# Patient Record
Sex: MALE | Race: WHITE | Employment: FULL TIME | URBAN - METROPOLITAN AREA
[De-identification: names, ages, dates, MRNs, and addresses within clinical notes are randomized per-mention and may not be internally consistent; named-entity substitution may affect disease eponyms.]

---

## 2017-01-04 ENCOUNTER — TELEPHONE (OUTPATIENT)
Dept: FAMILY MEDICINE CLINIC | Age: 57
End: 2017-01-04

## 2017-01-04 DIAGNOSIS — M17.11 PRIMARY OSTEOARTHRITIS OF RIGHT KNEE: ICD-10-CM

## 2017-01-04 RX ORDER — HYDROCODONE BITARTRATE AND ACETAMINOPHEN 5; 325 MG/1; MG/1
1 TABLET ORAL 2 TIMES DAILY PRN
Qty: 60 TABLET | Refills: 0 | Status: SHIPPED | OUTPATIENT
Start: 2017-01-04 | End: 2017-02-08 | Stop reason: SDUPTHER

## 2017-02-07 RX ORDER — ISOSORBIDE MONONITRATE 60 MG/1
TABLET, EXTENDED RELEASE ORAL
Qty: 30 TABLET | Refills: 1 | Status: SHIPPED | OUTPATIENT
Start: 2017-02-07 | End: 2017-03-05 | Stop reason: SDUPTHER

## 2017-02-08 ENCOUNTER — OFFICE VISIT (OUTPATIENT)
Dept: FAMILY MEDICINE CLINIC | Age: 57
End: 2017-02-08

## 2017-02-08 VITALS
DIASTOLIC BLOOD PRESSURE: 72 MMHG | HEART RATE: 70 BPM | SYSTOLIC BLOOD PRESSURE: 110 MMHG | WEIGHT: 153 LBS | HEIGHT: 66 IN | TEMPERATURE: 99.4 F | BODY MASS INDEX: 24.59 KG/M2 | RESPIRATION RATE: 12 BRPM

## 2017-02-08 DIAGNOSIS — I25.10 CAD S/P PERCUTANEOUS CORONARY ANGIOPLASTY: ICD-10-CM

## 2017-02-08 DIAGNOSIS — Z98.61 CAD S/P PERCUTANEOUS CORONARY ANGIOPLASTY: ICD-10-CM

## 2017-02-08 DIAGNOSIS — I10 BENIGN ESSENTIAL HTN: Primary | ICD-10-CM

## 2017-02-08 DIAGNOSIS — M17.11 PRIMARY OSTEOARTHRITIS OF RIGHT KNEE: ICD-10-CM

## 2017-02-08 PROCEDURE — 99213 OFFICE O/P EST LOW 20 MIN: CPT | Performed by: FAMILY MEDICINE

## 2017-02-08 RX ORDER — MELOXICAM 15 MG/1
15 TABLET ORAL DAILY
Qty: 30 TABLET | Refills: 3 | Status: SHIPPED | OUTPATIENT
Start: 2017-02-08 | End: 2017-07-18

## 2017-02-08 RX ORDER — HYDROCODONE BITARTRATE AND ACETAMINOPHEN 5; 325 MG/1; MG/1
1 TABLET ORAL 2 TIMES DAILY PRN
Qty: 60 TABLET | Refills: 0 | Status: SHIPPED | OUTPATIENT
Start: 2017-02-08 | End: 2017-03-08 | Stop reason: SDUPTHER

## 2017-02-19 ASSESSMENT — ENCOUNTER SYMPTOMS
SHORTNESS OF BREATH: 0
BLOOD IN STOOL: 0
ABDOMINAL PAIN: 0

## 2017-03-06 DIAGNOSIS — Z87.2: ICD-10-CM

## 2017-03-06 RX ORDER — METRONIDAZOLE 7.5 MG/G
GEL TOPICAL
Qty: 45 G | Refills: 2 | Status: SHIPPED | OUTPATIENT
Start: 2017-03-06 | End: 2018-05-02 | Stop reason: ALTCHOICE

## 2017-03-06 RX ORDER — ISOSORBIDE MONONITRATE 60 MG/1
60 TABLET, EXTENDED RELEASE ORAL DAILY
Qty: 30 TABLET | Refills: 2 | Status: SHIPPED | OUTPATIENT
Start: 2017-03-06 | End: 2017-06-05 | Stop reason: SDUPTHER

## 2017-03-08 DIAGNOSIS — M17.11 PRIMARY OSTEOARTHRITIS OF RIGHT KNEE: ICD-10-CM

## 2017-03-08 RX ORDER — HYDROCODONE BITARTRATE AND ACETAMINOPHEN 5; 325 MG/1; MG/1
1 TABLET ORAL 2 TIMES DAILY PRN
Qty: 60 TABLET | Refills: 0 | Status: SHIPPED | OUTPATIENT
Start: 2017-03-08 | End: 2017-04-07 | Stop reason: SDUPTHER

## 2017-03-14 ENCOUNTER — OFFICE VISIT (OUTPATIENT)
Dept: CARDIOLOGY | Age: 57
End: 2017-03-14

## 2017-03-14 VITALS
HEART RATE: 64 BPM | HEIGHT: 67 IN | WEIGHT: 152.8 LBS | DIASTOLIC BLOOD PRESSURE: 80 MMHG | BODY MASS INDEX: 23.98 KG/M2 | SYSTOLIC BLOOD PRESSURE: 120 MMHG

## 2017-03-14 DIAGNOSIS — I10 ESSENTIAL HYPERTENSION: Primary | ICD-10-CM

## 2017-03-14 DIAGNOSIS — Z72.0 TOBACCO ABUSE: Chronic | ICD-10-CM

## 2017-03-14 DIAGNOSIS — I25.10 CORONARY ARTERY DISEASE INVOLVING NATIVE CORONARY ARTERY OF NATIVE HEART WITHOUT ANGINA PECTORIS: Chronic | ICD-10-CM

## 2017-03-14 DIAGNOSIS — E78.5 DYSLIPIDEMIA: ICD-10-CM

## 2017-03-14 PROCEDURE — 93000 ELECTROCARDIOGRAM COMPLETE: CPT | Performed by: INTERNAL MEDICINE

## 2017-03-14 PROCEDURE — 99213 OFFICE O/P EST LOW 20 MIN: CPT | Performed by: INTERNAL MEDICINE

## 2017-03-31 RX ORDER — RAMIPRIL 10 MG/1
CAPSULE ORAL
Qty: 30 CAPSULE | Refills: 5 | Status: SHIPPED | OUTPATIENT
Start: 2017-03-31 | End: 2017-10-12 | Stop reason: SDUPTHER

## 2017-04-07 DIAGNOSIS — F41.0 ANXIETY ATTACK: ICD-10-CM

## 2017-04-07 DIAGNOSIS — M17.11 PRIMARY OSTEOARTHRITIS OF RIGHT KNEE: ICD-10-CM

## 2017-04-07 RX ORDER — ALPRAZOLAM 0.5 MG/1
TABLET ORAL
Qty: 30 TABLET | Refills: 1 | Status: SHIPPED | OUTPATIENT
Start: 2017-04-07 | End: 2017-10-01 | Stop reason: SDUPTHER

## 2017-04-07 RX ORDER — HYDROCODONE BITARTRATE AND ACETAMINOPHEN 5; 325 MG/1; MG/1
1 TABLET ORAL 2 TIMES DAILY PRN
Qty: 60 TABLET | Refills: 0 | Status: SHIPPED | OUTPATIENT
Start: 2017-04-07 | End: 2017-05-07 | Stop reason: SDUPTHER

## 2017-04-26 RX ORDER — PRAVASTATIN SODIUM 40 MG
40 TABLET ORAL NIGHTLY
Qty: 90 TABLET | Refills: 3 | Status: SHIPPED | OUTPATIENT
Start: 2017-04-26 | End: 2018-05-24 | Stop reason: SDUPTHER

## 2017-05-07 DIAGNOSIS — M17.11 PRIMARY OSTEOARTHRITIS OF RIGHT KNEE: ICD-10-CM

## 2017-05-07 RX ORDER — HYDROCODONE BITARTRATE AND ACETAMINOPHEN 5; 325 MG/1; MG/1
1 TABLET ORAL 2 TIMES DAILY PRN
Qty: 60 TABLET | Refills: 0 | Status: SHIPPED | OUTPATIENT
Start: 2017-05-07 | End: 2017-06-07 | Stop reason: SDUPTHER

## 2017-06-06 RX ORDER — ISOSORBIDE MONONITRATE 60 MG/1
60 TABLET, EXTENDED RELEASE ORAL DAILY
Qty: 30 TABLET | Refills: 5 | Status: SHIPPED | OUTPATIENT
Start: 2017-06-06 | End: 2018-01-11 | Stop reason: SDUPTHER

## 2017-06-07 DIAGNOSIS — M17.11 PRIMARY OSTEOARTHRITIS OF RIGHT KNEE: ICD-10-CM

## 2017-06-08 RX ORDER — HYDROCODONE BITARTRATE AND ACETAMINOPHEN 5; 325 MG/1; MG/1
1 TABLET ORAL 2 TIMES DAILY PRN
Qty: 60 TABLET | Refills: 0 | Status: SHIPPED | OUTPATIENT
Start: 2017-06-08 | End: 2017-07-18 | Stop reason: SDUPTHER

## 2017-07-18 ENCOUNTER — OFFICE VISIT (OUTPATIENT)
Dept: FAMILY MEDICINE CLINIC | Age: 57
End: 2017-07-18

## 2017-07-18 VITALS
HEART RATE: 64 BPM | TEMPERATURE: 99 F | RESPIRATION RATE: 16 BRPM | WEIGHT: 145 LBS | SYSTOLIC BLOOD PRESSURE: 130 MMHG | BODY MASS INDEX: 22.76 KG/M2 | DIASTOLIC BLOOD PRESSURE: 76 MMHG | HEIGHT: 67 IN

## 2017-07-18 DIAGNOSIS — F41.0 ANXIETY ATTACK: ICD-10-CM

## 2017-07-18 DIAGNOSIS — F43.21 ADJUSTMENT REACTION WITH BRIEF DEPRESSIVE REACTION: Primary | ICD-10-CM

## 2017-07-18 DIAGNOSIS — M17.11 PRIMARY OSTEOARTHRITIS OF RIGHT KNEE: ICD-10-CM

## 2017-07-18 PROCEDURE — 99213 OFFICE O/P EST LOW 20 MIN: CPT | Performed by: FAMILY MEDICINE

## 2017-07-18 PROCEDURE — G0444 DEPRESSION SCREEN ANNUAL: HCPCS | Performed by: FAMILY MEDICINE

## 2017-07-18 RX ORDER — FLUOXETINE 20 MG/1
20 TABLET, FILM COATED ORAL DAILY
Qty: 30 TABLET | Refills: 3 | Status: SHIPPED | OUTPATIENT
Start: 2017-07-18 | End: 2017-11-16 | Stop reason: SDUPTHER

## 2017-07-18 RX ORDER — HYDROCODONE BITARTRATE AND ACETAMINOPHEN 5; 325 MG/1; MG/1
1 TABLET ORAL 2 TIMES DAILY PRN
Qty: 60 TABLET | Refills: 0 | Status: SHIPPED | OUTPATIENT
Start: 2017-07-18 | End: 2017-08-17 | Stop reason: SDUPTHER

## 2017-07-18 ASSESSMENT — PATIENT HEALTH QUESTIONNAIRE - PHQ9
SUM OF ALL RESPONSES TO PHQ9 QUESTIONS 1 & 2: 5
4. FEELING TIRED OR HAVING LITTLE ENERGY: 3
1. LITTLE INTEREST OR PLEASURE IN DOING THINGS: 3
5. POOR APPETITE OR OVEREATING: 3
10. IF YOU CHECKED OFF ANY PROBLEMS, HOW DIFFICULT HAVE THESE PROBLEMS MADE IT FOR YOU TO DO YOUR WORK, TAKE CARE OF THINGS AT HOME, OR GET ALONG WITH OTHER PEOPLE: 1
7. TROUBLE CONCENTRATING ON THINGS, SUCH AS READING THE NEWSPAPER OR WATCHING TELEVISION: 0
SUM OF ALL RESPONSES TO PHQ QUESTIONS 1-9: 17
3. TROUBLE FALLING OR STAYING ASLEEP: 0
6. FEELING BAD ABOUT YOURSELF - OR THAT YOU ARE A FAILURE OR HAVE LET YOURSELF OR YOUR FAMILY DOWN: 3
2. FEELING DOWN, DEPRESSED OR HOPELESS: 2
9. THOUGHTS THAT YOU WOULD BE BETTER OFF DEAD, OR OF HURTING YOURSELF: 1
8. MOVING OR SPEAKING SO SLOWLY THAT OTHER PEOPLE COULD HAVE NOTICED. OR THE OPPOSITE, BEING SO FIGETY OR RESTLESS THAT YOU HAVE BEEN MOVING AROUND A LOT MORE THAN USUAL: 2

## 2017-07-18 ASSESSMENT — ENCOUNTER SYMPTOMS
BLOOD IN STOOL: 0
ABDOMINAL PAIN: 0
SHORTNESS OF BREATH: 0
CHEST TIGHTNESS: 0
NAUSEA: 0

## 2017-08-17 DIAGNOSIS — M17.11 PRIMARY OSTEOARTHRITIS OF RIGHT KNEE: ICD-10-CM

## 2017-08-17 RX ORDER — HYDROCODONE BITARTRATE AND ACETAMINOPHEN 5; 325 MG/1; MG/1
1 TABLET ORAL 2 TIMES DAILY PRN
Qty: 60 TABLET | Refills: 0 | Status: SHIPPED | OUTPATIENT
Start: 2017-08-17 | End: 2017-09-14 | Stop reason: SDUPTHER

## 2017-08-30 ENCOUNTER — OFFICE VISIT (OUTPATIENT)
Dept: FAMILY MEDICINE CLINIC | Age: 57
End: 2017-08-30

## 2017-08-30 VITALS
RESPIRATION RATE: 14 BRPM | HEIGHT: 67 IN | WEIGHT: 144 LBS | DIASTOLIC BLOOD PRESSURE: 78 MMHG | BODY MASS INDEX: 22.6 KG/M2 | SYSTOLIC BLOOD PRESSURE: 126 MMHG | HEART RATE: 59 BPM | TEMPERATURE: 98.7 F

## 2017-08-30 DIAGNOSIS — E78.5 DYSLIPIDEMIA: ICD-10-CM

## 2017-08-30 DIAGNOSIS — I10 ESSENTIAL HYPERTENSION: ICD-10-CM

## 2017-08-30 DIAGNOSIS — F32.4 MAJOR DEPRESSIVE DISORDER WITH SINGLE EPISODE, IN PARTIAL REMISSION (HCC): Primary | ICD-10-CM

## 2017-08-30 PROCEDURE — 99212 OFFICE O/P EST SF 10 MIN: CPT | Performed by: FAMILY MEDICINE

## 2017-08-31 ASSESSMENT — ENCOUNTER SYMPTOMS
ABDOMINAL PAIN: 0
BLOOD IN STOOL: 0
COUGH: 0

## 2017-09-14 DIAGNOSIS — M17.11 PRIMARY OSTEOARTHRITIS OF RIGHT KNEE: ICD-10-CM

## 2017-09-14 RX ORDER — HYDROCODONE BITARTRATE AND ACETAMINOPHEN 5; 325 MG/1; MG/1
1 TABLET ORAL 2 TIMES DAILY PRN
Qty: 60 TABLET | Refills: 0 | Status: SHIPPED | OUTPATIENT
Start: 2017-09-14 | End: 2017-10-12 | Stop reason: SDUPTHER

## 2017-10-01 DIAGNOSIS — F41.0 ANXIETY ATTACK: ICD-10-CM

## 2017-10-01 RX ORDER — ALPRAZOLAM 0.5 MG/1
TABLET ORAL
Qty: 30 TABLET | Refills: 0 | Status: SHIPPED | OUTPATIENT
Start: 2017-10-01 | End: 2017-11-29 | Stop reason: SDUPTHER

## 2017-10-12 DIAGNOSIS — M17.11 PRIMARY OSTEOARTHRITIS OF RIGHT KNEE: ICD-10-CM

## 2017-10-12 DIAGNOSIS — I10 ESSENTIAL HYPERTENSION: Primary | ICD-10-CM

## 2017-10-12 RX ORDER — RAMIPRIL 10 MG/1
CAPSULE ORAL
Qty: 30 CAPSULE | Refills: 5 | Status: SHIPPED | OUTPATIENT
Start: 2017-10-12 | End: 2018-04-25 | Stop reason: SDUPTHER

## 2017-10-12 RX ORDER — HYDROCODONE BITARTRATE AND ACETAMINOPHEN 5; 325 MG/1; MG/1
1 TABLET ORAL 2 TIMES DAILY PRN
Qty: 60 TABLET | Refills: 0 | Status: SHIPPED | OUTPATIENT
Start: 2017-10-12 | End: 2017-11-08 | Stop reason: SDUPTHER

## 2017-10-12 NOTE — TELEPHONE ENCOUNTER
Call, as rxs called    ; Controlled Substances Monitoring:     Attestation: The Prescription Monitoring Report for this patient was reviewed today. Ok Gale DO)  Documentation: Possible medication side effects, risk of tolerance and/or dependence, and alternative treatments discussed., No signs of potential drug abuse or diversion identified. Ok Gale DO)  Chronic Pain: Treatment objectives documented - patient is progressing appropriately. , Dose reduction has been attempted.  Ok Gale DO)

## 2017-10-12 NOTE — TELEPHONE ENCOUNTER
From: Yoni Ledesma  Sent: 10/12/2017 9:24 AM EDT  Subject: Medication Renewal Request    Yoni Ledesma would like a refill of the following medications:  ramipril (ALTACE) 10 MG capsule Nima Howardine, DO]  HYDROcodone-acetaminophen (Lillian Anthony) 5-325 MG per tablet Nima Boone, DO]    Preferred pharmacy: [address] [place] [address] Darrian [phone] - F [phone]    Comment:

## 2017-11-08 DIAGNOSIS — M17.11 PRIMARY OSTEOARTHRITIS OF RIGHT KNEE: ICD-10-CM

## 2017-11-08 RX ORDER — HYDROCODONE BITARTRATE AND ACETAMINOPHEN 5; 325 MG/1; MG/1
1 TABLET ORAL 2 TIMES DAILY PRN
Qty: 60 TABLET | Refills: 0 | Status: SHIPPED | OUTPATIENT
Start: 2017-11-08 | End: 2017-12-06 | Stop reason: SDUPTHER

## 2017-11-08 NOTE — TELEPHONE ENCOUNTER
Pt requesting refill please approve or deny.    Last seen 8/30/2017  Last filled 10/12/2017    Future Appointments  Date Time Provider Ginna Barnett   11/29/2017 9:30 AM DO KIKO Hernandez [facility] PCP [facility] EMERGENCY [facility] CENTER AT [place]

## 2017-11-08 NOTE — TELEPHONE ENCOUNTER
From: Julien Cabrales  Sent: 11/8/2017 8:22 AM EST  Subject: Medication Renewal Request    Julien Cabrales would like a refill of the following medications:  HYDROcodone-acetaminophen ([facility]) 5-325 MG per tablet Mallory Gao, DO]    Preferred pharmacy: [address] [place] [address] Darrian [phone] - F [phone]    Comment:  could I get either more or a higher dose they don't seem to work as well as they used to

## 2017-11-08 NOTE — TELEPHONE ENCOUNTER
Call-as rx called    ; Controlled Substances Monitoring:     Attestation: The Prescription Monitoring Report for this patient was reviewed today. Mallory Gao DO)  Documentation: Possible medication side effects, risk of tolerance and/or dependence, and alternative treatments discussed., No signs of potential drug abuse or diversion identified., Existing medication contract. Mallory Gao DO)  Chronic Pain: Treatment objectives documented - patient is progressing appropriately. , Functional status reviewed - continues with improved or maintaining ADL's., Reviewed the patient's functional status and documentation, including the 4A's of chronic pain treatment., Dose reduction has been attempted.  Mallory Gao DO)

## 2017-11-16 DIAGNOSIS — F43.21 ADJUSTMENT REACTION WITH BRIEF DEPRESSIVE REACTION: ICD-10-CM

## 2017-11-16 RX ORDER — FLUOXETINE 20 MG/1
20 TABLET, FILM COATED ORAL DAILY
Qty: 30 TABLET | Refills: 3 | Status: SHIPPED | OUTPATIENT
Start: 2017-11-16 | End: 2018-03-23 | Stop reason: SDUPTHER

## 2017-11-29 ENCOUNTER — OFFICE VISIT (OUTPATIENT)
Dept: FAMILY MEDICINE CLINIC | Age: 57
End: 2017-11-29

## 2017-11-29 VITALS
RESPIRATION RATE: 16 BRPM | TEMPERATURE: 97.9 F | DIASTOLIC BLOOD PRESSURE: 84 MMHG | BODY MASS INDEX: 23.39 KG/M2 | HEIGHT: 67 IN | WEIGHT: 149 LBS | SYSTOLIC BLOOD PRESSURE: 138 MMHG | HEART RATE: 61 BPM

## 2017-11-29 DIAGNOSIS — Z23 NEED FOR INFLUENZA VACCINATION: ICD-10-CM

## 2017-11-29 DIAGNOSIS — M17.11 LOCALIZED OSTEOARTHRITIS OF RIGHT KNEE: Primary | ICD-10-CM

## 2017-11-29 DIAGNOSIS — E78.5 DYSLIPIDEMIA: ICD-10-CM

## 2017-11-29 DIAGNOSIS — I10 ESSENTIAL HYPERTENSION: ICD-10-CM

## 2017-11-29 DIAGNOSIS — F41.0 ANXIETY ATTACK: ICD-10-CM

## 2017-11-29 PROCEDURE — G8420 CALC BMI NORM PARAMETERS: HCPCS | Performed by: FAMILY MEDICINE

## 2017-11-29 PROCEDURE — G8598 ASA/ANTIPLAT THER USED: HCPCS | Performed by: FAMILY MEDICINE

## 2017-11-29 PROCEDURE — G8484 FLU IMMUNIZE NO ADMIN: HCPCS | Performed by: FAMILY MEDICINE

## 2017-11-29 PROCEDURE — 99213 OFFICE O/P EST LOW 20 MIN: CPT | Performed by: FAMILY MEDICINE

## 2017-11-29 PROCEDURE — G8427 DOCREV CUR MEDS BY ELIG CLIN: HCPCS | Performed by: FAMILY MEDICINE

## 2017-11-29 PROCEDURE — 90688 IIV4 VACCINE SPLT 0.5 ML IM: CPT | Performed by: FAMILY MEDICINE

## 2017-11-29 PROCEDURE — 4004F PT TOBACCO SCREEN RCVD TLK: CPT | Performed by: FAMILY MEDICINE

## 2017-11-29 PROCEDURE — 90471 IMMUNIZATION ADMIN: CPT | Performed by: FAMILY MEDICINE

## 2017-11-29 PROCEDURE — 3017F COLORECTAL CA SCREEN DOC REV: CPT | Performed by: FAMILY MEDICINE

## 2017-11-29 RX ORDER — ALPRAZOLAM 0.5 MG/1
TABLET ORAL
Qty: 30 TABLET | Refills: 0 | Status: SHIPPED | OUTPATIENT
Start: 2017-11-29 | End: 2018-01-24 | Stop reason: SDUPTHER

## 2017-11-29 NOTE — PROGRESS NOTES
Subjective  Jair Rosa, 65 y.o. male presents today with:  Chief Complaint   Patient presents with    Depression     3 mnth f/u     Hypertension    Knee Pain       HPI  Patient presents today for a three month follow up for depression, hypertension, and coronary artery disease. NO exertional gallagher/palp/cp--on cardiac rxs/better diet  Still daily medial r-knee pain and occ swelling/pm[on norco, as benefit>>risk in this cad-pt]  Rev/rxs; No abuse nor side effects on meds   Mood/attacks much improved /prozac qam  Vaccine Information Sheet, \"Influenza - Inactivated\" OR \"Live - Intranasal\"  given to Jair Rosa. Patient responses:    Have you ever had a reaction to a flu vaccine? No  Are you able to eat eggs without adverse effects? Yes  Do you have any current illness? No  Have you ever had Guillian [place] Syndrome? No    Flu vaccine given per order. Please see immunization tab. Review of Systems   Constitutional: Negative for fatigue and fever. HENT: Positive for congestion. Eyes: Negative for visual disturbance. Respiratory: Negative for cough and shortness of breath. Cardiovascular: Negative for chest pain, palpitations and leg swelling. Gastrointestinal: Negative for abdominal pain, blood in stool and nausea. Genitourinary: Positive for frequency. Negative for decreased urine volume, difficulty urinating and dysuria. Musculoskeletal: Positive for arthralgias, gait problem (from r-knee pain) and myalgias. Neurological: Negative for weakness, light-headedness and headaches. Psychiatric/Behavioral: Positive for dysphoric mood (more stable/rx).        No Known Allergies    Past Medical History:   Diagnosis Date    Anxiety attack     Asthma     CAD (coronary artery disease)     Depression     Hypertension      Past Surgical History:   Procedure Laterality Date    CARDIAC CATHETERIZATION      TEMPOROMANDIBULAR JOINT SURGERY      Rt knee     Social History     Social History present. Cardiovascular: Normal rate, regular rhythm, S1 normal and S2 normal.   No extrasystoles are present. Exam reveals no S3.    Murmur heard. Systolic murmur is present with a grade of 1/6   Pulmonary/Chest: Effort normal and breath sounds normal.   Musculoskeletal: He exhibits no edema. Right knee: He exhibits decreased range of motion (nl hip exam) and bony tenderness. He exhibits no swelling, no effusion, no erythema, normal patellar mobility and normal meniscus. Tenderness found. Medial joint line (1/4 tx) tenderness noted. Skin: He is not diaphoretic. Psychiatric: Mood, memory and affect normal.   ;;Normal sensory/vascular foot exam and no lesions bilaterally   Assessment & Plan   1. Localized osteoarthritis of right knee  Comprehensive Metabolic Panel   2. Anxiety attack,depression,stable  ALPRAZolam (XANAX) 0.5 MG tablet   3. Need for influenza vaccination  INFLUENZA, QUADV, 3 YRS AND OLDER, IM, MDV, 0.5ML (Ellan Crumb)   4. Dyslipidemia  Lipid Panel    Comprehensive Metabolic Panel   5. Essential hypertension  Comprehensive Metabolic Panel   ; The patient is asked to make an attempt to improve diet and exercise patterns to aid in medical management of this problem. ;See above orders, as use and side effects reviewed ;Continue same meds, as common side effects and use reviewed-call if ineffective or problems   Call if worse  ; If worsening chest pain/weakness/ or SOB, get to ER; call 911 if severe or rapidly worsening symptoms. [ Jose Guadalupe Benitez soon/refill. ..--no s/e];xanax PRN only. ..   Orders Placed This Encounter   Procedures    INFLUENZA, QUADV, 3 YRS AND OLDER, IM, MDV, 0.5ML (FLUZONE QUADV)    Lipid Panel     Standing Status:   Future     Standing Expiration Date:   11/29/2018     Order Specific Question:   Is Patient Fasting?/# of Hours     Answer:   10 HOURS    Comprehensive Metabolic Panel     Standing Status:   Future     Standing Expiration Date:   11/29/2018     Orders Placed This

## 2017-12-06 DIAGNOSIS — M17.11 PRIMARY OSTEOARTHRITIS OF RIGHT KNEE: ICD-10-CM

## 2017-12-06 RX ORDER — HYDROCODONE BITARTRATE AND ACETAMINOPHEN 5; 325 MG/1; MG/1
1 TABLET ORAL 2 TIMES DAILY PRN
Qty: 60 TABLET | Refills: 0 | Status: SHIPPED | OUTPATIENT
Start: 2017-12-06 | End: 2018-01-04 | Stop reason: SDUPTHER

## 2017-12-06 ASSESSMENT — ENCOUNTER SYMPTOMS
BLOOD IN STOOL: 0
SHORTNESS OF BREATH: 0
NAUSEA: 0
ABDOMINAL PAIN: 0
COUGH: 0

## 2017-12-06 NOTE — TELEPHONE ENCOUNTER
Call, as rx called    ; Controlled Substances Monitoring:     Attestation: The Prescription Monitoring Report for this patient was reviewed today. Neil Machuca DO)  Documentation: Possible medication side effects, risk of tolerance and/or dependence, and alternative treatments discussed., No signs of potential drug abuse or diversion identified., Existing medication contract. Neil Machuca DO)  Chronic Pain: Treatment objectives documented - patient is progressing appropriately. , Functional status reviewed - continues with improved or maintaining ADL's., Reviewed the patient's functional status and documentation, including the 4A's of chronic pain treatment., Dose reduction has been attempted.  Neil Machuca, )

## 2017-12-06 NOTE — TELEPHONE ENCOUNTER
From: Josiah Herrera  Sent: 12/6/2017 9:32 AM EST  Subject: Medication Renewal Request    Erynfallon Javier would like a refill of the following medications:  HYDROcodone-acetaminophen ([facility] [facility]) 5-325 MG per tablet Demetri Ramon, DO]    Preferred pharmacy: [facility] Marlo Saleh [phone] - F [phone]    Comment:

## 2017-12-07 NOTE — PROGRESS NOTES
Subjective:      Patient ID: Lesvia Molina is a 65 y.o. male.     HPI    Review of Systems    Objective:   Physical Exam    Assessment:            Plan:

## 2018-01-04 DIAGNOSIS — M17.11 PRIMARY OSTEOARTHRITIS OF RIGHT KNEE: ICD-10-CM

## 2018-01-04 RX ORDER — HYDROCODONE BITARTRATE AND ACETAMINOPHEN 5; 325 MG/1; MG/1
1 TABLET ORAL 2 TIMES DAILY PRN
Qty: 60 TABLET | Refills: 0 | Status: SHIPPED | OUTPATIENT
Start: 2018-01-04 | End: 2018-01-31 | Stop reason: SDUPTHER

## 2018-01-04 NOTE — TELEPHONE ENCOUNTER
Pt requesting refill please approve or deny.    Last seen 11/29/2017  Last filled 12/06/2017    Future Appointments  Date Time Provider Ginna Barnett   1/31/2018 9:30 AM DO KIKO Block [facility] PCP [facility] AT [place]

## 2018-01-08 ENCOUNTER — TELEPHONE (OUTPATIENT)
Dept: FAMILY MEDICINE CLINIC | Age: 58
End: 2018-01-08

## 2018-01-08 NOTE — TELEPHONE ENCOUNTER
Received a PA for Roosevelt from [address] [place] [address]. Completed PA form and put on doctor's for signature, once signed needs faxed back to insurance. Will receive response by fax in 1-5 business days.

## 2018-01-12 RX ORDER — ISOSORBIDE MONONITRATE 60 MG/1
60 TABLET, EXTENDED RELEASE ORAL DAILY
Qty: 30 TABLET | Refills: 0 | Status: SHIPPED | OUTPATIENT
Start: 2018-01-12 | End: 2018-02-13 | Stop reason: SDUPTHER

## 2018-01-23 DIAGNOSIS — M17.11 LOCALIZED OSTEOARTHRITIS OF RIGHT KNEE: ICD-10-CM

## 2018-01-23 DIAGNOSIS — I10 ESSENTIAL HYPERTENSION: ICD-10-CM

## 2018-01-23 DIAGNOSIS — E78.5 DYSLIPIDEMIA: ICD-10-CM

## 2018-01-23 LAB
ALBUMIN SERPL-MCNC: 4.6 G/DL (ref 3.9–4.9)
ALP BLD-CCNC: 73 U/L (ref 35–104)
ALT SERPL-CCNC: 22 U/L (ref 0–41)
ANION GAP SERPL CALCULATED.3IONS-SCNC: 14 MEQ/L (ref 7–13)
AST SERPL-CCNC: 21 U/L (ref 0–40)
BILIRUB SERPL-MCNC: 0.5 MG/DL (ref 0–1.2)
BUN BLDV-MCNC: 7 MG/DL (ref 6–20)
CALCIUM SERPL-MCNC: 9.5 MG/DL (ref 8.6–10.2)
CHLORIDE BLD-SCNC: 101 MEQ/L (ref 98–107)
CHOLESTEROL, TOTAL: 174 MG/DL (ref 0–199)
CO2: 25 MEQ/L (ref 22–29)
CREAT SERPL-MCNC: 0.52 MG/DL (ref 0.7–1.2)
GFR AFRICAN AMERICAN: >60
GFR NON-AFRICAN AMERICAN: >60
GLOBULIN: 2.2 G/DL (ref 2.3–3.5)
GLUCOSE BLD-MCNC: 81 MG/DL (ref 74–109)
HDLC SERPL-MCNC: 67 MG/DL (ref 40–59)
LDL CHOLESTEROL CALCULATED: 86 MG/DL (ref 0–129)
POTASSIUM SERPL-SCNC: 5.2 MEQ/L (ref 3.5–5.1)
SODIUM BLD-SCNC: 140 MEQ/L (ref 132–144)
TOTAL PROTEIN: 6.8 G/DL (ref 6.4–8.1)
TRIGL SERPL-MCNC: 104 MG/DL (ref 0–200)

## 2018-01-24 DIAGNOSIS — F41.0 ANXIETY ATTACK: ICD-10-CM

## 2018-01-24 RX ORDER — ALPRAZOLAM 0.5 MG/1
TABLET ORAL
Qty: 30 TABLET | Refills: 1 | Status: SHIPPED | OUTPATIENT
Start: 2018-01-24 | End: 2018-04-27 | Stop reason: SDUPTHER

## 2018-01-24 NOTE — TELEPHONE ENCOUNTER
From: Wilfrido Mac  Sent: 1/24/2018 9:56 AM EST  Subject: Medication Renewal Request    Wilfrido Mac would like a refill of the following medications:  ALPRAZolam Valri Turner) 0.5 MG tablet JACKIE Chakraborty    Preferred pharmacy: [address] [place] [address] Way, [address] [place] [phone] - F [phone]    Comment:

## 2018-01-31 ENCOUNTER — OFFICE VISIT (OUTPATIENT)
Dept: FAMILY MEDICINE CLINIC | Age: 58
End: 2018-01-31
Payer: COMMERCIAL

## 2018-01-31 VITALS
HEART RATE: 60 BPM | RESPIRATION RATE: 14 BRPM | HEIGHT: 67 IN | SYSTOLIC BLOOD PRESSURE: 120 MMHG | DIASTOLIC BLOOD PRESSURE: 74 MMHG | TEMPERATURE: 98.4 F | WEIGHT: 148 LBS | BODY MASS INDEX: 23.23 KG/M2

## 2018-01-31 DIAGNOSIS — F32.0 MILD SINGLE CURRENT EPISODE OF MAJOR DEPRESSIVE DISORDER (HCC): Primary | ICD-10-CM

## 2018-01-31 DIAGNOSIS — M17.11 PRIMARY OSTEOARTHRITIS OF RIGHT KNEE: ICD-10-CM

## 2018-01-31 DIAGNOSIS — E78.5 DYSLIPIDEMIA: ICD-10-CM

## 2018-01-31 PROCEDURE — 3017F COLORECTAL CA SCREEN DOC REV: CPT | Performed by: FAMILY MEDICINE

## 2018-01-31 PROCEDURE — 4004F PT TOBACCO SCREEN RCVD TLK: CPT | Performed by: FAMILY MEDICINE

## 2018-01-31 PROCEDURE — G8427 DOCREV CUR MEDS BY ELIG CLIN: HCPCS | Performed by: FAMILY MEDICINE

## 2018-01-31 PROCEDURE — G8598 ASA/ANTIPLAT THER USED: HCPCS | Performed by: FAMILY MEDICINE

## 2018-01-31 PROCEDURE — 99213 OFFICE O/P EST LOW 20 MIN: CPT | Performed by: FAMILY MEDICINE

## 2018-01-31 PROCEDURE — G8420 CALC BMI NORM PARAMETERS: HCPCS | Performed by: FAMILY MEDICINE

## 2018-01-31 PROCEDURE — G8484 FLU IMMUNIZE NO ADMIN: HCPCS | Performed by: FAMILY MEDICINE

## 2018-01-31 RX ORDER — HYDROCODONE BITARTRATE AND ACETAMINOPHEN 5; 325 MG/1; MG/1
1 TABLET ORAL 2 TIMES DAILY PRN
Qty: 60 TABLET | Refills: 0 | Status: SHIPPED | OUTPATIENT
Start: 2018-01-31 | End: 2018-03-02 | Stop reason: SDUPTHER

## 2018-01-31 NOTE — PROGRESS NOTES
Subjective  Ramila See, 65 y.o. male presents today with:  Chief Complaint   Patient presents with    Depression     9 wk f/u     Knee Pain    Results       HPI  Patient presents today for a nine week follow up for depression, knee pain, and lab results. Still r-medial knee pain  ; No cardio-pulmonary probs;compliant with diet/rxs;no new gi-gu complaints rev/rxs; No abuse nor side effects on meds benefti norco>>risk for r- knee  NO cp/palp/gallagher  Mood ok/rxs  Review of Systems   Constitutional: Negative for fatigue and fever. Eyes: Negative for visual disturbance. Respiratory: Negative for choking. Cardiovascular: Negative for chest pain, palpitations and leg swelling. Gastrointestinal: Negative for abdominal distention and blood in stool. Genitourinary: Negative for dysuria and frequency. Musculoskeletal: Positive for arthralgias and myalgias. Neurological: Negative for headaches. Psychiatric/Behavioral: Positive for sleep disturbance. Negative for suicidal ideas. The patient is nervous/anxious.         No Known Allergies    Past Medical History:   Diagnosis Date    Anxiety attack     Asthma     CAD (coronary artery disease)     Depression     Hypertension      Past Surgical History:   Procedure Laterality Date    CARDIAC CATHETERIZATION      TEMPOROMANDIBULAR JOINT SURGERY      Rt knee     Social History     Social History    Marital status:      Spouse name: N/A    Number of children: N/A    Years of education: N/A     Occupational History    EMPLOYED      [employer]     Social History Main Topics    Smoking status: Current Every Day Smoker     Packs/day: 1.00     Types: Cigarettes    Smokeless tobacco: Never Used    Alcohol use Yes      Comment: OCCASONIALLY    Drug use: Unknown    Sexual activity: Not on file     Other Topics Concern    Not on file     Social History Narrative    No narrative on file     Family History   Problem Relation Age of Onset    Heart Disease Mother 58          Current Outpatient Prescriptions on File Prior to Visit   Medication Sig Dispense Refill    ALPRAZolam (XANAX) 0.5 MG tablet Take 1 qpm if needed for attacks/anxiety. 30 tablet 1    isosorbide mononitrate (IMDUR) 60 MG extended release tablet Take 1 tablet by mouth daily PLEASE SCHEDULE FOLLOW UP FOR REFILLS 30 tablet 0    metoprolol tartrate (LOPRESSOR) 25 MG tablet TAKE ONE TABLET BY MOUTH TWICE DAILY 60 tablet 5    FLUoxetine (PROZAC) 20 MG tablet Take 1 tablet by mouth daily 30 tablet 3    ramipril (ALTACE) 10 MG capsule take 1 capsule by mouth once daily 30 capsule 5    pravastatin (PRAVACHOL) 40 MG tablet Take 1 tablet by mouth nightly 90 tablet 3    metroNIDAZOLE (METROGEL) 0.75 % gel Apply topically 2 times daily. 45 g 2    NITROSTAT 0.4 MG SL tablet       albuterol (PROAIR HFA) 108 (90 BASE) MCG/ACT inhaler Inhale 2 puffs into the lungs every 4 hours as needed for Wheezing 1 Inhaler 3    aspirin 81 MG tablet Take 81 mg by mouth daily. No current facility-administered medications on file prior to visit. Objective    Vitals:    01/31/18 0929   BP: 120/74   Pulse: 60   Resp: 14   Temp: 98.4 °F (36.9 °C)   TempSrc: Temporal   Weight: 148 lb (67.1 kg)   Height: 5' 7\" (1.702 m)     Physical Exam   Constitutional: He is oriented to person, place, and time and well-developed, well-nourished, and in no distress. No distress. Eyes: No scleral icterus. Neck: Normal range of motion. Neck supple. Carotid bruit is not present. No neck rigidity. No thyroid mass and no thyromegaly present. Cardiovascular: Normal rate, regular rhythm, S1 normal, S2 normal and normal heart sounds. No extrasystoles are present. No murmur heard. Pulses:       Dorsalis pedis pulses are 2+ on the right side, and 2+ on the left side. Posterior tibial pulses are 2+ on the right side, and 2+ on the left side.    Pulmonary/Chest: Effort normal and breath sounds normal. mg by mouth daily.  [DISCONTINUED] HYDROcodone-acetaminophen (NORCO) 5-325 MG per tablet Take 1 tablet by mouth 2 times daily as needed for Pain for up to 30 days. 60 tablet 0     No facility-administered encounter medications on file as of 1/31/2018.       ;If worsening chest pain/weakness/ or SOB, get to ER; call 911 if severe or rapidly worsening symptoms. No orders of the defined types were placed in this encounter. ;Controlled Substances Monitoring:   ;Controlled Substances Monitoring:     Attestation: The Prescription Monitoring Report for this patient was reviewed today. Slick Patrick DO)  Documentation: Possible medication side effects, risk of tolerance and/or dependence, and alternative treatments discussed., No signs of potential drug abuse or diversion identified. Slick Patrick DO)  Chronic Pain: Treatment objectives documented - patient is progressing appropriately. , Functional status reviewed - continues with improved or maintaining ADL's., Reviewed the patient's functional status and documentation, including the 4A's of chronic pain treatment., Dose reduction has been attempted. Slick Patrick DO)  Medication Contracts: Medication contract signed today., Existing medication contract. Slick Patrick DO)    Attestation: The Prescription Monitoring Report for this patient was reviewed today. Slick Patrick DO)  Documentation: Possible medication side effects, risk of tolerance and/or dependence, and alternative treatments discussed., No signs of potential drug abuse or diversion identified. Slick Patrick DO)  Chronic Pain: Treatment objectives documented - patient is progressing appropriately. , Functional status reviewed - continues with improved or maintaining ADL's., Reviewed the patient's functional status and documentation, including the 4A's of chronic pain treatment., Dose reduction has been attempted.  Slick Patrick DO)  Medication Contracts: Medication contract signed today., Existing medication contract. Eduardo Casanova DO)   Orders Placed This Encounter   Medications    HYDROcodone-acetaminophen (NORCO) 5-325 MG per tablet     Sig: Take 1 tablet by mouth 2 times daily as needed for Pain for up to 30 days. Earliest Fill Date: 1/31/18     Dispense:  60 tablet     Refill:  0     Oarrs done     Medications Discontinued During This Encounter   Medication Reason    HYDROcodone-acetaminophen (NORCO) 5-325 MG per tablet Reorder     Return in about 3 months (around 4/30/2018). Call or return to clinic prn if these symptoms worsen or fail to improve as anticipated.       Eduardo Casanova DO

## 2018-01-31 NOTE — PROGRESS NOTES
Subjective:      Patient ID: Bertha Younger is a 65 y.o. male.     HPI    Review of Systems    Objective:   Physical Exam    Assessment:          Plan:

## 2018-01-31 NOTE — LETTER
· I will protect my prescriptions and medications. I understand that lost or misplaced prescriptions will not be replaced. · I will keep medications only for my own use and will not share them with others. I will keep all medications away from children. · I agree to participate in any medical, psychological or psychiatric assessments recommended by my provider. · I will actively participate in any program designed to improve function, including social, physical, psychological and daily or work activities. 2. I will not use illegal or street drugs or another person's prescription. If I have an addiction problem with drugs or alcohol and my provider asks me to enter a program to address this issue, I agree to follow through. Such programs may include:  · 12-Step program and securing a sponsor  · Individual counseling   · Inpatient or outpatient treatment  · Other:_____________________________________________________________________________________________________________________________________________    If in treatment, I will request that a copy of the programs initial evaluation and treatment recommendations be sent to this provider and will not expect refills until that is received. I will also request written monthly updates be sent to this provider to verify my continuing treatment. 3. I will consent to drug screening upon my providers request to assure I am only taking the prescribed drugs, described in this MEDICATION AGREEMENT. I understand that a drug screen is a laboratory test in which a sample of my urine, blood or saliva is checked to see what drugs I have been taking. 4. I agree that I will treat the providers and staff at this office with respect at all times. I will keep all of my scheduled appointments, but if I need to cancel my appointment, I will do so a minimum of 24 hours before it is scheduled.       5. I understand that this provider may stop prescribing the medications listed if:  · I do not show any improvement in pain, or my activity has not improved. · I develop rapid tolerance or loss of improvement, as described in my treatment plan. · I develop significant side effects from the medication. · My behavior is inconsistent with the responsibilities outlined above, which may also result in my being prevented from receiving further care from this office. · Other:____________________________________________________________________    AGREEMENT:    I have read the above and have had all of my questions answered. For chronic disease management, I know that my symptoms can be managed with many types of treatments. A chronic medication trial may be part of my treatment, but I must be an active participant in my care. Medication therapy is only one part of my symptom management plan. In some cases, there may be limited scientific evidence to support the chronic use of certain medications to improve symptoms and daily function. Furthermore, in certain circumstances, there may be scientific information that suggests that use of chronic controlled substances may actually worsen my symptoms and increase my risk of unintentional death directly related to this medication therapy. I know that if my provider feels my risk from controlled medications is greater than my benefit, I will have my controlled substance medication(s) compassionately lowered or removed altogether. I agree to a controlled substance medication trial.      I further agree to allow this office to contact family or friends if there are concerns about my safety and use of the controlled medications. I have agreed to use the following medications above as instructed by my physician and as stated in this [place] [address].      Patient Signature:  ______________________  Date:1/31/2018 or _____________    Provider Signature:______________________  Date:1/31/2018 or _____________

## 2018-02-01 ASSESSMENT — ENCOUNTER SYMPTOMS
ABDOMINAL DISTENTION: 0
BLOOD IN STOOL: 0
CHOKING: 0

## 2018-02-02 ENCOUNTER — TELEPHONE (OUTPATIENT)
Dept: FAMILY MEDICINE CLINIC | Age: 58
End: 2018-02-02

## 2018-02-13 NOTE — TELEPHONE ENCOUNTER
From: Kaz Gloria  Sent: 2/13/2018 7:44 AM EST  Subject: Medication Renewal Request    Kaz Gloria would like a refill of the following medications:  isosorbide mononitrate (IMDUR) 60 MG extended release tablet Katelyn Sheffield DOTameka    Preferred pharmacy: [address] [place] [address], [address] [address] [place] [phone] - F [phone]    Comment:

## 2018-02-14 RX ORDER — ISOSORBIDE MONONITRATE 60 MG/1
60 TABLET, EXTENDED RELEASE ORAL DAILY
Qty: 30 TABLET | Refills: 0 | Status: SHIPPED | OUTPATIENT
Start: 2018-02-14 | End: 2018-03-16 | Stop reason: SDUPTHER

## 2018-03-02 DIAGNOSIS — M17.11 PRIMARY OSTEOARTHRITIS OF RIGHT KNEE: ICD-10-CM

## 2018-03-02 RX ORDER — HYDROCODONE BITARTRATE AND ACETAMINOPHEN 5; 325 MG/1; MG/1
1 TABLET ORAL 2 TIMES DAILY PRN
Qty: 60 TABLET | Refills: 0 | Status: SHIPPED | OUTPATIENT
Start: 2018-03-02 | End: 2018-04-03 | Stop reason: SDUPTHER

## 2018-03-02 NOTE — TELEPHONE ENCOUNTER
Patient REQUESTING REFILL. ORDER PENDED.  THANK YOU.    LOV-1/31/2018    Future Appointments  Date Time Provider Ginna Barnett   5/2/2018 8:30 AM General [facility], THE [facility] AT [place]

## 2018-03-02 NOTE — TELEPHONE ENCOUNTER
From: Dinesh Martinez  Sent: 3/2/2018 9:46 AM EST  Subject: Medication Renewal Request    Dinesh Martinez would like a refill of the following medications:     HYDROcodone-acetaminophen (Radhadorina Solis) 5-325 MG per tablet JACKIE Guerra    Preferred pharmacy: [facility] Marlo Saleh [phone] - F [phone]    Comment:

## 2018-03-02 NOTE — TELEPHONE ENCOUNTER
Call, as rx called    ; Controlled Substances Monitoring: The Prescription Monitoring Report for this patient was reviewed today. Duglas Michel DO)    Possible medication side effects, risk of tolerance/dependence & alternative treatments discussed., No signs of potential drug abuse or diversion identified. Duglas Michel DO)         Treatment objectives documented - patient is progressing appropriately. , Functional status reviewed - continues with improved or maintaining ADL's., Dose reduction has been attempted., Reviewed the patient's functional status and documentation. Duglas Michel DO)    Existing medication contract.  Duglas Michel DO)

## 2018-03-22 RX ORDER — ISOSORBIDE MONONITRATE 60 MG/1
60 TABLET, EXTENDED RELEASE ORAL DAILY
Qty: 30 TABLET | Refills: 0 | Status: SHIPPED | OUTPATIENT
Start: 2018-03-22 | End: 2018-05-08

## 2018-03-23 DIAGNOSIS — F43.21 ADJUSTMENT REACTION WITH BRIEF DEPRESSIVE REACTION: ICD-10-CM

## 2018-03-23 RX ORDER — FLUOXETINE HYDROCHLORIDE 20 MG/1
CAPSULE ORAL
Qty: 30 CAPSULE | Refills: 3 | Status: SHIPPED | OUTPATIENT
Start: 2018-03-23 | End: 2018-08-02 | Stop reason: SDUPTHER

## 2018-04-03 DIAGNOSIS — M17.11 PRIMARY OSTEOARTHRITIS OF RIGHT KNEE: ICD-10-CM

## 2018-04-03 RX ORDER — HYDROCODONE BITARTRATE AND ACETAMINOPHEN 5; 325 MG/1; MG/1
1 TABLET ORAL 2 TIMES DAILY PRN
Qty: 60 TABLET | Refills: 0 | Status: SHIPPED | OUTPATIENT
Start: 2018-04-03 | End: 2018-05-02 | Stop reason: SDUPTHER

## 2018-04-25 DIAGNOSIS — I10 ESSENTIAL HYPERTENSION: ICD-10-CM

## 2018-04-25 RX ORDER — RAMIPRIL 10 MG/1
CAPSULE ORAL
Qty: 30 CAPSULE | Refills: 5 | Status: SHIPPED | OUTPATIENT
Start: 2018-04-25 | End: 2018-11-09 | Stop reason: SDUPTHER

## 2018-04-27 DIAGNOSIS — F41.0 ANXIETY ATTACK: ICD-10-CM

## 2018-04-27 RX ORDER — ALPRAZOLAM 0.5 MG/1
TABLET ORAL
Qty: 30 TABLET | Refills: 1 | Status: SHIPPED | OUTPATIENT
Start: 2018-04-27 | End: 2018-08-01 | Stop reason: SDUPTHER

## 2018-05-02 ENCOUNTER — OFFICE VISIT (OUTPATIENT)
Dept: FAMILY MEDICINE CLINIC | Age: 58
End: 2018-05-02
Payer: COMMERCIAL

## 2018-05-02 VITALS
BODY MASS INDEX: 23.07 KG/M2 | HEIGHT: 67 IN | HEART RATE: 61 BPM | RESPIRATION RATE: 14 BRPM | DIASTOLIC BLOOD PRESSURE: 88 MMHG | WEIGHT: 147 LBS | SYSTOLIC BLOOD PRESSURE: 124 MMHG | TEMPERATURE: 98.2 F

## 2018-05-02 DIAGNOSIS — J45.20 MILD INTERMITTENT ASTHMA WITHOUT COMPLICATION: ICD-10-CM

## 2018-05-02 DIAGNOSIS — I10 ESSENTIAL HYPERTENSION: ICD-10-CM

## 2018-05-02 DIAGNOSIS — E78.5 DYSLIPIDEMIA: ICD-10-CM

## 2018-05-02 DIAGNOSIS — M17.11 PRIMARY OSTEOARTHRITIS OF RIGHT KNEE: ICD-10-CM

## 2018-05-02 DIAGNOSIS — I25.10 CORONARY ARTERY DISEASE INVOLVING NATIVE CORONARY ARTERY OF NATIVE HEART WITHOUT ANGINA PECTORIS: Primary | Chronic | ICD-10-CM

## 2018-05-02 PROCEDURE — 4004F PT TOBACCO SCREEN RCVD TLK: CPT | Performed by: FAMILY MEDICINE

## 2018-05-02 PROCEDURE — 99213 OFFICE O/P EST LOW 20 MIN: CPT | Performed by: FAMILY MEDICINE

## 2018-05-02 PROCEDURE — 3017F COLORECTAL CA SCREEN DOC REV: CPT | Performed by: FAMILY MEDICINE

## 2018-05-02 PROCEDURE — G8420 CALC BMI NORM PARAMETERS: HCPCS | Performed by: FAMILY MEDICINE

## 2018-05-02 PROCEDURE — G8427 DOCREV CUR MEDS BY ELIG CLIN: HCPCS | Performed by: FAMILY MEDICINE

## 2018-05-02 PROCEDURE — G8598 ASA/ANTIPLAT THER USED: HCPCS | Performed by: FAMILY MEDICINE

## 2018-05-02 RX ORDER — ALBUTEROL SULFATE 90 UG/1
2 AEROSOL, METERED RESPIRATORY (INHALATION) EVERY 4 HOURS PRN
Qty: 1 INHALER | Refills: 3 | Status: SHIPPED | OUTPATIENT
Start: 2018-05-02

## 2018-05-02 RX ORDER — HYDROCODONE BITARTRATE AND ACETAMINOPHEN 5; 325 MG/1; MG/1
1 TABLET ORAL 2 TIMES DAILY PRN
Qty: 60 TABLET | Refills: 0 | Status: SHIPPED | OUTPATIENT
Start: 2018-05-02 | End: 2018-05-31 | Stop reason: SDUPTHER

## 2018-05-08 ENCOUNTER — OFFICE VISIT (OUTPATIENT)
Dept: CARDIOLOGY CLINIC | Age: 58
End: 2018-05-08
Payer: COMMERCIAL

## 2018-05-08 VITALS
HEIGHT: 67 IN | DIASTOLIC BLOOD PRESSURE: 80 MMHG | HEART RATE: 57 BPM | WEIGHT: 149.8 LBS | BODY MASS INDEX: 23.51 KG/M2 | SYSTOLIC BLOOD PRESSURE: 130 MMHG

## 2018-05-08 DIAGNOSIS — Z72.0 TOBACCO ABUSE: Chronic | ICD-10-CM

## 2018-05-08 DIAGNOSIS — E78.5 DYSLIPIDEMIA: ICD-10-CM

## 2018-05-08 DIAGNOSIS — I25.10 CORONARY ARTERY DISEASE INVOLVING NATIVE CORONARY ARTERY OF NATIVE HEART WITHOUT ANGINA PECTORIS: Chronic | ICD-10-CM

## 2018-05-08 DIAGNOSIS — I10 ESSENTIAL HYPERTENSION: Primary | ICD-10-CM

## 2018-05-08 DIAGNOSIS — R07.2 PRECORDIAL PAIN: Chronic | ICD-10-CM

## 2018-05-08 DIAGNOSIS — Z82.49 FAMILY HISTORY OF PREMATURE CAD: Chronic | ICD-10-CM

## 2018-05-08 PROCEDURE — G8427 DOCREV CUR MEDS BY ELIG CLIN: HCPCS | Performed by: INTERNAL MEDICINE

## 2018-05-08 PROCEDURE — G8598 ASA/ANTIPLAT THER USED: HCPCS | Performed by: INTERNAL MEDICINE

## 2018-05-08 PROCEDURE — 3017F COLORECTAL CA SCREEN DOC REV: CPT | Performed by: INTERNAL MEDICINE

## 2018-05-08 PROCEDURE — 99214 OFFICE O/P EST MOD 30 MIN: CPT | Performed by: INTERNAL MEDICINE

## 2018-05-08 PROCEDURE — G8420 CALC BMI NORM PARAMETERS: HCPCS | Performed by: INTERNAL MEDICINE

## 2018-05-08 PROCEDURE — 4004F PT TOBACCO SCREEN RCVD TLK: CPT | Performed by: INTERNAL MEDICINE

## 2018-05-08 PROCEDURE — 93000 ELECTROCARDIOGRAM COMPLETE: CPT | Performed by: INTERNAL MEDICINE

## 2018-05-09 ASSESSMENT — ENCOUNTER SYMPTOMS
SHORTNESS OF BREATH: 0
CHEST TIGHTNESS: 0
NAUSEA: 0
ABDOMINAL PAIN: 0
BLOOD IN STOOL: 0
DIARRHEA: 0
COUGH: 0

## 2018-05-25 RX ORDER — PRAVASTATIN SODIUM 40 MG
40 TABLET ORAL NIGHTLY
Qty: 90 TABLET | Refills: 3 | Status: SHIPPED | OUTPATIENT
Start: 2018-05-25 | End: 2019-06-19 | Stop reason: SDUPTHER

## 2018-05-31 DIAGNOSIS — M17.11 PRIMARY OSTEOARTHRITIS OF RIGHT KNEE: ICD-10-CM

## 2018-05-31 RX ORDER — HYDROCODONE BITARTRATE AND ACETAMINOPHEN 5; 325 MG/1; MG/1
1 TABLET ORAL 2 TIMES DAILY PRN
Qty: 60 TABLET | Refills: 0 | Status: SHIPPED | OUTPATIENT
Start: 2018-05-31 | End: 2018-06-29 | Stop reason: SDUPTHER

## 2018-06-29 DIAGNOSIS — M17.11 PRIMARY OSTEOARTHRITIS OF RIGHT KNEE: ICD-10-CM

## 2018-06-29 RX ORDER — HYDROCODONE BITARTRATE AND ACETAMINOPHEN 5; 325 MG/1; MG/1
1 TABLET ORAL 2 TIMES DAILY PRN
Qty: 60 TABLET | Refills: 0 | Status: SHIPPED | OUTPATIENT
Start: 2018-06-29 | End: 2018-07-30 | Stop reason: SDUPTHER

## 2018-06-29 NOTE — TELEPHONE ENCOUNTER
From: Jamari Todd  Sent: 6/28/2018 8:41 PM EDT  Subject: Medication Renewal Request    Jamari Todd would like a refill of the following medications:     HYDROcodone-acetaminophen (Lana [place]) 5-325 MG per tablet Dilshad Stewart, DO]    Preferred pharmacy: [facility] Marlo Saleh [phone] - F [phone]    Comment:

## 2018-07-10 DIAGNOSIS — I10 ESSENTIAL HYPERTENSION: Primary | ICD-10-CM

## 2018-08-01 ENCOUNTER — PATIENT MESSAGE (OUTPATIENT)
Dept: FAMILY MEDICINE CLINIC | Age: 58
End: 2018-08-01

## 2018-08-01 DIAGNOSIS — F41.0 ANXIETY ATTACK: ICD-10-CM

## 2018-08-01 RX ORDER — ALPRAZOLAM 0.5 MG/1
TABLET ORAL
Qty: 30 TABLET | Refills: 1 | Status: SHIPPED | OUTPATIENT
Start: 2018-08-01 | End: 2018-10-22 | Stop reason: SDUPTHER

## 2018-08-01 NOTE — TELEPHONE ENCOUNTER
PATIENT REQUESTING A REFILL. PATIENT LAST SEEN 5/2/18  LAST REFILL 4/27/18  PLEASE APPROVE OR DENY.        Future Appointments  Date Time Provider Ginna Barnett   9/5/2018 9:30 AM Linda Mac [facility], THE [facility]   11/13/2018 9:00 AM Carter Vega [facility]

## 2018-08-01 NOTE — TELEPHONE ENCOUNTER
From: Jorje Conn  To: Paulina Ascencio DO  Sent: 8/1/2018 9:37 AM EDT  Subject: Prescription Question    I need my prescription for xanax refilled it's not showing up on my chart

## 2018-08-02 DIAGNOSIS — F43.21 ADJUSTMENT REACTION WITH BRIEF DEPRESSIVE REACTION: ICD-10-CM

## 2018-08-02 RX ORDER — FLUOXETINE HYDROCHLORIDE 20 MG/1
CAPSULE ORAL
Qty: 30 CAPSULE | Refills: 3 | Status: SHIPPED | OUTPATIENT
Start: 2018-08-02 | End: 2018-12-09 | Stop reason: SDUPTHER

## 2018-08-02 NOTE — TELEPHONE ENCOUNTER
From: Rosie Winchester  Sent: 8/2/2018 6:27 AM EDT  Subject: Medication Renewal Request    Rosie Winchester would like a refill of the following medications:     FLUoxetine (PROZAC) 20 MG capsule JACKIE Trujillo    Preferred pharmacy: [address], [address] [address] [place] [phone] - F [phone]    Comment:

## 2018-08-28 DIAGNOSIS — M17.11 PRIMARY OSTEOARTHRITIS OF RIGHT KNEE: ICD-10-CM

## 2018-08-28 RX ORDER — HYDROCODONE BITARTRATE AND ACETAMINOPHEN 5; 325 MG/1; MG/1
1 TABLET ORAL 2 TIMES DAILY PRN
Qty: 60 TABLET | Refills: 0 | Status: SHIPPED | OUTPATIENT
Start: 2018-08-28 | End: 2018-09-27 | Stop reason: SDUPTHER

## 2018-08-28 NOTE — TELEPHONE ENCOUNTER
Call, as rx called    ; Controlled Substances Monitoring:     RX Monitoring 8/28/2018   Attestation The Prescription Monitoring Report for this patient was reviewed today. Documentation Possible medication side effects, risk of tolerance/dependence & alternative treatments discussed. ;Obtaining appropriate analgesic effect of treatment. ;No signs of potential drug abuse or diversion identified. Chronic Pain Treatment objectives documented - patient is progressing appropriately. ;Functional status reviewed - continues with improved or maintaining ADL's.;Reviewed the patient's functional status and documentation. ;Dose reduction has been attempted. Medication Contracts Existing medication contract.

## 2018-08-28 NOTE — TELEPHONE ENCOUNTER
From: Sylvester Thomas  Sent: 8/28/2018 6:26 AM EDT  Subject: Medication Renewal Request    Sylvester Thomas would like a refill of the following medications:     HYDROcodone-acetaminophen ([facility] [facility]) 5-325 MG per tablet Laura Ybarra DO]    Preferred pharmacy: [facility] Marlo Saleh [phone] - F [phone]    Comment:

## 2018-09-05 ENCOUNTER — OFFICE VISIT (OUTPATIENT)
Dept: FAMILY MEDICINE CLINIC | Age: 58
End: 2018-09-05
Payer: COMMERCIAL

## 2018-09-05 VITALS
TEMPERATURE: 97.8 F | HEART RATE: 56 BPM | HEIGHT: 67 IN | DIASTOLIC BLOOD PRESSURE: 82 MMHG | WEIGHT: 146 LBS | RESPIRATION RATE: 14 BRPM | SYSTOLIC BLOOD PRESSURE: 130 MMHG | BODY MASS INDEX: 22.91 KG/M2

## 2018-09-05 DIAGNOSIS — M17.11 PRIMARY OSTEOARTHRITIS OF RIGHT KNEE: ICD-10-CM

## 2018-09-05 DIAGNOSIS — E78.5 DYSLIPIDEMIA: ICD-10-CM

## 2018-09-05 DIAGNOSIS — I25.10 CORONARY ARTERY DISEASE INVOLVING NATIVE CORONARY ARTERY OF NATIVE HEART WITHOUT ANGINA PECTORIS: Chronic | ICD-10-CM

## 2018-09-05 DIAGNOSIS — F41.0 ANXIETY ATTACK: ICD-10-CM

## 2018-09-05 DIAGNOSIS — I10 ESSENTIAL HYPERTENSION: Primary | ICD-10-CM

## 2018-09-05 DIAGNOSIS — Z23 NEED FOR PROPHYLACTIC VACCINATION AND INOCULATION AGAINST VARICELLA: ICD-10-CM

## 2018-09-05 PROCEDURE — 3017F COLORECTAL CA SCREEN DOC REV: CPT | Performed by: FAMILY MEDICINE

## 2018-09-05 PROCEDURE — 4004F PT TOBACCO SCREEN RCVD TLK: CPT | Performed by: FAMILY MEDICINE

## 2018-09-05 PROCEDURE — G8420 CALC BMI NORM PARAMETERS: HCPCS | Performed by: FAMILY MEDICINE

## 2018-09-05 PROCEDURE — G8427 DOCREV CUR MEDS BY ELIG CLIN: HCPCS | Performed by: FAMILY MEDICINE

## 2018-09-05 PROCEDURE — G8598 ASA/ANTIPLAT THER USED: HCPCS | Performed by: FAMILY MEDICINE

## 2018-09-05 PROCEDURE — 99213 OFFICE O/P EST LOW 20 MIN: CPT | Performed by: FAMILY MEDICINE

## 2018-09-05 RX ORDER — NAPROXEN 500 MG/1
TABLET ORAL
Qty: 30 TABLET | Refills: 3 | Status: ON HOLD | OUTPATIENT
Start: 2018-09-05 | End: 2021-12-24 | Stop reason: HOSPADM

## 2018-09-05 NOTE — PROGRESS NOTES
DAILY 30 capsule 3    metoprolol tartrate (LOPRESSOR) 25 MG tablet Take 1 tablet by mouth 2 times daily 60 tablet 6    pravastatin (PRAVACHOL) 40 MG tablet Take 1 tablet by mouth nightly 90 tablet 3    albuterol sulfate HFA (PROAIR HFA) 108 (90 Base) MCG/ACT inhaler Inhale 2 puffs into the lungs every 4 hours as needed for Wheezing 1 Inhaler 3    ramipril (ALTACE) 10 MG capsule take 1 capsule by mouth once daily 30 capsule 5    NITROSTAT 0.4 MG SL tablet       aspirin 81 MG tablet Take 81 mg by mouth daily. No facility-administered encounter medications on file as of 9/5/2018. F/u cardio. .  Orders Placed This Encounter   Procedures    LDL Cholesterol, Direct     Standing Status:   Future     Standing Expiration Date:   9/5/2019    Comprehensive Metabolic Panel     Standing Status:   Future     Standing Expiration Date:   9/5/2019     Orders Placed This Encounter   Medications    zoster recombinant adjuvanted vaccine (SHINGRIX) 50 MCG SUSR injection     Sig: Inject 0.5 mLs into the muscle once for 1 dose 50 MCG IM then repeat 2-6 months. Dispense:  1 each     Refill:  1    naproxen (EC NAPROSYN) 500 MG EC tablet     Sig: Take 1 tab at lunch if needed for knee     Dispense:  30 tablet     Refill:  3     There are no discontinued medications. Return in about 3 months (around 12/5/2018) for lipid. Call or return to clinic prn if these symptoms worsen or fail to improve as anticipated.       Alethea Barrera, DO

## 2018-09-12 ASSESSMENT — ENCOUNTER SYMPTOMS
SHORTNESS OF BREATH: 0
ABDOMINAL PAIN: 0
BLOOD IN STOOL: 0

## 2018-09-27 DIAGNOSIS — M17.11 PRIMARY OSTEOARTHRITIS OF RIGHT KNEE: ICD-10-CM

## 2018-09-27 NOTE — TELEPHONE ENCOUNTER
Pt requests refill on medication.  Please approve or deny this request.    LOV 9/5/2018    Last refill 8/29/18    Future Appointments  Date Time Provider Ginna Barnett   11/13/2018 9:00 AM [id number]   12/5/2018 9:30 AM Derrick Fonseca DO [address]

## 2018-09-28 RX ORDER — HYDROCODONE BITARTRATE AND ACETAMINOPHEN 5; 325 MG/1; MG/1
1 TABLET ORAL 2 TIMES DAILY PRN
Qty: 60 TABLET | Refills: 0 | Status: SHIPPED | OUTPATIENT
Start: 2018-09-28 | End: 2018-10-26 | Stop reason: SDUPTHER

## 2018-10-22 ENCOUNTER — TELEPHONE (OUTPATIENT)
Dept: FAMILY MEDICINE CLINIC | Age: 58
End: 2018-10-22

## 2018-10-22 DIAGNOSIS — F41.0 ANXIETY ATTACK: ICD-10-CM

## 2018-10-22 RX ORDER — ALPRAZOLAM 0.5 MG/1
TABLET ORAL
Qty: 30 TABLET | Refills: 1 | Status: SHIPPED | OUTPATIENT
Start: 2018-10-22 | End: 2019-01-24 | Stop reason: SDUPTHER

## 2018-10-22 NOTE — TELEPHONE ENCOUNTER
Call, as rx called    ; Controlled Substances Monitoring:     RX Monitoring 10/22/2018   Attestation The Prescription Monitoring Report for this patient was reviewed today. Documentation Possible medication side effects, risk of tolerance/dependence & alternative treatments discussed. ;No signs of potential drug abuse or diversion identified. Acute Pain Prescriptions -   Chronic Pain Dose reduction has been attempted. Medication Contracts Existing medication contract.

## 2018-10-26 DIAGNOSIS — M17.11 PRIMARY OSTEOARTHRITIS OF RIGHT KNEE: ICD-10-CM

## 2018-10-26 RX ORDER — HYDROCODONE BITARTRATE AND ACETAMINOPHEN 5; 325 MG/1; MG/1
1 TABLET ORAL 2 TIMES DAILY PRN
Qty: 60 TABLET | Refills: 0 | Status: SHIPPED | OUTPATIENT
Start: 2018-10-26 | End: 2018-11-23 | Stop reason: SDUPTHER

## 2018-10-26 NOTE — TELEPHONE ENCOUNTER
Patient (lily) requesting refill. Please approve or deny refill request. Medication pended. Thank you.     Last OV: 9/5/2018  Last RX: 09/28/18    Next Visit Date:  Future Appointments  Date Time Provider Ginna Barnett   11/13/2018 9:00 AM Carter Chaudhary DO [facility]   12/5/2018 9:30 AM Neo Owens DO [facility], THE [facility] AT [place]

## 2018-11-13 ENCOUNTER — OFFICE VISIT (OUTPATIENT)
Dept: CARDIOLOGY CLINIC | Age: 58
End: 2018-11-13
Payer: COMMERCIAL

## 2018-11-13 VITALS
BODY MASS INDEX: 23.7 KG/M2 | WEIGHT: 151 LBS | SYSTOLIC BLOOD PRESSURE: 120 MMHG | HEART RATE: 62 BPM | DIASTOLIC BLOOD PRESSURE: 70 MMHG | HEIGHT: 67 IN | OXYGEN SATURATION: 97 %

## 2018-11-13 DIAGNOSIS — I25.10 CORONARY ARTERY DISEASE INVOLVING NATIVE CORONARY ARTERY OF NATIVE HEART WITHOUT ANGINA PECTORIS: Chronic | ICD-10-CM

## 2018-11-13 DIAGNOSIS — I10 ESSENTIAL HYPERTENSION: ICD-10-CM

## 2018-11-13 DIAGNOSIS — R07.2 PRECORDIAL PAIN: Chronic | ICD-10-CM

## 2018-11-13 DIAGNOSIS — M17.11 PRIMARY OSTEOARTHRITIS OF RIGHT KNEE: ICD-10-CM

## 2018-11-13 DIAGNOSIS — E78.5 DYSLIPIDEMIA: ICD-10-CM

## 2018-11-13 DIAGNOSIS — Z72.0 TOBACCO ABUSE: Primary | Chronic | ICD-10-CM

## 2018-11-13 LAB
ALBUMIN SERPL-MCNC: 4.8 G/DL (ref 3.9–4.9)
ALP BLD-CCNC: 76 U/L (ref 35–104)
ALT SERPL-CCNC: 23 U/L (ref 0–41)
ANION GAP SERPL CALCULATED.3IONS-SCNC: 17 MEQ/L (ref 7–13)
AST SERPL-CCNC: 26 U/L (ref 0–40)
BILIRUB SERPL-MCNC: 0.4 MG/DL (ref 0–1.2)
BUN BLDV-MCNC: 8 MG/DL (ref 6–20)
CALCIUM SERPL-MCNC: 10.1 MG/DL (ref 8.6–10.2)
CHLORIDE BLD-SCNC: 97 MEQ/L (ref 98–107)
CO2: 24 MEQ/L (ref 22–29)
CREAT SERPL-MCNC: 0.54 MG/DL (ref 0.7–1.2)
GFR AFRICAN AMERICAN: >60
GFR NON-AFRICAN AMERICAN: >60
GLOBULIN: 2.6 G/DL (ref 2.3–3.5)
GLUCOSE BLD-MCNC: 87 MG/DL (ref 74–109)
LDL CHOLESTEROL DIRECT: 86 MG/DL (ref 0–129)
POTASSIUM SERPL-SCNC: 5.6 MEQ/L (ref 3.5–5.1)
SODIUM BLD-SCNC: 138 MEQ/L (ref 132–144)
TOTAL PROTEIN: 7.4 G/DL (ref 6.4–8.1)

## 2018-11-13 PROCEDURE — 4004F PT TOBACCO SCREEN RCVD TLK: CPT | Performed by: INTERNAL MEDICINE

## 2018-11-13 PROCEDURE — 99214 OFFICE O/P EST MOD 30 MIN: CPT | Performed by: INTERNAL MEDICINE

## 2018-11-13 PROCEDURE — G8420 CALC BMI NORM PARAMETERS: HCPCS | Performed by: INTERNAL MEDICINE

## 2018-11-13 PROCEDURE — 3017F COLORECTAL CA SCREEN DOC REV: CPT | Performed by: INTERNAL MEDICINE

## 2018-11-13 PROCEDURE — G8484 FLU IMMUNIZE NO ADMIN: HCPCS | Performed by: INTERNAL MEDICINE

## 2018-11-13 PROCEDURE — G8427 DOCREV CUR MEDS BY ELIG CLIN: HCPCS | Performed by: INTERNAL MEDICINE

## 2018-11-13 PROCEDURE — G8598 ASA/ANTIPLAT THER USED: HCPCS | Performed by: INTERNAL MEDICINE

## 2018-11-14 DIAGNOSIS — I10 ESSENTIAL HYPERTENSION: Primary | ICD-10-CM

## 2018-11-14 RX ORDER — AMLODIPINE BESYLATE 5 MG/1
TABLET ORAL
Qty: 30 TABLET | Refills: 3 | Status: SHIPPED | OUTPATIENT
Start: 2018-11-14 | End: 2019-03-17 | Stop reason: SDUPTHER

## 2018-11-23 DIAGNOSIS — M17.11 PRIMARY OSTEOARTHRITIS OF RIGHT KNEE: ICD-10-CM

## 2018-11-23 RX ORDER — HYDROCODONE BITARTRATE AND ACETAMINOPHEN 5; 325 MG/1; MG/1
1 TABLET ORAL 2 TIMES DAILY PRN
Qty: 60 TABLET | Refills: 0 | Status: SHIPPED | OUTPATIENT
Start: 2018-11-23 | End: 2018-12-20 | Stop reason: SDUPTHER

## 2018-11-23 NOTE — TELEPHONE ENCOUNTER
From: Avtar Clifford  Sent: 11/23/2018 10:21 AM EST  Subject: Medication Renewal Request    Avtar Clifford would like a refill of the following medications:     HYDROcodone-acetaminophen (Burnett Gustavo) 5-325 MG per tablet Ariane Gould DO]    Preferred pharmacy: [facility] Marlo Saleh [phone] - F [phone]    Comment:

## 2018-12-05 ENCOUNTER — OFFICE VISIT (OUTPATIENT)
Dept: FAMILY MEDICINE CLINIC | Age: 58
End: 2018-12-05
Payer: COMMERCIAL

## 2018-12-05 VITALS
TEMPERATURE: 97.8 F | SYSTOLIC BLOOD PRESSURE: 132 MMHG | RESPIRATION RATE: 16 BRPM | DIASTOLIC BLOOD PRESSURE: 82 MMHG | HEART RATE: 67 BPM | BODY MASS INDEX: 23.86 KG/M2 | HEIGHT: 67 IN | WEIGHT: 152 LBS

## 2018-12-05 DIAGNOSIS — Z23 NEED FOR INFLUENZA VACCINATION: ICD-10-CM

## 2018-12-05 DIAGNOSIS — E78.5 DYSLIPIDEMIA: ICD-10-CM

## 2018-12-05 DIAGNOSIS — I10 ESSENTIAL HYPERTENSION: Primary | ICD-10-CM

## 2018-12-05 DIAGNOSIS — M17.11 PRIMARY OSTEOARTHRITIS OF RIGHT KNEE: ICD-10-CM

## 2018-12-05 DIAGNOSIS — E87.5 HYPERKALEMIA: ICD-10-CM

## 2018-12-05 LAB — POTASSIUM SERPL-SCNC: 4.8 MEQ/L (ref 3.5–5.1)

## 2018-12-05 PROCEDURE — 99213 OFFICE O/P EST LOW 20 MIN: CPT | Performed by: FAMILY MEDICINE

## 2018-12-05 PROCEDURE — G8598 ASA/ANTIPLAT THER USED: HCPCS | Performed by: FAMILY MEDICINE

## 2018-12-05 PROCEDURE — 90688 IIV4 VACCINE SPLT 0.5 ML IM: CPT | Performed by: FAMILY MEDICINE

## 2018-12-05 PROCEDURE — G8427 DOCREV CUR MEDS BY ELIG CLIN: HCPCS | Performed by: FAMILY MEDICINE

## 2018-12-05 PROCEDURE — 90471 IMMUNIZATION ADMIN: CPT | Performed by: FAMILY MEDICINE

## 2018-12-05 PROCEDURE — G8420 CALC BMI NORM PARAMETERS: HCPCS | Performed by: FAMILY MEDICINE

## 2018-12-05 PROCEDURE — 4004F PT TOBACCO SCREEN RCVD TLK: CPT | Performed by: FAMILY MEDICINE

## 2018-12-05 PROCEDURE — G8482 FLU IMMUNIZE ORDER/ADMIN: HCPCS | Performed by: FAMILY MEDICINE

## 2018-12-05 PROCEDURE — 3017F COLORECTAL CA SCREEN DOC REV: CPT | Performed by: FAMILY MEDICINE

## 2018-12-09 DIAGNOSIS — F43.21 ADJUSTMENT REACTION WITH BRIEF DEPRESSIVE REACTION: ICD-10-CM

## 2018-12-09 RX ORDER — FLUOXETINE HYDROCHLORIDE 20 MG/1
CAPSULE ORAL
Qty: 30 CAPSULE | Refills: 3 | Status: SHIPPED | OUTPATIENT
Start: 2018-12-09

## 2018-12-12 ASSESSMENT — ENCOUNTER SYMPTOMS
SHORTNESS OF BREATH: 0
ABDOMINAL PAIN: 0
BLOOD IN STOOL: 0

## 2018-12-20 DIAGNOSIS — M17.11 PRIMARY OSTEOARTHRITIS OF RIGHT KNEE: ICD-10-CM

## 2018-12-20 RX ORDER — HYDROCODONE BITARTRATE AND ACETAMINOPHEN 5; 325 MG/1; MG/1
1 TABLET ORAL 2 TIMES DAILY PRN
Qty: 60 TABLET | Refills: 0 | Status: SHIPPED | OUTPATIENT
Start: 2018-12-20 | End: 2019-01-21 | Stop reason: SDUPTHER

## 2018-12-20 NOTE — TELEPHONE ENCOUNTER
Call, as rx called for tomorrow  ; Controlled Substances Monitoring:     RX Monitoring 12/20/2018   Attestation The Prescription Monitoring Report for this patient was reviewed today. Documentation Possible medication side effects, risk of tolerance/dependence & alternative treatments discussed. ;Obtaining appropriate analgesic effect of treatment. ;No signs of potential drug abuse or diversion identified. Acute Pain Prescriptions -   Chronic Pain Treatment objectives documented - patient is progressing appropriately. ;Functional status reviewed - continues with improved or maintaining ADL's.;Reviewed the patient's functional status and documentation. ;Dose reduction has been attempted. Medication Contracts Existing medication contract.

## 2018-12-20 NOTE — TELEPHONE ENCOUNTER
Pt would like to  on 12/22/18      Pt requests refill on medication.  Please approve or deny this request.    LOV 12/5/2018    Last refill 11/24/18    Future Appointments  Date Time Provider Ginna Barnett   3/12/2019 9:00 AM Rena Grijalva DO [address]   5/14/2019 9:00 AM DO Mehnaz Javed [id number]

## 2019-01-21 ENCOUNTER — PATIENT MESSAGE (OUTPATIENT)
Dept: FAMILY MEDICINE CLINIC | Age: 59
End: 2019-01-21

## 2019-01-21 DIAGNOSIS — M17.11 PRIMARY OSTEOARTHRITIS OF RIGHT KNEE: ICD-10-CM

## 2019-01-21 RX ORDER — HYDROCODONE BITARTRATE AND ACETAMINOPHEN 5; 325 MG/1; MG/1
1 TABLET ORAL 2 TIMES DAILY PRN
Qty: 60 TABLET | Refills: 0 | Status: SHIPPED | OUTPATIENT
Start: 2019-01-21 | End: 2019-02-19 | Stop reason: SDUPTHER

## 2019-02-19 DIAGNOSIS — M17.11 PRIMARY OSTEOARTHRITIS OF RIGHT KNEE: ICD-10-CM

## 2019-02-19 RX ORDER — HYDROCODONE BITARTRATE AND ACETAMINOPHEN 5; 325 MG/1; MG/1
1 TABLET ORAL 2 TIMES DAILY PRN
Qty: 60 TABLET | Refills: 0 | Status: SHIPPED | OUTPATIENT
Start: 2019-02-19 | End: 2019-03-19 | Stop reason: SDUPTHER

## 2019-03-04 DIAGNOSIS — I10 ESSENTIAL HYPERTENSION: ICD-10-CM

## 2019-03-12 ENCOUNTER — OFFICE VISIT (OUTPATIENT)
Dept: FAMILY MEDICINE CLINIC | Age: 59
End: 2019-03-12
Payer: COMMERCIAL

## 2019-03-12 VITALS
HEIGHT: 67 IN | WEIGHT: 154 LBS | SYSTOLIC BLOOD PRESSURE: 124 MMHG | TEMPERATURE: 98.7 F | HEART RATE: 64 BPM | BODY MASS INDEX: 24.17 KG/M2 | RESPIRATION RATE: 16 BRPM | DIASTOLIC BLOOD PRESSURE: 82 MMHG

## 2019-03-12 DIAGNOSIS — F41.0 ANXIETY ATTACK: ICD-10-CM

## 2019-03-12 DIAGNOSIS — Z12.5 PROSTATE CANCER SCREENING: ICD-10-CM

## 2019-03-12 DIAGNOSIS — M17.11 PRIMARY OSTEOARTHRITIS OF RIGHT KNEE: ICD-10-CM

## 2019-03-12 DIAGNOSIS — I10 ESSENTIAL HYPERTENSION: Primary | ICD-10-CM

## 2019-03-12 DIAGNOSIS — E78.5 DYSLIPIDEMIA: ICD-10-CM

## 2019-03-12 DIAGNOSIS — Z79.899 ENCOUNTER FOR LONG-TERM (CURRENT) DRUG USE: ICD-10-CM

## 2019-03-12 PROCEDURE — 99213 OFFICE O/P EST LOW 20 MIN: CPT | Performed by: FAMILY MEDICINE

## 2019-03-12 PROCEDURE — G8427 DOCREV CUR MEDS BY ELIG CLIN: HCPCS | Performed by: FAMILY MEDICINE

## 2019-03-12 PROCEDURE — G8420 CALC BMI NORM PARAMETERS: HCPCS | Performed by: FAMILY MEDICINE

## 2019-03-12 PROCEDURE — 4004F PT TOBACCO SCREEN RCVD TLK: CPT | Performed by: FAMILY MEDICINE

## 2019-03-12 PROCEDURE — G8598 ASA/ANTIPLAT THER USED: HCPCS | Performed by: FAMILY MEDICINE

## 2019-03-12 PROCEDURE — G8482 FLU IMMUNIZE ORDER/ADMIN: HCPCS | Performed by: FAMILY MEDICINE

## 2019-03-12 PROCEDURE — 3017F COLORECTAL CA SCREEN DOC REV: CPT | Performed by: FAMILY MEDICINE

## 2019-03-12 RX ORDER — ALPRAZOLAM 0.5 MG/1
0.5 TABLET ORAL 3 TIMES DAILY PRN
COMMUNITY
Start: 2019-03-09

## 2019-03-14 LAB
ALCOHOL URINE: NEGATIVE MG/DL
AMPHETAMINE SCREEN, URINE: NEGATIVE NG/ML
BARBITURATE SCREEN URINE: NEGATIVE NG/ML
BENZODIAZEPINE SCREEN, URINE: POSITIVE NG/ML
CANNABINOID SCREEN URINE: NEGATIVE NG/ML
COCAINE METABOLITE SCREEN URINE: NEGATIVE NG/ML
CREATININE URINE: 48 MG/DL (ref 20–400)
Lab: NORMAL
MDMA URINE: NEGATIVE NG/ML
METHADONE SCREEN, URINE: NEGATIVE NG/ML
OPIATE SCREEN URINE: POSITIVE NG/ML
OXYCODONE SCREEN URINE: NEGATIVE NG/ML
PHENCYCLIDINE SCREEN URINE: NEGATIVE NG/ML
PROPOXYPHENE SCREEN: NEGATIVE NG/ML

## 2019-03-19 DIAGNOSIS — M17.11 PRIMARY OSTEOARTHRITIS OF RIGHT KNEE: ICD-10-CM

## 2019-03-19 RX ORDER — HYDROCODONE BITARTRATE AND ACETAMINOPHEN 5; 325 MG/1; MG/1
1 TABLET ORAL 2 TIMES DAILY PRN
Qty: 60 TABLET | Refills: 0 | Status: SHIPPED | OUTPATIENT
Start: 2019-03-19 | End: 2019-04-18

## 2019-03-19 ASSESSMENT — ENCOUNTER SYMPTOMS
ABDOMINAL PAIN: 0
CHEST TIGHTNESS: 0
SHORTNESS OF BREATH: 0
BLOOD IN STOOL: 0

## 2019-03-28 ENCOUNTER — TELEPHONE (OUTPATIENT)
Dept: FAMILY MEDICINE CLINIC | Age: 59
End: 2019-03-28

## 2019-05-14 ENCOUNTER — OFFICE VISIT (OUTPATIENT)
Dept: CARDIOLOGY CLINIC | Age: 59
End: 2019-05-14
Payer: COMMERCIAL

## 2019-05-14 VITALS
HEART RATE: 65 BPM | BODY MASS INDEX: 24.33 KG/M2 | HEIGHT: 67 IN | DIASTOLIC BLOOD PRESSURE: 80 MMHG | OXYGEN SATURATION: 98 % | RESPIRATION RATE: 20 BRPM | WEIGHT: 155 LBS | SYSTOLIC BLOOD PRESSURE: 122 MMHG

## 2019-05-14 DIAGNOSIS — I10 ESSENTIAL HYPERTENSION: ICD-10-CM

## 2019-05-14 DIAGNOSIS — Z72.0 TOBACCO ABUSE: Chronic | ICD-10-CM

## 2019-05-14 DIAGNOSIS — E78.5 DYSLIPIDEMIA: ICD-10-CM

## 2019-05-14 DIAGNOSIS — R07.2 PRECORDIAL PAIN: Chronic | ICD-10-CM

## 2019-05-14 DIAGNOSIS — I25.10 CORONARY ARTERY DISEASE INVOLVING NATIVE CORONARY ARTERY OF NATIVE HEART WITHOUT ANGINA PECTORIS: Primary | Chronic | ICD-10-CM

## 2019-05-14 PROCEDURE — G8427 DOCREV CUR MEDS BY ELIG CLIN: HCPCS | Performed by: INTERNAL MEDICINE

## 2019-05-14 PROCEDURE — G8598 ASA/ANTIPLAT THER USED: HCPCS | Performed by: INTERNAL MEDICINE

## 2019-05-14 PROCEDURE — G8420 CALC BMI NORM PARAMETERS: HCPCS | Performed by: INTERNAL MEDICINE

## 2019-05-14 PROCEDURE — 4004F PT TOBACCO SCREEN RCVD TLK: CPT | Performed by: INTERNAL MEDICINE

## 2019-05-14 PROCEDURE — 3017F COLORECTAL CA SCREEN DOC REV: CPT | Performed by: INTERNAL MEDICINE

## 2019-05-14 PROCEDURE — 93000 ELECTROCARDIOGRAM COMPLETE: CPT | Performed by: INTERNAL MEDICINE

## 2019-05-14 PROCEDURE — 99213 OFFICE O/P EST LOW 20 MIN: CPT | Performed by: INTERNAL MEDICINE

## 2019-05-14 RX ORDER — HYDROCODONE BITARTRATE AND ACETAMINOPHEN 5; 325 MG/1; MG/1
1 TABLET ORAL 2 TIMES DAILY
COMMUNITY
Start: 2019-04-18

## 2019-05-14 NOTE — PROGRESS NOTES
Chief Complaint   Patient presents with    6 Month Follow-Up    Coronary Artery Disease    Hypertension       Patient presents for follow up medical evaluation. Patient is followed on a regular basis by Dr. Renato Berg DO. S/p hospitalization for CP/SOB. S/p cardiac cath with abnormal FFR of the LAD s/p LAURENCE to mid LAD 2.75x12 Xience. Echo with normal LVF. Experienced occasional mild chest discomfort a few weeks ago, no SL nitro use. Pt denies dyspnea, dyspnea on exertion, change in exercise capacity, fatigue, nausea, vomiting, diarrhea, constipation, motor weakness, insomnia, weight loss, syncope, dizziness, lightheadedness, palpitations, PND, orthopnea, or claudication. 7-18-13: as above, doing ok overall, used SL nitro once in last six months. Pt denies chest pain, dyspnea, dyspnea on exertion, change in exercise capacity, fatigue, nausea, vomiting, diarrhea, constipation, motor weakness, insomnia, weight loss, syncope, dizziness, lightheadedness, palpitations, PND, orthopnea, or claudication. Having right knee pain, may need to have replacement in future. Compliant with meds, no bleeding issues. Álvarez complain of foot coldness at times b/l right >left. Continues to smoke but cutting back.    8-8-13: as above, s/p FUNMI/PVR. FUNMI were above one b/l. There was abnormal segmental pressure in right high thigh, abnormal TFI in tibioperoneal vessels, suggestive of mild disease. No significant claudication type symptoms. On chantix, trying to quit.    2-13-14: As above, doing well overall. No recent hospitalizations, SL nitro use, or bleeding issues. No change in meds. Recently hospitalized for PNA. Pt denies chest pain, dyspnea, dyspnea on exertion, change in exercise capacity, fatigue,  nausea, vomiting, diarrhea, constipation, motor weakness, insomnia, weight loss, syncope, dizziness, lightheadedness, palpitations, PND, orthopnea, or claudication. States right leg gets colder at times. Cutting back on smoking. 5-6-14: as above, s/p hospitalization for NSTEMI. Patient had severe CP, ischemic EKG changes, rushe to cath and underwent LAURENCE to outlfow of MID LAD instent restenosis. Doing better overall. No SL nitro use since discharge. + anxiety, increased BP at times. Pt denies chest pain, dyspnea, dyspnea on exertion, change in exercise capacity, fatigue,  nausea, vomiting, diarrhea, constipation, motor weakness, insomnia, weight loss, syncope, dizziness, lightheadedness, palpitations, PND, orthopnea, or claudication. Cut back on smoking. 11-6-14: as above, doing well overall. No bleeding issues. Does have some foot swelling. Has used nitro twice over the past 6 months, he had forgotten to take IMDUR. Dewane Newness Pt denies dyspnea, dyspnea on exertion, change in exercise capacity, fatigue,  nausea, vomiting, diarrhea, constipation, motor weakness, insomnia, weight loss, syncope, dizziness, lightheadedness, palpitations, PND, orthopnea, or claudication. States he's somewhat active. Continues to smoke. S/p hospitalization in 10/2014, s/p normal 2D echo. No further episodes, was felt to be due to vertigo. 12-23-14: as above, s/p hospitalization for unstable angina type symptoms, s/p C with patent mid LAD stents with mild proximal instent restenosis, somewhat hazy but no significant stenosis, meds were titrated. Developed right radial site hematoma. Compliant with meds. No melena or hematuria. No SL nitro use. Continues to smoke. imdur was increased to 60mg daily. 4-2-15: as above, continues to have occasional midsternal chest pain at times relieved with nitro. Has taken 3-4 tablets since last visit, which relieves his symptoms quickly. Longest episode lasts 4-5 min. Continues to smoke, cut way back. Started on Xanax by PCP. Does have some mild SOB with exertion. Having a lot of anxiety/stress issues financially, filled for bankruptcy.  Pt denies dyspnea,change in exercise capacity, fatigue,  nausea, vomiting, diarrhea, constipation, motor weakness, insomnia, weight loss, syncope, dizziness, lightheadedness, palpitations, PND, orthopnea, or claudication. 10-6-15: as above, states he had a CP episode last night, needed to take one nitro, otherwise been feeling ok. Pt denies chest pain, dyspnea, dyspnea on exertion, change in exercise capacity, fatigue,  nausea, vomiting, diarrhea, constipation, motor weakness, insomnia, weight loss, syncope, dizziness, lightheadedness, palpitations, PND, orthopnea, or claudication. Compliant with meds. No bleeding issues. BP is high today. Has a pinched nerve in his neck. 12-3-15: as above, s/p hospitalization for CP and NSTEMI. S/p LHC with patent mid LAD stents, mid CX with 10-30%, EF of 60%. No SL nitro use. Pt denies chest pain, dyspnea, dyspnea on exertion, change in exercise capacity, fatigue,  nausea, vomiting, diarrhea, constipation, motor weakness, insomnia, weight loss, syncope, dizziness, lightheadedness, palpitations, PND, orthopnea. BP is normal today. No LE edema. No on regular exercise program.     6-23-16: as above, Pt denies chest pain, dyspnea, dyspnea on exertion, change in exercise capacity, fatigue,  nausea, vomiting, diarrhea, constipation, motor weakness, insomnia, weight loss, syncope, dizziness, lightheadedness, palpitations, PND, orthopnea, or claudication. No nitro use. No bleeding issues. CAD is stable, BP is good. No recent lipid profile. No LE edema. Continues to smoke. No muscle aches. Diet is good but exercise could be better. 3-14-17: states his BP has been fluctuation recently. Gets LH/DIzz, SOB and CP at times. Has needed to take some nitros from time to time. Pt denies   nausea, vomiting, diarrhea, constipation, motor weakness, insomnia, weight loss, syncope, dizziness,  PND, orthopnea. BP and hr are good in office today. No LE discoloration or ulcers. No LE edema. No CHF type symptoms. Lipid profile is normal.  Continues to smoke.    Under a lot of 98 %.  Body mass index is 24.28 kg/m². Physical Examination:  General appearance - alert, well appearing, and in no distress  Mental status - alert, oriented to person, place, and time  Neck - Neck is supple, no JVD or carotid bruits. No thyromegaly or adenopathy. Chest - clear to auscultation, no wheezes, rales or rhonchi, symmetric air entry  Heart - normal rate, regular rhythm, normal S1, S2, no murmurs, rubs, clicks or gallops  Abdomen - soft, nontender, nondistended, no masses or organomegaly  Neurological - alert, oriented, normal speech, no focal findings or movement disorder noted  Extremities - peripheral pulses normal, no pedal edema, no clubbing or cyanosis  Skin - normal coloration and turgor, no rashes, no suspicious skin lesions noted    Right hand: right forearm echymosis, hematoma, +2 radial and ulnar pulses, hand is warm, no neurological deficits. LABS:    Chemistry        Component Value Date/Time     11/13/2018 1036    K 4.8 12/05/2018 1045    CL 97 (L) 11/13/2018 1036    CO2 24 11/13/2018 1036    BUN 8 11/13/2018 1036    CREATININE 0.54 (L) 11/13/2018 1036        Component Value Date/Time    CALCIUM 10.1 11/13/2018 1036    ALKPHOS 76 11/13/2018 1036    AST 26 11/13/2018 1036    ALT 23 11/13/2018 1036    BILITOT 0.4 11/13/2018 1036            Lab Results   Component Value Date    WBC 11.2 (H) 06/23/2016    HGB 16.4 06/23/2016    HCT 48.4 06/23/2016    .1 (H) 06/23/2016     06/23/2016       No components found for: CHLPL  Lab Results   Component Value Date    TRIG 104 01/23/2018    TRIG 101 06/23/2016    TRIG 90 04/15/2015     Lab Results   Component Value Date    HDL 67 (H) 01/23/2018    HDL 60 (H) 06/23/2016    HDL 56 04/15/2015     Lab Results   Component Value Date    LDLCALC 86 01/23/2018    LDLCALC 110 06/23/2016    [address] 104 04/15/2015     No results found for: LABVLDL    EKG: NSR, old AS wall infarct. ASSESSMENT:     Diagnosis Orders   1.  Coronary artery emergency room immediately if symptoms get worse or do not improve. Thank you for allowing me to participate in the care of your patient, please don't hesitate to contact me if you have any further questions.

## 2019-06-19 RX ORDER — PRAVASTATIN SODIUM 40 MG
40 TABLET ORAL NIGHTLY
Qty: 90 TABLET | Refills: 3 | Status: SHIPPED | OUTPATIENT
Start: 2019-06-19 | End: 2020-07-14

## 2019-10-30 DIAGNOSIS — I10 ESSENTIAL HYPERTENSION: ICD-10-CM

## 2019-11-03 DIAGNOSIS — I10 ESSENTIAL HYPERTENSION: ICD-10-CM

## 2019-11-12 ENCOUNTER — OFFICE VISIT (OUTPATIENT)
Dept: CARDIOLOGY CLINIC | Age: 59
End: 2019-11-12
Payer: COMMERCIAL

## 2019-11-12 VITALS
DIASTOLIC BLOOD PRESSURE: 90 MMHG | HEART RATE: 69 BPM | RESPIRATION RATE: 16 BRPM | SYSTOLIC BLOOD PRESSURE: 146 MMHG | OXYGEN SATURATION: 98 % | WEIGHT: 153.6 LBS | BODY MASS INDEX: 24.06 KG/M2

## 2019-11-12 DIAGNOSIS — I25.10 CORONARY ARTERY DISEASE INVOLVING NATIVE CORONARY ARTERY OF NATIVE HEART WITHOUT ANGINA PECTORIS: ICD-10-CM

## 2019-11-12 DIAGNOSIS — R07.2 PRECORDIAL PAIN: ICD-10-CM

## 2019-11-12 DIAGNOSIS — E78.5 DYSLIPIDEMIA: ICD-10-CM

## 2019-11-12 DIAGNOSIS — Z72.0 TOBACCO ABUSE: Chronic | ICD-10-CM

## 2019-11-12 DIAGNOSIS — I10 ESSENTIAL HYPERTENSION: Primary | ICD-10-CM

## 2019-11-12 PROCEDURE — G8427 DOCREV CUR MEDS BY ELIG CLIN: HCPCS | Performed by: INTERNAL MEDICINE

## 2019-11-12 PROCEDURE — G8598 ASA/ANTIPLAT THER USED: HCPCS | Performed by: INTERNAL MEDICINE

## 2019-11-12 PROCEDURE — G8484 FLU IMMUNIZE NO ADMIN: HCPCS | Performed by: INTERNAL MEDICINE

## 2019-11-12 PROCEDURE — 93000 ELECTROCARDIOGRAM COMPLETE: CPT | Performed by: INTERNAL MEDICINE

## 2019-11-12 PROCEDURE — 4004F PT TOBACCO SCREEN RCVD TLK: CPT | Performed by: INTERNAL MEDICINE

## 2019-11-12 PROCEDURE — 99214 OFFICE O/P EST MOD 30 MIN: CPT | Performed by: INTERNAL MEDICINE

## 2019-11-12 PROCEDURE — G8420 CALC BMI NORM PARAMETERS: HCPCS | Performed by: INTERNAL MEDICINE

## 2019-11-12 PROCEDURE — 3017F COLORECTAL CA SCREEN DOC REV: CPT | Performed by: INTERNAL MEDICINE

## 2020-01-06 NOTE — TELEPHONE ENCOUNTER
LOV 11/12/19  Requested Prescriptions     Pending Prescriptions Disp Refills    metoprolol tartrate (LOPRESSOR) 25 MG tablet 60 tablet 5     Sig: Take 1 tablet by mouth 2 times daily

## 2020-01-07 ENCOUNTER — OFFICE VISIT (OUTPATIENT)
Dept: CARDIOLOGY CLINIC | Age: 60
End: 2020-01-07
Payer: COMMERCIAL

## 2020-01-07 VITALS
BODY MASS INDEX: 23.65 KG/M2 | SYSTOLIC BLOOD PRESSURE: 110 MMHG | HEART RATE: 73 BPM | WEIGHT: 151 LBS | DIASTOLIC BLOOD PRESSURE: 80 MMHG

## 2020-01-07 PROBLEM — R06.02 SHORTNESS OF BREATH: Status: ACTIVE | Noted: 2020-01-07

## 2020-01-07 PROCEDURE — 99214 OFFICE O/P EST MOD 30 MIN: CPT | Performed by: INTERNAL MEDICINE

## 2020-01-07 PROCEDURE — 3017F COLORECTAL CA SCREEN DOC REV: CPT | Performed by: INTERNAL MEDICINE

## 2020-01-07 PROCEDURE — G8484 FLU IMMUNIZE NO ADMIN: HCPCS | Performed by: INTERNAL MEDICINE

## 2020-01-07 PROCEDURE — 93000 ELECTROCARDIOGRAM COMPLETE: CPT | Performed by: INTERNAL MEDICINE

## 2020-01-07 PROCEDURE — G8427 DOCREV CUR MEDS BY ELIG CLIN: HCPCS | Performed by: INTERNAL MEDICINE

## 2020-01-07 PROCEDURE — G8420 CALC BMI NORM PARAMETERS: HCPCS | Performed by: INTERNAL MEDICINE

## 2020-01-07 PROCEDURE — 4004F PT TOBACCO SCREEN RCVD TLK: CPT | Performed by: INTERNAL MEDICINE

## 2020-01-07 NOTE — PROGRESS NOTES
motor weakness, insomnia, weight loss, syncope, dizziness, lightheadedness, palpitations, PND, orthopnea, or claudication. 10-6-15: as above, states he had a CP episode last night, needed to take one nitro, otherwise been feeling ok. Pt denies chest pain, dyspnea, dyspnea on exertion, change in exercise capacity, fatigue,  nausea, vomiting, diarrhea, constipation, motor weakness, insomnia, weight loss, syncope, dizziness, lightheadedness, palpitations, PND, orthopnea, or claudication. Compliant with meds. No bleeding issues. BP is high today. Has a pinched nerve in his neck. 12-3-15: as above, s/p hospitalization for CP and NSTEMI. S/p LHC with patent mid LAD stents, mid CX with 10-30%, EF of 60%. No SL nitro use. Pt denies chest pain, dyspnea, dyspnea on exertion, change in exercise capacity, fatigue,  nausea, vomiting, diarrhea, constipation, motor weakness, insomnia, weight loss, syncope, dizziness, lightheadedness, palpitations, PND, orthopnea. BP is normal today. No LE edema. No on regular exercise program.     6-23-16: as above, Pt denies chest pain, dyspnea, dyspnea on exertion, change in exercise capacity, fatigue,  nausea, vomiting, diarrhea, constipation, motor weakness, insomnia, weight loss, syncope, dizziness, lightheadedness, palpitations, PND, orthopnea, or claudication. No nitro use. No bleeding issues. CAD is stable, BP is good. No recent lipid profile. No LE edema. Continues to smoke. No muscle aches. Diet is good but exercise could be better. 3-14-17: states his BP has been fluctuation recently. Gets LH/DIzz, SOB and CP at times. Has needed to take some nitros from time to time. Pt denies   nausea, vomiting, diarrhea, constipation, motor weakness, insomnia, weight loss, syncope, dizziness,  PND, orthopnea. BP and hr are good in office today. No LE discoloration or ulcers. No LE edema. No CHF type symptoms. Lipid profile is normal.  Continues to smoke.    Under a lot of stress and anxiety. Drinks a lot of ETOH, drinks 3-4 and more a day. Working at this time. His father committed suicide, his dog , his sister in law  from an MI in his home. EKG with NSR, no acute ischemia.       5-8-10: used nitro once back in  for CP while chopping wood. Doing well overall. Pt denies chest pain, dyspnea, dyspnea on exertion, change in exercise capacity, fatigue,  nausea, vomiting, diarrhea, constipation, motor weakness, insomnia, weight loss, syncope, palpitations, PND, orthopnea, or claudication. No nitro use. BP and hr are good. CAD is stable. No LE discoloration or ulcers. No LE edema. No CHF type symptoms. Lipid profile is normal. No recent hospitalization. No change in meds. He was LH/DIzz and quit Imdur and doing better now. Continues to smoke. EKG with NSR, old AS wall infarct. 18: HAS not needed to use nitro. Doing well overall. No complaints. Pt denies chest pain, dyspnea, dyspnea on exertion, change in exercise capacity, fatigue,  nausea, vomiting, diarrhea, constipation, motor weakness, insomnia, weight loss, syncope, dizziness, lightheadedness, palpitations, PND, orthopnea, or claudication. No nitro use. BP and hr are good. CAD is stable. No LE discoloration or ulcers. No LE edema. No CHF type symptoms. Lipid profile is normal. No recent hospitalization. No change in meds. Continues to smoke. 19: Pt denies chest pain, dyspnea, dyspnea on exertion, change in exercise capacity, fatigue,  nausea, vomiting, diarrhea, constipation, motor weakness, insomnia, weight loss, syncope, dizziness, lightheadedness, palpitations, PND, orthopnea, or claudication. No nitro use. BP and hr are good. CAD is stable. No LE discoloration or ulcers. No LE edema. No CHF type symptoms. Lipid profile is normal. No recent hospitalization. No change in meds. LDL is 86. EKG with NSR. continues to smoke but not as much. 19: he is smoking again.  Pt denies chest pain, dyspnea, dyspnea on exertion, change in exercise capacity, fatigue,  nausea, vomiting, diarrhea, constipation, motor weakness, insomnia, weight loss, syncope, dizziness, lightheadedness, palpitations, PND, orthopnea, or claudication. No nitro use. BP and hr are good. CAD is stable. No LE discoloration or ulcers. No LE edema. No CHF type symptoms. Lipid profile is abnormal. No recent hospitalization. No change in meds. EKG with NSR, no ischemia. 1-7-19: c/o SOB for the past 2 weeks. States its worsened with mild activity. Also c/o midsternal chest pressure, like someone sitting on his chest, occurs at rest and with activity. Took a nitro 3 days ago which relieved his symptoms. + diaphoresis. Denies any LE edema or fluid build up, no orthopnea. Pt denies  nausea, vomiting, diarrhea, constipation, motor weakness, insomnia, weight loss, syncope, dizziness, lightheadedness, palpitations, PND,  or claudication. BP and hr are good. No LE discoloration or ulcers. No LE edema. Lipid profile is normal. No recent hospitalization. No change in meds. Continues to smoke. Denies any wheezing. No URI type symptoms. EKG with NSR, non specific changes.          Patient Active Problem List   Diagnosis    Depression    Coronary artery disease involving native coronary artery of native heart without angina pectoris    Tobacco abuse    Family history of premature CAD    Dyslipidemia    Asthma    Anxiety attack    Traumatic amputation of finger    Closed fracture of distal phalanx of digit of hand    Essential hypertension    Primary osteoarthritis of right knee    Shortness of breath       Current Outpatient Medications   Medication Sig Dispense Refill    metoprolol tartrate (LOPRESSOR) 25 MG tablet Take 1 tablet by mouth 2 times daily 60 tablet 5    metoprolol tartrate (LOPRESSOR) 25 MG tablet Take 1 tablet by mouth 2 times daily 60 tablet 6    pravastatin (PRAVACHOL) 40 MG tablet Take 1 tablet by mouth nightly 90 tablet 3    HYDROcodone-acetaminophen (NORCO) 5-325 MG per tablet       amLODIPine (NORVASC) 5 MG tablet Take 1 qhs 30 tablet 5    ALPRAZolam (XANAX) 0.5 MG tablet       FLUoxetine (PROZAC) 20 MG capsule TAKE ONE CAPSULE BY MOUTH DAILY 30 capsule 3    naproxen (EC NAPROSYN) 500 MG EC tablet Take 1 tab at lunch if needed for knee 30 tablet 3    albuterol sulfate HFA (PROAIR HFA) 108 (90 Base) MCG/ACT inhaler Inhale 2 puffs into the lungs every 4 hours as needed for Wheezing 1 Inhaler 3    NITROSTAT 0.4 MG SL tablet       aspirin 81 MG tablet Take 81 mg by mouth daily. No current facility-administered medications for this visit. ALLERGIES: Altace [ramipril]    Review of Systems:  General ROS: negative  Psychological ROS: negative  Hematological and Lymphatic ROS: No history of blood clots or bleeding disorder. Respiratory ROS: no cough, shortness of breath, or wheezing  Cardiovascular ROS: no chest pain or dyspnea on exertion  Gastrointestinal ROS: no abdominal pain, change in bowel habits, or black or bloody stools  Genito-Urinary ROS: no dysuria, trouble voiding, or hematuria  Musculoskeletal ROS: negative  Neurological ROS: no TIA or stroke symptoms  Dermatological ROS: negative    VITALS:  Blood pressure 110/80, pulse 73, weight 151 lb (68.5 kg). Body mass index is 23.65 kg/m². Physical Examination:  General appearance - alert, well appearing, and in no distress  Mental status - alert, oriented to person, place, and time  Neck - Neck is supple, no JVD or carotid bruits. No thyromegaly or adenopathy.    Chest - clear to auscultation, no wheezes, rales or rhonchi, symmetric air entry  Heart - normal rate, regular rhythm, normal S1, S2, no murmurs, rubs, clicks or gallops  Abdomen - soft, nontender, nondistended, no masses or organomegaly  Neurological - alert, oriented, normal speech, no focal findings or movement disorder noted  Extremities - peripheral pulses normal, no pedal edema, no clubbing or

## 2020-01-09 ENCOUNTER — HOSPITAL ENCOUNTER (OUTPATIENT)
Dept: CARDIAC CATH/INVASIVE PROCEDURES | Age: 60
Discharge: HOME | End: 2020-01-09
Attending: INTERNAL MEDICINE | Admitting: INTERNAL MEDICINE
Payer: COMMERCIAL

## 2020-01-09 VITALS
HEART RATE: 67 BPM | DIASTOLIC BLOOD PRESSURE: 87 MMHG | SYSTOLIC BLOOD PRESSURE: 130 MMHG | RESPIRATION RATE: 15 BRPM | OXYGEN SATURATION: 96 %

## 2020-01-09 LAB
ANION GAP SERPL CALCULATED.3IONS-SCNC: 16 MEQ/L (ref 9–15)
BUN BLDV-MCNC: 8 MG/DL (ref 6–20)
CALCIUM SERPL-MCNC: 9.3 MG/DL (ref 8.5–9.9)
CHLORIDE BLD-SCNC: 100 MEQ/L (ref 95–107)
CO2: 21 MEQ/L (ref 20–31)
CREAT SERPL-MCNC: 0.53 MG/DL (ref 0.7–1.2)
EKG ATRIAL RATE: 64 BPM
EKG P AXIS: 72 DEGREES
EKG P-R INTERVAL: 156 MS
EKG Q-T INTERVAL: 440 MS
EKG QRS DURATION: 82 MS
EKG QTC CALCULATION (BAZETT): 453 MS
EKG R AXIS: 33 DEGREES
EKG T AXIS: 56 DEGREES
EKG VENTRICULAR RATE: 64 BPM
GFR AFRICAN AMERICAN: >60
GFR NON-AFRICAN AMERICAN: >60
GLUCOSE BLD-MCNC: 101 MG/DL (ref 70–99)
HCT VFR BLD CALC: 48.1 % (ref 42–52)
HEMOGLOBIN: 16.4 G/DL (ref 14–18)
LV EF: 65 %
LVEF MODALITY: NORMAL
MCH RBC QN AUTO: 35 PG (ref 27–31.3)
MCHC RBC AUTO-ENTMCNC: 34 % (ref 33–37)
MCV RBC AUTO: 102.9 FL (ref 80–100)
PDW BLD-RTO: 12.7 % (ref 11.5–14.5)
PLATELET # BLD: 293 K/UL (ref 130–400)
POTASSIUM SERPL-SCNC: 4.5 MEQ/L (ref 3.4–4.9)
RBC # BLD: 4.67 M/UL (ref 4.7–6.1)
SODIUM BLD-SCNC: 137 MEQ/L (ref 135–144)
WBC # BLD: 12.2 K/UL (ref 4.8–10.8)

## 2020-01-09 PROCEDURE — C1894 INTRO/SHEATH, NON-LASER: HCPCS

## 2020-01-09 PROCEDURE — 85027 COMPLETE CBC AUTOMATED: CPT

## 2020-01-09 PROCEDURE — C1725 CATH, TRANSLUMIN NON-LASER: HCPCS

## 2020-01-09 PROCEDURE — 93005 ELECTROCARDIOGRAM TRACING: CPT | Performed by: INTERNAL MEDICINE

## 2020-01-09 PROCEDURE — C1769 GUIDE WIRE: HCPCS

## 2020-01-09 PROCEDURE — 2500000003 HC RX 250 WO HCPCS

## 2020-01-09 PROCEDURE — 93306 TTE W/DOPPLER COMPLETE: CPT

## 2020-01-09 PROCEDURE — 2580000003 HC RX 258

## 2020-01-09 PROCEDURE — 6360000002 HC RX W HCPCS

## 2020-01-09 PROCEDURE — 6360000004 HC RX CONTRAST MEDICATION: Performed by: INTERNAL MEDICINE

## 2020-01-09 PROCEDURE — 93458 L HRT ARTERY/VENTRICLE ANGIO: CPT | Performed by: INTERNAL MEDICINE

## 2020-01-09 PROCEDURE — 2709999900 HC NON-CHARGEABLE SUPPLY

## 2020-01-09 PROCEDURE — 80048 BASIC METABOLIC PNL TOTAL CA: CPT

## 2020-01-09 RX ORDER — ACETAMINOPHEN 325 MG/1
650 TABLET ORAL EVERY 4 HOURS PRN
Status: DISCONTINUED | OUTPATIENT
Start: 2020-01-09 | End: 2020-01-09 | Stop reason: HOSPADM

## 2020-01-09 RX ORDER — ONDANSETRON 2 MG/ML
4 INJECTION INTRAMUSCULAR; INTRAVENOUS EVERY 6 HOURS PRN
Status: DISCONTINUED | OUTPATIENT
Start: 2020-01-09 | End: 2020-01-09 | Stop reason: HOSPADM

## 2020-01-09 RX ORDER — HYDRALAZINE HYDROCHLORIDE 20 MG/ML
10 INJECTION INTRAMUSCULAR; INTRAVENOUS EVERY 10 MIN PRN
Status: DISCONTINUED | OUTPATIENT
Start: 2020-01-09 | End: 2020-01-09 | Stop reason: HOSPADM

## 2020-01-09 RX ORDER — PANTOPRAZOLE SODIUM 20 MG/1
20 TABLET, DELAYED RELEASE ORAL
Qty: 30 TABLET | Refills: 5 | Status: SHIPPED | OUTPATIENT
Start: 2020-01-09

## 2020-01-09 RX ORDER — LABETALOL 20 MG/4 ML (5 MG/ML) INTRAVENOUS SYRINGE
10 EVERY 30 MIN PRN
Status: DISCONTINUED | OUTPATIENT
Start: 2020-01-09 | End: 2020-01-09 | Stop reason: HOSPADM

## 2020-01-09 RX ORDER — SODIUM CHLORIDE 9 MG/ML
INJECTION, SOLUTION INTRAVENOUS CONTINUOUS
Status: DISCONTINUED | OUTPATIENT
Start: 2020-01-09 | End: 2020-01-09 | Stop reason: HOSPADM

## 2020-01-09 RX ORDER — SODIUM CHLORIDE 0.9 % (FLUSH) 0.9 %
10 SYRINGE (ML) INJECTION PRN
Status: DISCONTINUED | OUTPATIENT
Start: 2020-01-09 | End: 2020-01-09 | Stop reason: HOSPADM

## 2020-01-09 RX ORDER — SODIUM CHLORIDE 0.9 % (FLUSH) 0.9 %
10 SYRINGE (ML) INJECTION EVERY 12 HOURS SCHEDULED
Status: DISCONTINUED | OUTPATIENT
Start: 2020-01-09 | End: 2020-01-09 | Stop reason: HOSPADM

## 2020-01-09 RX ADMIN — IOPAMIDOL 70 ML: 612 INJECTION, SOLUTION INTRAVENOUS at 16:40

## 2020-01-09 NOTE — BRIEF OP NOTE
Section of Cardiology  Adult Brief Cardiac Cath Procedure Note        Procedure(s):  LHC, b/l coronary angio    Pre-operative Diagnosis:  Angina class IV    H&P Status: Completed and reviewed.      Post-operative Diagnosis:    Patent lad stents with 40% ISR  Distal LAD 40%   Normal LVF    Findings:  See full report    Complications:  none    Primary Proceduralist:   Dr.Wes Rhodes DO    Max Med rx  RFM        Full procedure note to follow

## 2020-01-09 NOTE — PROGRESS NOTES
2cc of air removed from Vasc Band. No bleeding noted to right radial site. Wife at bedside upset and raising voice to pt.

## 2020-01-09 NOTE — PROGRESS NOTES
Arrived to pre/post from home. Name and date of birth verified along with consents for procedure and allergies.   Oriented to surroundings and rights and responsibility handout given to patient

## 2020-01-09 NOTE — PROGRESS NOTES
Wife at bedside. Taking liquids well and tolerating. Refusing food at this time . No bleeding or hematoma noted to right radial site.

## 2020-01-10 PROCEDURE — 93010 ELECTROCARDIOGRAM REPORT: CPT | Performed by: INTERNAL MEDICINE

## 2020-07-14 RX ORDER — PRAVASTATIN SODIUM 40 MG
40 TABLET ORAL NIGHTLY
Qty: 90 TABLET | Refills: 3 | Status: SHIPPED | OUTPATIENT
Start: 2020-07-14 | End: 2021-07-29

## 2020-07-16 ENCOUNTER — OFFICE VISIT (OUTPATIENT)
Dept: CARDIOLOGY CLINIC | Age: 60
End: 2020-07-16
Payer: COMMERCIAL

## 2020-07-16 VITALS
WEIGHT: 146.4 LBS | SYSTOLIC BLOOD PRESSURE: 138 MMHG | OXYGEN SATURATION: 99 % | TEMPERATURE: 91.9 F | BODY MASS INDEX: 22.93 KG/M2 | DIASTOLIC BLOOD PRESSURE: 76 MMHG | HEART RATE: 64 BPM | RESPIRATION RATE: 14 BRPM

## 2020-07-16 PROCEDURE — 99214 OFFICE O/P EST MOD 30 MIN: CPT | Performed by: INTERNAL MEDICINE

## 2020-07-16 PROCEDURE — G8427 DOCREV CUR MEDS BY ELIG CLIN: HCPCS | Performed by: INTERNAL MEDICINE

## 2020-07-16 PROCEDURE — 3017F COLORECTAL CA SCREEN DOC REV: CPT | Performed by: INTERNAL MEDICINE

## 2020-07-16 PROCEDURE — G8420 CALC BMI NORM PARAMETERS: HCPCS | Performed by: INTERNAL MEDICINE

## 2020-07-16 PROCEDURE — 93000 ELECTROCARDIOGRAM COMPLETE: CPT | Performed by: INTERNAL MEDICINE

## 2020-07-16 PROCEDURE — 4004F PT TOBACCO SCREEN RCVD TLK: CPT | Performed by: INTERNAL MEDICINE

## 2020-07-16 RX ORDER — NALOXONE HYDROCHLORIDE 4 MG/.1ML
SPRAY NASAL
COMMUNITY
Start: 2020-03-03

## 2020-07-16 NOTE — PATIENT INSTRUCTIONS
Patient Education        Stopping Smoking: Care Instructions  Your Care Instructions     Cigarette smokers crave the nicotine in cigarettes. Giving it up is much harder than simply changing a habit. Your body has to stop craving the nicotine. It is hard to quit, but you can do it. There are many tools that people use to quit smoking. You may find that combining tools works best for you. There are several steps to quitting. First you get ready to quit. Then you get support to help you. After that, you learn new skills and behaviors to become a nonsmoker. For many people, a necessary step is getting and using medicine. Your doctor will help you set up the plan that best meets your needs. You may want to attend a smoking cessation program to help you quit smoking. When you choose a program, look for one that has proven success. Ask your doctor for ideas. You will greatly increase your chances of success if you take medicine as well as get counseling or join a cessation program.  Some of the changes you feel when you first quit tobacco are uncomfortable. Your body will miss the nicotine at first, and you may feel short-tempered and grumpy. You may have trouble sleeping or concentrating. Medicine can help you deal with these symptoms. You may struggle with changing your smoking habits and rituals. The last step is the tricky one: Be prepared for the smoking urge to continue for a time. This is a lot to deal with, but keep at it. You will feel better. Follow-up care is a key part of your treatment and safety. Be sure to make and go to all appointments, and call your doctor if you are having problems. It's also a good idea to know your test results and keep a list of the medicines you take. How can you care for yourself at home? · Ask your family, friends, and coworkers for support. You have a better chance of quitting if you have help and support.   · Join a support group, such as Nicotine Anonymous, for people who are trying to quit smoking. · Consider signing up for a smoking cessation program, such as the American Lung Association's Freedom from Smoking program.  · Get text messaging support. Go to the website at www.smokefree. gov to sign up for the [facility] program.  · Set a quit date. Pick your date carefully so that it is not right in the middle of a big deadline or stressful time. Once you quit, do not even take a puff. Get rid of all ashtrays and lighters after your last cigarette. Clean your house and your clothes so that they do not smell of smoke. · Learn how to be a nonsmoker. Think about ways you can avoid those things that make you reach for a cigarette. ? Avoid situations that put you at greatest risk for smoking. For some people, it is hard to have a drink with friends without smoking. For others, they might skip a coffee break with coworkers who smoke. ? Change your daily routine. Take a different route to work or eat a meal in a different place. · Cut down on stress. Calm yourself or release tension by doing an activity you enjoy, such as reading a book, taking a hot bath, or gardening. · Talk to your doctor or pharmacist about nicotine replacement therapy, which replaces the nicotine in your body. You still get nicotine but you do not use tobacco. Nicotine replacement products help you slowly reduce the amount of nicotine you need. These products come in several forms, many of them available over-the-counter:  ? Nicotine patches  ? Nicotine gum and lozenges  ? Nicotine inhaler  · Ask your doctor about bupropion (Wellbutrin) or varenicline (Chantix), which are prescription medicines. They do not contain nicotine. They help you by reducing withdrawal symptoms, such as stress and anxiety. · Some people find hypnosis, acupuncture, and massage helpful for ending the smoking habit. · Eat a healthy diet and get regular exercise.  Having healthy habits will help your body move past its craving for nicotine. · Be prepared to keep trying. Most people are not successful the first few times they try to quit. Do not get mad at yourself if you smoke again. Make a list of things you learned and think about when you want to try again, such as next week, next month, or next year. Where can you learn more? Go to https://Next Safetypenicoleewradha.[employer]. org and sign in to your [employer] account. Enter D088 in the [employer] box to learn more about \"Stopping Smoking: Care Instructions. \"     If you do not have an account, please click on the \"Sign Up Now\" link. Current as of: March 12, 2020               Content Version: 12.5  © 2006-2020 [employer]wise, Incorporated. Care instructions adapted under license by [facility] ([facility]). If you have questions about a medical condition or this instruction, always ask your healthcare professional. [address] any warranty or liability for your use of this information.

## 2020-07-16 NOTE — PROGRESS NOTES
Chief Complaint   Patient presents with    Coronary Artery Disease    Hypertension       Patient presents for follow up medical evaluation. Patient is followed on a regular basis by Dr. Marge Pelletier, DO. S/p hospitalization for CP/SOB. S/p cardiac cath with abnormal FFR of the LAD s/p LAURENCE to mid LAD 2.75x12 Xience. Echo with normal LVF. Experienced occasional mild chest discomfort a few weeks ago, no SL nitro use. Pt denies dyspnea, dyspnea on exertion, change in exercise capacity, fatigue, nausea, vomiting, diarrhea, constipation, motor weakness, insomnia, weight loss, syncope, dizziness, lightheadedness, palpitations, PND, orthopnea, or claudication. 7-18-13: as above, doing ok overall, used SL nitro once in last six months. Pt denies chest pain, dyspnea, dyspnea on exertion, change in exercise capacity, fatigue, nausea, vomiting, diarrhea, constipation, motor weakness, insomnia, weight loss, syncope, dizziness, lightheadedness, palpitations, PND, orthopnea, or claudication. Having right knee pain, may need to have replacement in future. Compliant with meds, no bleeding issues. Álvarez complain of foot coldness at times b/l right >left. Continues to smoke but cutting back.    8-8-13: as above, s/p FUNMI/PVR. FUNMI were above one b/l. There was abnormal segmental pressure in right high thigh, abnormal TFI in tibioperoneal vessels, suggestive of mild disease. No significant claudication type symptoms. On chantix, trying to quit.    2-13-14: As above, doing well overall. No recent hospitalizations, SL nitro use, or bleeding issues. No change in meds. Recently hospitalized for PNA. Pt denies chest pain, dyspnea, dyspnea on exertion, change in exercise capacity, fatigue,  nausea, vomiting, diarrhea, constipation, motor weakness, insomnia, weight loss, syncope, dizziness, lightheadedness, palpitations, PND, orthopnea, or claudication. States right leg gets colder at times.  Cutting back on smoking.     5-6-14: as above, s/p hospitalization for NSTEMI. Patient had severe CP, ischemic EKG changes, rushe to cath and underwent LAURENCE to outlfow of MID LAD instent restenosis. Doing better overall. No SL nitro use since discharge. + anxiety, increased BP at times. Pt denies chest pain, dyspnea, dyspnea on exertion, change in exercise capacity, fatigue,  nausea, vomiting, diarrhea, constipation, motor weakness, insomnia, weight loss, syncope, dizziness, lightheadedness, palpitations, PND, orthopnea, or claudication. Cut back on smoking. 11-6-14: as above, doing well overall. No bleeding issues. Does have some foot swelling. Has used nitro twice over the past 6 months, he had forgotten to take IMDUR. [place] Pt denies dyspnea, dyspnea on exertion, change in exercise capacity, fatigue,  nausea, vomiting, diarrhea, constipation, motor weakness, insomnia, weight loss, syncope, dizziness, lightheadedness, palpitations, PND, orthopnea, or claudication. States he's somewhat active. Continues to smoke. S/p hospitalization in 10/2014, s/p normal 2D echo. No further episodes, was felt to be due to vertigo. 12-23-14: as above, s/p hospitalization for unstable angina type symptoms, s/p LHC with patent mid LAD stents with mild proximal instent restenosis, somewhat hazy but no significant stenosis, meds were titrated. Developed right radial site hematoma. Compliant with meds. No melena or hematuria. No SL nitro use. Continues to smoke. imdur was increased to 60mg daily. 4-2-15: as above, continues to have occasional midsternal chest pain at times relieved with nitro. Has taken 3-4 tablets since last visit, which relieves his symptoms quickly. Longest episode lasts 4-5 min. Continues to smoke, cut way back. Started on Xanax by PCP. Does have some mild SOB with exertion. Having a lot of anxiety/stress issues financially, filled for bankruptcy.  Pt denies dyspnea,change in exercise capacity, fatigue,  nausea, vomiting, diarrhea, constipation, motor Drinks a lot of ETOH, drinks 3-4 and more a day. Working at this time. His father committed suicide, his dog , his sister in law  from an MI in his home. EKG with NSR, no acute ischemia.       5-8-10: used nitro once back in  for CP while chopping wood. Doing well overall. Pt denies chest pain, dyspnea, dyspnea on exertion, change in exercise capacity, fatigue,  nausea, vomiting, diarrhea, constipation, motor weakness, insomnia, weight loss, syncope, palpitations, PND, orthopnea, or claudication. No nitro use. BP and hr are good. CAD is stable. No LE discoloration or ulcers. No LE edema. No CHF type symptoms. Lipid profile is normal. No recent hospitalization. No change in meds. He was LH/DIzz and quit Imdur and doing better now. Continues to smoke. EKG with NSR, old AS wall infarct. 18: HAS not needed to use nitro. Doing well overall. No complaints. Pt denies chest pain, dyspnea, dyspnea on exertion, change in exercise capacity, fatigue,  nausea, vomiting, diarrhea, constipation, motor weakness, insomnia, weight loss, syncope, dizziness, lightheadedness, palpitations, PND, orthopnea, or claudication. No nitro use. BP and hr are good. CAD is stable. No LE discoloration or ulcers. No LE edema. No CHF type symptoms. Lipid profile is normal. No recent hospitalization. No change in meds. Continues to smoke. 19: Pt denies chest pain, dyspnea, dyspnea on exertion, change in exercise capacity, fatigue,  nausea, vomiting, diarrhea, constipation, motor weakness, insomnia, weight loss, syncope, dizziness, lightheadedness, palpitations, PND, orthopnea, or claudication. No nitro use. BP and hr are good. CAD is stable. No LE discoloration or ulcers. No LE edema. No CHF type symptoms. Lipid profile is normal. No recent hospitalization. No change in meds. LDL is 86. EKG with NSR. continues to smoke but not as much. 19: he is smoking again.  Pt denies chest pain, dyspnea, dyspnea on exertion, change in exercise capacity, fatigue,  nausea, vomiting, diarrhea, constipation, motor weakness, insomnia, weight loss, syncope, dizziness, lightheadedness, palpitations, PND, orthopnea, or claudication. No nitro use. BP and hr are good. CAD is stable. No LE discoloration or ulcers. No LE edema. No CHF type symptoms. Lipid profile is abnormal. No recent hospitalization. No change in meds. EKG with NSR, no ischemia. 1-7-19: c/o SOB for the past 2 weeks. States its worsened with mild activity. Also c/o midsternal chest pressure, like someone sitting on his chest, occurs at rest and with activity. Took a nitro 3 days ago which relieved his symptoms. + diaphoresis. Denies any LE edema or fluid build up, no orthopnea. Pt denies  nausea, vomiting, diarrhea, constipation, motor weakness, insomnia, weight loss, syncope, dizziness, lightheadedness, palpitations, PND,  or claudication. BP and hr are good. No LE discoloration or ulcers. No LE edema. Lipid profile is normal. No recent hospitalization. No change in meds. Continues to smoke. Denies any wheezing. No URI type symptoms. EKG with NSR, non specific changes. 7-16-20: Pt denies chest pain, dyspnea, dyspnea on exertion, change in exercise capacity, fatigue,  nausea, vomiting, diarrhea, constipation, motor weakness, insomnia, weight loss, syncope, dizziness, lightheadedness, palpitations, PND, orthopnea, or claudication. No nitro use. BP and hr are good. CAD is stable. No LE discoloration or ulcers. No LE edema. No CHF type symptoms. Lipid profile is normal. No recent hospitalization. No change in meds. Continues to smoke. EKG with NSR, old AS infarct. S/p ECHO in 1/2020 with EF of 65%, grade I DD, trace MR.      S/p C on 1/-9-20: Post-operative Diagnosis:    Patent lad stents with 40% ISR  Distal LAD 40%   Normal LVF       Patient Active Problem List   Diagnosis    Depression    Coronary artery disease involving native coronary artery of native heart without angina pectoris    Tobacco abuse    Family history of premature CAD    Dyslipidemia    Asthma    Anxiety attack    Traumatic amputation of finger    Closed fracture of distal phalanx of digit of hand    Essential hypertension    Primary osteoarthritis of right knee    SOB (shortness of breath)       Current Outpatient Medications   Medication Sig Dispense Refill    naloxone 4 MG/0.1ML LIQD nasal spray by Nasal route      metoprolol tartrate (LOPRESSOR) 25 MG tablet Take 1 tablet by mouth 2 times daily 180 tablet 3    pravastatin (PRAVACHOL) 40 MG tablet TAKE 1 TABLET BY MOUTH NIGHTLY 90 tablet 3    pantoprazole (PROTONIX) 20 MG tablet Take 1 tablet by mouth every morning (before breakfast) 30 tablet 5    HYDROcodone-acetaminophen (NORCO) 5-325 MG per tablet Take 1 tablet by mouth 2 times daily.  amLODIPine (NORVASC) 5 MG tablet Take 1 qhs (Patient taking differently: Take 5 mg by mouth daily Take 1 qhs) 30 tablet 5    ALPRAZolam (XANAX) 0.5 MG tablet Take 0.5 mg by mouth 3 times daily as needed.  FLUoxetine (PROZAC) 20 MG capsule TAKE ONE CAPSULE BY MOUTH DAILY (Patient taking differently: Take 20 mg by mouth daily TAKE ONE CAPSULE BY MOUTH DAILY) 30 capsule 3    naproxen (EC NAPROSYN) 500 MG EC tablet Take 1 tab at lunch if needed for knee (Patient taking differently: Take 500 mg by mouth Take 1 tab at lunch if needed for knee) 30 tablet 3    albuterol sulfate HFA (PROAIR HFA) 108 (90 Base) MCG/ACT inhaler Inhale 2 puffs into the lungs every 4 hours as needed for Wheezing 1 Inhaler 3    NITROSTAT 0.4 MG SL tablet Place 0.4 mg under the tongue every 5 minutes as needed       aspirin 81 MG tablet Take 81 mg by mouth daily. No current facility-administered medications for this visit.         ALLERGIES: Altace [ramipril]    Review of Systems:  General ROS: negative  Psychological ROS: negative  Hematological and Lymphatic ROS: No history of blood clots or bleeding disorder. Respiratory ROS: no cough, shortness of breath, or wheezing  Cardiovascular ROS: no chest pain or dyspnea on exertion  Gastrointestinal ROS: no abdominal pain, change in bowel habits, or black or bloody stools  Genito-Urinary ROS: no dysuria, trouble voiding, or hematuria  Musculoskeletal ROS: negative  Neurological ROS: no TIA or stroke symptoms  Dermatological ROS: negative    VITALS:  Blood pressure 138/76, pulse 64, temperature (!) 91.9 °F (33.3 °C), temperature source Temporal, resp. rate 14, weight 146 lb 6.4 oz (66.4 kg), SpO2 99 %. Body mass index is 22.93 kg/m². Physical Examination:  General appearance - alert, well appearing, and in no distress  Mental status - alert, oriented to person, place, and time  Neck - Neck is supple, no JVD or carotid bruits. No thyromegaly or adenopathy. Chest - clear to auscultation, no wheezes, rales or rhonchi, symmetric air entry  Heart - normal rate, regular rhythm, normal S1, S2, no murmurs, rubs, clicks or gallops  Abdomen - soft, nontender, nondistended, no masses or organomegaly  Neurological - alert, oriented, normal speech, no focal findings or movement disorder noted  Extremities - peripheral pulses normal, no pedal edema, no clubbing or cyanosis  Skin - normal coloration and turgor, no rashes, no suspicious skin lesions noted    Right hand: right forearm echymosis, hematoma, +2 radial and ulnar pulses, hand is warm, no neurological deficits. Pulses:   Carotid pulses are 3+ on the right side, and 3+ on the left side. Radial pulses are 3+ on the right side, and 3+ on the left side. Femoral pulses are 3+ on the right side, and 3+ on the left side. Popliteal pulses are 2+ on the right side, and 2+ on the left side.        LABS:    Chemistry        Component Value Date/Time     01/09/2020 1318    K 4.5 01/09/2020 1318     01/09/2020 1318    CO2 21 01/09/2020 1318    BUN 8 01/09/2020 1318    CREATININE 0.53 (L) 01/09/2020 1318        Component Value Date/Time    CALCIUM 9.3 01/09/2020 1318    ALKPHOS 76 11/13/2018 1036    AST 26 11/13/2018 1036    ALT 23 11/13/2018 1036    BILITOT 0.4 11/13/2018 1036            Lab Results   Component Value Date    WBC 12.2 (H) 01/09/2020    HGB 16.4 01/09/2020    HCT 48.1 01/09/2020    .9 (H) 01/09/2020     01/09/2020       No components found for: CHLPL  Lab Results   Component Value Date    TRIG 104 01/23/2018    TRIG 101 06/23/2016    TRIG 90 04/15/2015     Lab Results   Component Value Date    HDL 67 (H) 01/23/2018    HDL 60 (H) 06/23/2016    HDL 56 04/15/2015     Lab Results   Component Value Date    LDLCALC 86 01/23/2018    LDLCALC 110 06/23/2016    [address] 104 04/15/2015     No results found for: LABVLDL    EKG: NSR, old AS wall infarct. ASSESSMENT:     Diagnosis Orders   1. SOB (shortness of breath)  EKG 12 Lead   2. Precordial pain  EKG 12 Lead   3. Coronary artery disease involving native coronary artery of native heart without angina pectoris     4. Tobacco abuse     5. Dyslipidemia     6. Essential hypertension     7. Shortness of breath - rule out ischemia, doubt ADHF. PLAN:     Patient will need to continue to follow up with you for their general medical care and return to see me in the office in 3 months. As always, aggressive risk factor modification is strongly recommended. We should adhere to the [address] VII guidelines for HTN management and the NCEP ATP III guidelines for LDL-C management. The nature of cardiac risk has been fully discussed with this patient. I have made him aware of his LDL target goal given his cardiovascular risk analysis. I have discussed the appropriate diet. The need for lifelong compliance in order to reduce risk is stressed. A regular exercise program is recommended to help achieve and maintain normal body weight, fitness and improve lipid balance. Continue medications at current doses.      Monitor BP, will not change meds at this time due to pain. Check EKG    PAD workup in future. Patient was advised and encouraged to check blood pressure at home or at a pharmacy, maintain a logbook, and also call us back if blood pressure are above the target ranges or if it is low. Patient clearly understands and agrees to the instructions. We will need to continue to monitor muscle and liver enzymes, BUN, CR, and electrolytes. Smoking cessation was strongly recommended    Details of medical condition explained and patient was warned about adverse consequences of uncontrolled medical conditions and possible side effects of prescribed medications. Patient was advised to go to the ER if he starts experiencing adverse effects of the medications. patient was instructed to call us back or go to nearby emergency room immediately if symptoms get worse or do not improve. Thank you for allowing me to participate in the care of your patient, please don't hesitate to contact me if you have any further questions.

## 2020-12-29 NOTE — TELEPHONE ENCOUNTER
Call- done    ; Controlled Substances Monitoring:     Attestation: The Prescription Monitoring Report for this patient was reviewed today. Khadijah Lindo DO)  Documentation: Possible medication side effects, risk of tolerance and/or dependence, and alternative treatments discussed., No signs of potential drug abuse or diversion identified. Khadijah Lindo DO)  Medication Contracts: Existing medication contract.  Khadijah Lindo DO) · BP elevated at time of admission   · Home regimen Norvasc 10 mg daily and Lopressor 25 mg BID   · Given 1 dose of IV Lasix in ED   · Give nightly dose of Lopressor now   · Monitor BP with PRN labetolol for SBP >200

## 2021-07-28 DIAGNOSIS — E78.5 DYSLIPIDEMIA: ICD-10-CM

## 2021-07-29 RX ORDER — PRAVASTATIN SODIUM 40 MG
TABLET ORAL
Qty: 90 TABLET | Refills: 3 | Status: SHIPPED | OUTPATIENT
Start: 2021-07-29

## 2021-07-29 NOTE — TELEPHONE ENCOUNTER
requesting medication refill. Please approve or deny this request.    Rx requested:  Requested Prescriptions     Pending Prescriptions Disp Refills    pravastatin (PRAVACHOL) 40 MG tablet [Pharmacy Med Name: PRAVASTATIN SODIUM 40MG TABLET] 90 tablet 3     Sig: TAKE ONE TABLET BY MOUTH NIGHTLY         Last Office Visit:   7/16/2020      Next Visit Date:  No future appointments.

## 2021-08-06 DIAGNOSIS — I10 ESSENTIAL HYPERTENSION: ICD-10-CM

## 2021-08-06 NOTE — TELEPHONE ENCOUNTER
requesting medication refill. Please approve or deny this request.    Rx requested:  Requested Prescriptions     Pending Prescriptions Disp Refills    metoprolol tartrate (LOPRESSOR) 25 MG tablet [Pharmacy Med Name: METOPROLOL TARTRATE 25MG TABLET] 180 tablet 3     Sig: TAKE ONE TABLET BY MOUTH TWO TIMES DAILY         Last Office Visit:   7/16/2020      Next Visit Date:  No future appointments.

## 2021-12-20 ENCOUNTER — HOSPITAL ENCOUNTER (INPATIENT)
Age: 61
LOS: 2 days | Discharge: HOME | DRG: 093 | End: 2021-12-24
Attending: EMERGENCY MEDICINE | Admitting: INTERNAL MEDICINE
Payer: COMMERCIAL

## 2021-12-20 ENCOUNTER — APPOINTMENT (OUTPATIENT)
Dept: CT IMAGING | Age: 61
DRG: 093 | End: 2021-12-20
Payer: COMMERCIAL

## 2021-12-20 ENCOUNTER — APPOINTMENT (OUTPATIENT)
Dept: ULTRASOUND IMAGING | Age: 61
DRG: 093 | End: 2021-12-20
Payer: COMMERCIAL

## 2021-12-20 DIAGNOSIS — I10 ESSENTIAL HYPERTENSION: ICD-10-CM

## 2021-12-20 DIAGNOSIS — R25.1 TREMOR: ICD-10-CM

## 2021-12-20 DIAGNOSIS — R26.81 GAIT INSTABILITY: Primary | ICD-10-CM

## 2021-12-20 DIAGNOSIS — I63.9 CEREBROVASCULAR ACCIDENT (CVA), UNSPECIFIED MECHANISM (HCC): ICD-10-CM

## 2021-12-20 PROBLEM — R27.0 ATAXIA: Status: ACTIVE | Noted: 2021-12-20

## 2021-12-20 LAB
ALBUMIN SERPL-MCNC: 4.4 G/DL (ref 3.5–4.6)
ALP BLD-CCNC: 101 U/L (ref 35–104)
ALT SERPL-CCNC: 20 U/L (ref 0–41)
ANION GAP SERPL CALCULATED.3IONS-SCNC: 19 MEQ/L (ref 9–15)
AST SERPL-CCNC: 17 U/L (ref 0–40)
BASOPHILS ABSOLUTE: 0.1 K/UL (ref 0–0.1)
BASOPHILS RELATIVE PERCENT: 1 % (ref 0.2–1.2)
BILIRUB SERPL-MCNC: 0.6 MG/DL (ref 0.2–0.7)
BUN BLDV-MCNC: 11 MG/DL (ref 8–23)
CALCIUM SERPL-MCNC: 9.5 MG/DL (ref 8.5–9.9)
CHLORIDE BLD-SCNC: 99 MEQ/L (ref 95–107)
CO2: 18 MEQ/L (ref 20–31)
CREAT SERPL-MCNC: 0.69 MG/DL (ref 0.7–1.2)
EKG ATRIAL RATE: 74 BPM
EKG P AXIS: 69 DEGREES
EKG P-R INTERVAL: 158 MS
EKG Q-T INTERVAL: 398 MS
EKG QRS DURATION: 70 MS
EKG QTC CALCULATION (BAZETT): 441 MS
EKG R AXIS: 47 DEGREES
EKG T AXIS: 61 DEGREES
EKG VENTRICULAR RATE: 74 BPM
EOSINOPHILS ABSOLUTE: 0.1 K/UL (ref 0–0.5)
EOSINOPHILS RELATIVE PERCENT: 1 % (ref 0.8–7)
GFR AFRICAN AMERICAN: >60
GFR NON-AFRICAN AMERICAN: >60
GLOBULIN: 2.8 G/DL (ref 2.3–3.5)
GLUCOSE BLD-MCNC: 99 MG/DL (ref 70–99)
HCT VFR BLD CALC: 49.3 % (ref 42–52)
HEMOGLOBIN: 17.1 G/DL (ref 13.7–17.5)
IMMATURE GRANULOCYTES #: 0 K/UL
IMMATURE GRANULOCYTES %: 0.4 %
LV EF: 60 %
LVEF MODALITY: NORMAL
LYMPHOCYTES ABSOLUTE: 2.3 K/UL (ref 1.3–3.6)
LYMPHOCYTES RELATIVE PERCENT: 23.1 %
MAGNESIUM: 2 MG/DL (ref 1.7–2.4)
MCH RBC QN AUTO: 34.3 PG (ref 25.7–32.2)
MCHC RBC AUTO-ENTMCNC: 34.7 % (ref 32.3–36.5)
MCV RBC AUTO: 98.8 FL (ref 79–92.2)
MONOCYTES ABSOLUTE: 1.1 K/UL (ref 0.3–0.8)
MONOCYTES RELATIVE PERCENT: 10.4 % (ref 5.3–12.2)
NEUTROPHILS ABSOLUTE: 6.5 K/UL (ref 1.8–5.4)
NEUTROPHILS RELATIVE PERCENT: 64.1 % (ref 34–67.9)
PDW BLD-RTO: 12 % (ref 11.6–14.4)
PLATELET # BLD: 264 K/UL (ref 163–337)
POTASSIUM SERPL-SCNC: 4.1 MEQ/L (ref 3.4–4.9)
RBC # BLD: 4.99 M/UL (ref 4.63–6.08)
SARS-COV-2, NAAT: NOT DETECTED
SODIUM BLD-SCNC: 136 MEQ/L (ref 135–144)
TOTAL PROTEIN: 7.2 G/DL (ref 6.3–8)
TROPONIN: <0.01 NG/ML (ref 0–0.01)
WBC # BLD: 10.1 K/UL (ref 4.2–9)

## 2021-12-20 PROCEDURE — 93306 TTE W/DOPPLER COMPLETE: CPT

## 2021-12-20 PROCEDURE — 96374 THER/PROPH/DIAG INJ IV PUSH: CPT

## 2021-12-20 PROCEDURE — 6370000000 HC RX 637 (ALT 250 FOR IP): Performed by: INTERNAL MEDICINE

## 2021-12-20 PROCEDURE — 83735 ASSAY OF MAGNESIUM: CPT

## 2021-12-20 PROCEDURE — 96372 THER/PROPH/DIAG INJ SC/IM: CPT

## 2021-12-20 PROCEDURE — 99285 EMERGENCY DEPT VISIT HI MDM: CPT

## 2021-12-20 PROCEDURE — 70450 CT HEAD/BRAIN W/O DYE: CPT

## 2021-12-20 PROCEDURE — 85025 COMPLETE CBC W/AUTO DIFF WBC: CPT

## 2021-12-20 PROCEDURE — 93005 ELECTROCARDIOGRAM TRACING: CPT

## 2021-12-20 PROCEDURE — 6360000002 HC RX W HCPCS: Performed by: INTERNAL MEDICINE

## 2021-12-20 PROCEDURE — 84484 ASSAY OF TROPONIN QUANT: CPT

## 2021-12-20 PROCEDURE — 6360000002 HC RX W HCPCS: Performed by: EMERGENCY MEDICINE

## 2021-12-20 PROCEDURE — 36415 COLL VENOUS BLD VENIPUNCTURE: CPT

## 2021-12-20 PROCEDURE — 2580000003 HC RX 258: Performed by: INTERNAL MEDICINE

## 2021-12-20 PROCEDURE — G0378 HOSPITAL OBSERVATION PER HR: HCPCS

## 2021-12-20 PROCEDURE — 87635 SARS-COV-2 COVID-19 AMP PRB: CPT

## 2021-12-20 PROCEDURE — 80053 COMPREHEN METABOLIC PANEL: CPT

## 2021-12-20 PROCEDURE — 93880 EXTRACRANIAL BILAT STUDY: CPT

## 2021-12-20 RX ORDER — FLUOXETINE HYDROCHLORIDE 20 MG/1
20 CAPSULE ORAL DAILY
Status: DISCONTINUED | OUTPATIENT
Start: 2021-12-20 | End: 2021-12-22

## 2021-12-20 RX ORDER — PANTOPRAZOLE SODIUM 20 MG/1
20 TABLET, DELAYED RELEASE ORAL
Status: DISCONTINUED | OUTPATIENT
Start: 2021-12-21 | End: 2021-12-24 | Stop reason: HOSPADM

## 2021-12-20 RX ORDER — LORAZEPAM 2 MG/ML
1 INJECTION INTRAMUSCULAR ONCE
Status: COMPLETED | OUTPATIENT
Start: 2021-12-20 | End: 2021-12-20

## 2021-12-20 RX ORDER — SODIUM CHLORIDE 9 MG/ML
25 INJECTION, SOLUTION INTRAVENOUS PRN
Status: DISCONTINUED | OUTPATIENT
Start: 2021-12-20 | End: 2021-12-24 | Stop reason: HOSPADM

## 2021-12-20 RX ORDER — ALPRAZOLAM 0.5 MG/1
0.5 TABLET ORAL 3 TIMES DAILY PRN
Status: DISCONTINUED | OUTPATIENT
Start: 2021-12-20 | End: 2021-12-22

## 2021-12-20 RX ORDER — ONDANSETRON 2 MG/ML
4 INJECTION INTRAMUSCULAR; INTRAVENOUS EVERY 6 HOURS PRN
Status: DISCONTINUED | OUTPATIENT
Start: 2021-12-20 | End: 2021-12-24 | Stop reason: HOSPADM

## 2021-12-20 RX ORDER — PROMETHAZINE HYDROCHLORIDE 12.5 MG/1
12.5 TABLET ORAL EVERY 6 HOURS PRN
Status: DISCONTINUED | OUTPATIENT
Start: 2021-12-20 | End: 2021-12-24 | Stop reason: HOSPADM

## 2021-12-20 RX ORDER — ASPIRIN 325 MG
162 TABLET ORAL DAILY
Status: DISCONTINUED | OUTPATIENT
Start: 2021-12-21 | End: 2021-12-20

## 2021-12-20 RX ORDER — ALBUTEROL SULFATE 90 UG/1
2 AEROSOL, METERED RESPIRATORY (INHALATION) EVERY 4 HOURS PRN
Status: DISCONTINUED | OUTPATIENT
Start: 2021-12-20 | End: 2021-12-24 | Stop reason: HOSPADM

## 2021-12-20 RX ORDER — SODIUM CHLORIDE 0.9 % (FLUSH) 0.9 %
10 SYRINGE (ML) INJECTION EVERY 12 HOURS SCHEDULED
Status: DISCONTINUED | OUTPATIENT
Start: 2021-12-20 | End: 2021-12-24 | Stop reason: HOSPADM

## 2021-12-20 RX ORDER — ACETAMINOPHEN 650 MG/1
650 SUPPOSITORY RECTAL EVERY 6 HOURS PRN
Status: DISCONTINUED | OUTPATIENT
Start: 2021-12-20 | End: 2021-12-24 | Stop reason: HOSPADM

## 2021-12-20 RX ORDER — POLYETHYLENE GLYCOL 3350 17 G/17G
17 POWDER, FOR SOLUTION ORAL DAILY PRN
Status: DISCONTINUED | OUTPATIENT
Start: 2021-12-20 | End: 2021-12-24 | Stop reason: HOSPADM

## 2021-12-20 RX ORDER — AMLODIPINE BESYLATE 5 MG/1
5 TABLET ORAL DAILY
Status: DISCONTINUED | OUTPATIENT
Start: 2021-12-20 | End: 2021-12-24 | Stop reason: HOSPADM

## 2021-12-20 RX ORDER — LORAZEPAM 2 MG/ML
2 INJECTION INTRAMUSCULAR ONCE
Status: COMPLETED | OUTPATIENT
Start: 2021-12-21 | End: 2021-12-21

## 2021-12-20 RX ORDER — ACETAMINOPHEN 325 MG/1
650 TABLET ORAL EVERY 6 HOURS PRN
Status: DISCONTINUED | OUTPATIENT
Start: 2021-12-20 | End: 2021-12-24 | Stop reason: HOSPADM

## 2021-12-20 RX ORDER — PRAVASTATIN SODIUM 40 MG
40 TABLET ORAL NIGHTLY
Status: DISCONTINUED | OUTPATIENT
Start: 2021-12-20 | End: 2021-12-24 | Stop reason: HOSPADM

## 2021-12-20 RX ORDER — SODIUM CHLORIDE 0.9 % (FLUSH) 0.9 %
10 SYRINGE (ML) INJECTION PRN
Status: DISCONTINUED | OUTPATIENT
Start: 2021-12-20 | End: 2021-12-24 | Stop reason: HOSPADM

## 2021-12-20 RX ADMIN — ALPRAZOLAM 0.5 MG: 0.5 TABLET ORAL at 18:13

## 2021-12-20 RX ADMIN — AMLODIPINE BESYLATE 5 MG: 5 TABLET ORAL at 15:37

## 2021-12-20 RX ADMIN — Medication 10 ML: at 19:55

## 2021-12-20 RX ADMIN — PRAVASTATIN SODIUM 40 MG: 40 TABLET ORAL at 19:55

## 2021-12-20 RX ADMIN — ACETAMINOPHEN 650 MG: 325 TABLET ORAL at 19:54

## 2021-12-20 RX ADMIN — LORAZEPAM 1 MG: 2 INJECTION INTRAMUSCULAR; INTRAVENOUS at 13:21

## 2021-12-20 RX ADMIN — ENOXAPARIN SODIUM 40 MG: 40 INJECTION SUBCUTANEOUS at 19:55

## 2021-12-20 RX ADMIN — ASPIRIN 325 MG: 325 TABLET, COATED ORAL at 15:02

## 2021-12-20 ASSESSMENT — PAIN SCALES - GENERAL: PAINLEVEL_OUTOF10: 3

## 2021-12-20 NOTE — ED PROVIDER NOTES
[facility] ED  EMERGENCY DEPARTMENT ENCOUNTER      Pt Name: Rima Velarde  MRN: [id number]  [place] 1960  Date of evaluation: 12/20/2021  Provider: Dread Flores MD    CHIEF COMPLAINT       Chief Complaint   Patient presents with    Extremity Weakness     weakness, shaking, confusion x1 week ago    Anxiety     history of anxiety did not take his medication today. HISTORY OF PRESENT ILLNESS   (Location/Symptom, Timing/Onset, Context/Setting, Quality, Duration, Modifying Factors, Severity)  Note limiting factors. 73-year-old male presenting with gait instability and tremors. Symptoms have been ongoing for a week. Prior to this patient was working and asymptomatic. He presents late because he thought symptoms would eventually go away. He does have a history of anxiety but did not take medication prior to arrival.  Does not walk with any assistance at baseline. No history of stroke in the past.          Nursing Notes were reviewed. REVIEW OF SYSTEMS    (2-9 systems for level 4, 10 or more for level 5)     Review of Systems   Neurological: Positive for dizziness and weakness. All other systems reviewed and are negative. Except as noted above the remainder of the review of systems was reviewed and negative. PAST MEDICAL HISTORY     Past Medical History:   Diagnosis Date    Anxiety attack     Asthma     CAD (coronary artery disease)     Depression     Hypertension     Primary osteoarthritis of right knee 9/5/2018         SURGICAL HISTORY       Past Surgical History:   Procedure Laterality Date    CARDIAC CATHETERIZATION      TEMPOROMANDIBULAR JOINT SURGERY      Rt knee         CURRENT MEDICATIONS       Previous Medications    ALBUTEROL SULFATE HFA (PROAIR HFA) 108 (90 BASE) MCG/ACT INHALER    Inhale 2 puffs into the lungs every 4 hours as needed for Wheezing    ALPRAZOLAM (XANAX) 0.5 MG TABLET    Take 0.5 mg by mouth 3 times daily as needed.      AMLODIPINE (NORVASC) 5 MG TABLET    Take 1 qhs    ASPIRIN 81 MG TABLET    Take 81 mg by mouth daily. FLUOXETINE (PROZAC) 20 MG CAPSULE    TAKE ONE CAPSULE BY MOUTH DAILY    HYDROCODONE-ACETAMINOPHEN (NORCO) 5-325 MG PER TABLET    Take 1 tablet by mouth 2 times daily. METOPROLOL TARTRATE (LOPRESSOR) 25 MG TABLET    Take 1 tablet by mouth 2 times daily    NALOXONE 4 MG/0.1ML LIQD NASAL SPRAY    by Nasal route    NAPROXEN (EC NAPROSYN) 500 MG EC TABLET    Take 1 tab at lunch if needed for knee    NITROSTAT 0.4 MG SL TABLET    Place 0.4 mg under the tongue every 5 minutes as needed     PANTOPRAZOLE (PROTONIX) 20 MG TABLET    Take 1 tablet by mouth every morning (before breakfast)    PRAVASTATIN (PRAVACHOL) 40 MG TABLET    TAKE ONE TABLET BY MOUTH NIGHTLY        ALLERGIES     Altace [ramipril]    FAMILY HISTORY       Family History   Problem Relation Age of Onset    Heart Disease Mother 58          SOCIAL HISTORY       Social History     Socioeconomic History    Marital status:      Spouse name: None    Number of children: None    Years of education: None    Highest education level: None   Occupational History    Occupation: EMPLOYED     Comment: [employer]   Tobacco Use    Smoking status: Current Every Day Smoker     Packs/day: 1.00     Years: 48.00     Pack years: 48.00     Types: Cigarettes    Smokeless tobacco: Never Used   Vaping Use    Vaping Use: Former    Substances: Unknown   Substance and Sexual Activity    Alcohol use: Yes     Comment: OCCASONIALLY    Drug use: Never    Sexual activity: Yes     Partners: Female   Other Topics Concern    None   Social History Narrative    None     Social Determinants of Health     Financial Resource Strain:     Difficulty of Paying Living Expenses: Not on file   Food Insecurity:     Worried About Running Out of Food in the Last Year: Not on file    Romain of Food in the Last Year: Not on file   Transportation Needs:     Lack of Transportation (Medical):  Not on file  Lack of Transportation (Non-Medical): Not on file   Physical Activity:     Days of Exercise per Week: Not on file    Minutes of Exercise per Session: Not on file   Stress:     Feeling of Stress : Not on file   Social Connections:     Frequency of Communication with Friends and Family: Not on file    Frequency of Social Gatherings with Friends and Family: Not on file    Attends Hoahaoism Services: Not on file    Active Member of [address] [place] [address] or Organizations: Not on file    Attends Club or Organization Meetings: Not on file    Marital Status: Not on file   Intimate Partner Violence:     Fear of Current or Ex-Partner: Not on file    Emotionally Abused: Not on file    Physically Abused: Not on file    Sexually Abused: Not on file   Housing Stability:     Unable to Pay for Housing in the Last Year: Not on file    Number of [place] in the Last Year: Not on file    Unstable Housing in the Last Year: Not on file       SCREENINGS   NIH Stroke Scale  Interval: Baseline  Level of Consciousness (1a. ): Alert  LOC Questions (1b. ):  Answers both correctly  LOC Commands (1c. ): Performs both tasks correctly  Best Gaze (2. ): Normal  Visual (3. ): No visual loss  Facial Palsy (4. ): Normal symmetrical movement  Motor Arm, Left (5a. ): No drift  Motor Arm, Right (5b. ): No drift  Motor Leg, Left (6a. ): No drift  Motor Leg, Right (6b. ): No drift  Limb Ataxia (7. ): Absent  Sensory (8. ): Normal  Best Language (9. ): No aphasia  Dysarthria (10. ): Normal  Extinction and Inattention (11): No abnormality  Total: 0Glasgow Coma Scale  Eye Opening: Spontaneous  Best Verbal Response: Oriented  Best Motor Response: Obeys commands  Baldemar Coma Scale Score: 15          PHYSICAL EXAM    (up to 7 for level 4, 8 or more for level 5)     ED Triage Vitals [12/20/21 1303]   BP Temp Temp Source Pulse Resp SpO2 Height Weight   (!) 196/124 97.9 °F (36.6 °C) Temporal 98 16 98 % 5' 7\" (1.702 m) 146 lb (66.2 kg)       Physical Exam  Vitals and nursing note reviewed. Constitutional:       General: He is not in acute distress. Appearance: Normal appearance. He is well-developed. He is not ill-appearing. HENT:      Head: Normocephalic and atraumatic. Mouth/Throat:      Mouth: Mucous membranes are moist.      Pharynx: Oropharynx is clear. Eyes:      Extraocular Movements: Extraocular movements intact. Conjunctiva/sclera: Conjunctivae normal.      Comments: +nystagmus   Cardiovascular:      Rate and Rhythm: Normal rate and regular rhythm. Pulmonary:      Effort: Pulmonary effort is normal.      Breath sounds: Normal breath sounds. Abdominal:      General: Bowel sounds are normal.      Palpations: Abdomen is soft. Tenderness: There is no abdominal tenderness. Musculoskeletal:         General: No deformity. Normal range of motion. Cervical back: Normal range of motion and neck supple. Skin:     General: Skin is warm and dry. Capillary Refill: Capillary refill takes less than 2 seconds. Neurological:      General: No focal deficit present. Mental Status: He is alert and oriented to person, place, and time. Cranial Nerves: No cranial nerve deficit. Comments: Gait instability   Psychiatric:         Thought Content: Thought content normal.         DIAGNOSTIC RESULTS     EKG: All EKG's are interpreted by the Emergency Department Physician who either signs or Co-signs this chart in the absence of a cardiologist.    NSR w PVC, rate 74, normal intervals, no ST elevation/ depression    RADIOLOGY:   Non-plain film images such as CT, Ultrasound and MRI are read by the radiologist. Plain radiographic images are visualized and preliminarily interpreted by the emergency physician with the below findings:    Interpretation per the Radiologist below, if available at the time of this note:    CT Head WO Contrast   Final Result      No acute intracranial process.          All CT scans at this facility use dose modulation, iterative reconstruction, and/or weight based dosing when appropriate to reduce radiation dose to as low as reasonably achievable. LABS:  Labs Reviewed   COMPREHENSIVE METABOLIC PANEL - Abnormal; Notable for the following components:       Result Value    CO2 18 (*)     Anion Gap 19 (*)     CREATININE 0.69 (*)     All other components within normal limits   CBC WITH AUTO DIFFERENTIAL - Abnormal; Notable for the following components:    WBC 10.1 (*)     MCV 98.8 (*)     MCH 34.3 (*)     Neutrophils Absolute 6.5 (*)     Monocytes Absolute 1.1 (*)     All other components within normal limits   COVID-19, RAPID   COVID-19, RAPID   TROPONIN   MAGNESIUM       All other labs were within normal range or not returned as of this dictation. EMERGENCY DEPARTMENT COURSE and DIFFERENTIAL DIAGNOSIS/MDM:   Vitals:    Vitals:    12/20/21 1303 12/20/21 1318 12/20/21 1407   BP: (!) 196/124 (!) 191/104 (!) 147/110   Pulse: 98  63   Resp: 16  20   Temp: 97.9 °F (36.6 °C)     TempSrc: Temporal     SpO2: 98%  96%   Weight: 146 lb (66.2 kg)     Height: 5' 7\" (1.702 m)         MDM  Number of Diagnoses or Management Options  Gait instability  Tremor  Diagnosis management comments: Work-up is negative but patient is unsteady walking around ER. Will admit for MRI, accepted by Dr. Jackie Omalley. Stable throughout ED course. Procedures    CRITICAL CARE TIME   Total Critical Care time was 0 minutes, excluding separately reportable procedures. There was a high probability of clinically significant/life threatening deterioration in the patient's condition which required my urgent intervention. FINAL IMPRESSION      1. Gait instability    2.  Tremor          DISPOSITION/PLAN   DISPOSITION Admitted 12/20/2021 02:21:00 PM      (Please note that portions of this note were completed with a voice recognition program.  Efforts were made to edit the dictations but occasionally words are mis-transcribed.)    Shira Keenan, MD (electronically signed)  Attending Emergency Physician        Declan Mendez MD  12/20/21 8311

## 2021-12-20 NOTE — ED NOTES
Supervisor called back, assigned bed 224, I told her patient will be ready for floor in approximately 10 minutes.   Luis Barnes [facility]  12/20/21 0401

## 2021-12-20 NOTE — ED TRIAGE NOTES
Patient presents to ED with c/o shakiness and anxiety for the past week. He does report history of anxiety and takes xanax a few times a wk. Denies any new stressors.  His wife reports mild confusion at times but upon arrival patient A & O xs 3

## 2021-12-20 NOTE — ED NOTES
Dr. Lali nevarez for Dr. Clarence Mcwilliams.   Mina Blanton [facility]  12/20/21 3352

## 2021-12-21 ENCOUNTER — HOSPITAL ENCOUNTER (OUTPATIENT)
Dept: MRI IMAGING | Age: 61
Discharge: HOME | End: 2021-12-23
Payer: COMMERCIAL

## 2021-12-21 LAB
ALBUMIN SERPL-MCNC: 3.9 G/DL (ref 3.5–4.6)
ALP BLD-CCNC: 88 U/L (ref 35–104)
ALT SERPL-CCNC: 14 U/L (ref 0–41)
ANION GAP SERPL CALCULATED.3IONS-SCNC: 13 MEQ/L (ref 9–15)
AST SERPL-CCNC: 13 U/L (ref 0–40)
BILIRUB SERPL-MCNC: 0.4 MG/DL (ref 0.2–0.7)
BUN BLDV-MCNC: 14 MG/DL (ref 8–23)
CALCIUM SERPL-MCNC: 9.3 MG/DL (ref 8.5–9.9)
CHLORIDE BLD-SCNC: 104 MEQ/L (ref 95–107)
CHOLESTEROL, TOTAL: 157 MG/DL (ref 0–199)
CO2: 21 MEQ/L (ref 20–31)
CREAT SERPL-MCNC: 0.62 MG/DL (ref 0.7–1.2)
GFR AFRICAN AMERICAN: >60
GFR NON-AFRICAN AMERICAN: >60
GLOBULIN: 2.5 G/DL (ref 2.3–3.5)
GLUCOSE BLD-MCNC: 98 MG/DL (ref 70–99)
HCT VFR BLD CALC: 46.2 % (ref 42–52)
HDLC SERPL-MCNC: 59 MG/DL (ref 40–59)
HEMOGLOBIN: 15.7 G/DL (ref 13.7–17.5)
LDL CHOLESTEROL CALCULATED: 80 MG/DL (ref 0–129)
MCH RBC QN AUTO: 33.7 PG (ref 25.7–32.2)
MCHC RBC AUTO-ENTMCNC: 34 % (ref 32.3–36.5)
MCV RBC AUTO: 99.1 FL (ref 79–92.2)
PDW BLD-RTO: 12 % (ref 11.6–14.4)
PLATELET # BLD: 251 K/UL (ref 163–337)
POTASSIUM REFLEX MAGNESIUM: 3.9 MEQ/L (ref 3.4–4.9)
RBC # BLD: 4.66 M/UL (ref 4.63–6.08)
SODIUM BLD-SCNC: 138 MEQ/L (ref 135–144)
TOTAL PROTEIN: 6.4 G/DL (ref 6.3–8)
TRIGL SERPL-MCNC: 91 MG/DL (ref 0–150)
TSH SERPL DL<=0.05 MIU/L-ACNC: 2.3 UIU/ML (ref 0.44–3.86)
WBC # BLD: 7.3 K/UL (ref 4.2–9)

## 2021-12-21 PROCEDURE — 96372 THER/PROPH/DIAG INJ SC/IM: CPT

## 2021-12-21 PROCEDURE — 84443 ASSAY THYROID STIM HORMONE: CPT

## 2021-12-21 PROCEDURE — 97166 OT EVAL MOD COMPLEX 45 MIN: CPT

## 2021-12-21 PROCEDURE — 85027 COMPLETE CBC AUTOMATED: CPT

## 2021-12-21 PROCEDURE — 96376 TX/PRO/DX INJ SAME DRUG ADON: CPT

## 2021-12-21 PROCEDURE — 2580000003 HC RX 258: Performed by: INTERNAL MEDICINE

## 2021-12-21 PROCEDURE — G0378 HOSPITAL OBSERVATION PER HR: HCPCS

## 2021-12-21 PROCEDURE — 70546 MR ANGIOGRAPH HEAD W/O&W/DYE: CPT

## 2021-12-21 PROCEDURE — 6360000002 HC RX W HCPCS: Performed by: PSYCHIATRY & NEUROLOGY

## 2021-12-21 PROCEDURE — 99204 OFFICE O/P NEW MOD 45 MIN: CPT | Performed by: INTERNAL MEDICINE

## 2021-12-21 PROCEDURE — A9579 GAD-BASE MR CONTRAST NOS,1ML: HCPCS | Performed by: INTERNAL MEDICINE

## 2021-12-21 PROCEDURE — 6370000000 HC RX 637 (ALT 250 FOR IP): Performed by: INTERNAL MEDICINE

## 2021-12-21 PROCEDURE — 36415 COLL VENOUS BLD VENIPUNCTURE: CPT

## 2021-12-21 PROCEDURE — 97162 PT EVAL MOD COMPLEX 30 MIN: CPT

## 2021-12-21 PROCEDURE — 6360000004 HC RX CONTRAST MEDICATION: Performed by: INTERNAL MEDICINE

## 2021-12-21 PROCEDURE — 6360000002 HC RX W HCPCS: Performed by: INTERNAL MEDICINE

## 2021-12-21 PROCEDURE — 70544 MR ANGIOGRAPHY HEAD W/O DYE: CPT

## 2021-12-21 PROCEDURE — 70553 MRI BRAIN STEM W/O & W/DYE: CPT

## 2021-12-21 PROCEDURE — 80061 LIPID PANEL: CPT

## 2021-12-21 PROCEDURE — 80053 COMPREHEN METABOLIC PANEL: CPT

## 2021-12-21 RX ORDER — SODIUM CHLORIDE 0.9 % (FLUSH) 0.9 %
10 SYRINGE (ML) INJECTION PRN
Status: DISCONTINUED | OUTPATIENT
Start: 2021-12-21 | End: 2021-12-24 | Stop reason: HOSPADM

## 2021-12-21 RX ADMIN — PRAVASTATIN SODIUM 40 MG: 40 TABLET ORAL at 21:03

## 2021-12-21 RX ADMIN — FLUOXETINE 20 MG: 20 CAPSULE ORAL at 09:44

## 2021-12-21 RX ADMIN — Medication 10 ML: at 11:29

## 2021-12-21 RX ADMIN — ENOXAPARIN SODIUM 40 MG: 40 INJECTION SUBCUTANEOUS at 09:44

## 2021-12-21 RX ADMIN — GADOTERIDOL 20 ML: 279.3 INJECTION, SOLUTION INTRAVENOUS at 12:50

## 2021-12-21 RX ADMIN — Medication 10 ML: at 10:17

## 2021-12-21 RX ADMIN — Medication 10 ML: at 21:03

## 2021-12-21 RX ADMIN — LORAZEPAM 2 MG: 2 INJECTION INTRAMUSCULAR; INTRAVENOUS at 11:32

## 2021-12-21 RX ADMIN — AMLODIPINE BESYLATE 5 MG: 5 TABLET ORAL at 09:44

## 2021-12-21 RX ADMIN — ASPIRIN 325 MG: 325 TABLET, COATED ORAL at 09:44

## 2021-12-21 RX ADMIN — PANTOPRAZOLE SODIUM 20 MG: 20 TABLET, DELAYED RELEASE ORAL at 05:35

## 2021-12-21 ASSESSMENT — ENCOUNTER SYMPTOMS
FACIAL SWELLING: 0
VOMITING: 0
BLOOD IN STOOL: 0
CHEST TIGHTNESS: 0
TROUBLE SWALLOWING: 0
SHORTNESS OF BREATH: 0
NAUSEA: 0
ANAL BLEEDING: 0
COLOR CHANGE: 0
ABDOMINAL DISTENTION: 0
APNEA: 0
WHEEZING: 0
VOICE CHANGE: 0

## 2021-12-21 ASSESSMENT — PAIN SCALES - GENERAL
PAINLEVEL_OUTOF10: 0

## 2021-12-21 NOTE — CONSULTS
ID - The patient is a 62y year old, right-handed male. Consultation requested by Dr. Sarah Turner. The history was obtained from  the patient and available records. Progress notes, x-rays, lab results, and other inpatient notes were reviewed. CC -- Ataxia -- HPI -- Pt presented to ER today with extremity weakness, gait instability, tremors, and confusion x 1 week. Initial sx were 11 d ago, headache, visual sx, nausea, dizziness. This persisted for several days, with pt continuing to  go to work. These sx have subsided but have not gone entirely away. Usual HA frequency is 2/mo, though usually mild; most  include some nausea. Then 7 d ago developed dystaxia R hand. Handwriting was messier than usual. This sx has not abated. Also had wordfinding  difficulty. Denies weakness of RUE, denies weakness or ataxia of RLE, denies facial droop. PMH -- hypertension, coronary artery disease, cardiac stent, asthma, osteoarthritis R knee, TMJ syndrome, depression, anxiety  PSH -- TMJ surg, cardiac dath  Aller -- ramipril  Meds -- see reconciliation sheet. alprazolam 0.5  FHx -- heart ds  SHx -- No tob No EtOH  ROS -- Negatives: malaise rash headache diplopia nausea ataxia angina palpitations cough vomiting incontinence dysuria  arthralgias weight gain anorexia alopecia nystagmus drowsiness tremor memory difficulty Positives: dizziness . Also see HPI for elements of this section, particularly PMH, allergies, FH, ROS, documented therein. Physical Examination --  The patient is well groomed, well developed, and in no acute distress. Vital signs: reviewed as per the graphic sheet. Skin: examination demonstrated no evident lesions. HEENT: bruits absent (carotid and supraclavicular). Tongue large, ridged. Heart: cardiac rhythm regular, with no murmur. Lungs: breath sounds normal throughout. .  Abdomen: grossly normal.  Extremities: no clubbing, cyanosis, or edema. Neurological Examination --  Mental status: The patient is alert. Orientation is to person, place, and time. Thought content and form is normal.  Comprehension is normal. Phonation, articulation, resonance, and prosody are normal. Flat affect. CNN: Pupils are equal, 4 mm OU, round, and normally reactive to light and accommodation, both directly and consensually. Visual fields are full to confrontation. Ptosis is absent. Extra-ocular movements are full. Nystagmus is not observed. Masseters are of normal strength. Facial movement is normal. Hearing is grossly normal. There is no dysarthria. The  gag reflex is strong bilaterally. Sternocleidomastoids and trapezii are of normal strength. The tongue in the midline. Motor: Muscle testing including , finger abductors, biceps, triceps, deltoid, toe flexors and extensors, tibialis anterior,  triceps surae, quadriceps femoris, biceps femoris, and iliopsoases was normal. Tone is normal. Muscle bulk is normal.  Fasciculations are not seen. Pronator drift was not evident. Sensory: Sensation to to touch, temperature, and vibration was normal in the arms. Decr sensation feet. Romberg is  mildly positive. Reflexes: biceps triceps brachioradialis patellar Achilles plantar  R 2+ 2+ 2+ 2+ 2+ flexor  L 2+ 2+ 2+ 2+ 2+ flexor  Coordination: Dysmetria and dysdiadochokinesia are absent. Tremor is absent; dystonia is absent; chorea is absent. Gait: Upon standing, pt is quite unsteady, shaking. Gait very wide, atactic. Pulse reg irreg upon standing; later noted very  frequent PVCs, including runs of bigeminy, on telemetry. Musculoskeletal: Scoliosis and lordosis are not evident. Impression  Hemidystaxia RUE, aetiology unclear. Stroke is a possibility. Migraine without aura. Many of the above sx could be explained by this diagnosis, but it is not at all clear that this versus another dx is causative. Bigeminy. Recommendations and Patient Instructions --  1. Agree with MR+MRA.  Given pt's claustrophobia, should try for 1.2 T open scanner at [facility]. Premedicate with lorazepam..  2. Add Mg 250. .  3. Cardiology consultation. Orthostatic BP/P.. 4. I will see pt after MR completed.

## 2021-12-21 NOTE — PROGRESS NOTES
Occupational Therapy   Occupational Therapy Initial Assessment- Obs  Date: 2021   Patient Name: Anju Ralph  MRN: [id number]     : 1960    Date of Service: 2021    Discharge Recommendations:  Continue to assess pending progress,Subacute/Skilled Nursing Facility (Acute rehab vs [facility] pending insurance)       Assessment   Performance deficits / Impairments: Decreased functional mobility ; Decreased ADL status; Decreased strength;Decreased safe awareness;Decreased cognition;Decreased endurance;Decreased balance;Decreased high-level IADLs;Decreased coordination  Assessment: Pt is a 56y/o male PLOF lives with wife, was I with ADL and IADL, whom expressed 1 week history of dizziness and feeling unsteady on his legs when he walks. Pt currently at [facility]* Ataxia, tremors, getting MRI to assess for possible stroke with the above resulting perf def/impair and would benefit from OT skilled services to maximize strength/end and safety/I with ADL for safe d/c transition. Treatment Diagnosis: Ataxia, tremors, getting MRI to assess for possible stroke  Prognosis: Good  History: multi comorb  Exam: 9 perf def/impair  OT Education: OT Role;Plan of Care;Transfer Training  REQUIRES OT FOLLOW UP: Yes  Safety Devices  Safety Devices in place: Yes  Type of devices: All fall risk precautions in place           Patient Diagnosis(es): The primary encounter diagnosis was Gait instability. A diagnosis of Tremor was also pertinent to this visit. has a past medical history of Anxiety attack, Asthma, CAD (coronary artery disease), Depression, Hypertension, and Primary osteoarthritis of right knee. has a past surgical history that includes Temporomandibular joint surgery and Cardiac catheterization.     Treatment Diagnosis: Ataxia, tremors, getting MRI to assess for possible stroke      Restrictions  Restrictions/Precautions: Fall risk    Subjective   General  Chart Reviewed: Yes  Patient assessed for rehabilitation services?: Yes  Additional Pertinent Hx: 1 week history of dizziness and feeling unsteady on his legs when he walks. Patient also reported having slowing of his thought process and expressions with inc'd time to process information. Family / Caregiver Present: No  Referring Practitioner: Dr. Lali Ornelas  Diagnosis: Ataxia, tremors, getting MRI to assess for possible stroke  Subjective  Subjective: Pt agreeable to OT eval/tx  Patient Currently in Pain: No  Pain Assessment  Pain Assessment: 0-10  Pain Level: 0  Vital Signs  Level of Consciousness: Alert (0)  Patient Currently in Pain: No     Social/Functional History  Social/Functional History  Lives With: Spouse  Type of Home: House  Home Layout: One level  Home Access: Stairs to enter with rails  Entrance Stairs - Number of Steps: 2  Entrance Stairs - Rails: Left (L side ascending)  Bathroom Shower/Tub: Tub/Shower unit  Bathroom Toilet: Standard  Bathroom Equipment: Hand-held shower  Bathroom Accessibility: Walker accessible  Home Equipment: Scarlet Bouquet Help From: Family  ADL Assistance: Independent  Homemaking Responsibilities: Yes  Ambulation Assistance: Independent (Mostly w/o AD, occ used SPC \"when R knee gets bad\")  Transfer Assistance: Independent  Active : Yes  Mode of Transportation: Car  Occupation: Full time employment  Type of occupation: Supervisor- [employer]  Leisure & Hobbies: TV, reading  IADL Comments: 3 dogs- 2 large 1 medium       Objective    Observations:Telemetry monitor, IV LUE, ataxic mvmts all UEs- mostly with gait. Eyes have twitching mvmts randomly even during still sitting and talking.  Inc'd processing time with slower mvmts noted as well  Vision: Impaired  Vision Exceptions: Wears glasses for reading  Hearing: Within functional limits    Orientation  Overall Orientation Status: Within Functional Limits (Pt A&O but reports inc'd time for processing/to respond than his norm.)     Functional Mobility  Functional - Mobility Device: No device (trialed w/o AD (PLOF) and w/RW)  Activity: To/from bathroom; Other (level tile surface in hallway)  Assist Level:  (CGA/Hector w/o AD, CGA with RW with inc'd steadiness)  Functional Mobility Comments: Trialed fxl mob w/o AD- pt needed CGA/Hector, furniture creaped, unsteady. Therefore, provided RW and recommend RW use at this time as pt was more steady and stated he felt more comfortable with it.   ADL  Feeding: Modified independent   Grooming: Modified independent   UE Bathing: Setup;Supervision  LE Bathing: Contact guard assistance;Stand by assistance  UE Dressing: Modified independent   LE Dressing: Stand by assistance  Toileting: Contact guard assistance;Stand by assistance  Tone RUE  RUE Tone: Normotonic  Tone LUE  LUE Tone: Normotonic  Coordination  Movements Are Fluid And Coordinated: No  Coordination and Movement description: Decreased speed;Right UE;Left UE  Quality of Movement Other  Comment: ataxic mvmt's at times, slower to move and inc'd weakness BUE     Bed mobility  Rolling to Left: Modified independent  Rolling to Right: Modified independent  Supine to Sit: Supervision  Sit to Supine: Supervision  Transfers  Sit to stand: Contact guard assistance  Stand to sit: Contact guard assistance  Transfer Comments: cues for safety with hand placement                       LUE AROM (degrees)  LUE AROM : WFL  Left Hand AROM (degrees)  Left Hand AROM: WFL  RUE AROM (degrees)  RUE AROM : WFL  Right Hand AROM (degrees)  Right Hand AROM: WFL  LUE Strength  Gross LUE Strength: Exceptions to [facility] [place]  L Shoulder Flex: 4-/5  RUE Strength  Gross RUE Strength: Exceptions to [facility] [place]  R Shoulder Flex: 4-/5     Hand Dominance  Hand Dominance: Right             Plan   Plan  Times per week: 4-7x/wk  Times per day: Daily  Plan weeks: <1- Obs pt  Current Treatment Recommendations: Strengthening,ROM,Balance Training,Functional Mobility Training,Endurance Training,Stair training,Neuromuscular Re-education,Safety Education & Training,Patient/Caregiver Education & Training,Self-Care / ADL,Home Management Training       Goals  Short term goals  Time Frame for Short term goals: 1-3 days- Obs pt  Short term goal 1: Tolerate 25-30min BUE ther ex/act to inc strength/end for ADL  Short term goal 2: SBA/Spv toileting  Short term goal 3: SBA/Spv LB bathing and dressing  Short term goal 4: Inc to 8-10min stand lexx/end, with CGA or less for steadiness, to inc safety and I with ADL  Long term goals  Time Frame for Long term goals : Same as STGs  Long term goal 1: Same as STGs  Long term goal 2: Same as STGs  Patient Goals   Patient goals :  To get better       Therapy Time   Individual Concurrent Group Co-treatment   Time In  9:55         Time Out  10:35         Minutes  [address] [place]

## 2021-12-21 NOTE — PROGRESS NOTES
Physical Therapy    Medical Initial Assessment    NAME: Wong Begum  : 1960  MRN: [id number]    Date of Service: 2021    Patient Diagnosis(es): The primary encounter diagnosis was Gait instability. A diagnosis of Tremor was also pertinent to this visit. has a past medical history of Anxiety attack, Asthma, CAD (coronary artery disease), Depression, Hypertension, and Primary osteoarthritis of right knee. has a past surgical history that includes Temporomandibular joint surgery and Cardiac catheterization. Restrictions  Restrictions/Precautions  Restrictions/Precautions: Fall Risk  Vision/Hearing  Vision: Impaired  Vision Exceptions: Wears glasses for reading  Hearing: Within functional limits     Subjective  General  Chart Reviewed: Yes  Patient assessed for rehabilitation services?: Yes  Family / Caregiver Present: No  Referring Practitioner: Dr Annemarie White  Referral Date : 21  Diagnosis: Ataxia, gait instability, tremors  Follows Commands: Within Functional Limits (mild dec processing per pt report)  Subjective  Subjective: Pt reports one week hisotry of difficulty wlaking and some confusion, doesn't feel that symptoms have gotten any better from yesterday. Pt to go for MRI today.   Pain Screening  Patient Currently in Pain: No  Pain Assessment  Pain Assessment: 0-10  Pain Level: 0  Vital Signs  Patient Currently in Pain: No  Patient Observation  Observations: RLE held in inc ext rot in stand and with gait- \"forgets behind\"   Pre Treatment Pain Screening  Pain at present: 0  Scale Used: Numeric Score    Orientation  Orientation  Overall Orientation Status: Within Functional Limits (accurate- slow response per pt report )    Social/Functional History  Social/Functional History  Lives With: Spouse  Type of Home: House  Home Layout: One level  Home Access: Stairs to enter with rails  Entrance Stairs - Number of Steps: 2  Entrance Stairs - Rails: Left  Bathroom Shower/Tub: Tub/Shower unit  Bathroom Toilet: Standard  Bathroom Equipment: Hand-held shower  Bathroom Accessibility: Walker accessible  Home Equipment: Cane  Receives Help From: Family  ADL Assistance: Independent  Homemaking Responsibilities: Yes  Ambulation Assistance: Independent (SPC PRN with bad knee R , uses cane in LUE)  Transfer Assistance: Independent  Active : Yes  Mode of Transportation: Car  Occupation: Full time employment  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, reading  IADL Comments: 3 dogs- 2 large 1 medium  Objective          AROM RLE (degrees)  RLE AROM: WFL  AROM LLE (degrees)  LLE AROM : WFL  Strength RLE  Comment: sitting hip, knee and ankle grossly 4 to 4+/5  Strength LLE  Comment: sitting hip, knee and ankle  grossly 4 to 4+/5  Coordination  Movements Are Fluid And Coordinated: Yes (ADAMA ankles and heel to shin)  Sensation  Overall Sensation Status:  (intact lt touch LE)  Bed mobility  Scooting: Modified independent  Transfers  Sit to Stand: Contact guard assistance;Stand by assistance  Stand to sit: Contact guard assistance;Stand by assistance  Ambulation  Ambulation?: Yes  More Ambulation?: Yes  Ambulation 1  Surface: level tile  Device: Rolling Walker  Assistance: Contact guard assistance;Stand by assistance  Quality of Gait: RLE in inc ext rotation \"forgets oputside of FWW\", wide ROZINA, no scissoring- concentrating on streight patetern with mild veering to left. Using FWW approx 25% UE  Distance: 50 ft  Comments: pt does not have a FWW at home. Ambulation 2  Surface - 2: level tile  Device 2:  (handheld left )  Assistance 2: Contact guard assistance  Quality of Gait 2: hesitancy in gait , mild tremors felt in UE.  More direct path with arm in arm than with FWW  Distance: 50ft  Stairs/Curb  Stairs?:  (NT pt had to go to MRI)     Balance  Sitting - Static: Good  Sitting - Dynamic: Good  Standing - Static: Good;-  Standing - Dynamic: Fair;+  Comments: wide ROZINA, hesitant,         Assessment   Body structures, Functions, Activity limitations: Decreased functional mobility ; Decreased strength;Decreased endurance;Decreased balance  Assessment: 61yom with one weekhisotry of weakness and difficutly walking . Pt used cane PRN for knee pain, pt works 50 hours a week. Pt noted to have unsteadiness and leaving RLEext rotated and out to side, no LOB , but slow and purposeful with movement- appears unsure of self. If pt needds FWW will need or veda or self purchase/script from MD. AMANDA. Pt would benefit from OP PT at discharge to regain gait and balcne to PLOF.  Pt currently approriate for medical PT intervention along with medical testing  while in acute hospital.    Treatment Diagnosis: ataxic gait, gait instability x 1 week  Prognosis: Good  REQUIRES PT FOLLOW UP: Yes  Activity Tolerance  Activity Tolerance: Patient limited by fatigue  Activity Tolerance: limited by balance, unsteadiness  PT Equipment Recommendations  Other: may need FWW     Discharge Recommendations:  Continue to assess pending progress,Outpatient PT      Plan   Plan  Times per week: 5-7x week  Times per day:  (1-2x per day)  Plan weeks: <1 medical pt  Current Treatment Recommendations: Strengthening,Balance Training,Functional Mobility Training,Gait Training,Stair training,Neuromuscular Re-education,Endurance Training,Home Exercise Program,Safety Education & Training,Patient/Caregiver Education & Training,Equipment Evaluation, Education, & procurement      Goals  Short term goals  Time Frame for Short term goals: 3-5 days medical pt  Short term goal 1: Transfers modified independent and safe with least AD  Short term goal 2: amb >= 150ft least AD improved gait and steadiness, no LOB   Short term goal 3: 2 stairs one rail with <= supervision   Short term goal 4: Pt able to do x10 LE standing ALBA for balance and strenght to asssist imroved functional gait   Short term goal 5: HEP and discharge plan in place  Long term goals  Time Frame for Long term goals : same as [facility] medical pt  Patient Goals   Patient goals : \"I want to feel better about walking and not be so tired.  \"       Therapy Time   Individual Concurrent Group Co-treatment   Time In           Time Out           Minutes                   [id number]

## 2021-12-21 NOTE — H&P
Hospital Medicine  History and Physical    Patient:  Xiang Medrano  MRN: [id number]    CHIEF COMPLAINT:    Chief Complaint   Patient presents with    Extremity Weakness     weakness, shaking, confusion x1 week ago    Anxiety     history of anxiety did not take his medication today. History Obtained From:  Patient, EMR  Primary Care Physician: Bishop Byrd DO    HISTORY OF PRESENT ILLNESS:   The patient is a 62 y.o. male with PMH of hypertension and heart disease presents with 1 week history of dizziness and feeling that he is unsteady on his legs when he walks. Patient also reported having slowing of his thought process and expressions. It takes him a while to process information. No slurred speech. No chest pain or palpitation. No fever or chills. No headaches, loss of consciousness or seizure. Past Medical History:      Diagnosis Date    Anxiety attack     Asthma     CAD (coronary artery disease)     Depression     Hypertension     Primary osteoarthritis of right knee 9/5/2018       Past Surgical History:      Procedure Laterality Date    CARDIAC CATHETERIZATION      TEMPOROMANDIBULAR JOINT SURGERY      Rt knee       Medications Prior to Admission:    Prior to Admission medications    Medication Sig Start Date End Date Taking? Authorizing Provider   pravastatin (PRAVACHOL) 40 MG tablet TAKE ONE TABLET BY MOUTH NIGHTLY  7/29/21  Yes Carter Rhodes, DO   naloxone 4 MG/0.1ML LIQD nasal spray by Nasal route 3/3/20  Yes Historical Provider, MD   pantoprazole (PROTONIX) 20 MG tablet Take 1 tablet by mouth every morning (before breakfast) 1/9/20  Yes Carter Rhodes, DO   HYDROcodone-acetaminophen (NORCO) 5-325 MG per tablet Take 1 tablet by mouth 2 times daily.   4/18/19  Yes Historical Provider, MD   amLODIPine (NORVASC) 5 MG tablet Take 1 qhs  Patient taking differently: Take 5 mg by mouth daily Take 1 qhs 3/18/19  Yes Bishop Byrd DO   ALPRAZolam Duchesnejean [place]) 0.5 MG tablet Take 0.5 mg by mouth 3 times daily as needed. 3/9/19  Yes Historical Provider, MD   FLUoxetine (PROZAC) 20 MG capsule TAKE ONE CAPSULE BY MOUTH DAILY  Patient taking differently: Take 20 mg by mouth daily TAKE ONE CAPSULE BY MOUTH DAILY 12/9/18  Yes Sagrario Brannon DO   naproxen (EC NAPROSYN) 500 MG EC tablet Take 1 tab at lunch if needed for knee  Patient taking differently: Take 500 mg by mouth Take 1 tab at lunch if needed for knee 9/5/18  Yes Sagrario Brannon DO   albuterol sulfate HFA (PROAIR HFA) 108 (90 Base) MCG/ACT inhaler Inhale 2 puffs into the lungs every 4 hours as needed for Wheezing 5/2/18  Yes Sagrario Brannon DO   NITROSTAT 0.4 MG SL tablet Place 0.4 mg under the tongue every 5 minutes as needed  11/17/15  Yes Historical Provider, MD   aspirin 81 MG tablet Take 81 mg by mouth daily. Yes Historical Provider, MD   metoprolol tartrate (LOPRESSOR) 25 MG tablet Take 1 tablet by mouth 2 times daily 7/16/20   Carter NAVAS Holiday, DO       Allergies:  Altace [ramipril]    Social History:   TOBACCO:   reports that he has been smoking cigarettes. He has a 48.00 pack-year smoking history. He has never used smokeless tobacco.  ETOH:   reports current alcohol use. Family History:       Problem Relation Age of Onset    Heart Disease Mother 58       REVIEW OF SYSTEMS:  Ten systems reviewed and negative except for stated in HPI    Physical Exam:    Vitals: BP (!) 154/87   Pulse 60   Temp 98.3 °F (36.8 °C) (Oral)   Resp 18   Ht 5' 7\" (1.702 m)   Wt 133 lb (60.3 kg)   SpO2 97%   BMI 20.83 kg/m²   General appearance: alert, appears stated age and cooperative, no apparent distress. Skin: Skin color, texture, turgor normal. No rashes or lesions  HEENT: Head: Normocephalic, no lesions, without obvious abnormality.   Neck: no adenopathy, no carotid bruit, no JVD, supple, symmetrical, trachea midline and thyroid not enlarged, symmetric, no tenderness/mass/nodules  Lungs: clear to auscultation bilaterally  Heart: regular rate and rhythm, S1, S2 normal, no murmur, click, rub or gallop  Abdomen: soft, non-tender; bowel sounds normal; no masses,  no organomegaly  Extremities: extremities normal, atraumatic, no cyanosis or edema  Neurologic: Mental status: Alert, oriented, thought content appropriate normal speech. Normal mentation however it takes patient is secondary to to process the question and provide an answer. Symmetrical motor and tone. No discoordination. He does have nystagmus, horizontal.    Recent Labs     12/20/21  1320 12/21/21  0606   WBC 10.1* 7.3   HGB 17.1 15.7    251     Recent Labs     12/20/21  1320 12/21/21  0607    138   K 4.1 3.9   CL 99 104   CO2 18* 21   BUN 11 14   CREATININE 0.69* 0.62*   GLUCOSE 99 98   AST 17 13   ALT 20 14   BILITOT 0.6 0.4   ALKPHOS 101 88     Troponin T:   Recent Labs     12/20/21  1320   TROPONINI <0.010       ABGs: No results found for: PHART, PO2ART, XLQ5WDP  INR: No results for input(s): INR in the last 72 hours. URINALYSIS:No results for input(s): NITRITE, COLORU, PHUR, LABCAST, WBCUA, RBCUA, MUCUS, TRICHOMONAS, YEAST, BACTERIA, CLARITYU, SPECGRAV, LEUKOCYTESUR, UROBILINOGEN, BILIRUBINUR, BLOODU, GLUCOSEU, AMORPHOUS in the last 72 hours. Invalid input(s): Raman Harrington  -----------------------------------------------------------------   CT Head WO Contrast    Result Date: 12/20/2021  EXAMINATION: CT of the brain without contrast HISTORY: Dizziness. Altered mental status. COMPARISON: None available TECHNIQUE: Multiple contiguous axial images were obtained of the brain from the skull base through the vertex. Multiplanar reformats were obtained. FINDINGS: Prominence of the sulci and ventricles compatible with mild generalized parenchymal volume loss. Gray-white matter differentiation is preserved. No acute hemorrhage or abnormal extra-axial fluid collection. No mass effect or midline shift. The visualized paranasal sinuses and mastoid air cells are clear. Calvarium is intact.      No acute cm/s Prox ICA    50 cm/s Mid ICA     77 cm/s Dist ICA    0 cm/s Prox ECA    195 cm/s Prox VERT   55 cm/s ICA/CCA     0.7      <50  % STENOSIS IN THE RIGHT ICA  <50 % STENOSIS IN THE LEFT ICA ANTEGRADE FLOW WITHIN BOTH VERTEBRAL ARTERIES. USING MODIFIED CRITERIA FOR CAROTID ARTERY STENOSIS BY THE CAROTID INTER[employer]I[facility] ACCREDITATION COMMISSION. Assessment and Plan     *Dizziness and ataxia, broad differential diagnosis. This started a week ago. NIH is 0. *Hypertension. *Coronary artery disease, previous stenting. No chest pain or angina    *Hyperlipidemia. *Tobacco addiction. *Few other medical conditions not listed above. Plan:  I had accepted to observe the patient for 24 to 48 hours. Start patient on aspirin. Requested echocardiogram, MRI of the brain, carotid ultrasound, lipid profile. Telemetry monitoring to rule out any cardiac dysrhythmia. PT OT eval and treatment. Counseling about tobacco addiction. Defer further needed diagnostic, investigative and therapeutic work-up relative to his neurological symptoms to neurology team given his expertise. I will follow his recommendations. Patient Active Problem List   Diagnosis Code    Depression F32. 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 High Risk Readmission Screening Tool Score Noted.      Emergency Contact:

## 2021-12-21 NOTE — CONSULTS
Consult Note  Patient: Flower Loss  Unit/Bed: 0277/8031-50  YOB: 1960  MRN: [id number]  Acct: [de-identified]   Admitting Diagnosis: Ataxia [R27.0]  Tremor [R25.1]  Gait instability [R26.81]  Date:  12/20/2021  Hospital Day: 0      Chief Complaint:  Unstable gait. History of cardiac stents    History of Present Illness:  52-year-old gentleman who is a patient of Dr. Sara Sales and Dr. Crescencio Rizvi. Has last catheterization 2020 which showed patent LAD stent. ISR was negative LV ejection fraction was normal.  He is a smoker. Smokes 1 pack/day. 3 to 4 days prior to admission he was getting dizzy confused balance issues. He came to the ER was admitted. We were consulted because of history of cardiac disease. He denies any chest pain.   He is still confused    Allergies   Allergen Reactions    Altace [Ramipril]      hyperkalemia       Current Facility-Administered Medications   Medication Dose Route Frequency Provider Last Rate Last Admin    aspirin EC tablet 325 mg  325 mg Oral Daily Aurora Munroe MD   325 mg at 12/21/21 0944    pantoprazole (PROTONIX) tablet 20 mg  20 mg Oral QAM AC Aurora Munroe MD   20 mg at 12/21/21 0535    pravastatin (PRAVACHOL) tablet 40 mg  40 mg Oral Nightly Aurora Munroe MD   40 mg at 12/20/21 1955    FLUoxetine (PROZAC) capsule 20 mg  20 mg Oral Daily Aurora Munroe MD   20 mg at 12/21/21 0944    amLODIPine (NORVASC) tablet 5 mg  5 mg Oral Daily Aurora Munroe MD   5 mg at 12/21/21 0944    ALPRAZolam (XANAX) tablet 0.5 mg  0.5 mg Oral TID PRN Anabella Peters MD   0.5 mg at 12/20/21 1813    albuterol sulfate  (90 Base) MCG/ACT inhaler 2 puff  2 puff Inhalation Q4H PRN Aurora Munroe MD        sodium chloride flush 0.9 % injection 10 mL  10 mL IntraVENous 2 times per day Anabella Peters MD   10 mL at 12/21/21 1017    sodium chloride flush 0.9 % injection 10 mL  10 mL IntraVENous PRN Anabella Peters MD   10 mL at 12/21/21 1126    0.9 % sodium chloride infusion  25 Financial Resource Strain:     Difficulty of Paying Living Expenses: Not on file   Food Insecurity:     Worried About Running Out of Food in the Last Year: Not on file    Romain of Food in the Last Year: Not on file   Transportation Needs:     Lack of Transportation (Medical): Not on file    Lack of Transportation (Non-Medical): Not on file   Physical Activity:     Days of Exercise per Week: Not on file    Minutes of Exercise per Session: Not on file   Stress:     Feeling of Stress : Not on file   Social Connections:     Frequency of Communication with Friends and Family: Not on file    Frequency of Social Gatherings with Friends and Family: Not on file    Attends Taoism Services: Not on file    Active Member of [address] [place] [address] or Organizations: Not on file    Attends Club or Organization Meetings: Not on file    Marital Status: Not on file   Intimate Partner Violence:     Fear of Current or Ex-Partner: Not on file    Emotionally Abused: Not on file    Physically Abused: Not on file    Sexually Abused: Not on file   Housing Stability:     Unable to Pay for Housing in the Last Year: Not on file    Number of [place] in the Last Year: Not on file    Unstable Housing in the Last Year: Not on file       Family Hx:  Family History   Problem Relation Age of Onset    Heart Disease Mother 58       Review of Systems:   Review of Systems   Constitutional: Negative for activity change, appetite change, diaphoresis, fatigue and unexpected weight change. HENT: Negative for facial swelling, nosebleeds, trouble swallowing and voice change. Respiratory: Negative for apnea, chest tightness, shortness of breath and wheezing. Cardiovascular: Negative for chest pain, palpitations and leg swelling. Gastrointestinal: Negative for abdominal distention, anal bleeding, blood in stool, nausea and vomiting. Genitourinary: Negative for decreased urine volume and dysuria.    Musculoskeletal: Negative for gait problem and myalgias. Skin: Negative for color change, pallor, rash and wound. Neurological: Negative for dizziness, syncope, facial asymmetry, weakness, light-headedness, numbness and headaches. Hematological: Does not bruise/bleed easily. Psychiatric/Behavioral: Negative for agitation, behavioral problems, confusion, hallucinations and suicidal ideas. The patient is not nervous/anxious. All other systems reviewed and are negative. Physical Examination:    BP (!) 154/87   Pulse 60   Temp 98.3 °F (36.8 °C) (Oral)   Resp 18   Ht 5' 7\" (1.702 m)   Wt 133 lb (60.3 kg)   SpO2 97%   BMI 20.83 kg/m²    Physical Exam  Constitutional:       Appearance: He is well-developed. HENT:      Head: Normocephalic and atraumatic. Right Ear: External ear normal.      Left Ear: External ear normal.   Eyes:      Conjunctiva/sclera: Conjunctivae normal.      Pupils: Pupils are equal, round, and reactive to light. Cardiovascular:      Rate and Rhythm: Normal rate and regular rhythm. Heart sounds: Normal heart sounds. Pulmonary:      Effort: Pulmonary effort is normal.      Breath sounds: Normal breath sounds. Abdominal:      General: Bowel sounds are normal.      Palpations: Abdomen is soft. Musculoskeletal:         General: Normal range of motion. Cervical back: Normal range of motion and neck supple. Skin:     General: Skin is warm and dry. Neurological:      Mental Status: He is alert and oriented to person, place, and time. Deep Tendon Reflexes: Reflexes are normal and symmetric. Psychiatric:         Behavior: Behavior normal.         Thought Content:  Thought content normal.         Judgment: Judgment normal.         LABS:  CBC:  Lab Results   Component Value Date    WBC 7.3 12/21/2021    RBC 4.66 12/21/2021    HGB 15.7 12/21/2021    HCT 46.2 12/21/2021    MCV 99.1 12/21/2021    MCH 33.7 12/21/2021    MCHC 34.0 12/21/2021    RDW 12.0 12/21/2021     12/21/2021    MPV 7.1 07/18/2013     CBC with Differential:   Lab Results   Component Value Date    WBC 7.3 12/21/2021    RBC 4.66 12/21/2021    HGB 15.7 12/21/2021    HCT 46.2 12/21/2021     12/21/2021    MCV 99.1 12/21/2021    MCH 33.7 12/21/2021    MCHC 34.0 12/21/2021    RDW 12.0 12/21/2021    LYMPHOPCT 23.1 12/20/2021    MONOPCT 10.4 12/20/2021    BASOPCT 1.0 12/20/2021    MONOSABS 1.1 12/20/2021    LYMPHSABS 2.3 12/20/2021    EOSABS 0.1 12/20/2021    BASOSABS 0.1 12/20/2021     CMP:    Lab Results   Component Value Date     12/21/2021    K 3.9 12/21/2021     12/21/2021    CO2 21 12/21/2021    BUN 14 12/21/2021    CREATININE 0.62 12/21/2021    GFRAA >60.0 12/21/2021    LABGLOM >60.0 12/21/2021    GLUCOSE 98 12/21/2021    PROT 6.4 12/21/2021    LABALBU 3.9 12/21/2021    CALCIUM 9.3 12/21/2021    BILITOT 0.4 12/21/2021    ALKPHOS 88 12/21/2021    AST 13 12/21/2021    ALT 14 12/21/2021     BMP:    Lab Results   Component Value Date     12/21/2021    K 3.9 12/21/2021     12/21/2021    CO2 21 12/21/2021    BUN 14 12/21/2021    LABALBU 3.9 12/21/2021    CREATININE 0.62 12/21/2021    CALCIUM 9.3 12/21/2021    GFRAA >60.0 12/21/2021    LABGLOM >60.0 12/21/2021    GLUCOSE 98 12/21/2021     Magnesium:    Lab Results   Component Value Date    MG 2.0 12/20/2021     Troponin:    Lab Results   Component Value Date    TROPONINI <0.010 12/20/2021       Radiology:  CT Head WO Contrast    Result Date: 12/20/2021  EXAMINATION: CT of the brain without contrast HISTORY: Dizziness. Altered mental status. COMPARISON: None available TECHNIQUE: Multiple contiguous axial images were obtained of the brain from the skull base through the vertex. Multiplanar reformats were obtained. FINDINGS: Prominence of the sulci and ventricles compatible with mild generalized parenchymal volume loss. Gray-white matter differentiation is preserved. No acute hemorrhage or abnormal extra-axial fluid collection.  No mass effect or midline shift. The visualized paranasal sinuses and mastoid air cells are clear. Calvarium is intact. No acute intracranial process. All CT scans at this facility use dose modulation, iterative reconstruction, and/or weight based dosing when appropriate to reduce radiation dose to as low as reasonably achievable. MRA HEAD WO CONTRAST    Result Date: 12/21/2021  EXAMINATION:  MRA HEAD WO CONTRAST, MRI BRAIN W WO CONTRAST HISTORY:  Altered state of awareness. TECHNIQUE: Routine contrast-enhanced MRI protocol including diffusion and gradient echo images. 20 mL IV MultiHance was administered. Intracranial 3D time-of-flight MRA with 2D multiplanar and 3D maximum intensity projections calculated on the imaging workstation under physician supervision. COMPARISON:  CT brain 12/20/2021 RESULT: BRAIN: Acute Change: There is no evidence of restricted diffusion to suggest an acute infarct. Hemorrhage:   No evidence of prior parenchymal hemorrhage on the gradient echo images. .  Mass Effect / Mass Lesion:   No evidence of an intracranial mass or extra-axial fluid collection. 3 mm right anterior temporal lobe focus of enhancement (series 2 image 24, series 1201, image 133) and similar focus of enhancement in the left anterior temporal lobe (series 2 image 25, series 1201 image 138), secondary to pulsation artifact from the cavernous ICA. Chronic Change:  Scattered patchy areas of increased T2 and FLAIR signal  are present in the supratentorial white matter which is a nonspecific finding but likely represents mild chronic microvascular ischemia. Parenchyma:   No significant volume loss for age. The brain parenchyma is otherwise within normal limits of signal intensity and morphology. Ventricles:     Normal caliber and morphology. Skull Base:    Hypothalamic and pituitary region are grossly normal.   Craniocervical junction is normal. No significant marrow replacement process.  Vasculature:    Major intracranial arterial structures, and dural venous sinuses show typical flow void, suggesting patency by spin echo criteria. Other:  The visualized paranasal sinuses and mastoid air cells are clear. The orbits and extracranial soft tissues are unremarkable. INTRACRANIAL MRA:    The visualized distal vertebral and basilar arteries are widely patent. The distal ICAs are patent and within normal limits of caliber. The proximal ACAs, MCAs and PCAs are patent and within normal limits of caliber and configuration. There is no evidence of focal,  significant stenosis or aneurysm in the visualized vessels. MRI brain: No acute intracranial abnormality; no acute infarct, intracranial mass or suspicious enhancement. Sequela of mild chronic microvascular ischemia. MRA BRAIN: Patent anterior and posterior intracranial circulation. MRI BRAIN W WO CONTRAST    Result Date: 12/21/2021  EXAMINATION:  MRA HEAD WO CONTRAST, MRI BRAIN W WO CONTRAST HISTORY:  Altered state of awareness. TECHNIQUE: Routine contrast-enhanced MRI protocol including diffusion and gradient echo images. 20 mL IV MultiHance was administered. Intracranial 3D time-of-flight MRA with 2D multiplanar and 3D maximum intensity projections calculated on the imaging workstation under physician supervision. COMPARISON:  CT brain 12/20/2021 RESULT: BRAIN: Acute Change: There is no evidence of restricted diffusion to suggest an acute infarct. Hemorrhage:   No evidence of prior parenchymal hemorrhage on the gradient echo images. .  Mass Effect / Mass Lesion:   No evidence of an intracranial mass or extra-axial fluid collection. 3 mm right anterior temporal lobe focus of enhancement (series 2 image 24, series 1201, image 133) and similar focus of enhancement in the left anterior temporal lobe (series 2 image 25, series 1201 image 138), secondary to pulsation artifact from the cavernous ICA.  Chronic Change:  Scattered patchy areas of increased T2 and FLAIR signal  are present in the supratentorial white matter which is a nonspecific finding but likely represents mild chronic microvascular ischemia. Parenchyma:   No significant volume loss for age. The brain parenchyma is otherwise within normal limits of signal intensity and morphology. Ventricles:     Normal caliber and morphology. Skull Base:    Hypothalamic and pituitary region are grossly normal.   Craniocervical junction is normal. No significant marrow replacement process. Vasculature:    Major intracranial arterial structures, and dural venous sinuses show typical flow void, suggesting patency by spin echo criteria. Other:  The visualized paranasal sinuses and mastoid air cells are clear. The orbits and extracranial soft tissues are unremarkable. INTRACRANIAL MRA:    The visualized distal vertebral and basilar arteries are widely patent. The distal ICAs are patent and within normal limits of caliber. The proximal ACAs, MCAs and PCAs are patent and within normal limits of caliber and configuration. There is no evidence of focal,  significant stenosis or aneurysm in the visualized vessels. MRI brain: No acute intracranial abnormality; no acute infarct, intracranial mass or suspicious enhancement. Sequela of mild chronic microvascular ischemia. MRA BRAIN: Patent anterior and posterior intracranial circulation. US CAROTID ARTERY BILATERAL    Result Date: 12/20/2021  EXAMINATION: US CAROTID ARTERY BILATERAL HISTORY:   TIA. Smoking history. COMPARISON:None TECHNIQUE: Carotid duplex sonograms which include gray scale and color flow evaluation are complimented with spectral waveform analysis. Please refer to chart below for specific carotid velocity measurements. The degree of stenosis recorded on this exam uses the same method of stratification used in the NASCET trials.  This complies with ACR practice guidelines and the Society of radiology in ultrasound consensus statement and provides adequate information for clinical decision making. Society of Radiologists in Ultrasound (SRU) consensus statement was used to estimate internal carotid artery stenosis. RESULT: There is complex plaque formation at the right carotid bifurcation with plaque extending into the ICA and soft plaque into the ECA. There is complex plaque formation at the left carotid bifurcation and origin of ICA and soft plaque formation at origin of the left KAY. There is increased velocity measurement within the proximal left ECA with a maximum velocity of 195 cm/s. There is antegrade flow identified in both vertebral arteries. A cardiac arrhythmia is noted. ARTERIAL BLOOD FLOW VELOCITY RIGHT PEAK SYSTOLIC VELOCITIES (PSV)                                               Prox CCA    109 cm/s             Mid CCA     80 cm/s              Dist CCA    66 cm/s              Prox ICA    52 cm/s               Mid ICA     53 cm/s            Dist ICA    56 cm/s             Prox ECA    122 cm/s             Prox VERT   42 cm/s              ICA/CCA     0.7                        LEFT PEAK SYSTOLIC VELOCITIES (PSV)  Prox CCA    134 cm/s Mid CCA     112 cm/s Dist CCA    106 cm/s Prox ICA    50 cm/s Mid ICA     77 cm/s Dist ICA    0 cm/s Prox ECA    195 cm/s Prox VERT   55 cm/s ICA/CCA     0.7      <50  % STENOSIS IN THE RIGHT ICA  <50 % STENOSIS IN THE LEFT ICA ANTEGRADE FLOW WITHIN BOTH VERTEBRAL ARTERIES. USING MODIFIED CRITERIA FOR CAROTID ARTERY STENOSIS BY THE CAROTID INTERSOCIETAL ACCREDITATION COMMISSION. EKG:Assessment:    Active Hospital Problems    Diagnosis Date Noted    Ataxia [R27.0] 12/20/2021         CAD        Patent LAD stent by cardiac cath in 2020        Mild nonobstructive carotid disease        Still mildly confused        Tobacco abuse       Plan:  1. Reviewed the echocardiogram  2. Neuro work-up in progress  3.  Evaluate for arrhythmias he is on telemetry            Electronically signed by Geralene Frankel, MD on 12/21/2021 at 3:27 PM

## 2021-12-21 NOTE — PLAN OF CARE
Problem: Falls - Risk of:  Goal: Will remain free from falls  Description: Will remain free from falls  Outcome: Ongoing  Goal: Absence of physical injury  Description: Absence of physical injury  Outcome: Ongoing     Problem: Skin Integrity:  Goal: Will show no infection signs and symptoms  Description: Will show no infection signs and symptoms  Outcome: Ongoing  Goal: Absence of new skin breakdown  Description: Absence of new skin breakdown  Outcome: Ongoing     Problem: Mental Status - Impaired:  Goal: Mental status will improve  Description: Mental status will improve  Outcome: Ongoing

## 2021-12-21 NOTE — PROGRESS NOTES
Pt p/u for transport to [facility] for mri, pt premedicated with ativan per order.

## 2021-12-21 NOTE — PROGRESS NOTES
Pt a and o x4 at this time. Pt pleasant. Able to take pills appropriately. Dr Odilia Mckeon in to see pt at this time. Inquiring about if pt can go to [facility] tomorrow for 1.2 scanner MRI for open MRI as pt is claustrophobic. Supervisor aware and will pass this on to see if possible for set up in AM. If not,  pt will need premedicated prior to MRI. Also when pt stood up for Dr. Odilia Mckeon pt threw multipile PVCs on tele. Per Dr. Odilia Mckeon this also may be contributing to his dizziness. Orthostatic bp's requested by Dr. Odilia Mckeon. This nurse did them and they were negative. Pt denies dizziness when in bed and only c/o left chronic knee pain. Denies chest pain and SOB. VSS. Call light in reach. Bed alarm on.

## 2021-12-21 NOTE — FLOWSHEET NOTE
[facility] called for report prior to scheduled MRI today. Spoke with pt's nurse Lindsey. Pt is A&Ox3 but can be forgetful at times, has complaints of dizziness and unsteadiness. Ok to come off tele. Pt to be pre-medicated with Ativan 2mg IV prior to arrival. [facility] transport to pick patient up at 1130 for 1230 scheduled time. Ratna Crocker called in MRI and updated.  Electronically signed by Juanita Granados RN on 12/21/2021 at 10:49 AM

## 2021-12-22 LAB — SEDIMENTATION RATE, ERYTHROCYTE: 2 MM (ref 0–20)

## 2021-12-22 PROCEDURE — 97110 THERAPEUTIC EXERCISES: CPT

## 2021-12-22 PROCEDURE — 95813 EEG EXTND MNTR 61-119 MIN: CPT

## 2021-12-22 PROCEDURE — 97530 THERAPEUTIC ACTIVITIES: CPT

## 2021-12-22 PROCEDURE — 84425 ASSAY OF VITAMIN B-1: CPT

## 2021-12-22 PROCEDURE — 85652 RBC SED RATE AUTOMATED: CPT

## 2021-12-22 PROCEDURE — G0378 HOSPITAL OBSERVATION PER HR: HCPCS

## 2021-12-22 PROCEDURE — 97112 NEUROMUSCULAR REEDUCATION: CPT

## 2021-12-22 PROCEDURE — 1210000000 HC MED SURG R&B

## 2021-12-22 PROCEDURE — 82607 VITAMIN B-12: CPT

## 2021-12-22 PROCEDURE — 96372 THER/PROPH/DIAG INJ SC/IM: CPT

## 2021-12-22 PROCEDURE — 6370000000 HC RX 637 (ALT 250 FOR IP): Performed by: PSYCHIATRY & NEUROLOGY

## 2021-12-22 PROCEDURE — 6360000002 HC RX W HCPCS: Performed by: INTERNAL MEDICINE

## 2021-12-22 PROCEDURE — 6370000000 HC RX 637 (ALT 250 FOR IP): Performed by: INTERNAL MEDICINE

## 2021-12-22 PROCEDURE — 36415 COLL VENOUS BLD VENIPUNCTURE: CPT

## 2021-12-22 PROCEDURE — 97116 GAIT TRAINING THERAPY: CPT

## 2021-12-22 PROCEDURE — 2580000003 HC RX 258: Performed by: INTERNAL MEDICINE

## 2021-12-22 PROCEDURE — 82746 ASSAY OF FOLIC ACID SERUM: CPT

## 2021-12-22 PROCEDURE — 97535 SELF CARE MNGMENT TRAINING: CPT

## 2021-12-22 RX ORDER — LORAZEPAM 2 MG/ML
1 INJECTION INTRAMUSCULAR ONCE
Status: COMPLETED | OUTPATIENT
Start: 2021-12-23 | End: 2021-12-23

## 2021-12-22 RX ORDER — DIAZEPAM 5 MG/1
5 TABLET ORAL ONCE
Status: DISCONTINUED | OUTPATIENT
Start: 2021-12-23 | End: 2021-12-22

## 2021-12-22 RX ORDER — ALPRAZOLAM 0.5 MG/1
0.5 TABLET ORAL 2 TIMES DAILY
Status: DISCONTINUED | OUTPATIENT
Start: 2021-12-22 | End: 2021-12-24 | Stop reason: HOSPADM

## 2021-12-22 RX ORDER — FLUOXETINE HYDROCHLORIDE 20 MG/1
40 CAPSULE ORAL DAILY
Status: DISCONTINUED | OUTPATIENT
Start: 2021-12-23 | End: 2021-12-24 | Stop reason: HOSPADM

## 2021-12-22 RX ORDER — ALPRAZOLAM 0.5 MG/1
0.5 TABLET ORAL 2 TIMES DAILY
Status: DISCONTINUED | OUTPATIENT
Start: 2021-12-22 | End: 2021-12-22

## 2021-12-22 RX ADMIN — AMLODIPINE BESYLATE 5 MG: 5 TABLET ORAL at 08:24

## 2021-12-22 RX ADMIN — ASPIRIN 325 MG: 325 TABLET, COATED ORAL at 08:24

## 2021-12-22 RX ADMIN — Medication 10 ML: at 08:26

## 2021-12-22 RX ADMIN — ENOXAPARIN SODIUM 40 MG: 40 INJECTION SUBCUTANEOUS at 08:24

## 2021-12-22 RX ADMIN — ALPRAZOLAM 0.5 MG: 0.5 TABLET ORAL at 17:41

## 2021-12-22 RX ADMIN — PRAVASTATIN SODIUM 40 MG: 40 TABLET ORAL at 20:42

## 2021-12-22 RX ADMIN — PANTOPRAZOLE SODIUM 20 MG: 20 TABLET, DELAYED RELEASE ORAL at 06:35

## 2021-12-22 RX ADMIN — Medication 10 ML: at 20:42

## 2021-12-22 RX ADMIN — FLUOXETINE 20 MG: 20 CAPSULE ORAL at 08:24

## 2021-12-22 ASSESSMENT — PAIN SCALES - GENERAL
PAINLEVEL_OUTOF10: 0

## 2021-12-22 NOTE — PROGRESS NOTES
to go home and follow up with out patient therapy vs. Inpatient rehab due to the holidays. Patient reports interest in going to [facility] out patient therapy however voices concern that insurance will not cover. This  contacted Sara in out patient therapy at [facility]. Sara reports plan to verify patient's insurance benefits and will notify this .   SS to continue to follow     Electronically signed by IZABELLA Blair on 12/22/2021 at 12:18 PM

## 2021-12-22 NOTE — PROGRESS NOTES
This  was informed by [facility] JESSICA therapy out patient therapy coordinator that patient's insurance benefits were checked. Sara reports that patient's insurance will provide patient with a combined total of 20 PT/OT visits with a $60.00 per visit co-pay. [facility] goes on to report that starting Jan. 2022 patient's co-pay will drop to $50 per out patient visit. This  met with patient to review above and provided [facility] out patient contact information. SS to continue to follow as needed while patient is at [facility].

## 2021-12-22 NOTE — PROGRESS NOTES
Physical Therapy  Facility/Department: [facility] MED SURG UNIT  Daily Treatment Note  NAME: Jill Gallego  : 1960  MRN: [id number]    Date of Service: 2021    Discharge Recommendations:  Continue to assess pending progress,Outpatient PT        Assessment   Body structures, Functions, Activity limitations: Decreased functional mobility ; Decreased strength;Decreased endurance;Decreased balance  Assessment: Pt ambulated in the hallway with fair/fair- dynamic balance with unsteady gait noted but pt able to self correct. Pt performed forward and retro gait in hallway with CGA and able to shift weight and direction with no LOB noted. Pt then performed balance activies holding onto railing (heel-toe walking) with reports of fatigue. Pt then performed steps with CGA and handrail reciprocally. Pt took seated rest break and then performed standing ther ex needing cues for form and rest due to fatigue. Pt then returned to room and was positioned for comfort. Pt reports that he had no c/o pain but reports of fatigue. Continue to progress  as able  with strength, endurance and balance activites. Treatment Diagnosis: ataxic gait, gait instability x 1 week  Prognosis: Good  REQUIRES PT FOLLOW UP: Yes     Patient Diagnosis(es): The primary encounter diagnosis was Gait instability. Diagnoses of Tremor and Cerebrovascular accident (CVA), unspecified mechanism ([address] were also pertinent to this visit. has a past medical history of Anxiety attack, Asthma, CAD (coronary artery disease), Depression, Hypertension, and Primary osteoarthritis of right knee. has a past surgical history that includes Temporomandibular joint surgery and Cardiac catheterization. Restrictions  Restrictions/Precautions  Restrictions/Precautions: Fall Risk  Subjective   General  Chart Reviewed: Yes  Family / Caregiver Present: No  Referring Practitioner: Dr Paco Morris: Pt in bed but agreeable to work with therapy.  No reports of pain. Pain Screening  Patient Currently in Pain: Denies  Vital Signs  Patient Currently in Pain: Denies  Pre Treatment Pain Screening  Pain at present: 0  Scale Used: Numeric Score    Orientation     Cognition      Objective      Transfers  Sit to Stand: Contact guard assistance;Stand by assistance  Stand to sit: Contact guard assistance;Stand by assistance  Ambulation  Ambulation?: Yes  More Ambulation?: Yes  Ambulation 1  Surface: level tile  Device: Rolling Walker  Assistance: Contact guard assistance;Stand by assistance  Quality of Gait: Pt ambulated with decreased step length B LE with rigth foot toeing out; Pt deomonstrated increased tremors at times. Distance: 75'x 2; 100'x 1  Ambulation 2  Surface - 2: level tile  Device 2: No device (Railing on the wall )  Assistance 2: Contact guard assistance  Quality of Gait 2: Pt ambulated with heel toe walking both forward and backward   Distance: 25'x2  Comments: Pt had reports of fatigue and not able to continue with retro gait         Other exercises  Other exercises 1: Heel raises x 15   Other exercises 2: Mini squats x 5 due to fatigue   Other exercises 3: Standing SLR x 10 BLE                         G-Code     OutComes Score                                                     AM-PAC Score             Goals  Short term goals  Time Frame for Short term goals: 3-5 days medical pt  Short term goal 1: Transfers modified independent and safe with least AD  Short term goal 2: amb >= 150ft least AD improved gait and steadiness, no LOB   Short term goal 3: 2 stairs one rail with <= supervision   Short term goal 4: Pt able to do x10 LE standing ALBA for balance and strenght to asssist imroved functional gait   Short term goal 5: HEP and discharge plan in place  Long term goals  Time Frame for Long term goals : same as [facility] medical pt  Patient Goals   Patient goals : \"I want to feel better about walking and not be so tired.  \"    Plan    Plan  Times per week: 5-7x week  Times per day: Daily  Plan weeks: <1 medical pt  Current Treatment Recommendations: Strengthening,Balance Training,Functional Mobility Training,Gait Training,Stair training,Neuromuscular Re-education,Endurance [employer] Exercise Program,Safety Education & Training,Patient/Caregiver Education & Training,Equipment Evaluation, Education, & procurement  Safety Devices  Type of devices:  All fall risk precautions in place,Bed alarm in place,Call light within reach     Therapy Time   Individual Concurrent Group Co-treatment   Time In  1:00pm         Time Out  2;00pm         Minutes  60 min                  Benny Villareal, PT  Therapy License Number: [id number]

## 2021-12-22 NOTE — PROGRESS NOTES
Physical Therapy  Facility/Department: [facility] MED SURG UNIT  Daily Treatment Note  NAME: Emilie Becerril  : 1960  MRN: [id number]    Date of Service: 2021    Discharge Recommendations:  Continue to assess pending progress,Outpatient PT   PT Equipment Recommendations  Other: (P) may need FWW    Assessment   Body structures, Functions, Activity limitations: Decreased functional mobility ; Decreased strength;Decreased endurance;Decreased balance  Assessment: (P) Pt. tolerated fair minus to therapy intervention demonstrating increaase shakinees and tremors with fatigue post stand activity and gait training. Pt. requires at least close stand by / contact guard assistance for safety with stand and gait activity to reduce risk of incident due to increase unsteadiness as stand time and activity increase. Pt. appears to demo decrease safety awreness with use of FWW, tends to step out of FWW with turns. Treatment Diagnosis: ataxic gait, gait instability x 1 week  Prognosis: Good  REQUIRES PT FOLLOW UP: Yes  Activity Tolerance  Activity Tolerance: (P) Patient limited by fatigue  Activity Tolerance: (P) limited by balance, unsteadiness     Patient Diagnosis(es): The primary encounter diagnosis was Gait instability. Diagnoses of Tremor and Cerebrovascular accident (CVA), unspecified mechanism ([address] were also pertinent to this visit. has a past medical history of Anxiety attack, Asthma, CAD (coronary artery disease), Depression, Hypertension, and Primary osteoarthritis of right knee. has a past surgical history that includes Temporomandibular joint surgery and Cardiac catheterization. Restrictions  Restrictions/Precautions  Restrictions/Precautions: Fall Risk  Subjective   General  Chart Reviewed: Yes  Family / Caregiver Present: No  Referring Practitioner: Dr Dolan Alpha: (P) Pt. reports I am alright. I have the shakes and unsteady when I walk. No complaints of pain.    Pain Screening  Patient Currently in Pain: Denies  Vital Signs  Patient Currently in Pain: Denies       Orientation     Cognition      Objective      Transfers  Sit to Stand: Contact guard assistance;Stand by assistance  Stand to sit: Contact guard assistance;Stand by assistance  Ambulation  Ambulation?: Yes  Ambulation 1  Surface: level tile  Device: Rolling Walker  Assistance: (P) Contact guard assistance;Stand by assistance  Quality of Gait: (P) Increase external LE rotation R > L with narrow step width. Mild UE and trunk tremor with gait pattern. Pt. leaves his walker with turning. Distance: (P) 50'x1, 100'x1  Comments: (P) Pt. demo's increase UE tremors as gait distance increases with reports of fatigue. Ambulation 2  Surface - 2: (P) level tile  Device 2: (P) No device  Assistance 2: (P) Contact guard assistance  Quality of Gait 2: (P) Hesitancy, Continues to demo mild tremors with UE. Distance: (P) 25'x1  Comments: (P) Pt. became unsteadiness before attempting to sit with decrease safe transition. Balance  Sitting - Static: (P) Good  Sitting - Dynamic: (P) Good  Standing - Static: (P) Good;-  Standing - Dynamic: (P) Fair;+  Comments: (P) Static/ Dynamic stand with retro and forward stepping, B stand pivot turning, Cervical rotation x 2. 0 LOB. Pt. reports mild increase in dizziness with turning of head. ZContact guard assistance for satfety due to unsteadiness and UE/ trunk tremors. Pt. demo's increase shakiness/tremors towards and of activity.                             G-Code     OutComes Score                                                     AM-PAC Score             Goals  Short term goals  Time Frame for Short term goals: 3-5 days medical pt  Short term goal 1: Transfers modified independent and safe with least AD  Short term goal 2: amb >= 150ft least AD improved gait and steadiness, no LOB   Short term goal 3: 2 stairs one rail with <= supervision   Short term goal 4: Pt able to do x10 LE standing ALBA for balance and strenght to asssist imroved functional gait   Short term goal 5: HEP and discharge plan in place  Long term goals  Time Frame for Long term goals : same as [facility] medical pt  Patient Goals   Patient goals : \"I want to feel better about walking and not be so tired.  \"    Plan    Plan  Times per week: 5-7x week  Times per day: Daily  Plan weeks: <1 medical pt  Current Treatment Recommendations: Strengthening,Balance Training,Functional Mobility Training,Gait Training,Stair training,Neuromuscular Re-education,Endurance Training,Home Exercise Program,Safety Education & Training,Patient/Caregiver Education & Training,Equipment Evaluation, Education, & procurement  Safety Devices  Type of devices: (P) All fall risk precautions in place,Call light within reach,Chair alarm in place,Left in chair     Therapy Time   Individual Concurrent Group Co-treatment   Time In  910         Time Out  [address] , [facility]  License and [address] Number: [phone]

## 2021-12-22 NOTE — PROGRESS NOTES
Occupational Therapy  Facility/Department: [facility] MED SURG UNIT  Daily Treatment Note  NAME: Estrellita Smith  : 1960  MRN: [id number]    Date of Service: 2021    Discharge Recommendations:  Continue to assess pending progress,Subacute/Skilled Nursing Facility (Acute rehab vs [facility] pending insurance)       Assessment   Performance deficits / Impairments: Decreased functional mobility ; Decreased ADL status; Decreased strength;Decreased safe awareness;Decreased cognition;Decreased endurance;Decreased balance;Decreased high-level IADLs;Decreased coordination  Assessment: pt is cooperatuve  with safety precautions. Tremors increase with stand time   REQUIRES OT FOLLOW UP: Yes  Safety Devices  Safety Devices in place: Yes  Type of devices: All fall risk precautions in place     tolerated tx well     Patient Diagnosis(es): The primary encounter diagnosis was Gait instability. Diagnoses of Tremor and Cerebrovascular accident (CVA), unspecified mechanism ([address] were also pertinent to this visit. has a past medical history of Anxiety attack, Asthma, CAD (coronary artery disease), Depression, Hypertension, and Primary osteoarthritis of right knee. has a past surgical history that includes Temporomandibular joint surgery and Cardiac catheterization. Restrictions  Restrictions/Precautions  Restrictions/Precautions: Fall Risk  Subjective   General  Chart Reviewed: Yes  Patient assessed for rehabilitation services?: Yes  Additional Pertinent Hx: 1 week history of dizziness and feeling unsteady on his legs when he walks. Patient also reported having slowing of his thought process and expressions with inc'd time to process information.   Family / Caregiver Present: No  Referring Practitioner: Dr. Marlene Luciano  Diagnosis: Ataxia, tremors, getting MRI to assess for possible stroke  Subjective  Subjective: Pt agreeable to OT eval/tx      Orientation     Objective    ADL  Grooming: Modified independent   LE Dressing: Stand by assistance (donns socks seated)  Toileting: Stand by assistance  Additional Comments: Pt agreeable to grooming at sink         Balance  Sitting Balance: Independent  Standing Balance: Contact guard assistance  Standing Balance  Time: 2, 4 min   Activity: grooming and standing balance exer  Comment: develops increasing legshakiness . resumed seated postur after 4 min  front of recliner  Functional Mobility  Functional - Mobility Device: Rolling Walker  Activity: To/from bathroom  Assist Level: Contact guard assistance  Functional Mobility Comments: FWW for safety unsteady  after ,2 min un  Bed mobility  Sit to Supine: Supervision  Transfers  Sit to stand: Contact guard assistance  Stand to sit: Contact guard assistance                                            Type of ROM/Therapeutic Exercise  Type of ROM/Therapeutic Exercise: AROM  Comment: satic and dynamic ballance pose with light finger tip touch for assesment abd stability R,L rotation  5 reps wo lob , but increasing leg tremor after 3-4 min dandy  Exercises  Shoulder Flexion: 5 reps                     Plan   Plan  Times per week: 4-7x/wk  Times per day: Daily  Plan weeks: <1- Obs pt  Current Treatment Recommendations: Strengthening,ROM,Balance Training,Functional Mobility Training,Endurance Training,Stair training,Neuromuscular Re-education,Safety Education & Training,Patient/Caregiver Education & Training,Self-Care / ADL,Home Management Training  G-Code     OutComes Score                                                  AM-PAC Score             Goals  Short term goals  Time Frame for Short term goals: 1-3 days- Obs pt  Short term goal 1: Tolerate 25-30min BUE ther ex/act to inc strength/end for ADL  Short term goal 2: SBA/Spv toileting  Short term goal 3: SBA/Spv LB bathing and dressing  Short term goal 4:  Inc to 8-10min stand lexx/end, with CGA or less for steadiness, to inc safety and I with ADL  Long term goals  Time Frame for Long term goals : Same as STGs  Long term goal 1: Same as STGs  Long term goal 2: Same as STGs  Patient Goals   Patient goals :  To get better       Therapy Time   Individual Concurrent Group Co-treatment   Time In  8:30         Time Out  9:20         Minutes  [facility], [address] [place] [address],[facility] Floor Number: [id number]

## 2021-12-22 NOTE — PROGRESS NOTES
Rev'd MR and MRA images. White matter disease, quite mild. Examined gait with Dr. Jory Campbell and PT. Gait without assistive devices remains wide. Rapid tremulous shaking while walking without any truncal ataxia. Turns in 3-4 steps. Denies these sx while sitting, only when standing. Denies any positional component. Asked by PT to turn head side to side, which he did. After 2nd head-turning to R, states that he feels somewhat worse. I placed my hands on his shoulders, at which time his tremor worsened and pt asked to sit back down in wheelchair. Spoke with PT as well, which confirmed that sx have been primarily tremor and not ataxia. Will order MR C-s out of an abundance of caution, but my level of suspicion is not high. Suggest rehab if PT agreeable.

## 2021-12-22 NOTE — PROGRESS NOTES
Patient continues to feel somewhat unsteady on his feet when he walks. No chest pain or palpitation. No abdominal pain, nausea or vomiting. ROS: 12 system review otherwise is negative for acute signs or symptoms over the last 24 hrs. Exam: General appearance: alert, appears stated age and cooperative, no apparent distress. Skin: Skin color, texture, turgor normal. No rashes or lesions  HEENT: Head: Normocephalic, no lesions, without obvious abnormality. Neck: no adenopathy, no carotid bruit, no JVD, supple, symmetrical, trachea midline and thyroid not enlarged, symmetric, no tenderness/mass/nodules  Lungs: clear to auscultation bilaterally  Heart: regular rate and rhythm, S1, S2 normal, no murmur, click, rub or gallop  Abdomen: soft, non-tender; bowel sounds normal; no masses,  no organomegaly  Extremities: extremities normal, atraumatic, no cyanosis or edema  Neurologic: Mental status: Alert, oriented, thought content appropriate normal speech. Normal mentation however it takes patient is secondary to to process the question and provide an answer. Symmetrical motor and tone. No discoordination. He does have nystagmus, horizontal.  I witnessed patient standing up and walking in the hallway with therapist.  Patient appears to have wide gait. Patient is able to maintain his posture without assist however he was walking very slowly to prevent fall. Patient was having even slower and unsteady gait when he was turning around. Assessment and plan:     *Ataxia. MRI of the brain is negative. Requested MRI of the cervical and lumbar spine to rule out spinal cord disease. I requested to check a B12 and sed rate. I discussed his case with social work and . Plan to see if his insurance company would approve him to be admitted to an inpatient rehab unit. Defer further needed diagnostic, investigative and therapeutic work-up relative to his ataxia to neurology team given his expertise.   I will follow his recommendation. *Coronary artery disease. No chest pain or angina. *Tobacco addiction, counseling.

## 2021-12-23 ENCOUNTER — HOSPITAL ENCOUNTER (OUTPATIENT)
Dept: MRI IMAGING | Age: 61
Discharge: HOME | End: 2021-12-25
Payer: COMMERCIAL

## 2021-12-23 LAB
FOLATE: 11.2 NG/ML
VITAMIN B-12: 617 PG/ML (ref 232–1245)

## 2021-12-23 PROCEDURE — 97535 SELF CARE MNGMENT TRAINING: CPT

## 2021-12-23 PROCEDURE — 72141 MRI NECK SPINE W/O DYE: CPT

## 2021-12-23 PROCEDURE — 6360000002 HC RX W HCPCS: Performed by: INTERNAL MEDICINE

## 2021-12-23 PROCEDURE — A9579 GAD-BASE MR CONTRAST NOS,1ML: HCPCS | Performed by: PSYCHIATRY & NEUROLOGY

## 2021-12-23 PROCEDURE — 2580000003 HC RX 258: Performed by: INTERNAL MEDICINE

## 2021-12-23 PROCEDURE — 6360000004 HC RX CONTRAST MEDICATION: Performed by: PSYCHIATRY & NEUROLOGY

## 2021-12-23 PROCEDURE — 6370000000 HC RX 637 (ALT 250 FOR IP): Performed by: INTERNAL MEDICINE

## 2021-12-23 PROCEDURE — 96376 TX/PRO/DX INJ SAME DRUG ADON: CPT

## 2021-12-23 PROCEDURE — 1210000000 HC MED SURG R&B

## 2021-12-23 PROCEDURE — 97530 THERAPEUTIC ACTIVITIES: CPT

## 2021-12-23 PROCEDURE — G0378 HOSPITAL OBSERVATION PER HR: HCPCS

## 2021-12-23 PROCEDURE — 6370000000 HC RX 637 (ALT 250 FOR IP): Performed by: PSYCHIATRY & NEUROLOGY

## 2021-12-23 PROCEDURE — 72158 MRI LUMBAR SPINE W/O & W/DYE: CPT

## 2021-12-23 RX ORDER — LORAZEPAM 2 MG/ML
1 INJECTION INTRAMUSCULAR ONCE
Status: COMPLETED | OUTPATIENT
Start: 2021-12-23 | End: 2021-12-23

## 2021-12-23 RX ORDER — 0.9 % SODIUM CHLORIDE 0.9 %
10 INTRAVENOUS SOLUTION INTRAVENOUS ONCE
Status: DISCONTINUED | OUTPATIENT
Start: 2021-12-23 | End: 2021-12-26 | Stop reason: HOSPADM

## 2021-12-23 RX ADMIN — LORAZEPAM 1 MG: 2 INJECTION INTRAMUSCULAR; INTRAVENOUS at 13:19

## 2021-12-23 RX ADMIN — ALPRAZOLAM 0.5 MG: 0.5 TABLET ORAL at 18:59

## 2021-12-23 RX ADMIN — ALPRAZOLAM 0.5 MG: 0.5 TABLET ORAL at 06:39

## 2021-12-23 RX ADMIN — PRAVASTATIN SODIUM 40 MG: 40 TABLET ORAL at 20:12

## 2021-12-23 RX ADMIN — GADOTERIDOL 15 ML: 279.3 INJECTION, SOLUTION INTRAVENOUS at 16:51

## 2021-12-23 RX ADMIN — FLUOXETINE 40 MG: 20 CAPSULE ORAL at 08:37

## 2021-12-23 RX ADMIN — ASPIRIN 325 MG: 325 TABLET, COATED ORAL at 08:37

## 2021-12-23 RX ADMIN — PANTOPRAZOLE SODIUM 20 MG: 20 TABLET, DELAYED RELEASE ORAL at 06:39

## 2021-12-23 RX ADMIN — ENOXAPARIN SODIUM 40 MG: 40 INJECTION SUBCUTANEOUS at 08:37

## 2021-12-23 RX ADMIN — AMLODIPINE BESYLATE 5 MG: 5 TABLET ORAL at 08:37

## 2021-12-23 RX ADMIN — LORAZEPAM 1 MG: 2 INJECTION INTRAMUSCULAR; INTRAVENOUS at 15:54

## 2021-12-23 ASSESSMENT — PAIN SCALES - GENERAL
PAINLEVEL_OUTOF10: 0
PAINLEVEL_OUTOF10: 0

## 2021-12-23 NOTE — PROGRESS NOTES
Patient reports that his difficulty walking has improved over the last for 8 hours. No headaches, no dizziness just unsteady gait. No focal weakness or numbness. No chest pain or palpitation. ROS: 12 system review otherwise is negative for acute signs or symptoms over the last 24 hrs.      Exam: General appearance: alert, appears stated age and cooperative, no apparent distress. Skin: Skin color, texture, turgor normal. No rashes or lesions  HEENT: Head: Normocephalic, no lesions, without obvious abnormality. Neck: no adenopathy, no carotid bruit, no JVD, supple, symmetrical, trachea midline and thyroid not enlarged, symmetric, no tenderness/mass/nodules  Lungs: clear to auscultation bilaterally  Heart: regular rate and rhythm, S1, S2 normal, no murmur, click, rub or gallop  Abdomen: soft, non-tender; bowel sounds normal; no masses,  no organomegaly  Extremities: extremities normal, atraumatic, no cyanosis or edema  Neurologic: Mental status: Alert, oriented, thought content appropriate normal speech.  Normal mentation however it takes patient is secondary to to process the question and provide an answer.  Symmetrical motor and tone.  No discoordination.  He does have nystagmus, horizontal.    On Wednesday, I witnessed patient standing up and walking in the hallway with therapist.  Patient appears to have wide gait. Patient is able to maintain his posture without assist however he was walking very slowly to prevent fall. Patient was having even slower and unsteady gait when he was turning around.        Assessment and plan:      *Ataxia. MRI of the brain is negative. Requested MRI of the cervical and lumbar spine to rule out spinal cord disease. I requested to check a B12 and sed rate. Patient does not want any inpatient rehabilitation.   He is interested in having outpatient rehabilitation and therapy.     Defer further needed diagnostic, investigative and therapeutic work-up relative to his ataxia to neurology team given his expertise. I will follow his recommendation.     *Coronary artery disease. No chest pain or angina.     *Tobacco addiction, counseling.

## 2021-12-23 NOTE — PROGRESS NOTES
Physical Therapy  Facility/Department: [facility] MED SURG UNIT  Daily Treatment Note  NAME: Pat Oconnor  : 1960  MRN: [id number]    Date of Service: 2021    Discharge Recommendations:  (P) Continue to assess pending progress,Outpatient PT        Assessment   Body structures, Functions, Activity limitations: (P) Decreased functional mobility ; Decreased strength;Decreased endurance;Decreased balance  Treatment Diagnosis: (P) ataxic gait, gait instability x 1 week  Prognosis: (P) Good  REQUIRES PT FOLLOW UP: (P) Yes  Activity Tolerance  Activity Tolerance: (P) Patient limited by fatigue  Activity Tolerance: (P) limited by balance, unsteadiness     Patient Diagnosis(es): The primary encounter diagnosis was Gait instability. Diagnoses of Tremor and Cerebrovascular accident (CVA), unspecified mechanism ([address] were also pertinent to this visit. has a past medical history of Anxiety attack, Asthma, CAD (coronary artery disease), Depression, Hypertension, and Primary osteoarthritis of right knee. has a past surgical history that includes Temporomandibular joint surgery and Cardiac catheterization. Restrictions  Restrictions/Precautions  Restrictions/Precautions: Fall Risk  Subjective   General  Chart Reviewed: (P) Yes  Family / Caregiver Present: (P) No  Referring Practitioner: (P) Dr Deja Hatfield  Comments: (P) Pt. out of facility for MRI per nursing.            Orientation     Cognition      Objective                                           G-Code     OutComes Score                                                     AM-PAC Score             Goals  Short term goals  Time Frame for Short term goals: (P) 3-5 days medical pt  Short term goal 1: (P) Transfers modified independent and safe with least AD  Short term goal 2: (P) amb >= 150ft least AD improved gait and steadiness, no LOB   Short term goal 3: (P) 2 stairs one rail with <= supervision   Short term goal 4: (P) Pt able to do x10 LE standing ALBA for balance and strenght to asssist improved functional gait   Short term goal 5: (P) HEP and discharge plan in place  Long term goals  Time Frame for Long term goals : (P) same as [facility] medical pt  Patient Goals   Patient goals : (P) \"I want to feel better about walking and not be so tired.  \"    Plan    Plan  Times per week: (P) 5-7x week  Times per day: (P) Daily  Plan weeks: (P) <1 medical pt  Current Treatment Recommendations: (P) Strengthening,Balance Training,Functional Mobility Training,Gait Training,Stair training,Neuromuscular Re-education,Endurance Training,Home Exercise Program,Safety Education & Training,Patient/Caregiver Education & Training,Equipment Evaluation, Education, & procurement  Safety Devices  Type of devices: (P) All fall risk precautions in place,Call light within reach,Left in chair  Restraints  Initially in place: (P) Yes     Therapy Time   Individual Concurrent Group Co-treatment   Time In  [facility] PTA  License and [address] Number: [phone]

## 2021-12-23 NOTE — PROGRESS NOTES
having slowing of his thought process and expressions with inc'd time to process information. Family / Caregiver Present: No  Referring Practitioner: Dr. Kelin Newman  Diagnosis: Ataxia, tremors, getting MRI to assess for possible stroke  Subjective  Subjective: \"I can take a shower. \"      Orientation     Objective    ADL  Feeding: Modified independent   Grooming: Supervision  UE Bathing: Supervision  LE Bathing: Supervision  UE Dressing: Modified independent   LE Dressing: Supervision;Stand by assistance  Toileting: Supervision  Additional Comments: Min cues for ww safety        Balance  Sitting Balance: Independent  Standing Balance: Contact guard assistance  Standing Balance  Time: 13 min  Activity: functional mobility and bathing  Comment: Min cues for posture and safefty w/ use of w/w  Functional Mobility  Functional - Mobility Device: Rolling Walker  Activity: To/From therapy gym  Assist Level: Stand by assistance  Functional Mobility Comments: Pt demonstrated imrpoved skill level   Tub Transfers  Tub - Transfer From: Walker  Tub - Transfer Type: To  Tub - Transfer To: Other  Tub - Technique: Ambulating  Tub Transfers Comments: Simulated tub transfer with use of grab bar   Shower Transfers  Shower - Transfer From: Taylor Hooverbernardino - Transfer Type: To and From  Shower - Transfer To: Shower seat with back  Shower - Technique: Ambulating  Shower Transfers: Contact Guard     Transfers  Stand Step Transfers: Stand by assistance  Sit to stand: Stand by assistance  Stand to sit: Stand by assistance  Transfer Comments: Min cues for technique                           Functional Activity Tolerance  Functional Activity Tolerance:  Tolerates < 10 Min exercise, no significant change in vital signs  Additional Comments: Pt tolerated standing up to 13 min                                       Plan   Plan  Times per week: 4-7x/wk  Times per day: Daily  Plan weeks: <1- Obs pt  Current Treatment Recommendations: Strengthening,ROM,Balance Training,Functional Mobility Training,Endurance Training,Stair training,Neuromuscular Re-education,Safety Education & Training,Patient/Caregiver Education & Training,Self-Care / ADL,Home Management Training  Plan Comment: Continue with OT               Goals  Short term goals  Time Frame for Short term goals: 1-3 days- Obs pt  Short term goal 1: Tolerate 25-30min BUE ther ex/act to inc strength/end for ADL  Short term goal 2: SBA/Spv toileting  Short term goal 3: SBA/Spv LB bathing and dressing  Short term goal 4: Inc to 8-10min stand lexx/end, with CGA or less for steadiness, to inc safety and I with ADL  Long term goals  Time Frame for Long term goals : Same as STGs  Long term goal 1: Same as STGs  Long term goal 2: Same as STGs  Patient Goals   Patient goals :  To get better       Therapy Time   Individual Concurrent Group Co-treatment   Time In  9:35am         Time Out  10:30am         Minutes  Jennifer Billy [address] and [address] Number: [phone]

## 2021-12-23 NOTE — PROGRESS NOTES
PRN ativan given to pt prior to transport. Pt left unit via cot with [facility]. 1725 - Pt returned to unit via cot with [facility]. Tele reapplied and pt eating dinner.

## 2021-12-23 NOTE — FLOWSHEET NOTE
Spoke with Grant Sparks RN regarding pt needing more sedation to complete MRI exam at [facility] [place]. waiting to hear back for orders. Pt is currently in MRI department on MRI table. [address] spoke with Dr Sarah Turner, order received for additional sedation. [id number] pt medicated with 1 mg IV ativan per order. Pt very pleasant. Waiting to finish MRI exam then will return to [facility] via ambulance transport.

## 2021-12-24 VITALS
DIASTOLIC BLOOD PRESSURE: 90 MMHG | HEIGHT: 67 IN | RESPIRATION RATE: 18 BRPM | OXYGEN SATURATION: 94 % | WEIGHT: 133 LBS | HEART RATE: 71 BPM | BODY MASS INDEX: 20.88 KG/M2 | TEMPERATURE: 97.7 F | SYSTOLIC BLOOD PRESSURE: 147 MMHG

## 2021-12-24 PROCEDURE — 6370000000 HC RX 637 (ALT 250 FOR IP): Performed by: PSYCHIATRY & NEUROLOGY

## 2021-12-24 PROCEDURE — 96375 TX/PRO/DX INJ NEW DRUG ADDON: CPT

## 2021-12-24 PROCEDURE — 97110 THERAPEUTIC EXERCISES: CPT

## 2021-12-24 PROCEDURE — 97116 GAIT TRAINING THERAPY: CPT

## 2021-12-24 PROCEDURE — 6370000000 HC RX 637 (ALT 250 FOR IP): Performed by: INTERNAL MEDICINE

## 2021-12-24 PROCEDURE — 6360000002 HC RX W HCPCS: Performed by: INTERNAL MEDICINE

## 2021-12-24 PROCEDURE — G0378 HOSPITAL OBSERVATION PER HR: HCPCS

## 2021-12-24 RX ORDER — DEXAMETHASONE SODIUM PHOSPHATE 4 MG/ML
8 INJECTION, SOLUTION INTRA-ARTICULAR; INTRALESIONAL; INTRAMUSCULAR; INTRAVENOUS; SOFT TISSUE ONCE
Status: COMPLETED | OUTPATIENT
Start: 2021-12-24 | End: 2021-12-24

## 2021-12-24 RX ORDER — DEXAMETHASONE 4 MG/1
4 TABLET ORAL SEE ADMIN INSTRUCTIONS
Qty: 20 TABLET | Refills: 0 | Status: SHIPPED | OUTPATIENT
Start: 2021-12-24 | End: 2021-12-31

## 2021-12-24 RX ADMIN — FLUOXETINE 40 MG: 20 CAPSULE ORAL at 08:28

## 2021-12-24 RX ADMIN — AMLODIPINE BESYLATE 5 MG: 5 TABLET ORAL at 08:28

## 2021-12-24 RX ADMIN — ASPIRIN 325 MG: 325 TABLET, COATED ORAL at 08:28

## 2021-12-24 RX ADMIN — PANTOPRAZOLE SODIUM 20 MG: 20 TABLET, DELAYED RELEASE ORAL at 06:33

## 2021-12-24 RX ADMIN — ALPRAZOLAM 0.5 MG: 0.5 TABLET ORAL at 06:33

## 2021-12-24 RX ADMIN — DEXAMETHASONE SODIUM PHOSPHATE 4 MG: 4 INJECTION, SOLUTION INTRAMUSCULAR; INTRAVENOUS at 12:22

## 2021-12-24 RX ADMIN — ENOXAPARIN SODIUM 40 MG: 40 INJECTION SUBCUTANEOUS at 08:28

## 2021-12-24 NOTE — DISCHARGE SUMMARY
Hospital Medicine Discharge Summary    Estrellita Smith  :  1960  MRN:  [id number]    Admit date:  2021  Discharge date:  2021    Admitting Physician:  Charl Merlin, MD  Primary Care Physician:  Mayco Mcbride DO      Discharge Diagnoses:      *Ataxia.  MRI of the brain is negative for anything acute. Positive for microvascular disease. I increase his aspirin up to 325 mg daily. Carotid ultrasound is negative for any significant stenosis. Echocardiogram is unremarkable. B12 is normal, sed rate is normal.  MRI of the cervical and lumbar spine showed multiple abnormalities, multi disc degeneration and herniation, spinal stenosis amongst multiple other issues. Please refer to MRI imaging and the report for details. I called and discussed this with Dr. Nishi Hughes. Dr. Nishi Hughes stated that there is no urgent indication for urgent surgical intervention as this seemed to be chronic and subacute rather than acute issue. Patient however would need to be seen by spine surgery within short period of time. I discussed this with the patient in the room and I also called his wife on the phone and talk to her about this. I offered both of them transferring him to [facility] to inpatient to be seen by spine surgery tomorrow or the day after tomorrow. The other option is to discharge home and follow-up in the outpatient setting. Patient and his wife stated that they are not planning or permitting for anything to be done until the holiday is over. We called Dr. Stu Polanco office to schedule an appointment for him within a week or 2. 's office is closed today. The plan as desired by patient is for him to go home and get an outpatient appointment post holiday. We will try to call Dr. Jonathan Aggarwal office on Monday and attempt to get him an appointment sooner than later. Patient and his wife are comfortable with the plan. Patient also would need to follow-up with Dr. Nishi Hughes and the PCP.   I instructed patient to return back to the emergency room with any worsening symptoms. He understood my recommendation very well.     *Coronary artery disease.  No chest pain or angina.     *Tobacco addiction, counseling. Hospital Course:   Miller Rhodes is a 62 y.o. male that was admitted and treated at [facility] for the aforementioned medical issues. Patient wants to go home and follow-up in the outpatient setting. Patient does not want any urgent surgical intervention until after the holiday. Dr. Mariaa El is comfortable not having urgent spine surgery evaluation and stating that his spinal issues are chronic/subacute. At this time I do not have any other option but to respect patient's wishes and release him home with a goal for him to see a spine surgery within a week or 2 after discharge. Exam: General appearance: alert, appears stated age and cooperative, no apparent distress. Skin: Skin color, texture, turgor normal. No rashes or lesions  HEENT: Head: Normocephalic, no lesions, without obvious abnormality. Neck: no adenopathy, no carotid bruit, no JVD, supple, symmetrical, trachea midline and thyroid not enlarged, symmetric, no tenderness/mass/nodules  Lungs: clear to auscultation bilaterally  Heart: regular rate and rhythm, S1, S2 normal, no murmur, click, rub or gallop  Abdomen: soft, non-tender; bowel sounds normal; no masses,  no organomegaly  Extremities: extremities normal, atraumatic, no cyanosis or edema  Neurologic: Mental status: Alert, oriented, thought content appropriate normal speech.  Normal mentation however it takes patient is secondary to to process the question and provide an answer.  Symmetrical motor and tone.  No discoordination.  He does have nystagmus, horizontal.      I witnessed him walking today. He appears to be definitely more steady on his feet than previously reported.   He is able to use the walker and walk the hallway without any assist.    Patient was seen by the following consultants while admitted to [facility]:   Consults:  [address] TO CARDIOLOGY    Significant Diagnostic Studies:    MRI CERVICAL SPINE WO CONTRAST    Result Date: 12/24/2021  EXAMINATION: MRI CERVICAL SPINE WO CONTRAST CLINICAL HISTORY:  Gait abnormality, eval for cervical stenosis COMPARISONS: NONE AVAILABLE TECHNIQUE: Multiplanar multisequence MRI images of the cervical spine were obtained without contrast. FINDINGS:  The spine is in anatomic alignment. There is no acute fracture. There is preservation of the vertebral body heights. There is intervertebral disc desiccation disc space narrowing at each level. The bone marrow signal is within normal limits. There is a congenitally narrow spinal canal due to short pedicles which is exacerbated by degenerative changes resulting in varying degrees of central canal narrowing. The cord is normal in course with mild increased signal at the level of C5-C6 which may be secondary to chronic compression myelitis. There is no prevertebral soft tissue swelling. Anterior soft tissues are unremarkable. The craniocervical junction is unremarkable. C2-3:    There is no disc herniation or central canal narrowing. There is mild bilateral facet and uncovertebral joint hypertrophy producing mild bilateral neural foraminal narrowing. C3-4:   There is a 2 mm central disc Lisfranc complex indenting the thecal sac and mildly abutting cord without narrowing of central canal. There is moderate right and mild left facet and uncovertebral joint hypertrophy producing moderate right and mild left neural foraminal narrowing. C4-5:   There is a 3 mm disc Lisfranc complex compressing thecal sac and flatten the cord with moderate to severe narrowing of the central canal. AP diameter of the thecal sac to 6 mm. There is moderate bilateral facet and uncovertebral joint hypertrophy  producing severe bilateral neural foraminal narrowing.  C5-6:   There is a 2 mm disc osteophyte complex as well as moderate bilateral facet and uncovertebral joint hypertrophy resulting in severe narrowing of central canal and flattening of the cord. AP diameter of the thecal sac is 5 mm. There is severe bilateral neural foraminal narrowing. C6-7:   There is a 3 mm disc Lisfranc complex compressing thecal sac and abutting the cord producing moderate central canal narrowing. AP diameter of the thecal sac is 7 mm. There is moderate bilateral facet and uncovertebral joint hypertrophy producing moderate bilateral neural foraminal narrowing. C7-T1:   There is a 2 mm symmetric disc bulge flattening the thecal sac but not abutting the cord without narrowing of central canal. There is mild bilateral facet and uncovertebral joint hypertrophy producing mild bilateral neural foraminal narrowing. There is no acute fracture or subluxation. There is no loss of vertebral body height. There is intervertebral disc desiccation and disc space narrowing at each level. There is moderate to severe narrowing of central canal and a multifactorial basis from C4 to C7 severely affecting C5-6. AP diameter of the thecal sac at C5-6 is 5 mm. There is increased cord signal at the level of C5-6 worrisome for chronic compression myelitis. There are varying degrees of facet arthrosis and uncovertebral joint hypertrophy with variable degrees of neural foraminal narrowing as described at each level above. MRI LUMBAR SPINE W WO CONTRAST    Result Date: 12/23/2021  Patient: Oleksandr Smith  Time Out: 21:04 Exam(s): MRI L SPINE W/WO Contrast  EXAM:   MR Lumbar Spine Without and With Intravenous Contrast  CLINICAL HISTORY:    Reason for exam: Ataxia r/o spinal disease. TECHNIQUE:   Magnetic resonance images of the lumbar spine without and with intravenous contrast in multiple planes. COMPARISON:   No relevant prior studies available.   FINDINGS:   Vertebrae:  Mild grade 1 anterolisthesis of L4 on L5 likely degenerative. Mild levoscoliosis of the mid lumbar spine centered at L3- 4. Multilevel anterior osteophytes predominantly L2-L5. Spinal cord terminates at L1. No acute fracture. Spinal cord:  Unremarkable. Normal signal.  No abnormal enhancement. Soft tissues:  Unremarkable. Kidneys and ureters:  8mm and a 6 mm right renal cysts. DISCS/SPINAL CANAL/NEURAL FORAMINA:   L1-L2:  L1-L2 no significant herniation or stenosis. L2-L3:  L2-3 mild left neural foraminal disc bulge with mild left neural foraminal narrowing. No significant central canal narrowing. Facet arthropathy and ligamentum flavum thickening. L3-L4:  L3-4 mild left neural foraminal disc protrusion with mild left neural foraminal narrowing. No significant central canal narrowing. Mild facet arthropathy. L4-L5:  L4-5 broad-based disc bulge and mild anterolisthesis. Facet arthropathy and ligamentum flavum thickening. Moderate central canal narrowing with central canal measuring 7 x 7.5 mm AP dimension. Moderate to severe bilateral neural foraminal narrowing worse on the right. L5-S1:  L5-S1 Left far lateral disc protrusion with effacement of the left far lateral recess and may be touching the exiting nerve root. Mild facet arthropathy. Mild left neural foraminal narrowing. Electronically signed by Wilda Toro M.D. on 12-23-21 at 2108    1. Mild grade 1 anterolisthesis of L4 on L5 likely degenerative. 2.  Mild levoscoliosis of the mid lumbar spine centered at L3-4. 3. L4-5 broad-based disc bulge and mild anterolisthesis. Facet arthropathy and ligamentum flavum thickening. Moderate central canal narrowing with central canal measuring 7 x 7.5 mm AP dimension. Moderate to severe bilateral neural foraminal narrowing worse on the right. 4. L5-S1 Left far lateral disc protrusion with effacement of the left far lateral recess and may be touching the exiting nerve root. Mild facet arthropathy.   Mild left neural foraminal narrowing.         Discharge Medications:         Medication List      START taking these medications    aspirin 325 MG EC tablet  Take 1 tablet by mouth daily  Start taking on: December 25, 2021  Replaces: aspirin 81 MG tablet     dexamethasone 4 MG tablet  Commonly known as: Decadron  Take 1 tablet by mouth See Admin Instructions for 7 days Take 1 tablet twice a day for 7 days then 1 tablet daily        CHANGE how you take these medications    amLODIPine 5 MG tablet  Commonly known as: NORVASC  Take 1 qhs  What changed:   · how much to take  · how to take this  · when to take this     FLUoxetine 20 MG capsule  Commonly known as: PROZAC  TAKE ONE CAPSULE BY MOUTH DAILY  What changed:   · how much to take  · how to take this  · when to take this     metoprolol tartrate 25 MG tablet  Commonly known as: LOPRESSOR  What changed: how much to take        CONTINUE taking these medications    albuterol sulfate  (90 Base) MCG/ACT inhaler  Commonly known as: ProAir HFA  Inhale 2 puffs into the lungs every 4 hours as needed for Wheezing     ALPRAZolam 0.5 MG tablet  Commonly known as: XANAX     HYDROcodone-acetaminophen 5-325 MG per tablet  Commonly known as: NORCO     naloxone 4 MG/0.1ML Liqd nasal spray     Nitrostat 0.4 MG SL tablet  Generic drug: nitroGLYCERIN     pantoprazole 20 MG tablet  Commonly known as: PROTONIX  Take 1 tablet by mouth every morning (before breakfast)     pravastatin 40 MG tablet  Commonly known as: PRAVACHOL  TAKE ONE TABLET BY MOUTH NIGHTLY        STOP taking these medications    aspirin 81 MG tablet  Replaced by: aspirin 325 MG EC tablet     naproxen 500 MG EC tablet  Commonly known as: EC NAPROSYN           Where to Get Your Medications      These medications were sent to [address] [place] [address] CHINTAN Andino [address] P [phone] - F [phone]  [address] [facility] [address]    Phone: [phone]   · aspirin 325 MG EC tablet  · dexamethasone 4 MG tablet         Disposition: Discharged to Home. Any [facility] needs that were indicated and/or required as been addressed and set up by Social Work. Condition at discharge: Pt was medically stable at the time of discharge. Significant improvement in clinical condition compared to initial condition at presentation to hospital    Activity: activity as tolerated, fall precautions. Total time taken for discharging this patient: 40 minutes. Greater than 70% of time was spent focused exclusively on this patient. Time was taken to review chart, discuss plans with consultants, reconciling medications, discussing plan answering questions with patient. Pao Rene MD  12/24/2021, 11:39 AM  ----------------------------------------------------------------------------------------------------------------------    Truman Rosado,     Please return to ER or call 911 if you develop any significant signs or symptoms. I may not have addressed all of your medical illnesses or the abnormal blood work or imaging therefore please ask your PCP Darryle Glad, DO and other out patient specialists and providers  to obtain [facility] record entirely to follow up on all of the abnormal labs, imaging and findings that I have and have not addressed during your hospitalization. Discharging you from the hospital does not mean that your medical care ends here and now. You may still need additional work up, investigation, monitoring, and treatment to be handled from this point on by outside providers including your PCP, Darryle Glad, DO , Specialists and other healthcare providers. Please review your list of discharge medications prior to resuming medications you might still have at home, as the medications you need to be taking, dosages or how often you must take them may have changed.  For medication questions, contact your retail pharmacy and your PCP, Darryle Glad, DO .     ** I STRONGLY RECOMMEND that you follow up with Darryle Glad, DO within 3 to 5 days for a post hospitalization evaluation. This specific office visit is covered by your insurance, and is not the same as your annual doctor visit/ check up. This office visit is important, as it may prevent need for repeat and/or future hospitalizations. **    Your medical team at [facility]) appreciates the opportunity to work with you to get well! Sincerely,  Anabella Peters MD Follow up with Dr. Irma Serrano, DO in the next 7 days or sooner if needed. Please return to ER or call 911 if you develop any significant signs or symptoms. I may or may not have addressed all of your symptoms, medical issues,  Illnesses, or all of the abnormal blood work or imaging therefore please ask your PCP and other specialists to obtain [facility] record entirely to follow up on all of your symptoms, illnesses, abnormal labs, imaging and findings that I have and have not addressed during your hospitalization. Discharging you from the hospital does not mean that your medical care ends here and now. You may still need to have additional work up, investigation, monitoring, surveillance, and treatment to be handled from this point on by in the out patient setting by  out patient providers including your PCP, Specialists and other healthcare providers. For medication questions, contact your retail pharmacy and your PCP. Your medical team at [facility] appreciates the opportunity to work with you to get well!     Anabella Peters MD

## 2021-12-24 NOTE — DISCHARGE INSTR - COC
Continuity of Care Form    Patient Name: Tushar Herrera   :  1960  MRN:  [id number]    Admit date:  2021  Discharge date:  ***    Code Status Order: Full Code   Advance Directives:      Admitting Physician:  Kevin Canada MD  PCP: Tony Rodriguez DO    Discharging Nurse: [facility] Unit/Room#: 0224/0224-01  Discharging Unit Phone Number: ***    Emergency Contact:   Extended Emergency Contact Information  Primary Emergency Contact: [facility]  Address: [address] of [address] [place] [address] Phone: [phone]  Mobile Phone: [phone]  Relation: Spouse    Past Surgical History:  Past Surgical History:   Procedure Laterality Date    CARDIAC CATHETERIZATION      TEMPOROMANDIBULAR JOINT SURGERY      Rt knee       Immunization History:   Immunization History   Administered Date(s) Administered    COVID-19, J&J, PF, 0.5 mL 2021    Influenza, Quadv, IM, (6 mo and older Fluzone, Flulaval, Fluarix and 3 yrs and older Afluria) 2016, 2017, 2018    Pneumococcal Polysaccharide ([id number]) 2014       Active Problems:  Patient Active Problem List   Diagnosis Code    Depression F32. 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    Closed fracture of distal phalanx of digit of hand S62.639A    Essential hypertension I10    Primary osteoarthritis of right knee M17.11    SOB (shortness of breath) R06.02    Ataxia R27.0       Isolation/Infection:   Isolation            No Isolation          Patient Infection Status       Infection Onset Added Last Indicated Last Indicated By Review Planned Expiration Resolved Resolved By    None active    Resolved    COVID-19 (Rule Out) 21 COVID-19, Rapid (Ordered)   12/20/21 Rule-Out Test Resulted            Nurse Assessment:  Last Vital Signs: BP (!) 147/90 Comment: nurse aware  Pulse 71   Temp 97.7 °F (36.5 °C)   Resp 18   Ht 5' 7\" (1.702 m)   Wt 133 lb (60.3 kg)   SpO2 94%   BMI 20.83 kg/m²     Last documented pain score (0-10 scale): Pain Level: 0  Last Weight:   Wt Readings from Last 1 Encounters:   12/20/21 133 lb (60.3 kg)     Mental Status:  {IP PT MENTAL STATUS:20030}    IV Access:  { RENA IV ACCESS:782936025}    Nursing Mobility/ADLs:  Walking   {CHP DME IWXH:019201583}  Transfer  {CHP DME OEUX:536273985}  Bathing  {CHP DME VUTN:153875338}  Dressing  {CHP DME PIJM:271450787}  Toileting  {CHP DME EAYM:567103513}  Feeding  {CHP DME NQHH:230012548}  Med Admin  {CHP DME PXEX:150651546}  Med Delivery   { RENA MED Delivery:476348559}    Wound Care Documentation and Therapy:        Elimination:  Continence: Bowel: {YES / JV:52085}  Bladder: {YES / QO:08051}  Urinary Catheter: {Urinary Catheter:476745859}   Colostomy/Ileostomy/Ileal Conduit: {YES / RADHA:80197}       Date of Last BM: ***    Intake/Output Summary (Last 24 hours) at 12/24/2021 1129  Last data filed at 12/23/2021 1730  Gross per 24 hour   Intake 648 ml   Output --   Net 648 ml     I/O last 3 completed shifts:   In: 5 [P.O.:888]  Out: -     Safety Concerns:     [address] Concerns:889950356}    Impairments/Disabilities:      [address] Impairments/Disabilities:403507845}    Nutrition Therapy:  Current Nutrition Therapy:   [address] [employer] Diet List:731150483}    Routes of Feeding: {CHP DME Other Feedings:314566216}  Liquids: {Slp liquid thickness:29388}  Daily Fluid Restriction: {CHP DME Yes amt example:218894327}  Last Modified Barium Swallow with Video (Video Swallowing Test): {Done Not Done IPXC:400241670}    Treatments at the Time of Hospital Discharge:   Respiratory Treatments: ***  Oxygen Therapy:  {Therapy; copd oxygen:57563}  Ventilator:    {MH CC Vent JRKR:439671286}    Rehab Therapies: {THERAPEUTIC INTERVENTION:0862062294}  Weight Bearing Status/Restrictions: {[facility] Weight Bearin}  Other Medical Equipment (for information only, NOT a DME order):  {EQUIPMENT:969005243}  Other Treatments: ***    Patient's personal belongings (please select all that are sent with patient):  {[facility] DME Belongings:383778405}    RN SIGNATURE:  {Esignature:997204445}    CASE MANAGEMENT/SOCIAL WORK SECTION    Inpatient Status Date: ***    Readmission Risk Assessment Score:  Readmission Risk              Risk of Unplanned Readmission:  8           Discharging to Facility/ Agency   Name:   Address:  Phone:  Fax:    Dialysis Facility (if applicable)   Name:  Address:  Dialysis Schedule:  Phone:  Fax:    / signature: {Esignature:052289469}    PHYSICIAN SECTION    Prognosis: {Prognosis:1570989513}    Condition at Discharge: [address] [place] [address] Patient Condition:347907380}    Rehab Potential (if transferring to Rehab): {Prognosis:6682119958}    Recommended Labs or Other Treatments After Discharge: ***    Physician Certification: I certify the above information and transfer of Jill Gallego  is necessary for the continuing treatment of the diagnosis listed and that he requires {Admit to Appropriate Level of Care:68219} for {GREATER/LESS:831540278} 30 days.      Update Admission H&P: {CHP DME Changes in PJDYI:278692707}    PHYSICIAN SIGNATURE:  {Esignature:087653576}

## 2021-12-24 NOTE — PROGRESS NOTES
Physical Therapy  Facility/Department: [facility] MED SURG UNIT  Daily Treatment Note  NAME: Padmini Francisco  : 1960  MRN: [id number]    Date of Service: 2021    Discharge Recommendations:  Continue to assess pending progress,Outpatient PT        Assessment   Body structures, Functions, Activity limitations: Decreased functional mobility ; Decreased strength;Decreased endurance;Decreased balance  Assessment: Patient able to walk with minimal unsteadiness noted, good change of idirection and no loss of balance. Paitient was slightly delayed with responses but this improved as session progressed. Issued and performed standing and seated exercises this visit. Treatment Diagnosis: ataxic gait, gait instability x 1 week  Prognosis: Good  REQUIRES PT FOLLOW UP: Yes  Activity Tolerance  Activity Tolerance: Patient Tolerated treatment well;Patient limited by cognitive status  Activity Tolerance: limited by balance, unsteadiness     Patient Diagnosis(es): The primary encounter diagnosis was Gait instability. Diagnoses of Tremor and Cerebrovascular accident (CVA), unspecified mechanism ([address] were also pertinent to this visit. has a past medical history of Anxiety attack, Asthma, CAD (coronary artery disease), Depression, Hypertension, and Primary osteoarthritis of right knee. has a past surgical history that includes Temporomandibular joint surgery and Cardiac catheterization. Restrictions  Restrictions/Precautions  Restrictions/Precautions: Fall Risk  Subjective   General  Chart Reviewed: Yes  Family / Caregiver Present: No  Referring Practitioner: Dr Joe Dos Santos  Comments: Patient reports that he is feeling OK and wants to get out of here.    Pain Screening  Patient Currently in Pain: Denies  Vital Signs  Patient Currently in Pain: Denies  Pre Treatment Pain Screening  Pain at present: 0  Scale Used: Numeric Score    Orientation     Cognition      Objective      Transfers  Sit to Stand: Supervision  Stand to sit: Modified independent  Ambulation  Ambulation?: Yes  More Ambulation?: Yes  Ambulation 1  Surface: level tile  Device: Rolling Walker  Assistance: Stand by assistance;Supervision  Quality of Gait: Slight uneven but steady, symmetrical steps  Gait Deviations: Slow Kathleen; Increased ROZINA  Distance: 75'x 1; 100'x 1  Ambulation 2  Surface - 2: level tile  Device 2: No device  Distance: 10     Balance  Posture: Good  Sitting - Static: Good  Sitting - Dynamic: Good  Standing - Static: Good;-  Standing - Dynamic: Fair;+  Exercises  Hip Extension/Leg Presses: x 10  Hip Abduction: x 10 (standing)  Knee Long Arc Quad: x 10  Knee Short Arc Quad: x 10  Ankle Pumps: x 10  Core Strengthening: x 10 (rocking)                        G-Code     OutComes Score                                                     AM-PAC Score             Goals  Short term goals  Time Frame for Short term goals: 3-5 days medical pt  Short term goal 1: Transfers modified independent and safe with least AD  Short term goal 2: amb >= 150ft least AD improved gait and steadiness, no LOB   Short term goal 3: 2 stairs one rail with <= supervision   Short term goal 4: Pt able to do x10 LE standing ALBA for balance and strenght to asssist improved functional gait   Short term goal 5: HEP and discharge plan in place  Long term goals  Time Frame for Long term goals : same as [facility] medical pt  Patient Goals   Patient goals : \"I want to feel better about walking and not be so tired. \"    Plan    Plan  Times per week: 5-7x week  Times per day: Daily  Plan weeks: <1 medical pt  Current Treatment Recommendations: Strengthening,Balance Training,Functional Mobility Training,Gait Training,Stair training,Neuromuscular Re-education,Endurance [employer] Exercise Program,Safety Education & Training,Patient/Caregiver Education & Training,Equipment Evaluation, Education, & procurement  Safety Devices  Type of devices:  All fall risk precautions in place,Call light within reach,Left in chair  Restraints  Initially in place: Yes     Therapy Time   Individual Concurrent Group Co-treatment   Time In  930         Time Out  1000         Minutes  [facility]  License and [address] Number: [id number]

## 2021-12-27 LAB — VITAMIN B1, PLASMA: 5 NMOL/L (ref 4–15)

## 2021-12-27 NOTE — PROGRESS NOTES
12/27/21 10am  Dr Lloyd Sandhoff, neurosurgeon, LM on new patient line, to contact us for patient information. Dr Prescilla Leyden office will be arranging a f/u. Dr Jermaine Steinberg office will be contacting pt for a f/u.    16:00pm LM again for appt.

## 2022-01-12 ENCOUNTER — HOSPITAL ENCOUNTER (OUTPATIENT)
Dept: GENERAL RADIOLOGY | Age: 62
Discharge: HOME | End: 2022-01-14
Payer: COMMERCIAL

## 2022-01-12 DIAGNOSIS — M54.16 LUMBAR RADICULOPATHY: ICD-10-CM

## 2022-01-12 DIAGNOSIS — M50.00 CERVICAL DISC DISEASE WITH MYELOPATHY: ICD-10-CM

## 2022-01-12 PROCEDURE — 72050 X-RAY EXAM NECK SPINE 4/5VWS: CPT

## 2022-01-12 PROCEDURE — 72110 X-RAY EXAM L-2 SPINE 4/>VWS: CPT

## 2022-08-19 DIAGNOSIS — E78.5 DYSLIPIDEMIA: ICD-10-CM

## 2022-08-19 RX ORDER — PRAVASTATIN SODIUM 40 MG
TABLET ORAL
Qty: 90 TABLET | Refills: 3 | OUTPATIENT
Start: 2022-08-19

## 2022-08-19 NOTE — TELEPHONE ENCOUNTER
Requesting medication refill. Please approve or deny this request.    Rx requested:  Requested Prescriptions     Pending Prescriptions Disp Refills    pravastatin (PRAVACHOL) 40 MG tablet [Pharmacy Med Name: PRAVASTATIN SODIUM 40MG TABLET] 90 tablet 3     Sig: TAKE ONE TABLET BY MOUTH NIGHTLY         Last Office Visit:   7/16/2020      Next Visit Date:  No future appointments. Please approve or deny.

## 2022-10-04 ENCOUNTER — INITIAL CONSULT (OUTPATIENT)
Dept: PAIN MANAGEMENT | Age: 62
End: 2022-10-04
Payer: COMMERCIAL

## 2022-10-04 VITALS
TEMPERATURE: 97.5 F | BODY MASS INDEX: 22.6 KG/M2 | DIASTOLIC BLOOD PRESSURE: 82 MMHG | WEIGHT: 144 LBS | SYSTOLIC BLOOD PRESSURE: 132 MMHG | HEIGHT: 67 IN

## 2022-10-04 DIAGNOSIS — M51.26 LUMBAR HERNIATED DISC: ICD-10-CM

## 2022-10-04 DIAGNOSIS — M51.36 DDD (DEGENERATIVE DISC DISEASE), LUMBAR: ICD-10-CM

## 2022-10-04 DIAGNOSIS — M54.16 LUMBAR RADICULOPATHY: Primary | ICD-10-CM

## 2022-10-04 DIAGNOSIS — M54.31 SCIATICA OF RIGHT SIDE: ICD-10-CM

## 2022-10-04 PROCEDURE — G8420 CALC BMI NORM PARAMETERS: HCPCS | Performed by: PAIN MEDICINE

## 2022-10-04 PROCEDURE — 3017F COLORECTAL CA SCREEN DOC REV: CPT | Performed by: PAIN MEDICINE

## 2022-10-04 PROCEDURE — 4004F PT TOBACCO SCREEN RCVD TLK: CPT | Performed by: PAIN MEDICINE

## 2022-10-04 PROCEDURE — G8427 DOCREV CUR MEDS BY ELIG CLIN: HCPCS | Performed by: PAIN MEDICINE

## 2022-10-04 PROCEDURE — 99204 OFFICE O/P NEW MOD 45 MIN: CPT | Performed by: PAIN MEDICINE

## 2022-10-04 PROCEDURE — G8484 FLU IMMUNIZE NO ADMIN: HCPCS | Performed by: PAIN MEDICINE

## 2022-10-04 ASSESSMENT — ENCOUNTER SYMPTOMS
CONSTIPATION: 0
SHORTNESS OF BREATH: 0
DIARRHEA: 0
BACK PAIN: 1
NAUSEA: 0

## 2022-10-04 NOTE — PROGRESS NOTES
[facility]) Physicians  Neurosurgery and Pain [facility]  [address], [address] [facility] , [address] [facility], [facility]: [phone]  F: [phone]        Emilee Smith  ([birth year])    10/4/2022    Subjective:     Emilee Smith is 65 y.o. male who complains today of:    Chief Complaint   Patient presents with    Back Pain     Right sided Sciatica       HPI    Patient complains of low back pain for years, but more recently in July he experienced some pain shooting down his right leg. (Of note, he has chronic right knee pain with meniscal damage and needs a right knee replacement). The patient denies a traumatic or inciting incident. Pain has been gradual and insidious in onset. Leg symptoms predominate. Pain is described as aching, sharp, shooting, and tingling. Pain level today is rated 5 on a 10 point scale. It is severe in nature. With pain medication (Tylenol, Advil - of note he is also on chronic Norco for right knee pain), pain level drops to a 5/10. Without pain medication, pain level can escalate upto a 7/10. Pain is affecting the patients ability to perform activities of daily living especially with regards to personal hygiene, household chores and self care. With pain medication, the patient is better able to perform ADLs. Pain in part is secondary to osteoarthritis of the spine and knee. Pain is aggravated by bending, lifting, twisting, walking, and standing  Pain is alleviated by rest and medication. Prior PT: none  Prior Injections: none  Prior medications: NSAIDs and analgesics  Prior spine surgeries:  none  Pain is not is associated with fevers/chills/night sweats. Bowel and bladder are working appropriately.         Allergies:  Altace [ramipril]    Past Medical History:   Diagnosis Date    Anxiety attack     Asthma     CAD (coronary artery disease)     Coronary artery disease     Depression     Hypertension     Primary osteoarthritis of right knee 09/05/2018     Past Surgical History:   Procedure Laterality Date    CARDIAC CATHETERIZATION      TEMPOROMANDIBULAR JOINT SURGERY      Rt knee     Family History   Problem Relation Age of Onset    Stroke Mother     Cancer Mother     Heart Disease Mother 58    High Blood Pressure Mother     High Blood Pressure Father     Cancer Father      Social History     Socioeconomic History    Marital status:      Spouse name: Not on file    Number of children: Not on file    Years of education: Not on file    Highest education level: Not on file   Occupational History    Occupation: EMPLOYED     Comment: [employer]   Tobacco Use    Smoking status: Every Day     Packs/day: 1.00     Years: 48.00     Pack years: 48.00     Types: Cigarettes    Smokeless tobacco: Never   Vaping Use    Vaping Use: Former    Substances: Unknown   Substance and Sexual Activity    Alcohol use: Yes     Comment: OCCASONIALLY    Drug use: Never    Sexual activity: Yes     Partners: Female   Other Topics Concern    Not on file   Social History Narrative    Not on file     Social Determinants of Health     Financial Resource Strain: Not on file   Food Insecurity: Not on file   Transportation Needs: Not on file   Physical Activity: Not on file   Stress: Not on file   Social Connections: Not on file   Intimate Partner Violence: Not on file   Housing Stability: Not on file       Current Outpatient Medications on File Prior to Visit   Medication Sig Dispense Refill    metoprolol tartrate (LOPRESSOR) 25 MG tablet Take 0.5 tablets by mouth 2 times daily  0    aspirin 325 MG EC tablet Take 1 tablet by mouth daily 30 tablet 3    pravastatin (PRAVACHOL) 40 MG tablet TAKE ONE TABLET BY MOUTH NIGHTLY  90 tablet 3    HYDROcodone-acetaminophen (NORCO) 5-325 MG per tablet Take 1 tablet by mouth 2 times daily.        amLODIPine (NORVASC) 5 MG tablet Take 1 qhs (Patient taking differently: Take 5 mg by mouth daily Take 1 qhs) 30 tablet 5    ALPRAZolam (XANAX) 0.5 MG tablet Take 0.5 mg by mouth 3 times daily as needed. FLUoxetine (PROZAC) 20 MG capsule TAKE ONE CAPSULE BY MOUTH DAILY (Patient taking differently: Take 20 mg by mouth daily TAKE ONE CAPSULE BY MOUTH DAILY) 30 capsule 3    albuterol sulfate HFA (PROAIR HFA) 108 (90 Base) MCG/ACT inhaler Inhale 2 puffs into the lungs every 4 hours as needed for Wheezing 1 Inhaler 3    NITROSTAT 0.4 MG SL tablet Place 0.4 mg under the tongue every 5 minutes as needed       naloxone 4 MG/0.1ML LIQD nasal spray by Nasal route (Patient not taking: Reported on 10/4/2022)      pantoprazole (PROTONIX) 20 MG tablet Take 1 tablet by mouth every morning (before breakfast) (Patient not taking: Reported on 10/4/2022) 30 tablet 5     No current facility-administered medications on file prior to visit. Review of Systems   Constitutional:  Negative for fever. HENT:  Negative for hearing loss. Respiratory:  Negative for shortness of breath. Gastrointestinal:  Negative for constipation, diarrhea and nausea. Genitourinary:  Negative for difficulty urinating. Musculoskeletal:  Positive for back pain. Skin:  Negative for rash. Neurological:  Negative for headaches. Hematological:  Does not bruise/bleed easily. Psychiatric/Behavioral:  Negative for sleep disturbance. Objective:     Vitals:  /82 (Site: Left Upper Arm, Position: Sitting)   Temp 97.5 °F (36.4 °C)   Ht 5' 7\" (1.702 m)   Wt 144 lb (65.3 kg)   BMI 22.55 kg/m² Pain Score:   5      Physical Exam    General Appearance: NAD and Conversant. Eyes: EOM intact. HENT: Atraumatic. Neck: Neck is supple and Trachea midline. Lymph: No supraclavicular lymphadenopathy. Lungs: Normal respiratory effort and No significant respiratory distress. Cardiovascular: No lower extremity ulcerations or cyanosis and Capillary refill is less than 2 seconds. Abdomen: Soft and Non tender to palpation.   Extremeties: No significant lower exremity edema.  Skin: Visualized skin is intact and he has normal skin turgor. Psych: Mood and affect within normal limits, Insight and judgement within normal limits, and Alert and oriented. Inspection of the spine reveals no gross abnormalities in terms of curvature. ROM of the spine is abnormal.  There is pain inhibition with end ranges. Palpation: he hasTTP over the lumbar paraspinal musculature. Muscle Tone is within normal limits in all four extremities. Strength: 5 in right upper extremity extremity. 5 in left upper extremity extremity. 5 in right lower extremity extremity. 5 in left lower extremity extremity. Neurologic:  Coordination is within normal limits. DTR's are abnormal and very brisk. Sensation is abnormal with altered sensation in the lateral foot. Gait is not within normal limits. Difficulty with heel walking, toe walking and tandem walking. Long Tract Signs: SLR: Positive with reproduction of symptoms into the right lower extremity. Spurling's Maneuver: Negative. Plantar Response: Downgoing bilaterally. Law sign is negative bilaterally    DATA REVIEW:  XRAY LUMBAR SPINE 1/12/2022:   FINDINGS: Six views of the lumbar spine including flexion and extension views. Are submitted. There are 5 lumbar type vertebrae. There is a mild rotatory levoscoliosis. No acute fractures. Disk spaces are intact   There is degenerative changes posterior facets of lower lumbar spine. There is some anterior spondylolysis. The SI joints are unremarkable. In flexion there is a minimal grade 1 anterolisthesis of L4 on L5 which reduces on extension. IMPRESSION       NO ACUTE FRACTURE. MINIMAL GRADE 1 ANTEROLISTHESIS OF L4 AND L5 DURING FLEXION. MRI LUMBAR SPINE 12/22/2021:   FINDINGS:     Vertebrae:  Mild grade 1 anterolisthesis of L4 on L5 likely    degenerative. Mild levoscoliosis of the mid lumbar spine centered at L3-   4.   Multilevel anterior osteophytes predominantly L2-L5. Spinal cord    terminates at L1. No acute fracture. Spinal cord:  Unremarkable. Normal signal.  No abnormal enhancement. Soft tissues:  Unremarkable. Kidneys and ureters:  8mm and a 6 mm right renal cysts. DISCS/SPINAL CANAL/NEURAL FORAMINA:     L1-L2:  L1-L2 no significant herniation or stenosis. L2-L3:  L2-3 mild left neural foraminal disc bulge with mild left    neural foraminal narrowing. No significant central canal narrowing. Facet arthropathy and ligamentum flavum thickening. L3-L4:  L3-4 mild left neural foraminal disc protrusion with mild left    neural foraminal narrowing. No significant central canal narrowing. Mild facet arthropathy. L4-L5:  L4-5 broad-based disc bulge and mild anterolisthesis. Facet    arthropathy and ligamentum flavum thickening. Moderate central canal    narrowing with central canal measuring 7 x 7.5 mm AP dimension. Moderate    to severe bilateral neural foraminal narrowing worse on the right. L5-S1:  L5-S1 Left far lateral disc protrusion with effacement of the    left far lateral recess and may be touching the exiting nerve root. Mild    facet arthropathy. Mild left neural foraminal narrowing. Electronically signed by Uri Greenberg M.D. on 12-23-21 at 2104       Impression   1. Mild grade 1 anterolisthesis of L4 on L5 likely degenerative. 2.  Mild levoscoliosis of the mid lumbar spine centered at L3-4.   3. L4-5 broad-based disc bulge and mild anterolisthesis. Facet    arthropathy and ligamentum flavum thickening. Moderate central canal    narrowing with central canal measuring 7 x 7.5 mm AP dimension. Moderate    to severe bilateral neural foraminal narrowing worse on the right. 4. L5-S1 Left far lateral disc protrusion with effacement of the left    far lateral recess and may be touching the exiting nerve root. Mild    facet arthropathy. Mild left neural foraminal narrowing. Assessment:      Diagnosis Orders   1. Lumbar radiculopathy  [facility] Physical [facility]      2. Sciatica of right side  [facility] Physical [facility]      3. Lumbar herniated disc  [facility] Physical [facility]      4. DDD (degenerative disc disease), lumbar  [facility] Physical Therapy [facility]          Plan:          No orders of the defined types were placed in this encounter. Orders Placed This Encounter   Procedures    [facility] Physical [facility]     Referral Priority:   Routine     Referral Type:   Eval and Treat     Referral Reason:   Specialty Services Required     Requested Specialty:   Physical Therapist     Number of Visits Requested:   1     Trial of Physical Therapy. All recommendations for therapy are provided to improve function with activities of daily living, decrease pain, and help develop a home exercise program.  IF no relief, consider right sided TFESI. He has pathology at L5-S1 and L4-5. I would lean mor toward a right L5-S1 injection if needed based in history and physical exam.     Follow up:  Return for P.T. Internal Referral, Follow up in 2 months with NP. Maria Esther Torres The patient will follow up for reevaluation of his pain. The patient is aware of the treatment plan and in agreement. All of his questions were answered.      Kary Gipson MD

## 2022-10-19 ENCOUNTER — HOSPITAL ENCOUNTER (OUTPATIENT)
Dept: PHYSICAL THERAPY | Age: 62
Setting detail: THERAPIES SERIES
Discharge: HOME | End: 2022-10-19
Payer: COMMERCIAL

## 2022-10-19 PROCEDURE — 97162 PT EVAL MOD COMPLEX 30 MIN: CPT

## 2022-10-19 ASSESSMENT — PAIN DESCRIPTION - DESCRIPTORS: DESCRIPTORS: ACHING;BURNING;SHARP;SHOOTING

## 2022-10-19 ASSESSMENT — PAIN DESCRIPTION - ORIENTATION: ORIENTATION: RIGHT

## 2022-10-19 ASSESSMENT — PAIN DESCRIPTION - LOCATION: LOCATION: BACK

## 2022-10-19 ASSESSMENT — PAIN SCALES - GENERAL: PAINLEVEL_OUTOF10: 5

## 2022-10-19 ASSESSMENT — PAIN DESCRIPTION - FREQUENCY: FREQUENCY: CONTINUOUS

## 2022-10-19 NOTE — PROGRESS NOTES
[address] [place] [address]   EVALUATION            Physical Therapy: Initial Evaluation    Patient: Di Dumont (40 y.o.     male)   Examination Date: 10/19/2022   :  1960 ;    Confirmed: Yes MRN: [id number]  CSN: [id number]   Insurance: Payor: Jazmine Pires / Plan: Jazmine Pires / Product Type: *No Product type* /   Insurance ID: [id number] - (Commercial) Secondary Insurance (if applicable):    Referring Physician: Darrian Bullard MD      PCP: April Comfort  Visits to Date/Visits Approved:     No Show/Cancelled Appts: 0      Medical Diagnosis: Lumbar radiculopathy [M54.16]  Sciatica of right side [M54.31]  Lumbar herniated disc [M51.26]  DDD (degenerative disc disease), lumbar [M51.36]    Treatment Diagnosis: LBP and R LE dysfunction with decreased ROM, strength affecting overall function     PERTINENT MEDICAL HISTORY   Patient Assessed for Rehabilitation Services: Yes       Medical History: Chart Reviewed: Yes   Past Medical History:   Diagnosis Date    Anxiety attack     Asthma     CAD (coronary artery disease)     Coronary artery disease     Depression     Hypertension     Primary osteoarthritis of right knee 2018     Surgical History:   Past Surgical History:   Procedure Laterality Date    CARDIAC CATHETERIZATION      TEMPOROMANDIBULAR JOINT SURGERY      Rt knee       Medications:   Current Outpatient Medications:     metoprolol tartrate (LOPRESSOR) 25 MG tablet, Take 0.5 tablets by mouth 2 times daily, Disp: , Rfl: 0    aspirin 325 MG EC tablet, Take 1 tablet by mouth daily, Disp: 30 tablet, Rfl: 3    pravastatin (PRAVACHOL) 40 MG tablet, TAKE ONE TABLET BY MOUTH NIGHTLY , Disp: 90 tablet, Rfl: 3    naloxone 4 MG/0.1ML LIQD nasal spray, by Nasal route (Patient not taking: Reported on 10/4/2022), Disp: , Rfl:     pantoprazole (PROTONIX) 20 MG tablet, Take 1 tablet by mouth every morning (before breakfast) (Patient not taking: Reported on 10/4/2022), Disp: 30 tablet, Rfl: 5    HYDROcodone-acetaminophen (NORCO) 5-325 MG per tablet, Take 1 tablet by mouth 2 times daily. , Disp: , Rfl:     amLODIPine (NORVASC) 5 MG tablet, Take 1 qhs (Patient taking differently: Take 5 mg by mouth daily Take 1 qhs), Disp: 30 tablet, Rfl: 5    ALPRAZolam (XANAX) 0.5 MG tablet, Take 0.5 mg by mouth 3 times daily as needed. , Disp: , Rfl:     FLUoxetine (PROZAC) 20 MG capsule, TAKE ONE CAPSULE BY MOUTH DAILY (Patient taking differently: Take 20 mg by mouth daily TAKE ONE CAPSULE BY MOUTH DAILY), Disp: 30 capsule, Rfl: 3    albuterol sulfate HFA (PROAIR HFA) 108 (90 Base) MCG/ACT inhaler, Inhale 2 puffs into the lungs every 4 hours as needed for Wheezing, Disp: 1 Inhaler, Rfl: 3    NITROSTAT 0.4 MG SL tablet, Place 0.4 mg under the tongue every 5 minutes as needed , Disp: , Rfl:   Allergies: Altace [ramipril]      SUBJECTIVE EXAMINATION     History obtained from[de-identified] Patient, Chart Review,     Subjective History: Onset Date: 07/01/22  Subjective: Pt reports in July felt a pinching in R LE to heel while going from a sit to stand and has been constant since. Symptoms remain the same however symptoms went away for 5 minutes after a sneeze and then returned. Additional Pertinent Hx (if applicable): anxiety, HTN, CAD, depression, cardiac stent, menisectomy 20 years ago, OA R knee   Imaging: No results found.            Pain Screening    Pain Screening  Patient Currently in Pain: Yes  Pain Assessment: 0-10  Pain Level: 5  Best Pain Level: 5  Worst Pain Level: 8  Pain Location: Back  Pain Orientation: Right  Pain Radiating Towards: buttock and radiates to lat thigh, NT in leg but mostly only foot R  Pain Descriptors: Aching, Burning, Sharp, Shooting  Pain Frequency: Continuous  Aggravating factors: Walking, Standing, Lifting, Carrying, Sleeping  Pain Management/Relieving Factors: Sitting    Functional Status    Social History:    Social History  Lives With: Spouse  Home Access: Stairs to enter with rails  Entrance Stairs - Number of Steps: 3 nonrecip  Bathroom Shower/Tub: Tub/Shower unit  Home Equipment: Walker, rolling, Cane (used last winter after neck pain and difficulty walking)    Occupation/Interests:   Occupation: Full time employment  Type of Occupation: [address] [employer]  Job Duties: Prolonged standing (has help for lifting)  Leisure & Hobbies: none    Prior Level of Function:   100% before July          Current Level of Function:   25% General   Sleeping Tolerance: decreased  Bed Mobility: min guarding  Driving: depends on car driven to tolerance.   Recreational Activities: limited due to pain    ADL Assistance: Independent  Homemaking Assistance: Independent (needs complete assist with yard work)  [employer] Responsibilities: Yes  Ambulation Assistance: Independent  Transfer Assistance: Independent  Active : Yes  Mode of Transportation: Car       OBJECTIVE EXAMINATION     Restrictions:         [employer] Status: WBAT    Review of Systems:  Vision: Within Functional Limits (glasses for reading)  Hearing: Within functional limits    VBI Screening / Lumbar Screening:    Bowel/bladder disturbances: No  Saddle anesthesia: No  Unexplained weight loss: No  Severe motor weakness: No  Stumbling or giving way while walking: No  Unrelenting pain at night: No      Observations:   General Observations  Description: Fwd head, rounded shoulders, unsteady in stance, decreased lordosis    Palpation:   Lumbar Spine Palpation: Tightness B lumbar paraspinal and min tenderness L5 and buttock R>L    Mobility:    Ambulation  WB Status: WBAT  Ambulation  Surface: Carpet  Device: No Device  Assistance: Independent  Quality of Gait: guarded  Gait Deviations: Slow Kathleen, Decreased step length, Decreased head and trunk rotation  Distance: 100' with increasing pain  Stairs/Curb  Stairs?: No (unable recip stair climb)      Balance Screen:   Balance  Posture: Good  Sitting - Static: Good  Sitting - Dynamic: Good  Standing - Static: Fair  Standing - Dynamic: Fair  Single Stance R Le  Single Stance L Le  Comments: No falls reported however near falls with shooting pain reported    Neuro Screen: Sensation  Overall Sensation Status: Impaired  Light Touch: Partial deficits in the RLE (quad diminished R)  Lower Quarter Deep Tendon Reflex (DTR)  Right Quadriceps (L3-4):  Exaggerated  Right Gastrocnemius (S1):  Average/Normal    Left Strength  Right Strength         Strength LLE  L Hip Flexion: 4/5  L Hip Extension: 4/5  L Hip ABduction: 4/5  L Hip Internal Rotation: 3+/5  L Hip External Rotation: 3+/5  L Knee Flexion: 4/5  L Knee Extension: 4/5  L Ankle Dorsiflexion: 4/5    Strength RLE  R Hip Flexion: 3/5, 3+/5  R Hip Extension: 3/5  R Hip ABduction: 3+/5  R Hip Internal Rotation: 3+/5  R Hip External Rotation: 3+/5  R Knee Flexion: 4-/5  R Knee Extension: 4-/5  R Ankle Dorsiflexion: 4-/5       Lumbar Assessment     AROM Lumbar Spine   Lumbar spine general AROM: Flex 75% with pain, Ext <25% with severe pain, SB 25% with severe pain, 50% L no pain        Trunk Strength     Trunk Strength  Lower abdominals: Fair     Muscle Length/Flexibility:   Muscle Length LE  90/90 SLR (Hamstring Tightness): Hamstring flexibility -20°R, -29°L at 90/90 hip/knee position. Special Tests:   Special Tests Lumbar Spine  [place] Test: R (+), L (-)  Prone Knee Bend Test: R (+), L (-)  Slump Test: R (+), L (-)  Other: R (+) (distraction=decrease pain)    Outcomes Score:  Exam: Mod Oswestry 32/50=36% functional    Treatment:    Exercises:   Exercises  Exercise 1: R road kill 1-2 min  Exercise 2: prone position 3 min  Exercise 3: heel squeeze*, GS*, ham curl*  Exercise 4: hip add with ball*, hip abd*  Exercise 5: ham stretch*  Treatment Reasoning  Limitations addressed:  Mobility, Strength, Posture, Pain modulation  Modalities:Modalities:  (estim, HP, CP, US*)        Manual:  Manual Therapy  Manual Traction: trial leg pulls*  Soft Tissue Mobilizaton: low back/buttock*  Treatment Reasoning  Limitations addressed: Tissue extensibility, Painful spasm  Functional ability(s) targeted: Ambulating community distances, Tolerance to age appropriate activities  *Indicates exercise,modality, or manual techniques to be initiated when appropriate       ASSESSMENT     Impression: Assessment: The pt's impairments currently limit functional abilities by 64% including his abilities to reach and lift, walk, stand, perform recreational activities, and perform household/work related duties without pain or limitations. Skilled PT required to address above deficits to improve overall function and return to prior level of function. Body Structures, Functions, Activity Limitations Requiring Skilled Therapeutic Intervention: Decreased functional mobility , Decreased ADL status, Decreased ROM, Decreased posture, Increased pain, Decreased sensation, Decreased balance, Decreased strength    Statement of Medical Necessity: Physical Therapy is both indicated and medically necessary as outlined in the POC to increase the likelihood of meeting the functionally related goals stated below. Patient's Activity Tolerance: Patient tolerated evaluation without incident      Patient's rehabilitation potential/prognosis is considered to be: Good    Factors which may impact rehabilitation potential include: None     Patient Education: Goals, PT Role, Plan of Care, Evaluative findings, Insurance      GOALS   Patient Goal(s): Patient Goals : Decrease pain    Short Term Goals Completed by 2 weeks Goal Status   The pt will demonstrate improved postural awareness requiring <25% VC's with exercises New   Decrease lumbar pain 50% to assist with improved functional gains.  New   Pt to perform SLS B LE 8 sec without UE or pain on R LE assist to improve overall dynamic balance during gait New     Long Term Goals Completed by 4-6 weeks Goal Status   LTG 1 Indep HEP for symptom management New   LTG 2 Pt demo improved overall function by reporting greater than 70% per functional survey score New   LTG 3 Pain-free lumbar Ext and R SB AROM to 50% WNL allowing an increase in ADL tolerance. New   LTG 4 Improve L LE strength 4-/5 to 4/5 to allow patient to improve standing, walking and stair climbing New        TREATMENT PLAN       Requires PT Follow-Up: Yes    Treatment may include any combination of the following: Strengthening, ROM, Balance training, Modalities, Functional mobility training, Home exercise program, Transfer training, Manual Therapy - Joint Manipulation, Manual Therapy - Soft Tissue Mobilization, Neuromuscular re-education, Stair training     Frequency / Duration:  Patient to be seen 2 times per week for 4-6 weeks  Plan Comment:               Eval Complexity:   Decision Making: Medium Complexity  History: Personal Factors and/or Comorbidities Impacting POC: Medium  History: anxiety, HTN, CAD, depression, cardiac stent, menisectomy 20 years ago, OA R knee  Examination of body system(s) including body structures and functions, activity limitations, and/or participation restrictions: Medium  Exam: Mod Oswestry 32/50=36% functional  Clinical Presentation: Medium    POST-PAIN     Pain Rating (0-10 pain scale):   no change/10  Location and pain description same as pre-treatment unless indicated. Action: [x] NA  [] Call Physician  [] Perform HEP  [] Meds as prescribed    Evaluation and patient rights have been reviewed and patient agrees with plan of care.   Yes   [x] No  []   Explain:     Marco Fall Risk Assessment  Risk Factor Scale  Score   History of Falls [] Yes  [x] No 25  0 0   Secondary Diagnosis [] Yes  [x] No 15  0 0   Ambulatory Aid [] Furniture  [] Crutches/cane/walker  [x] None/bedrest/wheelchair/nurse 30  15  0 0   IV/Heparin Lock [] Yes  [x] No 20  0 0   Gait/Transferring [] Impaired  [x] Weak  [] Normal/bedrest/immobile 20  10  0 10   Mental Status [] Forgets limitations  [x] Oriented to own ability 15  0 0      Total:10     Based on the Assessment score: check the appropriate box.   [x]  No intervention needed   Low =   Score of 0-24  []  Use standard prevention interventions Moderate =  Score of 24-44   [] Discuss fall prevention strategies   [] Indicate moderate falls risk on eval  []  Use high risk prevention interventions High = Score of 45 and higher   [] Discuss fall prevention strategies   [] Provide supervision during treatment time      Minutes:  PT Individual Minutes  Time In: 1012  Time Out: 1042  Minutes: 30     Procedure Minutes:30 min Eval       Electronically signed by Antonio Blancas PT on 10/19/22 at 2:47 PM EDT

## 2022-10-19 NOTE — PROGRESS NOTES
[address]  [address]      ECOFQ:[phone]    [] Certification  [] Recertification [x]  Plan of Care  [] Progress Note [] Discharge      Referring Provider: Jovan Stone MD     From:  April Flor, PT    Patient: Edgar Deleon (23 y.o. male) : 1960 Date: 10/19/2022   Medical Diagnosis: Lumbar radiculopathy [M54.16]  Sciatica of right side [M54.31]  Lumbar herniated disc [M51.26]  DDD (degenerative disc disease), lumbar [M51.36]      Treatment Diagnosis: LBP and R LE dysfunction with decreased ROM, strength affecting overall function      Progress Report Period from:  10/19/2022  to 10/19/2022    Visits to Date: 1 No Show: 0 Cancelled Appts: 0    OBJECTIVE:   Short Term Goals - Time Frame for Short Term Goals: 2 weeks    Goals Current/Discharge status  Status   Short Term Goal 1: The pt will demonstrate improved postural awareness requiring <25% VC's with exercises  General Observations  Description: Fwd head, rounded shoulders, unsteady in stance, decreased lordosis  STG Goal 1 Status[de-identified] New   Short Term Goal 2: Decrease lumbar pain 50% to assist with improved functional gains.   Pain Screening  Patient Currently in Pain: Yes  Pain Assessment: 0-10  Pain Level: 5  Best Pain Level: 5  Worst Pain Level: 8  Pain Location: Back  Pain Orientation: Right  Pain Radiating Towards: buttock and radiates to lat thigh, NT in leg but mostly only foot R  Pain Descriptors: Aching, Burning, Sharp, Shooting  Pain Frequency: Continuous  Aggravating factors: Walking, Standing, Lifting, Carrying, Sleeping  Pain Management/Relieving Factors: Sitting   STG Goal 2 Status[de-identified] New   Short Term Goal 3: Pt to perform SLS B LE 8 sec without UE or pain on R LE assist to improve overall dynamic balance during gait   Single Stance R Le  Single Stance L Le  Comments: No falls reported however near falls with shooting pain reported STG Goal 3 Status[de-identified] New   Long Term Goals - Time Frame for Long Term Goals : 4-6 weeks  Goals Current/ Discharge status Met   Long Term Goal 1: Indep HEP for symptom management Written HEP initiated  for symptom management  Needs progression for comprehensive program development. LTG Goal 1 Status[de-identified] New   Long Term Goal 2: Pt demo improved overall function by reporting greater than 70% per functional survey score Exam: Mod Oswestry 32/50=36% functional   LTG Goal 2 Status[de-identified] New   Long Term Goal 3: Pain-free lumbar Ext and R SB AROM to 50% WNL allowing an increase in ADL tolerance. AROM Lumbar Spine   Lumbar spine general AROM: Flex 75% with pain, Ext <25% with severe pain, SB 25% with severe pain, 50% L no pain LTG Goal 3 Status[de-identified] New   Long Term Goal 4: Improve L LE strength 4-/5 to 4/5 to allow patient to improve standing, walking and stair climbing  Strength LLE  L Hip Flexion: 4/5  L Hip Extension: 4/5  L Hip ABduction: 4/5  L Hip Internal Rotation: 3+/5  L Hip External Rotation: 3+/5  L Knee Flexion: 4/5  L Knee Extension: 4/5  L Ankle Dorsiflexion: 4/5   LTG Goal 4 Status[de-identified] New     Body Structures, Functions, Activity Limitations Requiring Skilled Therapeutic Intervention: Decreased functional mobility , Decreased ADL status, Decreased ROM, Decreased posture, Increased pain, Decreased sensation, Decreased balance, Decreased strength  Assessment: The pt's impairments currently limit functional abilities by 64% including his abilities to reach and lift, walk, stand, perform recreational activities, and perform household/work related duties without pain or limitations. Skilled PT required to address above deficits to improve overall function and return to prior level of function.   Therapy Prognosis: Good    Special Test:         Special Tests Lumbar Spine  [place] Test: R (+), L (-)  Prone Knee Bend Test: R (+), L (-)  Slump Test: R (+), L (-)  Other: R (+) (distraction=decrease pain)  PT Education: Goals;PT Role;Plan of Care;Evaluative findings; Insurance    PLAN: [x] Evaluate and Treat  Frequency/Duration:  Plan Frequency: 2  Plan weeks: 4-6  Current Treatment Recommendations: Strengthening, ROM, Balance training, Modalities, Functional mobility training, Home exercise program, Transfer training, Manual Therapy - Joint Manipulation, Manual Therapy - Soft Tissue Mobilization, Neuromuscular re-education, Stair training                       Patient Status:[x] Continue/ Initiate plan of Care     [] Discharge PT. Recommend pt continue with HEP. [] Additional visits requested, Please re-certify for additional visits:        Signature: Electronically signed by Bianca Richardson PT on 10/19/22 at 2:40 PM EDT      If you have any questions or concerns, please don't hesitate to call. Thank you for your referral.    I have reviewed this plan of care and certify a need for medically necessary rehabilitation services.     Physician Signature:__________________________________________________________  Date:  Please sign and return

## 2022-10-25 ENCOUNTER — HOSPITAL ENCOUNTER (OUTPATIENT)
Dept: PHYSICAL THERAPY | Age: 62
Setting detail: THERAPIES SERIES
Discharge: HOME | End: 2022-10-25
Payer: COMMERCIAL

## 2022-10-25 PROCEDURE — 97110 THERAPEUTIC EXERCISES: CPT

## 2022-10-25 PROCEDURE — 97140 MANUAL THERAPY 1/> REGIONS: CPT

## 2022-10-25 ASSESSMENT — PAIN SCALES - GENERAL: PAINLEVEL_OUTOF10: 5

## 2022-10-25 ASSESSMENT — PAIN DESCRIPTION - LOCATION: LOCATION: BACK

## 2022-10-25 ASSESSMENT — PAIN DESCRIPTION - FREQUENCY: FREQUENCY: CONTINUOUS

## 2022-10-25 ASSESSMENT — PAIN DESCRIPTION - DESCRIPTORS: DESCRIPTORS: ACHING;BURNING

## 2022-10-25 ASSESSMENT — PAIN DESCRIPTION - ORIENTATION: ORIENTATION: RIGHT

## 2022-10-25 NOTE — PROGRESS NOTES
[address] [address]  [place], [address]  BOMEY:[phone]  Treatment Note        Date: 10/25/2022  Patient: Gwendolyn Olivares  : 1960   Confirmed: Yes  MRN: [id number]  Referring Provider: Catalina Gallego MD      Medical Diagnosis: Lumbar radiculopathy [M54.16]  Sciatica of right side [M54.31]  Lumbar herniated disc [M51.26]  DDD (degenerative disc disease), lumbar [M51.36]      Treatment Diagnosis: LBP and R LE dysfunction with decreased ROM, strength affecting overall function    Visit Information:  Insurance: Payor: [employer] / Plan: [employer] / Product Type: *No Product type* /   PT Visit Information  Onset Date: 22  PT Insurance Information: Kassi Anaya  Total # of Visits Approved: 20  Total # of Visits to Date: 2  No Show: 0  Canceled Appointment: 0  Progress Note Counter:     Subjective Information:  Subjective: Pt reports no change in symptoms since last visit. HEP Compliance:  [x] Good [] Fair [] Poor [] Reports not doing due to:    Pain Screening  Patient Currently in Pain: Yes  Pain Assessment: 0-10  Pain Level: 5  Pain Location: Back  Pain Orientation: Right  Pain Descriptors: Aching, Burning  Pain Frequency: Continuous    Treatment:  Exercises:  Exercises  Exercise 1: R road kill 1-2 min  Exercise 2: prone position 3 min  Exercise 3: heel squeeze x10 , GS 5sec x10, ham curl x10  Exercise 4: hip add with ball 5sec x10, hip abd x10  Exercise 5: ham stretch supine 3x30 sec       Manual:   Manual Therapy  Manual Traction: melanie leg pulls with good lexx  Soft Tissue Mobilizaton: low back/buttock with tennnis ball       Assessment:    Body Structures, Functions, Activity Limitations Requiring Skilled Therapeutic Intervention: Decreased functional mobility , Decreased ADL status, Decreased ROM, Decreased posture, Increased pain, Decreased sensation, Decreased balance, Decreased strength  Assessment: Pt reports that melanie leg pulls \"feels good\". Pt with no change in pain after treatment. Treatment Diagnosis: LBP and R LE dysfunction with decreased ROM, strength affecting overall function  Therapy Prognosis: Good      Post-Pain Assessment:       Pain Rating (0-10 pain scale):   5/10   Location and pain description same as pre-treatment unless indicated. Action: [] NA   [x] Perform HEP  [] Meds as prescribed  [] Modalities as prescribed   [] Call Physician     GOALS   Patient Goal(s):      Short Term Goals Completed by 2 weeks Goal Status   STG 1 The pt will demonstrate improved postural awareness requiring <25% VC's with exercises In progress   STG 2 Decrease lumbar pain 50% to assist with improved functional gains. In progress   STG 3 Pt to perform SLS B LE 8 sec without UE or pain on R LE assist to improve overall dynamic balance during gait In progress     Long Term Goals Completed by 4-6 weeks Goal Status   LTG 1 Indep HEP for symptom management In progress   LTG 2 Pt demo improved overall function by reporting greater than 70% per functional survey score In progress   LTG 3 Pain-free lumbar Ext and R SB AROM to 50% WNL allowing an increase in ADL tolerance. In progress   LTG 4 Improve L LE strength 4-/5 to 4/5 to allow patient to improve standing, walking and stair climbing In progress       Plan:  Frequency/Duration:  Plan  Plan Frequency: 2  Plan weeks: 4-6  Current Treatment Recommendations: Strengthening, ROM, Balance training, Modalities, Functional mobility training, Home exercise program, Transfer training, Manual Therapy - Joint Manipulation, Manual Therapy - Soft Tissue Mobilization, Neuromuscular re-education, Stair training  Pt to continue current HEP. See objective section for any therapeutic exercise changes, additions or modifications this date.     Therapy Time:      PT Individual Minutes  Time In: 1988  Time Out: 1022  Minutes: 40  Timed Code Treatment Minutes: 40 Minutes    Timed Activity Minutes Units   Ther Ex 30 2   Manual  10 1     Electronically signed by Kalie Crowe PTA on 10/25/22 at 11:19 AM EDT

## 2022-10-28 ENCOUNTER — HOSPITAL ENCOUNTER (OUTPATIENT)
Dept: PHYSICAL THERAPY | Age: 62
Setting detail: THERAPIES SERIES
Discharge: HOME | End: 2022-10-28
Payer: COMMERCIAL

## 2022-10-28 PROCEDURE — 97140 MANUAL THERAPY 1/> REGIONS: CPT

## 2022-10-28 PROCEDURE — G0283 ELEC STIM OTHER THAN WOUND: HCPCS

## 2022-10-28 PROCEDURE — 97110 THERAPEUTIC EXERCISES: CPT

## 2022-10-28 ASSESSMENT — PAIN DESCRIPTION - DESCRIPTORS: DESCRIPTORS: ACHING;BURNING

## 2022-10-28 ASSESSMENT — PAIN DESCRIPTION - FREQUENCY: FREQUENCY: CONTINUOUS

## 2022-10-28 ASSESSMENT — PAIN DESCRIPTION - ORIENTATION: ORIENTATION: RIGHT

## 2022-10-28 ASSESSMENT — PAIN SCALES - GENERAL: PAINLEVEL_OUTOF10: 5

## 2022-10-28 ASSESSMENT — PAIN DESCRIPTION - LOCATION: LOCATION: BACK

## 2022-10-28 NOTE — PROGRESS NOTES
[address] [place] [address]  [address]  YKJXU:[phone]  Treatment Note        Date: 10/28/2022  Patient: Janelle Palafox  : 1960   Confirmed: Yes  MRN: [id number]  Referring Provider: Geremias Owusu MD  Medical Diagnosis: Lumbar radiculopathy [M54.16]  Sciatica of right side [M54.31]  Lumbar herniated disc [M51.26]  DDD (degenerative disc disease), lumbar [M51.36]   Treatment Diagnosis: LBP and R LE dysfunction with decreased ROM, strength affecting overall function    Visit Information:  Insurance: Payor: Keyur Powell / Plan: Keyur Powell / Product Type: *No Product type* /   PT Visit Information  Onset Date: 22  PT Insurance Information: [employer]  Total # of Visits Approved: 20  Total # of Visits to Date: 3  No Show: 0  Canceled Appointment: 0  Progress Note Counter: 3/12    Subjective Information:  Subjective: Pt continues to have constaint pain in his right low back and down his right leg. It was really bad yesterday because he was on his feet a lot. No change in pain and symptoms from last visit.   HEP Compliance:  [x] Good [] Fair [] Poor [] Reports not doing due to:    Pain Screening  Patient Currently in Pain: Yes  Pain Assessment: 0-10  Pain Level: 5  Pain Location: Back  Pain Orientation: Right  Pain Descriptors: Aching, Burning  Pain Frequency: Continuous    Treatment:  Exercises:  Exercises  Exercise 4: hip add with ball 10 x 5\" , hip abd with BTB 10 x 5\"  Exercise 5: ham stretch supine 2 x 10\" (10 ankle pumps on R)  Exercise 6: SKTC 3 x 20-30\"  Exercise 7: L side lying positional distraction using bolster with gentle rot 3 x 20\" - good relief  Exercise 8: HEP: SKTC, ham stretch with ankle pump on R, hip add/abd, positional distraction      Manual:   Manual Therapy  Manual Traction: melanie leg pulls 5 x 20\" - good relief x 3 min  Soft Tissue Mobilizaton: R low back/buttock with tennnis ball x 6 min      Modalities:  Cryotherapy (CPT 22277)  Patient Position: Seated  Number Minutes Cryotherapy: 10 min with E-stim  Cryotherapy location: Right, Low back, Hip  Post treatment skin assessment: Intact  Electric stimulation, unattended (CPT 17976) /  (Medicare)  Patient Position: Seated  E-stim location: Right  E-stim type: Interferential (IFC)  E-stim via: 4 Electrode Pads  Post treatment skin assessment: Intact  Untimed (minutes): 10      *Indicates exercise, modality, or manual techniques to be initiated when appropriate    Objective Measures:   LTG 1 Current Status[de-identified] Issued written HEP this date      Assessment: Body Structures, Functions, Activity Limitations Requiring Skilled Therapeutic Intervention: Decreased functional mobility , Decreased ADL status, Decreased ROM, Decreased posture, Increased pain, Decreased sensation, Decreased balance, Decreased strength  Assessment: Incorporated SKTC, ham stretch with ankle pump on R, and positional distraction stretch this date. Pt responded well to ex's with some relief reported. Pt also experienced relief with B LE pulls, STM, and E-stim/CP. Issued written HEP with good understanding. Pt was pleased with today's tx. Treatment Diagnosis: LBP and R LE dysfunction with decreased ROM, strength affecting overall function  Therapy Prognosis: Good      Post-Pain Assessment:       Pain Rating (0-10 pain scale):   1/10   Location and pain description same as pre-treatment unless indicated. Action: [x] NA   [] Perform HEP  [] Meds as prescribed  [] Modalities as prescribed   [] Call Physician     GOALS   Patient Goal(s): Patient Goals : Decrease pain    Short Term Goals Completed by 2 weeks Goal Status   STG 1 The pt will demonstrate improved postural awareness requiring <25% VC's with exercises In progress   STG 2 Decrease lumbar pain 50% to assist with improved functional gains.  In progress   STG 3 Pt to perform SLS B LE 8 sec without UE or pain on R LE assist to improve overall dynamic balance during gait In progress       Long Term Goals Completed by 4-6 weeks Goal Status   LTG 1 Indep HEP for symptom management In progress   LTG 2 Pt demo improved overall function by reporting greater than 70% per functional survey score In progress   LTG 3 Pain-free lumbar Ext and R SB AROM to 50% WNL allowing an increase in ADL tolerance. In progress   LTG 4 Improve R LE strength 4-/5 to 4/5 to allow patient to improve standing, walking and stair climbing In progress       Plan:  Frequency/Duration:  Plan  Plan Frequency: 2  Plan weeks: 4-6  Current Treatment Recommendations: Strengthening, ROM, Balance training, Modalities, Functional mobility training, Home exercise program, Transfer training, Manual Therapy - Joint Manipulation, Manual Therapy - Soft Tissue Mobilization, Neuromuscular re-education, Stair training  Pt to continue current HEP. See objective section for any therapeutic exercise changes, additions or modifications this date.     Therapy Time:  PT Individual Minutes  Time In: 9519  Time Out: 1110  Minutes: 56  Timed Code Treatment Minutes: 46 Minutes  Procedure Minutes: E-stim/CP x 10'   Timed Activity Minutes Units   Ther Ex 37 2   Manual  9 1     Electronically signed by Fany Leo PTA on 10/28/22 at 10:22 AM EDT

## 2022-11-01 ENCOUNTER — HOSPITAL ENCOUNTER (OUTPATIENT)
Dept: PHYSICAL THERAPY | Age: 62
Setting detail: THERAPIES SERIES
Discharge: HOME | End: 2022-11-01
Payer: COMMERCIAL

## 2022-11-01 PROCEDURE — G0283 ELEC STIM OTHER THAN WOUND: HCPCS

## 2022-11-01 PROCEDURE — 97140 MANUAL THERAPY 1/> REGIONS: CPT

## 2022-11-01 PROCEDURE — 97110 THERAPEUTIC EXERCISES: CPT

## 2022-11-01 ASSESSMENT — PAIN DESCRIPTION - LOCATION: LOCATION: BACK

## 2022-11-01 ASSESSMENT — PAIN SCALES - GENERAL: PAINLEVEL_OUTOF10: 2

## 2022-11-01 ASSESSMENT — PAIN DESCRIPTION - ORIENTATION: ORIENTATION: RIGHT

## 2022-11-01 ASSESSMENT — PAIN DESCRIPTION - DESCRIPTORS: DESCRIPTORS: ACHING

## 2022-11-01 NOTE — PROGRESS NOTES
[address] [place] [address],[address]  [place] [address]  [facility]:[phone]  Treatment Note        Date: 2022  Patient: Sd Heath  : 1960   Confirmed: Yes  MRN: [id number]  Referring Provider: Marc Wahl MD      Medical Diagnosis: Lumbar radiculopathy [M54.16]  Sciatica of right side [M54.31]  Lumbar herniated disc [M51.26]  DDD (degenerative disc disease), lumbar [M51.36]      Treatment Diagnosis: LBP and R LE dysfunction with decreased ROM, strength affecting overall function    Visit Information:  Insurance: Payor: Khanh Andrea / Plan: Khanh Andrea / Product Type: *No Product type* /   PT Visit Information  Onset Date: 22  PT Insurance Information: Darcie Foy  Total # of Visits Approved: 20  Total # of Visits to Date: 4  No Show: 0  Canceled Appointment: 0  Progress Note Counter:     Subjective Information:  Subjective: Pt reports that he has felt better since last time was he here.   HEP Compliance:  [x] Good [] Fair [] Poor [] Reports not doing due to:    Pain Screening  Patient Currently in Pain: Yes  Pain Assessment: 0-10  Pain Level: 2  Pain Location: Back  Pain Orientation: Right  Pain Descriptors: Aching    Treatment:  Exercises:  Exercises  Exercise 4: hip add with ball 10 x 5\" , hip abd with BTB 10 x 5\"  Exercise 5: ham stretch supine 2 x 10\" (10 ankle pumps on R)  Exercise 6: SKTC 3 x 20-30\"  Exercise 7: L side lying positional distraction using bolster with gentle rot 3 x 20\" - good relief       Manual:   Manual Therapy  Manual Traction: melanie leg pulls 5 x 20\" - good relief x 3 min  Soft Tissue Mobilizaton: R low back/buttock with tennnis ball x 6 min       Modalities:  Cryotherapy (CPT 51891)  Patient Position: Seated  Number Minutes Cryotherapy: 10 min with E-stim  Cryotherapy location: Right, Low back, Hip  Post treatment skin assessment: Intact  Electric stimulation, unattended (CPT 02809) /  (Medicare)  Patient Position: Seated  E-stim location: Right, Low back  E-stim type: Interferential (IFC)  E-stim via: 4 Electrode Pads  Post treatment skin assessment: Intact  Untimed (minutes): 10    Assessment: Body Structures, Functions, Activity Limitations Requiring Skilled Therapeutic Intervention: Decreased functional mobility , Decreased ADL status, Decreased ROM, Decreased posture, Increased pain, Decreased sensation, Decreased balance, Decreased strength  Assessment: Pt reports relief with melanie leg pulls and positional distraction stretch this date. Pt with overall decreased radicular symptoms down the rt le. Treatment Diagnosis: LBP and R LE dysfunction with decreased ROM, strength affecting overall function  Therapy Prognosis: Good    Post-Pain Assessment:       Pain Rating (0-10 pain scale):  0 /10   Location and pain description same as pre-treatment unless indicated. Action: [] NA   [x] Perform HEP  [] Meds as prescribed  [] Modalities as prescribed   [] Call Physician     GOALS   Patient Goal(s):      Short Term Goals Completed by 2 weeks Goal Status   STG 1 The pt will demonstrate improved postural awareness requiring <25% VC's with exercises In progress   STG 2 Decrease lumbar pain 50% to assist with improved functional gains. In progress   STG 3 Pt to perform SLS B LE 8 sec without UE or pain on R LE assist to improve overall dynamic balance during gait In progress     Long Term Goals Completed by 4-6 weeks Goal Status   LTG 1 Indep HEP for symptom management In progress   LTG 2 Pt demo improved overall function by reporting greater than 70% per functional survey score In progress   LTG 3 Pain-free lumbar Ext and R SB AROM to 50% WNL allowing an increase in ADL tolerance.  In progress   LTG 4 Improve R LE strength 4-/5 to 4/5 to allow patient to improve standing, walking and stair climbing In progress     Plan:  Frequency/Duration:  Plan  Plan Frequency: 2  Plan weeks: 4-6  Current Treatment Recommendations: Strengthening, ROM, Balance training, Modalities, Functional mobility training, Home exercise program, Transfer training, Manual Therapy - Joint Manipulation, Manual Therapy - Soft Tissue Mobilization, Neuromuscular re-education, Stair training  Pt to continue current HEP. See objective section for any therapeutic exercise changes, additions or modifications this date. Therapy Time:      PT Individual Minutes  Time In: 9867  Time Out: 1103  Minutes: 48  Timed Code Treatment Minutes: 38 Minutes  Procedure Minutes:10 min estim with Cp seated.   Timed Activity Minutes Units   Ther Ex 29 2   Manual  9 1     Electronically signed by Saul Alas PTA on 11/1/22 at 11:18 AM EDT

## 2022-11-04 ENCOUNTER — HOSPITAL ENCOUNTER (OUTPATIENT)
Dept: PHYSICAL THERAPY | Age: 62
Setting detail: THERAPIES SERIES
Discharge: HOME | End: 2022-11-04
Payer: COMMERCIAL

## 2022-11-04 PROCEDURE — G0283 ELEC STIM OTHER THAN WOUND: HCPCS

## 2022-11-04 PROCEDURE — 97140 MANUAL THERAPY 1/> REGIONS: CPT

## 2022-11-04 PROCEDURE — 97110 THERAPEUTIC EXERCISES: CPT

## 2022-11-04 ASSESSMENT — PAIN DESCRIPTION - DESCRIPTORS: DESCRIPTORS: ACHING

## 2022-11-04 ASSESSMENT — PAIN DESCRIPTION - LOCATION: LOCATION: BACK

## 2022-11-04 ASSESSMENT — PAIN DESCRIPTION - DIRECTION: RADIATING_TOWARDS: R LE

## 2022-11-04 ASSESSMENT — PAIN DESCRIPTION - ORIENTATION: ORIENTATION: RIGHT

## 2022-11-04 ASSESSMENT — PAIN SCALES - GENERAL: PAINLEVEL_OUTOF10: 3

## 2022-11-04 ASSESSMENT — PAIN DESCRIPTION - FREQUENCY: FREQUENCY: CONTINUOUS

## 2022-11-04 NOTE — PROGRESS NOTES
[address] [place] [address],[address]  [address]  FGAPR:[phone]  Treatment Note        Date: 2022  Patient: Brain Santana  : 1960   Confirmed: Yes  MRN: [id number]  Referring Provider: Danitza Miguel MD  Medical Diagnosis: Lumbar radiculopathy [M54.16]  Sciatica of right side [M54.31]  Lumbar herniated disc [M51.26]  DDD (degenerative disc disease), lumbar [M51.36]   Treatment Diagnosis: LBP and R LE dysfunction with decreased ROM, strength affecting overall function    Visit Information:  Insurance: Payor: [address] [place] [address] / Plan: [address] [place] [address] / Product Type: *No Product type* /   PT Visit Information  Onset Date: 22  PT Insurance Information: Feli Cabral  Total # of Visits Approved: 20  Total # of Visits to Date: 4  No Show: 0  Canceled Appointment: 0  Progress Note Counter:     Subjective Information:  Subjective: Pt feels better with the exercises in therapy until he does something \"stupid\" at work then it hurts again. He will go home to do his exercises then it feels better.   HEP Compliance:  [x] Good [] Fair [] Poor [] Reports not doing due to:    Pain Screening  Patient Currently in Pain: Yes  Pain Assessment: 0-10  Pain Level: 3  Pain Location: Back  Pain Orientation: Right  Pain Radiating Towards: R LE  Pain Descriptors: Aching  Pain Frequency: Continuous    Treatment:  Exercises:  Exercises  Exercise 4: hip add with ball 15x 5\" , hip abd with BTB 15 x 5\"  Exercise 5: ham stretch supine 2 x 10\" (10 ankle pumps on R)  Exercise 6: SKTC 3 x 30\"  Exercise 7: L side lying positional distraction using bolster x 2 min, then gentle rot 3 x 20\" - good relief      Manual:   Manual Therapy  Manual Traction: melanie leg pulls 4 x 30\" - good relief x 3 min  Soft Tissue Mobilizaton: R low back/buttock with tennnis ball x 7 min      Modalities:  Cryotherapy (CPT 06666)  Patient Position: Seated  Number Minutes Cryotherapy: 10 min with E-stim  Cryotherapy location: Right, Low back, Hip  Post treatment skin assessment: Intact  Electric stimulation, unattended (CPT 78575) /  (Medicare)  Patient Position: Seated  E-stim location: Right, Low back  E-stim type: Interferential (IFC)  E-stim via: 4 Electrode Pads  Post treatment skin assessment: Intact  Untimed (minutes): 10      *Indicates exercise, modality, or manual techniques to be initiated when appropriate      Assessment: Body Structures, Functions, Activity Limitations Requiring Skilled Therapeutic Intervention: Decreased functional mobility , Decreased ADL status, Decreased ROM, Decreased posture, Increased pain, Decreased sensation, Decreased balance, Decreased strength  Assessment: Increased reps with hip add/abd. Pt experienced good relief with all treatments today. Treatment Diagnosis: LBP and R LE dysfunction with decreased ROM, strength affecting overall function  Therapy Prognosis: Good      Post-Pain Assessment:       Pain Rating (0-10 pain scale):   0/10   Location and pain description same as pre-treatment unless indicated. Action: [x] NA   [] Perform HEP  [] Meds as prescribed  [] Modalities as prescribed   [] Call Physician     GOALS   Patient Goal(s):      Short Term Goals Completed by 2 weeks Goal Status   STG 1 The pt will demonstrate improved postural awareness requiring <25% VC's with exercises In progress   STG 2 Decrease lumbar pain 50% to assist with improved functional gains. In progress   STG 3 Pt to perform SLS B LE 8 sec without UE or pain on R LE assist to improve overall dynamic balance during gait In progress       Long Term Goals Completed by 4-6 weeks Goal Status   LTG 1 Indep HEP for symptom management In progress   LTG 2 Pt demo improved overall function by reporting greater than 70% per functional survey score In progress   LTG 3 Pain-free lumbar Ext and R SB AROM to 50% WNL allowing an increase in ADL tolerance.  In progress   LTG 4 Improve R LE strength 4-/5 to 4/5 to allow patient to improve standing, walking and stair climbing In progress       Plan:  Frequency/Duration:  Plan  Plan Frequency: 2  Plan weeks: 4-6  Current Treatment Recommendations: Strengthening, ROM, Balance training, Modalities, Functional mobility training, Home exercise program, Transfer training, Manual Therapy - Joint Manipulation, Manual Therapy - Soft Tissue Mobilization, Neuromuscular re-education, Stair training  Pt to continue current HEP. See objective section for any therapeutic exercise changes, additions or modifications this date.     Therapy Time:  PT Individual Minutes  Time In: 1020  Time Out: 1110  Minutes: 50  Timed Code Treatment Minutes: 38 Minutes  Procedure Minutes: E-stim/CP x 10'   Timed Activity Minutes Units   Ther Ex 28 2   Manual  10 1     Electronically signed by Sabina Walton PTA on 11/4/22 at 10:37 AM EDT

## 2022-11-08 ENCOUNTER — HOSPITAL ENCOUNTER (OUTPATIENT)
Dept: PHYSICAL THERAPY | Age: 62
Setting detail: THERAPIES SERIES
Discharge: HOME | End: 2022-11-08
Payer: COMMERCIAL

## 2022-11-08 PROCEDURE — G0283 ELEC STIM OTHER THAN WOUND: HCPCS

## 2022-11-08 PROCEDURE — 97110 THERAPEUTIC EXERCISES: CPT

## 2022-11-08 PROCEDURE — 97140 MANUAL THERAPY 1/> REGIONS: CPT

## 2022-11-08 ASSESSMENT — PAIN DESCRIPTION - DESCRIPTORS: DESCRIPTORS: ACHING

## 2022-11-08 ASSESSMENT — PAIN DESCRIPTION - ORIENTATION: ORIENTATION: RIGHT;LOWER

## 2022-11-08 ASSESSMENT — PAIN DESCRIPTION - DIRECTION: RADIATING_TOWARDS: R LE

## 2022-11-08 ASSESSMENT — PAIN SCALES - GENERAL: PAINLEVEL_OUTOF10: 3

## 2022-11-08 ASSESSMENT — PAIN DESCRIPTION - LOCATION: LOCATION: BACK

## 2022-11-08 ASSESSMENT — PAIN DESCRIPTION - FREQUENCY: FREQUENCY: CONTINUOUS

## 2022-11-08 NOTE — PROGRESS NOTES
[address] [place] [address]  [address]  WKU:[phone]  Treatment Note        Date: 2022  Patient: Maya Dale  : 1960   Confirmed: Yes  MRN: [id number]  Referring Provider: Mackenzie Sage MD  Medical Diagnosis: Lumbar radiculopathy [M54.16]  Sciatica of right side [M54.31]  Lumbar herniated disc [M51.26]  DDD (degenerative disc disease), lumbar [M51.36]   Treatment Diagnosis: LBP and R LE dysfunction with decreased ROM, strength affecting overall function    Visit Information:  Insurance: Payor: [employer] / Plan: Melanie Danker / Product Type: *No Product type* /   PT Visit Information  Onset Date: 22  PT Insurance Information: Vasylzan Kari  Total # of Visits Approved: 20  Total # of Visits to Date: 6 (corrected count)  No Show: 0  Canceled Appointment: 0  Progress Note Counter:  (corrected count)    Subjective Information:  Subjective: Pt reports no new complaints or changes since last visit.   HEP Compliance:  [x] Good [] Fair [] Poor [] Reports not doing due to:    Pain Screening  Patient Currently in Pain: Yes  Pain Assessment: 0-10  Pain Level: 3  Pain Location: Back  Pain Orientation: Right, Lower  Pain Radiating Towards: R LE  Pain Descriptors: Aching  Pain Frequency: Continuous    Treatment:  Exercises:  Exercises  Exercise 3: bridge - increased pain  Exercise 4: hip add with ball 15x 5\" , hip abd with BTB 15 x 5\"  Exercise 5: ham stretch supine 2 x 10\" (10 ankle pumps on R)  Exercise 6: SKTC 3 x 30\"  Exercise 7: L side lying positional distraction using bolster x 3 min, then gentle rot 3 x 20-30\" - good relief      Manual:   Manual Therapy  Manual Traction: melanie leg pulls 4 x 30\" - good relief x 3 min  Soft Tissue Mobilizaton: R low back/buttock with tennnis ball x 7 min      Modalities:  Cryotherapy (CPT 65789)  Patient Position: Seated  Number Minutes Cryotherapy: 10 min with E-stim  Cryotherapy location: LTG 4 Improve R LE strength 4-/5 to 4/5 to allow patient to improve standing, walking and stair climbing In progress       Plan:  Frequency/Duration:  Plan  Plan Frequency: 2  Plan weeks: 4-6  Current Treatment Recommendations: Strengthening, ROM, Balance training, Modalities, Functional mobility training, Home exercise program, Transfer training, Manual Therapy - Joint Manipulation, Manual Therapy - Soft Tissue Mobilization, Neuromuscular re-education, Stair training  Pt to continue current HEP. See objective section for any therapeutic exercise changes, additions or modifications this date.     Therapy Time:  PT Individual Minutes  Time In: 1017  Time Out: 1106  Minutes: 49  Timed Code Treatment Minutes: 39 Minutes  Procedure Minutes: E-stim/CP x 10'   Timed Activity Minutes Units   Ther Ex 29 2   Manual  10 1     Electronically signed by Kajal Stinson PTA on 11/8/22 at 12:04 PM EST

## 2022-11-11 ENCOUNTER — HOSPITAL ENCOUNTER (OUTPATIENT)
Dept: PHYSICAL THERAPY | Age: 62
Setting detail: THERAPIES SERIES
Discharge: HOME | End: 2022-11-11
Payer: COMMERCIAL

## 2022-11-11 PROCEDURE — G0283 ELEC STIM OTHER THAN WOUND: HCPCS

## 2022-11-11 PROCEDURE — 97110 THERAPEUTIC EXERCISES: CPT

## 2022-11-11 PROCEDURE — 97140 MANUAL THERAPY 1/> REGIONS: CPT

## 2022-11-11 PROCEDURE — 2580000003 HC RX 258

## 2022-11-11 RX ORDER — SODIUM CHLORIDE 9 MG/ML
INJECTION, SOLUTION INTRAVENOUS
Status: DISCONTINUED
Start: 2022-11-11 | End: 2022-11-12 | Stop reason: HOSPADM

## 2022-11-11 ASSESSMENT — PAIN DESCRIPTION - FREQUENCY: FREQUENCY: INTERMITTENT

## 2022-11-11 ASSESSMENT — PAIN DESCRIPTION - DIRECTION: RADIATING_TOWARDS: R LE

## 2022-11-11 ASSESSMENT — PAIN DESCRIPTION - ORIENTATION: ORIENTATION: RIGHT;LOWER

## 2022-11-11 ASSESSMENT — PAIN SCALES - GENERAL: PAINLEVEL_OUTOF10: 0

## 2022-11-11 ASSESSMENT — PAIN DESCRIPTION - LOCATION: LOCATION: BACK

## 2022-11-11 NOTE — PROGRESS NOTES
[address] [place] [address]  [address]  XZNYF:[phone]  Treatment Note        Date: 2022  Patient: Candis Griffith  : 1960   Confirmed: Yes  MRN: [id number]  Referring Provider: Francisco Mathew MD  Medical Diagnosis: Lumbar radiculopathy [M54.16]  Sciatica of right side [M54.31]  Lumbar herniated disc [M51.26]  DDD (degenerative disc disease), lumbar [M51.36]   Treatment Diagnosis: LBP and R LE dysfunction with decreased ROM, strength affecting overall function    Visit Information:  Insurance: Payor: [employer] / Plan: Adreal / Product Type: *No Product type* /   PT Visit Information  Onset Date: 22  PT Insurance Information: Kary Dumont  Total # of Visits Approved: 20  Total # of Visits to Date: 7  No Show: 0  Canceled Appointment: 0  Progress Note Counter:     Subjective Information:  Subjective: Pt is feeling better since the last visit. He feels good as long as he doesn't stand too long. \"I'm even sleeping better now. \"  HEP Compliance:  [x] Good [] Fair [] Poor [] Reports not doing due to:    Pain Screening  Patient Currently in Pain: No  Pain Assessment: 0-10  Pain Level: 0  Best Pain Level: 0  Worst Pain Level: 3  Pain Location: Back  Pain Orientation: Right, Lower  Pain Radiating Towards: R LE  Pain Frequency: Intermittent    Treatment:  Exercises:  Exercises  Exercise 3: hip add with ball 15 x 5\" , hip abd with BTB 15 x 5\"  Exercise 4: SKTC 3 x 30\"  Exercise 5: ham stretch supine 2 x 10\" (10 ankle pumps on R)  Exercise 6: TA isos 10 x 3-5\"  Exercise 7: L side lying positional distraction using bolster x 3 min, then gentle rot 3 x 20-30\" - good relief      Manual:   Manual Therapy  Manual Traction: melanie leg pulls 4 x 30\" - good relief x 3 min  Soft Tissue Mobilizaton: R low back/buttock with tennnis ball x 8 min      Modalities:  Cryotherapy (CPT 86440)  Patient Position: Seated  Number Minutes Cryotherapy: 10 min with E-stim  Cryotherapy location: Right, Low back, Hip  Post treatment skin assessment: Intact  Electric stimulation, unattended (CPT 02839) /  (Medicare)  Patient Position: Seated  E-stim location: Right, Low back  E-stim type: Interferential (IFC)  E-stim via: 4 Electrode Pads  Post treatment skin assessment: Intact  Untimed (minutes): 10      *Indicates exercise, modality, or manual techniques to be initiated when appropriate    Objective Measures:   LTG 3 Current Status[de-identified] Lumbar Ext and R SB AROM 25% with pain      Assessment: Body Structures, Functions, Activity Limitations Requiring Skilled Therapeutic Intervention: Decreased functional mobility , Decreased ADL status, Decreased ROM, Decreased posture, Increased pain, Decreased sensation, Decreased balance, Decreased strength  Assessment: Instructed in TA isos for core strengthening which pt tolerated well. Good relief with positional distraction/self mobs. Responding well to B LE pulls, STM, and E-stim. Continues to experience R LBP with AROM ext and R SB. Treatment Diagnosis: LBP and R LE dysfunction with decreased ROM, strength affecting overall function  Therapy Prognosis: Good      Post-Pain Assessment:       Pain Rating (0-10 pain scale):   0/10   Location and pain description same as pre-treatment unless indicated. Action: [x] NA   [] Perform HEP  [] Meds as prescribed  [] Modalities as prescribed   [] Call Physician     GOALS   Patient Goal(s): Patient Goals : Decrease pain    Short Term Goals Completed by 2 weeks Goal Status   STG 1 The pt will demonstrate improved postural awareness requiring <25% VC's with exercises In progress   STG 2 Decrease lumbar pain 50% to assist with improved functional gains.  In progress   STG 3 Pt to perform SLS B LE 8 sec without UE or pain on R LE assist to improve overall dynamic balance during gait In progress       Long Term Goals Completed by 4-6 weeks Goal Status   LTG 1 Indep HEP for symptom management In progress   LTG 2 Pt demo improved overall function by reporting greater than 70% per functional survey score In progress   LTG 3 Pain-free lumbar Ext and R SB AROM to 50% WNL allowing an increase in ADL tolerance. In progress   LTG 4 Improve R LE strength 4-/5 to 4/5 to allow patient to improve standing, walking and stair climbing In progress       Plan:  Frequency/Duration:  Plan  Plan Frequency: 2  Plan weeks: 4-6  Current Treatment Recommendations: Strengthening, ROM, Balance training, Modalities, Functional mobility training, Home exercise program, Transfer training, Manual Therapy - Joint Manipulation, Manual Therapy - Soft Tissue Mobilization, Neuromuscular re-education, Stair training  Pt to continue current HEP. See objective section for any therapeutic exercise changes, additions or modifications this date.     Therapy Time:  PT Individual Minutes  Time In: 1017  Time Out: 8598  Minutes: 50  Timed Code Treatment Minutes: 40 Minutes  Procedure Minutes: E-stim/CP x 10'   Timed Activity Minutes Units   Ther Ex 29 2   Manual  11 1     Electronically signed by Corina Ortega PTA on 11/11/22 at 10:31 AM EST

## 2022-11-15 ENCOUNTER — HOSPITAL ENCOUNTER (OUTPATIENT)
Dept: PHYSICAL THERAPY | Age: 62
Setting detail: THERAPIES SERIES
Discharge: HOME | End: 2022-11-15
Payer: COMMERCIAL

## 2022-11-15 PROCEDURE — 97110 THERAPEUTIC EXERCISES: CPT

## 2022-11-15 PROCEDURE — G0283 ELEC STIM OTHER THAN WOUND: HCPCS

## 2022-11-15 PROCEDURE — 97140 MANUAL THERAPY 1/> REGIONS: CPT

## 2022-11-15 ASSESSMENT — PAIN DESCRIPTION - ORIENTATION: ORIENTATION: RIGHT;LOWER

## 2022-11-15 ASSESSMENT — PAIN DESCRIPTION - LOCATION: LOCATION: BACK

## 2022-11-15 ASSESSMENT — PAIN DESCRIPTION - FREQUENCY: FREQUENCY: INTERMITTENT

## 2022-11-15 NOTE — PROGRESS NOTES
[address] [place] [address]  [address]  XSNBD:[phone]  Treatment Note        Date: 11/15/2022  Patient: Kaushal Meyers  : 1960   Confirmed: Yes  MRN: [id number]  Referring Provider: Bernadette Reeves MD      Medical Diagnosis: Lumbar radiculopathy [M54.16]  Sciatica of right side [M54.31]  Lumbar herniated disc [M51.26]  DDD (degenerative disc disease), lumbar [M51.36]      Treatment Diagnosis: LBP and R LE dysfunction with decreased ROM, strength affecting overall function    Visit Information:  Insurance: Payor: [employer] / Plan: [employer] / Product Type: *No Product type* /   PT Visit Information  Onset Date: 22  PT Insurance Information: Karey Francisco  Total # of Visits Approved: 20  Total # of Visits to Date: 8  No Show: 0  Canceled Appointment: 0  Progress Note Counter:     Subjective Information:  Subjective: Pt reports feeling a lot better sice before therapy  HEP Compliance:  [x] Good [] Fair [] Poor [] Reports not doing due to:    Pain Screening  Patient Currently in Pain: Yes  Pain Assessment: 0-10  Worst Pain Level: 2  Pain Location: Back  Pain Orientation: Right, Lower  Pain Frequency: Intermittent    Treatment:  Exercises:  Exercises  Exercise 3: hip add with ball 15 x 5\" , hip abd with BTB 15 x 5\"  Exercise 4: SKTC 3 x 30\"  Exercise 5: ham stretch supine 2 x 20\" (10 ankle pumps on R)  Exercise 6: TA isos 10 x 3-5\",   Exercise 7: L side lying positional distraction using bolster x 3 min, then gentle rot 3 x 20-30\" - good relief       Manual:   Manual Therapy  Manual Traction: melanie leg pulls 4 x 30\" - good relief x 3 min  Soft Tissue Mobilizaton: R low back/buttock, it band with tennnis ball x 8 min       Assessment:    Body Structures, Functions, Activity Limitations Requiring Skilled Therapeutic Intervention: Decreased functional mobility , Decreased ADL status, Decreased ROM, Decreased posture, Increased pain, Decreased sensation, Decreased balance, Decreased strength  Assessment: Pt with good lexx to exercises. Cont relief with sl positional distraction and leg pulls. Treatment Diagnosis: LBP and R LE dysfunction with decreased ROM, strength affecting overall function  Therapy Prognosis: Good          Post-Pain Assessment:       Pain Rating (0-10 pain scale):  0 /10   Location and pain description same as pre-treatment unless indicated. Action: [] NA   [x] Perform HEP  [] Meds as prescribed  [] Modalities as prescribed   [] Call Physician     GOALS   Patient Goal(s):      Short Term Goals Completed by 2 weeks Goal Status   STG 1 The pt will demonstrate improved postural awareness requiring <25% VC's with exercises In progress   STG 2 Decrease lumbar pain 50% to assist with improved functional gains. In progress   STG 3 Pt to perform SLS B LE 8 sec without UE or pain on R LE assist to improve overall dynamic balance during gait In progress     Long Term Goals Completed by 4-6 weeks Goal Status   LTG 1 Indep HEP for symptom management In progress   LTG 2 Pt demo improved overall function by reporting greater than 70% per functional survey score In progress   LTG 3 Pain-free lumbar Ext and R SB AROM to 50% WNL allowing an increase in ADL tolerance. In progress   LTG 4 Improve R LE strength 4-/5 to 4/5 to allow patient to improve standing, walking and stair climbing In progress       Plan:  Frequency/Duration:  Plan  Plan Frequency: 2  Plan weeks: 4-6  Current Treatment Recommendations: Strengthening, ROM, Balance training, Modalities, Functional mobility training, Home exercise program, Transfer training, Manual Therapy - Joint Manipulation, Manual Therapy - Soft Tissue Mobilization, Neuromuscular re-education, Stair training  Pt to continue current HEP. See objective section for any therapeutic exercise changes, additions or modifications this date.     Therapy Time:      PT Individual Minutes  Time In: 1017  Time Out: 1112  Minutes: 55  Timed Code Treatment Minutes: 45 Minutes  Procedure Minutes:10 min estim with CP  Timed Activity Minutes Units   Ther Ex 35 2   Manual  10 1     Electronically signed by Lilian Beauchamp PTA on 11/15/22 at 11:15 AM EST

## 2022-11-18 ENCOUNTER — HOSPITAL ENCOUNTER (OUTPATIENT)
Dept: PHYSICAL THERAPY | Age: 62
Setting detail: THERAPIES SERIES
Discharge: HOME | End: 2022-11-18
Payer: COMMERCIAL

## 2022-11-18 PROCEDURE — G0283 ELEC STIM OTHER THAN WOUND: HCPCS

## 2022-11-18 PROCEDURE — 97150 GROUP THERAPEUTIC PROCEDURES: CPT

## 2022-11-18 ASSESSMENT — PAIN DESCRIPTION - ORIENTATION: ORIENTATION: RIGHT

## 2022-11-18 ASSESSMENT — PAIN DESCRIPTION - DESCRIPTORS: DESCRIPTORS: ACHING

## 2022-11-18 ASSESSMENT — PAIN DESCRIPTION - LOCATION: LOCATION: BACK

## 2022-11-18 ASSESSMENT — PAIN SCALES - GENERAL: PAINLEVEL_OUTOF10: 3

## 2022-11-18 ASSESSMENT — PAIN DESCRIPTION - FREQUENCY: FREQUENCY: INTERMITTENT

## 2022-11-18 NOTE — PROGRESS NOTES
[address] [place] [address]  [address]:[phone]  Treatment Note        Date: 2022  Patient: Randy Olvera  : 1960   Confirmed: Yes  MRN: [id number]  Referring Provider: Naila Ramirez MD      Medical Diagnosis: Lumbar radiculopathy [M54.16]  Sciatica of right side [M54.31]  Lumbar herniated disc [M51.26]  DDD (degenerative disc disease), lumbar [M51.36]      Treatment Diagnosis: LBP and R LE dysfunction with decreased ROM, strength affecting overall function    Visit Information:  Insurance: Payor: [employer] / Plan: [employer] / Product Type: *No Product type* /   PT Visit Information  Onset Date: 22  PT Insurance Information: Gordon Doctor  Total # of Visits Approved: 20  Total # of Visits to Date: 9  No Show: 0  Canceled Appointment: 0  Progress Note Counter:     Subjective Information:  Subjective: Pt continues to get relief with therapy. Compliant with HEP and positioning for relief.   HEP Compliance:  [x] Good [] Fair [] Poor [] Reports not doing due to:    Pain Screening  Patient Currently in Pain: Yes  Pain Assessment: 0-10  Pain Level: 3  Pain Location: Back  Pain Orientation: Right  Pain Radiating Towards: lat thigh  Pain Descriptors: Aching  Pain Frequency: Intermittent    Treatment:  Exercises:  Exercises  Exercise 3: hip add with ball 15 x 5\" , hip abd with BTB 15 x 5\"  Exercise 4: SKTC 3 x 30\"  Exercise 5: ham stretch supine 2 x 20\" (10 ankle pumps on R)  Exercise 6: TA isos 10 x 3-5\", march x1  Exercise 7: L side lying positional distraction using bolster x 3 min, then gentle rot 3 x 20-30\" - good relief  Exercise 8: supine march, walk out, ALt UE LE*       Manual:   Manual Therapy  Manual Traction: melanie leg pulls 4 x 30\" - good relief x 3 min  Soft Tissue Mobilizaton: R low back/buttock, it band with tennnis ball x 8 min       Modalities:  Cryotherapy (CPT 73576)  Patient Position: Seated  Number Minutes Cryotherapy: 10 min with E-stim  Cryotherapy location: Right, Low back, Hip  Post treatment skin assessment: Intact  Electric stimulation, unattended (CPT 13647) /  (Medicare)  Patient Position: Seated  E-stim location: Right, Low back  E-stim type: Interferential (IFC)  E-stim via: 4 Electrode Pads  Post treatment skin assessment: Intact  Untimed (minutes): 10       *Indicates exercise, modality, or manual techniques to be initiated when appropriate    Objective Measures:        Strength: [x] NT  [] MMT completed:   ROM: [x] NT  [] ROM measurements:       Assessment: Body Structures, Functions, Activity Limitations Requiring Skilled Therapeutic Intervention: Decreased functional mobility , Decreased ADL status, Decreased ROM, Decreased posture, Increased pain, Decreased sensation, Decreased balance, Decreased strength  Assessment: Pt tolerated ther ex well. Good knowledge with min to no vc needed to improve technique. Decrease pain from distraction and SL positioning. Conclude with CP/estim for pain control. Treatment Diagnosis: LBP and R LE dysfunction with decreased ROM, strength affecting overall function  Therapy Prognosis: Good          Post-Pain Assessment:       Pain Rating (0-10 pain scale):   2/10   Location and pain description same as pre-treatment unless indicated. Action: [x] NA   [] Perform HEP  [] Meds as prescribed  [] Modalities as prescribed   [] Call Physician     GOALS   Patient Goal(s):      Short Term Goals Completed by 2 weeks Goal Status   STG 1 The pt will demonstrate improved postural awareness requiring <25% VC's with exercises In progress   STG 2 Decrease lumbar pain 50% to assist with improved functional gains.  In progress   STG 3 Pt to perform SLS B LE 8 sec without UE or pain on R LE assist to improve overall dynamic balance during gait In progress     Long Term Goals Completed by 4-6 weeks Goal Status   LTG 1 Indep HEP for symptom management In progress   LTG 2 Pt demo improved overall function by reporting greater than 70% per functional survey score In progress   LTG 3 Pain-free lumbar Ext and R SB AROM to 50% WNL allowing an increase in ADL tolerance. In progress   LTG 4 Improve R LE strength 4-/5 to 4/5 to allow patient to improve standing, walking and stair climbing In progress          Plan:  Frequency/Duration:  Plan  Plan Frequency: 2  Plan weeks: 4-6  Current Treatment Recommendations: Strengthening, ROM, Balance training, Modalities, Functional mobility training, Home exercise program, Transfer training, Manual Therapy - Joint Manipulation, Manual Therapy - Soft Tissue Mobilization, Neuromuscular re-education, Stair training  Pt to continue current HEP. See objective section for any therapeutic exercise changes, additions or modifications this date.     Therapy Time:      PT Individual Minutes  Time In: 1050  Time Out: 1100  Minutes: 10  PT Group Minutes  Time In: 0544  Time Out: 1050  Minutes: 35     Group Procedure Minutes (2 person group):35 min, 10 min estim/cp    Electronically signed by Gibson Keyes PT on 11/18/22 at 4:09 PM EST

## 2022-11-22 ENCOUNTER — HOSPITAL ENCOUNTER (OUTPATIENT)
Dept: PHYSICAL THERAPY | Age: 62
Setting detail: THERAPIES SERIES
Discharge: HOME | End: 2022-11-22
Payer: COMMERCIAL

## 2022-11-22 PROCEDURE — G0283 ELEC STIM OTHER THAN WOUND: HCPCS

## 2022-11-22 PROCEDURE — 97140 MANUAL THERAPY 1/> REGIONS: CPT

## 2022-11-22 PROCEDURE — 97110 THERAPEUTIC EXERCISES: CPT

## 2022-11-22 ASSESSMENT — PAIN DESCRIPTION - ORIENTATION: ORIENTATION: RIGHT

## 2022-11-22 ASSESSMENT — PAIN SCALES - GENERAL: PAINLEVEL_OUTOF10: 1

## 2022-11-22 ASSESSMENT — PAIN DESCRIPTION - LOCATION: LOCATION: BACK

## 2022-11-22 ASSESSMENT — PAIN DESCRIPTION - DESCRIPTORS: DESCRIPTORS: ACHING

## 2022-11-22 NOTE — PROGRESS NOTES
[address] [place] [address]  [address]  LRYSD:[phone]  Treatment Note        Date: 2022  Patient: Sd Heath  : 1960   Confirmed: Yes  MRN: [id number]  Referring Provider: Marc Wahl MD  Medical Diagnosis: Lumbar radiculopathy [M54.16]  Sciatica of right side [M54.31]  Lumbar herniated disc [M51.26]  DDD (degenerative disc disease), lumbar [M51.36]   Treatment Diagnosis: LBP and R LE dysfunction with decreased ROM, strength affecting overall function    Visit Information:  Insurance: Payor: Willim Kingsbury / Plan: Willim Kingsbury / Product Type: *No Product type* /   PT Visit Information  Onset Date: 22  PT Insurance Information: [employer]  Total # of Visits Approved: 20  Total # of Visits to Date: 10  No Show: 0  Canceled Appointment: 0  Progress Note Counter: 10/12    Subjective Information:  Subjective: Pt reports minimal pain enterinf clinic. Still can get quite a bit of pain if he stands too long but notices a big difference in pain overall since starting therapy.   HEP Compliance:  [x] Good [] Fair [] Poor [] Reports not doing due to:    Pain Screening  Patient Currently in Pain: Yes  Pain Assessment: 0-10  Pain Level: 1  Pain Location: Back  Pain Orientation: Right  Pain Descriptors: Aching    Treatment:  Exercises:  Exercises  Exercise 3: hip add with ball 20 x 5\" , hip abd with BTB 20 x 5\"  Exercise 4: SKTC 3 x 30\"  Exercise 5: ham stretch supine 2 x 20\" (10 ankle pumps on R)  Exercise 6: TA isos 15 x 3-5\", TA march x 10 , TA SLR 10 x 3\"  Exercise 7: L side lying positional distraction using bolster x 3 min, then gentle rot 3 x 30\" - good relief      Manual:   Manual Therapy  Manual Traction: melanie leg pulls 4 x 30\" - good relief x 3 min  Soft Tissue Mobilizaton: R low back/buttock, it band with tennnis ball x 7 min      Modalities:  Cryotherapy (CPT 56689)  Patient Position: Seated  Number Minutes Cryotherapy: 10 min with E-stim  Cryotherapy location: Right, Low back, Hip  Post treatment skin assessment: Intact  Electric stimulation, unattended (CPT 43062) /  (Medicare)  Patient Position: Seated  E-stim location: Right, Low back  E-stim type: Interferential (IFC)  E-stim via: 4 Electrode Pads  Post treatment skin assessment: Intact  Untimed (minutes): 10      *Indicates exercise, modality, or manual techniques to be initiated when appropriate    Assessment: Body Structures, Functions, Activity Limitations Requiring Skilled Therapeutic Intervention: Decreased functional mobility , Decreased ADL status, Decreased ROM, Decreased posture, Increased pain, Decreased sensation, Decreased balance, Decreased strength  Assessment: Pt is progressing well toward goals with overall decreasing pain and improved strength. Progressed DLS with added TA SLR which pt tolerated well with no complaints. Treatment Diagnosis: LBP and R LE dysfunction with decreased ROM, strength affecting overall function  Therapy Prognosis: Good    Post-Pain Assessment:       Pain Rating (0-10 pain scale):   0/10   Location and pain description same as pre-treatment unless indicated. Action: [x] NA   [] Perform HEP  [] Meds as prescribed  [] Modalities as prescribed   [] Call Physician     GOALS   Patient Goal(s): Patient Goals : Decrease pain    Short Term Goals Completed by 2 weeks Goal Status   STG 1 The pt will demonstrate improved postural awareness requiring <25% VC's with exercises In progress   STG 2 Decrease lumbar pain 50% to assist with improved functional gains.  In progress   STG 3 Pt to perform SLS B LE 8 sec without UE or pain on R LE assist to improve overall dynamic balance during gait In progress       Long Term Goals Completed by 4-6 weeks Goal Status   LTG 1 Indep HEP for symptom management In progress   LTG 2 Pt demo improved overall function by reporting greater than 70% per functional survey score In progress   LTG 3 Pain-free lumbar Ext and R SB AROM to 50% WNL allowing an increase in ADL tolerance. In progress   LTG 4 Improve R LE strength 4-/5 to 4/5 to allow patient to improve standing, walking and stair climbing Met       Plan:  Frequency/Duration:  Plan  Plan Frequency: 2  Plan weeks: 4-6  Current Treatment Recommendations: Strengthening, ROM, Balance training, Modalities, Functional mobility training, Home exercise program, Transfer training, Manual Therapy - Joint Manipulation, Manual Therapy - Soft Tissue Mobilization, Neuromuscular re-education, Stair training  Pt to continue current HEP. See objective section for any therapeutic exercise changes, additions or modifications this date.     Therapy Time:  PT Individual Minutes  Time In: 1016  Time Out: 1108  Minutes: 52  Timed Code Treatment Minutes: 40 Minutes  Procedure Minutes: E-stim/CP x 10'   Timed Activity Minutes Units   Ther Ex 30 2   Manual  10 1     Electronically signed by Matthew Howell PTA on 11/22/22 at 10:59 AM EST

## 2022-12-05 ENCOUNTER — OFFICE VISIT (OUTPATIENT)
Dept: PAIN MANAGEMENT | Age: 62
End: 2022-12-05
Payer: COMMERCIAL

## 2022-12-05 VITALS
DIASTOLIC BLOOD PRESSURE: 80 MMHG | TEMPERATURE: 97 F | SYSTOLIC BLOOD PRESSURE: 124 MMHG | HEIGHT: 67 IN | WEIGHT: 140 LBS | BODY MASS INDEX: 21.97 KG/M2

## 2022-12-05 DIAGNOSIS — M54.16 LUMBAR RADICULOPATHY: Primary | ICD-10-CM

## 2022-12-05 PROCEDURE — G8420 CALC BMI NORM PARAMETERS: HCPCS | Performed by: NURSE PRACTITIONER

## 2022-12-05 PROCEDURE — 3078F DIAST BP <80 MM HG: CPT | Performed by: NURSE PRACTITIONER

## 2022-12-05 PROCEDURE — 3017F COLORECTAL CA SCREEN DOC REV: CPT | Performed by: NURSE PRACTITIONER

## 2022-12-05 PROCEDURE — G8484 FLU IMMUNIZE NO ADMIN: HCPCS | Performed by: NURSE PRACTITIONER

## 2022-12-05 PROCEDURE — 3074F SYST BP LT 130 MM HG: CPT | Performed by: NURSE PRACTITIONER

## 2022-12-05 PROCEDURE — 99214 OFFICE O/P EST MOD 30 MIN: CPT | Performed by: NURSE PRACTITIONER

## 2022-12-05 PROCEDURE — G8427 DOCREV CUR MEDS BY ELIG CLIN: HCPCS | Performed by: NURSE PRACTITIONER

## 2022-12-05 PROCEDURE — 4004F PT TOBACCO SCREEN RCVD TLK: CPT | Performed by: NURSE PRACTITIONER

## 2022-12-05 ASSESSMENT — ENCOUNTER SYMPTOMS
EYES NEGATIVE: 1
BACK PAIN: 1
DIARRHEA: 0
CONSTIPATION: 0
NAUSEA: 0
SHORTNESS OF BREATH: 0
COUGH: 0
GASTROINTESTINAL NEGATIVE: 1

## 2022-12-05 NOTE — PROGRESS NOTES
Narendra Heart  ([birth year])    12/5/2022    Subjective:     Narendra Ken is 65 y.o. male who complains today of:    Chief Complaint   Patient presents with    Back Pain         Allergies:  Altace [ramipril]    Past Medical History:   Diagnosis Date    Anxiety attack     Asthma     CAD (coronary artery disease)     Coronary artery disease     Depression     Hypertension     Primary osteoarthritis of right knee 09/05/2018     Past Surgical History:   Procedure Laterality Date    CARDIAC CATHETERIZATION      TEMPOROMANDIBULAR JOINT SURGERY      Rt knee     Family History   Problem Relation Age of Onset    Stroke Mother     Cancer Mother     Heart Disease Mother 58    High Blood Pressure Mother     High Blood Pressure Father     Cancer Father      Social History     Socioeconomic History    Marital status:      Spouse name: Not on file    Number of children: Not on file    Years of education: Not on file    Highest education level: Not on file   Occupational History    Occupation: EMPLOYED     Comment: [employer]   Tobacco Use    Smoking status: Every Day     Packs/day: 1.00     Years: 48.00     Pack years: 48.00     Types: Cigarettes    Smokeless tobacco: Never   Vaping Use    Vaping Use: Former    Substances: Unknown   Substance and Sexual Activity    Alcohol use: Yes     Comment: OCCASONIALLY    Drug use: Never    Sexual activity: Yes     Partners: Female   Other Topics Concern    Not on file   Social History Narrative    Not on file     Social Determinants of Health     Financial Resource Strain: Not on file   Food Insecurity: Not on file   Transportation Needs: Not on file   Physical Activity: Not on file   Stress: Not on file   Social Connections: Not on file   Intimate Partner Violence: Not on file   Housing Stability: Not on file       Current Outpatient Medications on File Prior to Visit   Medication Sig Dispense Refill    ASPIRIN 81 PO Take by mouth      metoprolol tartrate (LOPRESSOR) 25 MG tablet Take 0.5 tablets by mouth 2 times daily  0    pravastatin (PRAVACHOL) 40 MG tablet TAKE ONE TABLET BY MOUTH NIGHTLY  90 tablet 3    naloxone 4 MG/0.1ML LIQD nasal spray by Nasal route      pantoprazole (PROTONIX) 20 MG tablet Take 1 tablet by mouth every morning (before breakfast) 30 tablet 5    HYDROcodone-acetaminophen (NORCO) 5-325 MG per tablet Take 1 tablet by mouth 2 times daily. amLODIPine (NORVASC) 5 MG tablet Take 1 qhs (Patient taking differently: Take 5 mg by mouth daily Take 1 qhs) 30 tablet 5    ALPRAZolam (XANAX) 0.5 MG tablet Take 0.5 mg by mouth 3 times daily as needed. FLUoxetine (PROZAC) 20 MG capsule TAKE ONE CAPSULE BY MOUTH DAILY (Patient taking differently: Take 20 mg by mouth daily TAKE ONE CAPSULE BY MOUTH DAILY) 30 capsule 3    albuterol sulfate HFA (PROAIR HFA) 108 (90 Base) MCG/ACT inhaler Inhale 2 puffs into the lungs every 4 hours as needed for Wheezing 1 Inhaler 3    NITROSTAT 0.4 MG SL tablet Place 0.4 mg under the tongue every 5 minutes as needed       aspirin 325 MG EC tablet Take 1 tablet by mouth daily 30 tablet 3     No current facility-administered medications on file prior to visit. PCP is Dr. Kofi Dougherty, DO gives him norco for pain with some relief for his knee pain on Rt. Patient was seen by Dr. Ted Leon on 10/4/2022 for his initial consult. He does have a history of chronic right knee pain with meniscal damage and needs a right knee replacement evidently. He presented for low back pain and pain into the right lower extremity. Patient had done x-rays of the low back 1/12/2022 and an MRI of the low back 12/22/2021 which was in Dr. Jailyn Franklin note. MRI impression did show disc bulge at L4-5 with facet arthropathy and L5-S1 left lateral disc protrusion. X-ray report shows a grade 1 anterior listhesis of L4 and L5 during flexion per impression.   Physical therapy was ordered at time of appointment and consideration of a right TFESI at either L5-S1 or L4-5.  Patient presents today following completion of physical therapy. He feels PT did help, but pain does continue with radiating pain into Rt lateral thigh/posterior thigh. Says no longer radiates into lower leg/knee and foot. Numbness less. Walking and standing prolonged increases his Sx. Pt feels pain level 4-6/10. Pt feels that walking, standing makes the pain worse, and laying down/sitting makes the pain better. Pt feels his medication helps   him function and improve his quality of life, specifically . Pt admits to Rt LE radiating numbness and tingling. Denies recent falls, injuries or trauma. Pt denies new weakness. Pt reports PT has been completed. Review of Systems   Constitutional:  Negative for fever. HENT: Negative. Eyes: Negative. Respiratory:  Negative for cough and shortness of breath. Cardiovascular:  Negative for chest pain. Gastrointestinal: Negative. Negative for constipation, diarrhea and nausea. Endocrine: Negative. Genitourinary: Negative. Musculoskeletal:  Positive for arthralgias and back pain. Skin: Negative. Neurological:  Positive for numbness. Negative for dizziness and weakness. Psychiatric/Behavioral: Negative. Objective:     Vitals:  /80 (Site: Right Upper Arm)   Temp 97 °F (36.1 °C) (Temporal)   Ht 5' 7\" (1.702 m)   Wt 140 lb (63.5 kg)   BMI 21.93 kg/m² Pain Score:   4      Physical Exam  Vitals and nursing note reviewed. This is a pleasant male who answers questions appropriately and follows commands. Pt is alert and oriented x 3. Recent and remote memory is intact. Mood and affect, judgement and insight are normal.  No signs of distress, no dyspnea or SOB noted. HEENT: PERRL. Neck is supple, trachea midline. No lymphadenopathy noted. Decreased ROM with flexion and extension of low back. Non-tender with palpation to lumbar spine. Positive SLR on Rt. Tightness in both hamstrings noted.   Pt is able to briefly heel walk and toe walk. Balance and coordination normal.  Strength is functional for ambulation. Cranial nerves II-XII are intact. Assessment:      Diagnosis Orders   1. Lumbar radiculopathy  IN NJX AA&/STRD TFRML EPI LUMBAR/SACRAL 1 LEVEL    CHG FLUOR NEEDLE/CATH SPINE/PARASPINAL DX/THER ADDON          Plan:          No orders of the defined types were placed in this encounter. Orders Placed This Encounter   Procedures    CHG FLUOR NEEDLE/CATH SPINE/PARASPINAL DX/THER ADDON     Standing Status:   Future     Standing Expiration Date:   3/5/2023    IN NJX AA&/STRD TFRML EPI LUMBAR/SACRAL 1 LEVEL     Rt L4-5 mnoty with JH - hold baby ASA x 7 days prior     Standing Status:   Future     Standing Expiration Date:   3/5/2023     Discussed options with the patient today. Anatomic model pathology was shown and reviewed with pt. Reviewed MRI report. Pt is describing L4-5 radiculopathy on Rt today which correlates with MRI report. Will order Rt L4-5 TFESI with Dr. Lakshmi Francis. He has completed PT and encouraged continued HEP. All questions were answered. Discussed home exercise program and  smoking cessation. Relevant imaging and pain generators reviewed. Pt verbalized understanding and agrees with above plan. Pt has chronic pain. OARRS was reviewed. This NP saw pt under direct supervision of Dr. Lakshmi Francis. Follow up:  Return for for procedure with Dr. Lakshmi Francis.     [place], APRN - CNP

## 2022-12-09 NOTE — PROGRESS NOTES
[address]  [address]      GWJCE:[phone]     [] Certification  [] Recertification     []  Plan of Care  [] Progress Note [x] Discharge                            Referring Provider: Alix Buckley MD      From:  Kristine Pedro, PT     Patient: Diana Arguello (67 y.o. male) : 1960 Date: 2022   Medical Diagnosis: Lumbar radiculopathy [M54.16]  Sciatica of right side [M54.31]  Lumbar herniated disc [M51.26]  DDD (degenerative disc disease), lumbar [M51.36]       Treatment Diagnosis: LBP and R LE dysfunction with decreased ROM, strength affecting overall function      Progress Report Period from:  10/19/2022  to 2022     Visits to Date: 10  No Show: 0  Cancelled Appts: 0     OBJECTIVE:   Short Term Goals - Time Frame for Short Term Goals: 2 weeks    Goals Current/Discharge status  Status   Short Term Goal 1: The pt will demonstrate improved postural awareness requiring <25% VC's with exercises  Pt with improved postural awareness requiring <25% VC's with exercises  Met   Short Term Goal 2: Decrease lumbar pain 50% to assist with improved functional gains. Taken 22:  Patient Currently in Pain: Yes  Pain Assessment: 0-10  Pain Level: 1  Pain Location: Back/RLE  Pain Orientation: Right Met    Short Term Goal 3: Pt to perform SLS B LE 8 sec without UE or pain on R LE assist to improve overall dynamic balance during gait  Not tested due to unexpected discharge. Not met    Long Term Goals - Time Frame for Long Term Goals : 4-6 weeks  Goals Current/ Discharge status Met   Long Term Goal 1: Indep HEP for symptom management Written HEP initiated  for symptom management and is compliant/Indep Met    Long Term Goal 2: Pt demo improved overall function by reporting greater than 70% per functional survey score Not tested due to unexpected discharge.       Not met    Long Term Goal 3: Pain-free lumbar Ext and R SB AROM to 50% WNL allowing an increase in ADL tolerance. Taken 11/11/22:  Lumbar Ext and R SB AROM 25% with pain Not met    Long Term Goal 4: Improve L LE strength 4-/5 to 4/5 to allow patient to improve standing, walking and stair climbing Taken 11/11/22:  L LE strength 4-/5 to 4/5    Met       Body Structures, Functions, Activity Limitations Requiring Skilled Therapeutic Intervention: Decreased functional mobility , Decreased ADL status, Decreased ROM, Decreased posture, Increased pain, Decreased sensation, Decreased balance, Decreased strength  Assessment: Pt did not return to PT after 11/22/22 unable to determine functional outcomes d/t unexpected D/C. Pt with improved R LBP and R LE symptoms with therapy meeting 4 of 7 goals. Pt followed up with pain management for further tx options and will continue HEP as instructed. Therapy Prognosis: Good        PLAN: [x] Discharge                                              Patient Status:[] Continue/ Initiate plan of Care                           [x] Discharge PT. Recommend pt continue with HEP.                             [] Additional visits requested, Please re-certify for additional visits:    Objective info by: Electronically signed by Juanetta Rinne, PTA on 12/9/22 at 12:42 PM EST                                                     Signature: Electronically signed by Ileana Grayson PT on 12/9/2022 at 1:35 PM

## 2022-12-15 ENCOUNTER — TELEPHONE (OUTPATIENT)
Dept: PAIN MANAGEMENT | Age: 62
End: 2022-12-15

## 2022-12-15 NOTE — TELEPHONE ENCOUNTER
RIGHT L4-5 ISIDRO    AUTH APPROVED FROM 12/13/22-3/13/23    OK to schedule procedure approved as above. Please note sides/levels approved and date range. (If applicable, sides/levels approved may differ from those ordered)    TO BE SCHEDULED WITH DR. FERNANDEZ

## 2023-01-18 ENCOUNTER — PROCEDURE VISIT (OUTPATIENT)
Dept: PAIN MANAGEMENT | Age: 63
End: 2023-01-18

## 2023-01-18 DIAGNOSIS — M54.16 LUMBAR RADICULOPATHY: ICD-10-CM

## 2023-01-18 RX ORDER — LIDOCAINE HYDROCHLORIDE 10 MG/ML
10 INJECTION, SOLUTION EPIDURAL; INFILTRATION; INTRACAUDAL; PERINEURAL ONCE
Status: COMPLETED | OUTPATIENT
Start: 2023-01-18 | End: 2023-01-18

## 2023-01-18 RX ORDER — DEXAMETHASONE SODIUM PHOSPHATE 10 MG/ML
10 INJECTION, SOLUTION INTRAMUSCULAR; INTRAVENOUS ONCE
Status: COMPLETED | OUTPATIENT
Start: 2023-01-18 | End: 2023-01-18

## 2023-01-18 RX ADMIN — DEXAMETHASONE SODIUM PHOSPHATE 10 MG: 10 INJECTION, SOLUTION INTRAMUSCULAR; INTRAVENOUS at 10:44

## 2023-01-18 RX ADMIN — LIDOCAINE HYDROCHLORIDE 10 MG: 10 INJECTION, SOLUTION EPIDURAL; INFILTRATION; INTRACAUDAL; PERINEURAL at 10:44

## 2023-01-18 NOTE — PROGRESS NOTES
[facility]) Physicians  Neurosurgery and Pain [address] [facility] [place] [address]: [phone]  F: [phone]      LUMBAR TRANSFORAMINAL INJECTION      Patient Name: Derrick Hill : 1960  Date: 2023   Physician: Barbara López MD      Derrick Hill  is here today for interventional pain management. Preoperatively, the patient presents with radicular pain in the affected area as per history and exam. Patient has failed NSAIDs, PT/HEP, and conservative treatment. Patient has significant psychological and functional impairment due to this condition. Standard ASI guidelines were followed and sterile technique used. Area was cleaned with Betadine x3. Informed consent was obtained. Fluoroscopic guidance was used for this procedure. Discussed the risks including but not limited to bleeding, infection, worsened pain, damage to surrounding structures, side effects, toxicity, allergic reactions to medications used, need for surgery, pneumothorax, abdominal and visceral injury, as well as catastrophic injury such as vision loss, paralysis, spinal cord or plexus injury, stroke, bowel or bladder incontinence, chest tube insertion, ventilator dependence, and death. Discussed the risks, benefits, and alternatives to the procedure including no procedure at all. Discussed that we cannot undo any permanent neurologic or orthopaedic damage or change the course of any underlying disease. After thorough discussion, patient expressed understanding and willingness to proceed. Written consent was obtained and is in the chart. Verbal consent to proceed was obtained. TRANSFORAMINAL EPIDURAL  There was appropriate spread of contrast in the anterior epidural space and around the exiting nerve root. The 6 oclock position of the pedicle was identified.  Multiple views of fluoroscopy including lateral were used to confirm accurate needle placement of depth. Live fluoroscopy was used when injecting contrast to ensure no subdural or vascular spread. In total, approximately 5 mg of Dexamethasone and 1.5 ml of 0.9cc of normal saline was injected slowly without difficulty. Patient tolerated the procedure well, no obvious complications occurred during the procedure. Patient was appropriately monitored and discharged home in stable condition with their usual motor strength. Post Op instructions were given to patient. Patient told to resume blood thinners if stopped prior to procedure. Relevant and recent imaging reviewed with patient today.               [] Bilateral [] T12-L1 [] L3-4        [] L1-2 [x] L4-5       [x] Right [] L2-3 [] L5-S1                [] Left                  Rama Miguel MD

## 2023-02-09 ENCOUNTER — OFFICE VISIT (OUTPATIENT)
Dept: PAIN MANAGEMENT | Age: 63
End: 2023-02-09
Payer: COMMERCIAL

## 2023-02-09 VITALS
HEIGHT: 67 IN | BODY MASS INDEX: 21.97 KG/M2 | TEMPERATURE: 97.1 F | WEIGHT: 140 LBS | DIASTOLIC BLOOD PRESSURE: 82 MMHG | SYSTOLIC BLOOD PRESSURE: 124 MMHG

## 2023-02-09 DIAGNOSIS — M51.36 DDD (DEGENERATIVE DISC DISEASE), LUMBAR: Primary | ICD-10-CM

## 2023-02-09 PROCEDURE — 3079F DIAST BP 80-89 MM HG: CPT | Performed by: NURSE PRACTITIONER

## 2023-02-09 PROCEDURE — 3074F SYST BP LT 130 MM HG: CPT | Performed by: NURSE PRACTITIONER

## 2023-02-09 PROCEDURE — 99213 OFFICE O/P EST LOW 20 MIN: CPT | Performed by: NURSE PRACTITIONER

## 2023-02-09 ASSESSMENT — ENCOUNTER SYMPTOMS
CONSTIPATION: 0
EYES NEGATIVE: 1
COUGH: 0
GASTROINTESTINAL NEGATIVE: 1
DIARRHEA: 0
SHORTNESS OF BREATH: 0
NAUSEA: 0

## 2023-02-09 NOTE — PROGRESS NOTES
Umair Quigley  ([birth year])    2/9/2023    Subjective:     Umair Quigley is 65 y.o. male who complains today of:    Chief Complaint   Patient presents with    1 Month Follow-Up    Back Pain         Allergies:  Altace [ramipril]    Past Medical History:   Diagnosis Date    Anxiety attack     Asthma     CAD (coronary artery disease)     Coronary artery disease     Depression     Hypertension     Primary osteoarthritis of right knee 09/05/2018     Past Surgical History:   Procedure Laterality Date    CARDIAC CATHETERIZATION      TEMPOROMANDIBULAR JOINT SURGERY      Rt knee     Family History   Problem Relation Age of Onset    Stroke Mother     Cancer Mother     Heart Disease Mother 58    High Blood Pressure Mother     High Blood Pressure Father     Cancer Father      Social History     Socioeconomic History    Marital status:      Spouse name: Not on file    Number of children: Not on file    Years of education: Not on file    Highest education level: Not on file   Occupational History    Occupation: EMPLOYED     Comment: [employer]   Tobacco Use    Smoking status: Every Day     Packs/day: 1.00     Years: 48.00     Pack years: 48.00     Types: Cigarettes    Smokeless tobacco: Never   Vaping Use    Vaping Use: Former    Substances: Unknown   Substance and Sexual Activity    Alcohol use: Yes     Comment: OCCASONIALLY    Drug use: Never    Sexual activity: Yes     Partners: Female   Other Topics Concern    Not on file   Social History Narrative    Not on file     Social Determinants of Health     Financial Resource Strain: Not on file   Food Insecurity: Not on file   Transportation Needs: Not on file   Physical Activity: Not on file   Stress: Not on file   Social Connections: Not on file   Intimate Partner Violence: Not on file   Housing Stability: Not on file       Current Outpatient Medications on File Prior to Visit   Medication Sig Dispense Refill    ASPIRIN 81 PO Take by mouth      metoprolol tartrate (LOPRESSOR) 25 MG tablet Take 0.5 tablets by mouth 2 times daily  0    aspirin 325 MG EC tablet Take 1 tablet by mouth daily 30 tablet 3    pravastatin (PRAVACHOL) 40 MG tablet TAKE ONE TABLET BY MOUTH NIGHTLY  90 tablet 3    naloxone 4 MG/0.1ML LIQD nasal spray by Nasal route      pantoprazole (PROTONIX) 20 MG tablet Take 1 tablet by mouth every morning (before breakfast) 30 tablet 5    HYDROcodone-acetaminophen (NORCO) 5-325 MG per tablet Take 1 tablet by mouth 2 times daily. amLODIPine (NORVASC) 5 MG tablet Take 1 qhs (Patient taking differently: Take 5 mg by mouth daily Take 1 qhs) 30 tablet 5    ALPRAZolam (XANAX) 0.5 MG tablet Take 0.5 mg by mouth 3 times daily as needed. FLUoxetine (PROZAC) 20 MG capsule TAKE ONE CAPSULE BY MOUTH DAILY (Patient taking differently: Take 20 mg by mouth daily TAKE ONE CAPSULE BY MOUTH DAILY) 30 capsule 3    albuterol sulfate HFA (PROAIR HFA) 108 (90 Base) MCG/ACT inhaler Inhale 2 puffs into the lungs every 4 hours as needed for Wheezing 1 Inhaler 3    NITROSTAT 0.4 MG SL tablet Place 0.4 mg under the tongue every 5 minutes as needed        No current facility-administered medications on file prior to visit. Pt presents today for a f/u of his pain. PCP is Dr. Gogo Brownlee, DO gives him norco for pain with some relief for his knee pain on Rt. Last saw Dr. Maria Guadalupe Brand on 1/18/2023 for a right L4-5 transforaminal epidural steroid injection. He has done physical therapy. He does have a history of chronic right knee pain with meniscal damage and needs a right knee replacement evidently. He presented for low back pain and pain into the right lower extremity. Patient had done x-rays of the low back 1/12/2022 and an MRI of the low back 12/22/2021 which was in Dr. Lissa Broderick note. MRI impression did show disc bulge at L4-5 with facet arthropathy and L5-S1 left lateral disc protrusion.   X-ray report shows a grade 1 anterior listhesis of L4 and L5 during flexion per impression. Physical therapy was ordered at time of appointment and consideration of a right TFESI at either L5-S1 or L4-5. Patient presents today following completion of physical therapy. He feels PT did help, but pain does continue with radiating pain into Rt lateral thigh/posterior thigh. Says no longer radiates into lower leg/knee and foot. Numbness less. Walking and standing prolonged increases his Sx. Pt feels pain level 0/10. Pt DENIES radiating numbness and tingling. Denies recent falls, injuries or trauma. Pt denies new weakness. Pt reports PT has been DONE. Review of Systems   Constitutional:  Negative for fever. HENT: Negative. Eyes: Negative. Respiratory:  Negative for cough and shortness of breath. Cardiovascular:  Negative for chest pain. Gastrointestinal: Negative. Negative for constipation, diarrhea and nausea. Endocrine: Negative. Genitourinary: Negative. Musculoskeletal: Negative. Skin: Negative. Neurological:  Negative for dizziness and weakness. Psychiatric/Behavioral: Negative. Objective:     Vitals:  /82 (Site: Left Upper Arm, Position: Sitting)   Temp 97.1 °F (36.2 °C) (Temporal)   Ht 5' 7\" (1.702 m)   Wt 140 lb (63.5 kg)   BMI 21.93 kg/m² Pain Score:   0 - No pain      Physical Exam  Vitals and nursing note reviewed. This is a pleasant male who answers questions appropriately and follows commands. Pt is alert and oriented x 3. Recent and remote memory is intact. Mood and affect, judgement and insight are normal.  No signs of distress, no dyspnea or SOB noted. HEENT: PERRL. Neck is supple, trachea midline. No lymphadenopathy noted. Decreased ROM with flexion and extension of low back. Non-tender with palpation to lumbar spine. Negative SLR  Tightness in both hamstrings noted. Pt is able to briefly heel walk and toe walk. Balance and coordination normal.  Strength is functional for ambulation.  Cranial nerves II-XII are intact. Assessment:      Diagnosis Orders   1. DDD (degenerative disc disease), lumbar            Plan:          No orders of the defined types were placed in this encounter. No orders of the defined types were placed in this encounter. Discussed options with the patient today. Anatomic model pathology was shown and reviewed with pt. He is doing really well s/p monty. Hopefully this is lasting. Hold injections. Continue HEP. All questions were answered. Discussed home exercise program.  Relevant imaging and pain generators reviewed. Pt verbalized understanding and agrees with above plan. Pt has chronic pain. OARRS was reviewed. This NP saw pt under direct supervision of Dr. Kellie Contreras. Follow up:  Return if symptoms worsen or fail to improve.     Brent Speak CARLIN Park - CNP

## 2023-03-10 LAB
ALANINE AMINOTRANSFERASE (SGPT) (U/L) IN SER/PLAS: 19 U/L (ref 10–52)
ALBUMIN (G/DL) IN SER/PLAS: 4.5 G/DL (ref 3.4–5)
ALKALINE PHOSPHATASE (U/L) IN SER/PLAS: 91 U/L (ref 33–136)
ANION GAP IN SER/PLAS: 12 MMOL/L (ref 10–20)
ASPARTATE AMINOTRANSFERASE (SGOT) (U/L) IN SER/PLAS: 19 U/L (ref 9–39)
BILIRUBIN TOTAL (MG/DL) IN SER/PLAS: 0.8 MG/DL (ref 0–1.2)
CALCIUM (MG/DL) IN SER/PLAS: 9.3 MG/DL (ref 8.6–10.3)
CARBON DIOXIDE, TOTAL (MMOL/L) IN SER/PLAS: 26 MMOL/L (ref 21–32)
CHLORIDE (MMOL/L) IN SER/PLAS: 104 MMOL/L (ref 98–107)
CHOLESTEROL (MG/DL) IN SER/PLAS: 150 MG/DL (ref 0–199)
CHOLESTEROL IN HDL (MG/DL) IN SER/PLAS: 63.7 MG/DL
CHOLESTEROL/HDL RATIO: 2.4
CREATININE (MG/DL) IN SER/PLAS: 0.78 MG/DL (ref 0.5–1.3)
GFR MALE: >90 ML/MIN/1.73M2
GLUCOSE (MG/DL) IN SER/PLAS: 98 MG/DL (ref 74–99)
LDL: 73 MG/DL (ref 0–99)
POTASSIUM (MMOL/L) IN SER/PLAS: 4.8 MMOL/L (ref 3.5–5.3)
PROSTATE SPECIFIC ANTIGEN,SCREEN: 0.7 NG/ML (ref 0–4)
PROTEIN TOTAL: 7 G/DL (ref 6.4–8.2)
SODIUM (MMOL/L) IN SER/PLAS: 137 MMOL/L (ref 136–145)
TRIGLYCERIDE (MG/DL) IN SER/PLAS: 66 MG/DL (ref 0–149)
UREA NITROGEN (MG/DL) IN SER/PLAS: 9 MG/DL (ref 6–23)
VLDL: 13 MG/DL (ref 0–40)

## 2023-03-14 DIAGNOSIS — M17.11 OSTEOARTHRITIS OF RIGHT KNEE, UNSPECIFIED OSTEOARTHRITIS TYPE: ICD-10-CM

## 2023-03-14 RX ORDER — HYDROCODONE BITARTRATE AND ACETAMINOPHEN 5; 325 MG/1; MG/1
1 TABLET ORAL 2 TIMES DAILY PRN
COMMUNITY
Start: 2019-01-21 | End: 2023-03-14 | Stop reason: SDUPTHER

## 2023-03-14 RX ORDER — HYDROCODONE BITARTRATE AND ACETAMINOPHEN 5; 325 MG/1; MG/1
1 TABLET ORAL 2 TIMES DAILY PRN
Qty: 60 TABLET | Refills: 0 | Status: SHIPPED | OUTPATIENT
Start: 2023-03-14 | End: 2023-04-12 | Stop reason: SDUPTHER

## 2023-03-14 NOTE — TELEPHONE ENCOUNTER
The patient is requesting a refill for the following medications:    *Hydrocodone Acetaminophen 5-325 MG  -Take 1 tablet by mouth twice daily    [facility]    Please approve or deny.  Thank you.

## 2023-03-22 DIAGNOSIS — F41.9 ANXIETY: ICD-10-CM

## 2023-03-22 NOTE — TELEPHONE ENCOUNTER
Rx Refill Request Telephone Encounter    Name:  Emerson Chilel  :  [id number]  Medication Name:  Alprazolam 0.5 mg   Specific Pharmacy location:  [facility]  Date of last appointment:    Date of next appointment:    Best number to reach patient:  [phone]

## 2023-03-23 PROBLEM — F41.9 ANXIETY: Status: ACTIVE | Noted: 2023-03-23

## 2023-03-23 RX ORDER — ALPRAZOLAM 0.5 MG/1
0.5 TABLET ORAL DAILY PRN
Qty: 30 TABLET | Refills: 0 | Status: SHIPPED | OUTPATIENT
Start: 2023-03-23 | End: 2023-05-22 | Stop reason: SDUPTHER

## 2023-03-23 RX ORDER — ALPRAZOLAM 0.5 MG/1
1 TABLET ORAL DAILY PRN
COMMUNITY
Start: 2019-05-07 | End: 2023-03-23 | Stop reason: SDUPTHER

## 2023-04-12 DIAGNOSIS — M17.11 OSTEOARTHRITIS OF RIGHT KNEE, UNSPECIFIED OSTEOARTHRITIS TYPE: ICD-10-CM

## 2023-04-12 PROBLEM — M54.16 LEFT LUMBAR RADICULOPATHY: Status: ACTIVE | Noted: 2023-04-12

## 2023-04-12 PROBLEM — M51.16 LUMBAR DISC DISEASE WITH RADICULOPATHY: Status: ACTIVE | Noted: 2023-04-12

## 2023-04-12 PROBLEM — R25.1 TREMOR OF RIGHT HAND: Status: ACTIVE | Noted: 2023-04-12

## 2023-04-12 PROBLEM — E78.5 DYSLIPIDEMIA: Status: ACTIVE | Noted: 2023-04-12

## 2023-04-12 PROBLEM — M99.31 OSSEOUS STENOSIS OF NEURAL CANAL OF CERVICAL REGION: Status: ACTIVE | Noted: 2023-04-12

## 2023-04-12 PROBLEM — M50.00 DEGENERATION OF CERVICAL INTERVERTEBRAL DISC WITH MYELOPATHY: Status: ACTIVE | Noted: 2023-04-12

## 2023-04-12 PROBLEM — M54.31 SCIATICA OF RIGHT SIDE: Status: ACTIVE | Noted: 2023-04-12

## 2023-04-12 PROBLEM — M54.2 NECK PAIN: Status: ACTIVE | Noted: 2023-04-12

## 2023-04-12 PROBLEM — Z95.5 CORONARY STENT PATENT: Status: ACTIVE | Noted: 2023-04-12

## 2023-04-12 PROBLEM — L71.9 ACNE ROSACEA: Status: ACTIVE | Noted: 2023-04-12

## 2023-04-12 RX ORDER — ALBUTEROL SULFATE 90 UG/1
2 AEROSOL, METERED RESPIRATORY (INHALATION) EVERY 4 HOURS PRN
COMMUNITY
End: 2023-10-04

## 2023-04-12 RX ORDER — NALOXONE HYDROCHLORIDE 4 MG/.1ML
SPRAY NASAL
Status: ON HOLD | COMMUNITY
Start: 2020-03-03 | End: 2024-05-25 | Stop reason: ENTERED-IN-ERROR

## 2023-04-12 RX ORDER — NITROGLYCERIN 0.4 MG/1
TABLET SUBLINGUAL
COMMUNITY
End: 2023-08-09 | Stop reason: SDUPTHER

## 2023-04-12 RX ORDER — AMLODIPINE BESYLATE 5 MG/1
1 TABLET ORAL DAILY
COMMUNITY
Start: 2021-04-08 | End: 2023-08-03

## 2023-04-12 RX ORDER — NAPROXEN SODIUM 220 MG/1
81 TABLET, FILM COATED ORAL DAILY
COMMUNITY

## 2023-04-12 RX ORDER — PRAVASTATIN SODIUM 40 MG/1
TABLET ORAL
COMMUNITY
End: 2023-08-09 | Stop reason: ALTCHOICE

## 2023-04-12 RX ORDER — METOPROLOL TARTRATE 25 MG/1
TABLET, FILM COATED ORAL
COMMUNITY
Start: 2022-01-04 | End: 2023-06-14

## 2023-04-12 RX ORDER — FLUOXETINE HYDROCHLORIDE 20 MG/1
1 CAPSULE ORAL DAILY
COMMUNITY
Start: 2020-01-18 | End: 2024-01-02

## 2023-04-12 RX ORDER — HYDROCODONE BITARTRATE AND ACETAMINOPHEN 5; 325 MG/1; MG/1
1 TABLET ORAL 2 TIMES DAILY PRN
Qty: 60 TABLET | Refills: 0 | Status: SHIPPED | OUTPATIENT
Start: 2023-04-12 | End: 2023-05-11 | Stop reason: SDUPTHER

## 2023-04-12 NOTE — TELEPHONE ENCOUNTER
Rx Refill Request Telephone Encounter    Name:  Emerson Chilel  :  [id number]  Medication Name:  Norco 5-325 mg  Specific Pharmacy location:  [facility]  Date of last appointment:    Date of next appointment:    Best number to reach patient:  [phone]

## 2023-05-11 DIAGNOSIS — M17.11 OSTEOARTHRITIS OF RIGHT KNEE, UNSPECIFIED OSTEOARTHRITIS TYPE: ICD-10-CM

## 2023-05-11 RX ORDER — HYDROCODONE BITARTRATE AND ACETAMINOPHEN 5; 325 MG/1; MG/1
1 TABLET ORAL 2 TIMES DAILY PRN
Qty: 60 TABLET | Refills: 0 | Status: SHIPPED | OUTPATIENT
Start: 2023-05-11 | End: 2023-06-12 | Stop reason: SDUPTHER

## 2023-05-11 NOTE — TELEPHONE ENCOUNTER
Rx Refill Request    PATIENT CALLED IN    Name: Emerson Chilel  :  1960   Medication Name:  NORCO 5MG  Specific Pharmacy location:  [facility]  Date of last appointment:  Visit date not found   Date of next appointment:  2023   Best number to reach patient:  [phone]

## 2023-05-22 DIAGNOSIS — F41.9 ANXIETY: ICD-10-CM

## 2023-05-22 RX ORDER — ALPRAZOLAM 0.5 MG/1
0.5 TABLET ORAL DAILY PRN
Qty: 30 TABLET | Refills: 0 | Status: SHIPPED | OUTPATIENT
Start: 2023-05-22 | End: 2023-07-18

## 2023-05-22 NOTE — TELEPHONE ENCOUNTER
Rx Refill Request Telephone Encounter    Name:  Emerson Chilel  :  [id number]  Medication Name:  Alprazolam (Xanax) 0.5 mg   Specific Pharmacy location:  [facility]  Date of last appointment:    Date of next appointment:    Best number to reach patient:  [phone]

## 2023-05-26 ENCOUNTER — OFFICE VISIT (OUTPATIENT)
Dept: PRIMARY CARE | Facility: CLINIC | Age: 63
End: 2023-05-26
Payer: COMMERCIAL

## 2023-05-26 VITALS
SYSTOLIC BLOOD PRESSURE: 132 MMHG | RESPIRATION RATE: 16 BRPM | BODY MASS INDEX: 23.07 KG/M2 | HEART RATE: 68 BPM | OXYGEN SATURATION: 96 % | WEIGHT: 147 LBS | TEMPERATURE: 98 F | DIASTOLIC BLOOD PRESSURE: 76 MMHG | HEIGHT: 67 IN

## 2023-05-26 DIAGNOSIS — M17.11 PRIMARY OSTEOARTHRITIS OF RIGHT KNEE: Primary | ICD-10-CM

## 2023-05-26 DIAGNOSIS — Z95.5 CORONARY STENT PATENT: ICD-10-CM

## 2023-05-26 DIAGNOSIS — F41.9 ANXIETY: ICD-10-CM

## 2023-05-26 DIAGNOSIS — M51.16 LUMBAR DISC DISEASE WITH RADICULOPATHY: ICD-10-CM

## 2023-05-26 DIAGNOSIS — E78.5 DYSLIPIDEMIA: ICD-10-CM

## 2023-05-26 DIAGNOSIS — Z79.899 MEDICATION MANAGEMENT: ICD-10-CM

## 2023-05-26 PROCEDURE — 1036F TOBACCO NON-USER: CPT | Performed by: FAMILY MEDICINE

## 2023-05-26 PROCEDURE — 99214 OFFICE O/P EST MOD 30 MIN: CPT | Performed by: FAMILY MEDICINE

## 2023-05-26 NOTE — PROGRESS NOTES
Subjective   Patient ID: Emerson Chilel is a 63 y.o. male who presents for Med Refill (3 month follow up).  HPI  63year-old gentleman with a history of anxiety attacks chronic lumbar pain as well as chronic right knee pain as well as coronary disease hypertension dyslipidemia recheck  Patient does take his Xanax only if needed for increased anxiety in the afternoon evening hours but takes his pain medicine in the morning and evening because of work-related knee pain.  Benefits always a risk of his abuse  Opioid and benzodiazepine template completed  Safety issues reviewed  Aware not to coadminister these 2 medicines.  Med agreement done today.  OARRS performed  Urine tox screen performed today.  Patient remains on statin therapy as well as his low-dose Norvasc and low-dose metoprolol.  Wean off smoking well  Able to take NSAIDs because of GI irritation and prior peptic ulcer disease  Has been stable work is going well uses ice if needed in the evening hours to the knee  Chronic meds reviewed no reported side effects safety issues reviewed no new neurological problems and cervical disc disease been stable  OARRS:  No data recorded  I have personally reviewed the OARRS report for Emerson Chilel. I have considered the risks of abuse, dependence, addiction and diversion    Is the patient prescribed a combination of a benzodiazepine and opioid?  Yes, I feel it is clincially indicated to continue the medication and have discussed with the patient risks/benefits/alternatives.    Last Urine Drug Screen / ordered today: Yes  Recent Results (from the past 47326 hour(s))   OPIATE/OPIOID/BENZO PRESCRIPTION COMPLIANCE    Collection Time: 04/15/22  9:22 AM   Result Value Ref Range    DRUG SCREEN COMMENT URINE SEE BELOW     Creatine, Urine 44.1 mg/dL    Amphetamine Screen, Urine PRESUMPTIVE NEGATIVE NEGATIVE    Barbiturate Screen, Urine PRESUMPTIVE NEGATIVE NEGATIVE    Cannabinoid Screen, Urine PRESUMPTIVE NEGATIVE NEGATIVE     Cocaine Screen, Urine PRESUMPTIVE NEGATIVE NEGATIVE    PCP Screen, Urine PRESUMPTIVE NEGATIVE NEGATIVE    7-Aminoclonazepam <25 Cutoff <25 ng/mL    Alpha-Hydroxyalprazolam 26 (A) Cutoff <25 ng/mL    Alpha-Hydroxymidazolam <25 Cutoff <25 ng/mL    Alprazolam <25 Cutoff <25 ng/mL    Chlordiazepoxide <25 Cutoff <25 ng/mL    Clonazepam <25 Cutoff <25 ng/mL    Diazepam <25 Cutoff <25 ng/mL    Lorazepam <25 Cutoff <25 ng/mL    Midazolam <25 Cutoff <25 ng/mL    Nordiazepam <25 Cutoff <25 ng/mL    Oxazepam <25 Cutoff <25 ng/mL    Temazepam <25 Cutoff <25 ng/mL    Zolpidem <25 Cutoff <25 ng/mL    Zolpidem Metabolite (ZCA) <25 Cutoff <25 ng/mL    6-Acetylmorphine <25 Cutoff <25 ng/mL    Codeine <50 Cutoff <50 ng/mL    Hydrocodone 650 (A) Cutoff <25 ng/mL    Hydromorphone 38 (A) Cutoff <25 ng/mL    Morphine Urine <50 Cutoff <50 ng/mL    Norhydrocodone 624 (A) Cutoff <25 ng/mL    Noroxycodone <25 Cutoff <25 ng/mL    Oxycodone <25 Cutoff <25 ng/mL    Oxymorphone <25 Cutoff <25 ng/mL    Tramadol <50 Cutoff <50 ng/mL    O-Desmethyltramadol <50 Cutoff <50 ng/mL    Fentanyl <2.5 Cutoff<2.5 ng/mL    Norfentanyl <2.5 Cutoff<2.5 ng/mL    METHADONE CONFIRMATION,URINE <25 Cutoff <25 ng/mL    EDDP <25 Cutoff <25 ng/mL     Results are as expected.     Controlled Substance Agreement:  Date of the Last Agreement: 5-  Reviewed Controlled Substance Agreement including but not limited to the benefits, risks, and alternatives to treatment with a Controlled Substance medication(s).    Opioids:  What is the patient's goal of therapy?  Mitten pain in the low back and mostly good morning and evening pain in his mid anterior medial right knee.  Benefits always a risk and no evidence abuse side effects or divergence  Is this being achieved with current treatment? yes    I have calculated the patient's Morphine Dose Equivalent (MED):   I have considered referral to Pain Management and/or a specialist, and do not feel it is necessary at this  time.    I feel that it is clinically indicated to continue this current medication regimen after consideration of alternative therapies, and other non-opioid treatment.OARRS:  No data recorded  I have personally reviewed the OARRS report for Emerson Chilel. I have considered the risks of abuse, dependence, addiction and diversion    Is the patient prescribed a combination of a benzodiazepine and opioid?  Yes, I feel it is clincially indicated to continue the medication and have discussed with the patient risks/benefits/alternatives.    Last Urine Drug Screen / ordered today: Yes  Recent Results (from the past 13658 hour(s))   OPIATE/OPIOID/BENZO PRESCRIPTION COMPLIANCE    Collection Time: 04/15/22  9:22 AM   Result Value Ref Range    DRUG SCREEN COMMENT URINE SEE BELOW     Creatine, Urine 44.1 mg/dL    Amphetamine Screen, Urine PRESUMPTIVE NEGATIVE NEGATIVE    Barbiturate Screen, Urine PRESUMPTIVE NEGATIVE NEGATIVE    Cannabinoid Screen, Urine PRESUMPTIVE NEGATIVE NEGATIVE    Cocaine Screen, Urine PRESUMPTIVE NEGATIVE NEGATIVE    PCP Screen, Urine PRESUMPTIVE NEGATIVE NEGATIVE    7-Aminoclonazepam <25 Cutoff <25 ng/mL    Alpha-Hydroxyalprazolam 26 (A) Cutoff <25 ng/mL    Alpha-Hydroxymidazolam <25 Cutoff <25 ng/mL    Alprazolam <25 Cutoff <25 ng/mL    Chlordiazepoxide <25 Cutoff <25 ng/mL    Clonazepam <25 Cutoff <25 ng/mL    Diazepam <25 Cutoff <25 ng/mL    Lorazepam <25 Cutoff <25 ng/mL    Midazolam <25 Cutoff <25 ng/mL    Nordiazepam <25 Cutoff <25 ng/mL    Oxazepam <25 Cutoff <25 ng/mL    Temazepam <25 Cutoff <25 ng/mL    Zolpidem <25 Cutoff <25 ng/mL    Zolpidem Metabolite (ZCA) <25 Cutoff <25 ng/mL    6-Acetylmorphine <25 Cutoff <25 ng/mL    Codeine <50 Cutoff <50 ng/mL    Hydrocodone 650 (A) Cutoff <25 ng/mL    Hydromorphone 38 (A) Cutoff <25 ng/mL    Morphine Urine <50 Cutoff <50 ng/mL    Norhydrocodone 624 (A) Cutoff <25 ng/mL    Noroxycodone <25 Cutoff <25 ng/mL    Oxycodone <25 Cutoff <25 ng/mL     Oxymorphone <25 Cutoff <25 ng/mL    Tramadol <50 Cutoff <50 ng/mL    O-Desmethyltramadol <50 Cutoff <50 ng/mL    Fentanyl <2.5 Cutoff<2.5 ng/mL    Norfentanyl <2.5 Cutoff<2.5 ng/mL    METHADONE CONFIRMATION,URINE <25 Cutoff <25 ng/mL    EDDP <25 Cutoff <25 ng/mL     Results are as expected.     Controlled Substance Agreement:  Date of the Last Agreement: 5-  Reviewed Controlled Substance Agreement including but not limited to the benefits, risks, and alternatives to treatment with a Controlled Substance medication(s).    Benzodiazepines:  What is the patient's goal of therapy?  Quality life control of as needed anxiety especially in the afternoon evening hours this is definitely helped his ER visits with chest pain palpitations and anxiety attacks benefits always a risk and Oarrs abuse or side effects  Is this being achieved with current treatment? yes    FRANTZ-7:  No data recorded    Activities of Daily Living:   Is your overall impression that this patient is benefiting (symptom reduction outweighs side effects) from benzodiazepine therapy? Yes     1. Physical Functioning: Better  2. Family Relationship: Better  3. Social Relationship: Better  4. Mood: Better  5. Sleep Patterns: Better  6. Overall Function: Better    Opioid Risk Screening:  No data recorded    Pain Assessment:  No data recorded  Review of Systems   Constitutional:  Negative for fatigue and fever.   Eyes:  Negative for visual disturbance.   Respiratory:  Negative for cough, chest tightness and shortness of breath.    Cardiovascular:  Negative for chest pain, palpitations and leg swelling.   Gastrointestinal:  Negative for abdominal pain and blood in stool.   Endocrine: Negative for cold intolerance.   Genitourinary:  Negative for dysuria, frequency and hematuria.   Musculoskeletal:  Positive for arthralgias, back pain and joint swelling. Negative for myalgias.   Skin:  Negative for rash.   Neurological:  Positive for weakness. Negative for  "syncope, numbness and headaches.   Psychiatric/Behavioral:  Positive for sleep disturbance. Negative for behavioral problems and suicidal ideas. The patient is nervous/anxious.        Objective   /76   Pulse 68   Temp 36.7 °C (98 °F)   Resp 16   Ht 1.702 m (5' 7\")   Wt 66.7 kg (147 lb)   SpO2 96%   BMI 23.02 kg/m²           Physical Exam  Vital signs reviewed and normal  General-inspection reveals well-developed well-nourished individual in no acute distress  ENT-watery rhinorrhea otherwise no thick exudate oral exam is benign no oral lesions noted  Neck neck is all without masses adenopathy bruits or rigidity thyroid is normal no significant paravertebral muscle tenderness except for mild tightness  Respiratory-diminished breath sound the bases otherwise no wheezing rales or rhonchi  Cardiovascular grade 1/2 over 6 is logics murmur otherwise no diastolic murmurs, ectopy, or gallop  Abdominal no organomegaly mass or rebound no CVA tenderness  Musculoskeletal mild tenderness the paravertebral muscles from L3-L5 especially the right medial SI joint definite tenderness of the medial and anterior medial and right anserine bursa of the right knee otherwise no redness warmth or effusion range of motion is normal hips show good range of motion no acute synovitis upper lower extremities  Peripheral vascular slightly diminished pulses of the lower extremities otherwise symmetrical no symmetric or asymmetric edema no motor or sensory deficits lower extremities  Skin no rash petechiae jaundice  Mood mood is stable out anxiety depressive or cognitive issues  Reviewed medicines safety issues after Oarrs and templates completed      Assessment/Plan   Problem List Items Addressed This Visit    None  Oarrs done.  Med agreement done urine tox screen done.  Templates x2 is completed  Safety is reviewed  Continue above medicine side effects reviewed the above  In 3 months or earlier if normal problems maintain low-salt " low-fat diet maintain the above statin therapy antihypertensive antianginal medicines.  Needed as well as Vicodin  Healthy routines and tobacco withdrawal as well as patient alcohol  @discharge  The above diagnosis and treatment plan was discussed with the patient patient will continue appropriate diet and exercise as reviewed  Patient will recheck earlier if any interval problems of significance or clinical worsening of the above problems.  Agrees above surveillance.  All question were addressed regarding above meds

## 2023-05-29 PROBLEM — Z79.899 MEDICATION MANAGEMENT: Status: ACTIVE | Noted: 2023-05-29

## 2023-05-29 ASSESSMENT — ENCOUNTER SYMPTOMS
FATIGUE: 0
MYALGIAS: 0
CHEST TIGHTNESS: 0
ARTHRALGIAS: 1
HEADACHES: 0
WEAKNESS: 1
SHORTNESS OF BREATH: 0
COUGH: 0
BLOOD IN STOOL: 0
FEVER: 0
NUMBNESS: 0
SLEEP DISTURBANCE: 1
BACK PAIN: 1
ABDOMINAL PAIN: 0
DYSURIA: 0
JOINT SWELLING: 1
FREQUENCY: 0
NERVOUS/ANXIOUS: 1
HEMATURIA: 0
PALPITATIONS: 0

## 2023-05-31 PROCEDURE — 80365 DRUG SCREENING OXYCODONE: CPT

## 2023-05-31 PROCEDURE — 80358 DRUG SCREENING METHADONE: CPT

## 2023-05-31 PROCEDURE — 80346 BENZODIAZEPINES1-12: CPT

## 2023-05-31 PROCEDURE — 80373 DRUG SCREENING TRAMADOL: CPT

## 2023-05-31 PROCEDURE — 80361 OPIATES 1 OR MORE: CPT

## 2023-05-31 PROCEDURE — 80354 DRUG SCREENING FENTANYL: CPT

## 2023-05-31 PROCEDURE — 80307 DRUG TEST PRSMV CHEM ANLYZR: CPT

## 2023-05-31 PROCEDURE — 80368 SEDATIVE HYPNOTICS: CPT

## 2023-06-02 LAB
6-ACETYLMORPHINE: <25 NG/ML
7-AMINOCLONAZEPAM: <25 NG/ML
ALPHA-HYDROXYALPRAZOLAM: 27 NG/ML
ALPHA-HYDROXYMIDAZOLAM: <25 NG/ML
ALPRAZOLAM: <25 NG/ML
AMPHETAMINE (PRESENCE) IN URINE BY SCREEN METHOD: ABNORMAL
BARBITURATES PRESENCE IN URINE BY SCREEN METHOD: ABNORMAL
CANNABINOIDS IN URINE BY SCREEN METHOD: ABNORMAL
CHLORDIAZEPOXIDE: <25 NG/ML
CLONAZEPAM: <25 NG/ML
COCAINE (PRESENCE) IN URINE BY SCREEN METHOD: ABNORMAL
CODEINE: <50 NG/ML
CREATINE, URINE FOR DRUG: 36.5 MG/DL
DIAZEPAM: <25 NG/ML
DRUG SCREEN COMMENT URINE: ABNORMAL
EDDP: <25 NG/ML
FENTANYL CONFIRMATION, URINE: <2.5 NG/ML
HYDROCODONE: 590 NG/ML
HYDROMORPHONE: 26 NG/ML
LORAZEPAM: <25 NG/ML
METHADONE CONFIRMATION,URINE: <25 NG/ML
MIDAZOLAM: <25 NG/ML
MORPHINE URINE: <50 NG/ML
NORDIAZEPAM: <25 NG/ML
NORFENTANYL: <2.5 NG/ML
NORHYDROCODONE: 504 NG/ML
NOROXYCODONE: <25 NG/ML
O-DESMETHYLTRAMADOL: <50 NG/ML
OXAZEPAM: <25 NG/ML
OXYCODONE: <25 NG/ML
OXYMORPHONE: <25 NG/ML
PHENCYCLIDINE (PRESENCE) IN URINE BY SCREEN METHOD: ABNORMAL
TEMAZEPAM: <25 NG/ML
TRAMADOL: <50 NG/ML
ZOLPIDEM METABOLITE (ZCA): <25 NG/ML
ZOLPIDEM: <25 NG/ML

## 2023-06-12 DIAGNOSIS — M17.11 OSTEOARTHRITIS OF RIGHT KNEE, UNSPECIFIED OSTEOARTHRITIS TYPE: ICD-10-CM

## 2023-06-12 RX ORDER — HYDROCODONE BITARTRATE AND ACETAMINOPHEN 5; 325 MG/1; MG/1
1 TABLET ORAL 2 TIMES DAILY PRN
Qty: 60 TABLET | Refills: 0 | Status: SHIPPED | OUTPATIENT
Start: 2023-06-12 | End: 2023-07-11 | Stop reason: SDUPTHER

## 2023-06-12 NOTE — TELEPHONE ENCOUNTER
Rx Refill Request     Name: Emerson Chilel  :  1960   Medication Name: Hydrocodone  Acetaminophen 5-325 MG-Take 1 tablet by mouth twice daily as needed for moderate pain.  Specific Pharmacy location:  [facility]  Date of last appointment:  2023   Date of next appointment:  2023   Best number to reach patient:  [phone]

## 2023-06-14 PROBLEM — R06.02 SOB (SHORTNESS OF BREATH): Status: ACTIVE | Noted: 2020-01-07

## 2023-06-14 PROBLEM — R27.0 ATAXIA: Status: ACTIVE | Noted: 2021-12-20

## 2023-06-14 PROBLEM — F32.A DEPRESSION: Status: ACTIVE | Noted: 2023-06-14

## 2023-07-11 DIAGNOSIS — M17.11 OSTEOARTHRITIS OF RIGHT KNEE, UNSPECIFIED OSTEOARTHRITIS TYPE: ICD-10-CM

## 2023-07-11 RX ORDER — HYDROCODONE BITARTRATE AND ACETAMINOPHEN 5; 325 MG/1; MG/1
1 TABLET ORAL 2 TIMES DAILY PRN
Qty: 60 TABLET | Refills: 0 | Status: SHIPPED | OUTPATIENT
Start: 2023-07-11 | End: 2023-08-09 | Stop reason: SDUPTHER

## 2023-07-11 NOTE — TELEPHONE ENCOUNTER
Rx Refill Request     Name: Emerson Chilel  :  1960   Medication Name:  Hydrocodone Acetaminophen 5-325 MG-Take 1 tablet by mouth twice daily prn moderate pain.  Alprazolam 0.5 MG-Take 1 tablet daily prn anxiety.  Specific Pharmacy location:  [facility]   Date of last appointment:  2023   Date of next appointment:  2023   Best number to reach patient:  [phone]

## 2023-07-18 ENCOUNTER — PATIENT OUTREACH (OUTPATIENT)
Dept: PRIMARY CARE | Facility: CLINIC | Age: 63
End: 2023-07-18
Payer: COMMERCIAL

## 2023-07-18 DIAGNOSIS — F41.9 ANXIETY: Primary | ICD-10-CM

## 2023-07-18 RX ORDER — CLOPIDOGREL BISULFATE 75 MG/1
1 TABLET ORAL DAILY
COMMUNITY
Start: 2023-07-17

## 2023-07-18 RX ORDER — ALPRAZOLAM 0.5 MG/1
TABLET ORAL
Qty: 30 TABLET | Refills: 0 | Status: SHIPPED | OUTPATIENT
Start: 2023-07-18 | End: 2023-08-31 | Stop reason: SDUPTHER

## 2023-07-18 RX ORDER — ATORVASTATIN CALCIUM 40 MG/1
1 TABLET, FILM COATED ORAL DAILY
COMMUNITY
Start: 2023-07-17 | End: 2024-05-10 | Stop reason: SDUPTHER

## 2023-07-18 NOTE — PROGRESS NOTES
Discharge Facility: [facility]  Discharge Diagnosis: NSTEMI, HTN  Admission Date: 7/16/23  Discharge Date: 7/17/23    PCP Appointment Date: [facility]  Specialist Appointment Date:           Physician/Dept/Service:   Dr. Boston (Cardiology)          Reason for Referral:   Hospital Follow Up          Scheduled Date/Time:   03-Aug-2023 08:30    Hospital Encounter and Summary: not available at this time  See discharge assessment below for further details    Medications  Medications reviewed with patient/caregiver?: Yes (new/changes only) (7/18/2023  2:35 PM)  Is the patient having any side effects they believe may be caused by any medication additions or changes?: No (7/18/2023  2:35 PM)  Does the patient have all medications ordered at discharge?: Yes (7/18/2023  2:35 PM)  Care Management Interventions: No intervention needed (7/18/2023  2:35 PM)  Is the patient taking all medications as directed (includes completed medication regime)?: Yes (7/18/2023  2:35 PM)  Care Management Interventions: Provided patient education (7/18/2023  2:35 PM)  Medication Comments: DC pravastatin. Start plavix, atorvastatin (7/18/2023  2:35 PM)    Appointments  Does the patient have a primary care provider?: Yes (7/18/2023  2:35 PM)  Care Management Interventions: Advised patient to make appointment (7/18/2023  2:35 PM)    Self Management  Has home health visited the patient within 72 hours of discharge?: Not applicable (7/18/2023  2:35 PM)  What Durable Medical Equipment (DME) was ordered?: n/a (7/18/2023  2:35 PM)    Patient Teaching  Does the patient have access to their discharge instructions?: Yes (7/18/2023  2:35 PM)  Care Management Interventions: Reviewed instructions with patient (7/18/2023  2:35 PM)  What is the patient's perception of their health status since discharge?: Improving (7/18/2023  2:35 PM)  Is the patient/caregiver able to teach back the hierarchy of who to call/visit for symptoms/problems? PCP, Specialist, Home Health nurse,  Urgent Care, ED, 911: Yes (7/18/2023  2:35 PM)  Patient/Caregiver Education Comments: Patient reports cath site has some bruising. Denies bleeding, swelling, or other complications. States he is taking it easy for awhile. (7/18/2023  2:35 PM)

## 2023-07-26 DIAGNOSIS — J45.909 ASTHMA, UNSPECIFIED ASTHMA SEVERITY, UNSPECIFIED WHETHER COMPLICATED, UNSPECIFIED WHETHER PERSISTENT (HHS-HCC): Primary | ICD-10-CM

## 2023-08-02 DIAGNOSIS — I10 ESSENTIAL HYPERTENSION: Primary | ICD-10-CM

## 2023-08-03 ENCOUNTER — PATIENT OUTREACH (OUTPATIENT)
Dept: PRIMARY CARE | Facility: CLINIC | Age: 63
End: 2023-08-03
Payer: COMMERCIAL

## 2023-08-03 RX ORDER — AMLODIPINE BESYLATE 5 MG/1
5 TABLET ORAL DAILY
Qty: 30 TABLET | Refills: 0 | Status: SHIPPED | OUTPATIENT
Start: 2023-08-03 | End: 2023-10-17

## 2023-08-03 NOTE — PROGRESS NOTES
Unable to reach patient for call back after patient's recent hospitalization. Patient has not had follow up with PCP.   LVM with call back number for patient to call if needed   If no voicemail available call attempts x 2 were made to contact the patient to assist with any questions or concerns patient may have.

## 2023-08-09 ENCOUNTER — OFFICE VISIT (OUTPATIENT)
Dept: PRIMARY CARE | Facility: CLINIC | Age: 63
End: 2023-08-09
Payer: COMMERCIAL

## 2023-08-09 VITALS
BODY MASS INDEX: 23.39 KG/M2 | HEART RATE: 65 BPM | TEMPERATURE: 97.8 F | WEIGHT: 149 LBS | OXYGEN SATURATION: 97 % | SYSTOLIC BLOOD PRESSURE: 118 MMHG | HEIGHT: 67 IN | DIASTOLIC BLOOD PRESSURE: 72 MMHG | RESPIRATION RATE: 16 BRPM

## 2023-08-09 DIAGNOSIS — F41.9 ANXIETY: ICD-10-CM

## 2023-08-09 DIAGNOSIS — E78.5 DYSLIPIDEMIA: ICD-10-CM

## 2023-08-09 DIAGNOSIS — I10 ESSENTIAL HYPERTENSION: ICD-10-CM

## 2023-08-09 DIAGNOSIS — Z95.5 CORONARY STENT PATENT: ICD-10-CM

## 2023-08-09 DIAGNOSIS — I25.10 CORONARY ARTERY DISEASE INVOLVING NATIVE CORONARY ARTERY OF NATIVE HEART WITHOUT ANGINA PECTORIS: Chronic | ICD-10-CM

## 2023-08-09 DIAGNOSIS — M17.11 OSTEOARTHRITIS OF RIGHT KNEE, UNSPECIFIED OSTEOARTHRITIS TYPE: Primary | ICD-10-CM

## 2023-08-09 PROCEDURE — 1036F TOBACCO NON-USER: CPT | Performed by: FAMILY MEDICINE

## 2023-08-09 PROCEDURE — 99214 OFFICE O/P EST MOD 30 MIN: CPT | Performed by: FAMILY MEDICINE

## 2023-08-09 PROCEDURE — 3078F DIAST BP <80 MM HG: CPT | Performed by: FAMILY MEDICINE

## 2023-08-09 PROCEDURE — 3074F SYST BP LT 130 MM HG: CPT | Performed by: FAMILY MEDICINE

## 2023-08-09 RX ORDER — HYDROCODONE BITARTRATE AND ACETAMINOPHEN 5; 325 MG/1; MG/1
1 TABLET ORAL 2 TIMES DAILY PRN
Qty: 60 TABLET | Refills: 0 | Status: SHIPPED | OUTPATIENT
Start: 2023-08-09 | End: 2023-09-11 | Stop reason: SDUPTHER

## 2023-08-09 RX ORDER — NITROGLYCERIN 0.4 MG/1
0.4 TABLET SUBLINGUAL EVERY 5 MIN PRN
Qty: 25 TABLET | Refills: 3 | Status: SHIPPED | OUTPATIENT
Start: 2023-08-09

## 2023-08-09 NOTE — PROGRESS NOTES
Subjective   Patient ID: Emerson Chilel is a 63 y.o. male who presents for Hospital Follow-up (Patient was seen at [facility] on 07/16/2023 for Chest Pain. Patient was admitted for chest pain. Patient had Left heart cath and coronary angiography, KITTY of mid LAD on 07/17/2023. Patient was discharged on 07/17/2023.).  HPI  63-year-old gentleman history of dyslipidemia hypertension anxiety and arthritis is here to recheck after being discharged in middle of July for a unstable angina.  Was taken to the Cath Lab which showed significant narrowing in the mid LAD and underwent successful stenting.  Followed by Dr. Wolf.  Did have mild aortic regurgitation as well as tricuspid regurgitation.  Otherwise the patient is at discharge  Had to use nitroglycerin once and is done very nicely on the following medicines  Metoprolol aspirin Plavix and Lipitor.  Try to wean off smoking  Patient is a return to work without resting or exertional chest pain short of breath palpitations takes his pain medicine for his right knee twice a day without significant side effects benefits always a risk  Safety is reviewed  Opioid template completed  OARRS completed  Chronic medicines no reported side effects to date.  OARRS:  No data recorded  I have personally reviewed the OARRS report for Emerson Chilel. I have considered the risks of abuse, dependence, addiction and diversion    Is the patient prescribed a combination of a benzodiazepine and opioid?  Yes, I feel it is clincially indicated to continue the medication and have discussed with the patient risks/benefits/alternatives.    Last Urine Drug Screen / ordered today: Yes  Recent Results (from the past 16709 hour(s))   OPIATE/OPIOID/BENZO PRESCRIPTION COMPLIANCE    Collection Time: 05/31/23  4:02 PM   Result Value Ref Range    DRUG SCREEN COMMENT URINE SEE BELOW     Creatine, Urine 36.5 mg/dL    Amphetamine Screen, Urine PRESUMPTIVE NEGATIVE NEGATIVE    Barbiturate  Screen, Urine PRESUMPTIVE NEGATIVE NEGATIVE    Cannabinoid Screen, Urine PRESUMPTIVE NEGATIVE NEGATIVE    Cocaine Screen, Urine PRESUMPTIVE NEGATIVE NEGATIVE    PCP Screen, Urine PRESUMPTIVE NEGATIVE NEGATIVE    7-Aminoclonazepam <25 Cutoff <25 ng/mL    Alpha-Hydroxyalprazolam 27 (A) Cutoff <25 ng/mL    Alpha-Hydroxymidazolam <25 Cutoff <25 ng/mL    Alprazolam <25 Cutoff <25 ng/mL    Chlordiazepoxide <25 Cutoff <25 ng/mL    Clonazepam <25 Cutoff <25 ng/mL    Diazepam <25 Cutoff <25 ng/mL    Lorazepam <25 Cutoff <25 ng/mL    Midazolam <25 Cutoff <25 ng/mL    Nordiazepam <25 Cutoff <25 ng/mL    Oxazepam <25 Cutoff <25 ng/mL    Temazepam <25 Cutoff <25 ng/mL    Zolpidem <25 Cutoff <25 ng/mL    Zolpidem Metabolite (ZCA) <25 Cutoff <25 ng/mL    6-Acetylmorphine <25 Cutoff <25 ng/mL    Codeine <50 Cutoff <50 ng/mL    Hydrocodone 590 (A) Cutoff <25 ng/mL    Hydromorphone 26 (A) Cutoff <25 ng/mL    Morphine Urine <50 Cutoff <50 ng/mL    Norhydrocodone 504 (A) Cutoff <25 ng/mL    Noroxycodone <25 Cutoff <25 ng/mL    Oxycodone <25 Cutoff <25 ng/mL    Oxymorphone <25 Cutoff <25 ng/mL    Tramadol <50 Cutoff <50 ng/mL    O-Desmethyltramadol <50 Cutoff <50 ng/mL    Fentanyl <2.5 Cutoff<2.5 ng/mL    Norfentanyl <2.5 Cutoff<2.5 ng/mL    METHADONE CONFIRMATION,URINE <25 Cutoff <25 ng/mL    EDDP <25 Cutoff <25 ng/mL     Results are as expected.     Controlled Substance Agreement:  Date of the Last Agreement: 5-  Reviewed Controlled Substance Agreement including but not limited to the benefits, risks, and alternatives to treatment with a Controlled Substance medication(s).    Opioids:  What is the patient's goal of therapy?  All the alive control of his anxiety using Xanax once a day but he usually uses pain medicine twice a day for his painful right knee which is a quality life issue while working.  Benefits always a risk and Oarrs abuse side effects or divergence of either Xanax and/or Norco.  Is this being achieved with  "current treatment? yes    I have calculated the patient's Morphine Dose Equivalent (MED):   I have considered referral to Pain Management and/or a specialist, and do not feel it is necessary at this time.    I feel that it is clinically indicated to continue this current medication regimen after consideration of alternative therapies, and other non-opioid treatment.    Opioid Risk Screening:  No data recorded    Pain Assessment:  No data recorded  Review of Systems   Constitutional:  Negative for fatigue, fever and unexpected weight change.   Eyes:  Negative for visual disturbance.   Respiratory:  Negative for cough, chest tightness, shortness of breath and wheezing.    Cardiovascular:  Negative for chest pain, palpitations and leg swelling.   Gastrointestinal:  Negative for abdominal pain and blood in stool.   Genitourinary:  Negative for dysuria, frequency and hematuria.   Musculoskeletal:  Positive for arthralgias, back pain and joint swelling. Negative for myalgias.   Skin:  Negative for rash.   Neurological:  Negative for weakness, light-headedness and headaches.   Psychiatric/Behavioral:  Positive for dysphoric mood. Negative for behavioral problems, sleep disturbance and suicidal ideas. The patient is nervous/anxious.        Objective   /72 (BP Location: Right arm, Patient Position: Sitting, BP Cuff Size: Adult)   Pulse 65   Temp 36.6 °C (97.8 °F) (Temporal)   Resp 16   Ht 1.702 m (5' 7\")   Wt 67.6 kg (149 lb)   SpO2 97%   BMI 23.34 kg/m²     Electrocardiogram 12 Lead    Height: Not recorded   Weight: Not recorded   Blood Pressure: Not recorded    Date of Study: 07/19/2023   Ordering Provider: SOLOMON Perdomo-CNP   Clinical Indications: None Listed       Interpreting Physicians  Performing Staff   Zack Boston DO No performing staff assigned to study.           Indications  Priority: Routine  Not on file       Interpretation Summary    Sinus rhythm with occasional premature ventricular " complexes  T wave abnormality, consider anterior ischemia  Prolonged QT  Abnormal ECG  When compared with ECG of 17-JUL-2023 06:53,  T wave inversion now evident in Anterior leads  Confirmed by Zack Hackett ([id number]) on 7/19/2023 5:55:17 PM        Measurements    P Axis 72 degrees         P Offset 208 ms         OH Interval 154 ms         Q Onset 224 ms         QTC Calculation(Bazett) 490 ms         QTC Fredericia 462 ms         R Axis 15 degrees               Department    Name Address     [facility] LEGACY [address] [place] [address]  Virtual Department  [facility] [id number]           PACS Images     Show images for Adult Cath      Conclusion       [facility], Cath Lab  [address] [place] [id number]     Cardiovascular Catheterization Report     Patient Name:     SOLOMON YEUNG Performing Physician: Ed Barker MD  Study Date:       7/17/2023        Verifying Physician:  Ed Barker MD  MRN/PID:          [id number]         Cardiologist:  Accession/Order#: [id number]        Referring Physician:  ZACK HACKETT  YOB: 1960        Referring Physician:  Gender:           M                Referring Physician:  Ed Barker MD        Study: Left Heart Catheterization        Indications:  SOLOMON YEUNG is a 63 year old male who presents with dyslipidemia, prior percutaneous coronary intervention, hypertension, Current Smoker and a chest pain assessment of typical angina. Acute coronary syndrome - NSTEMI.  Stress test performed: No. CTA performed: NoConner Piedra accessed: No. LVEF  Assessed: No.  Cardiac arrest: No.  Cardiac surgical consult: No.  Cardiovascular Instability: No  Frailty status of patient entering lab: 6 = Moderate symptoms - needs help with  activities.        Procedure Description:  After infiltration with 2% Lidocaine, the right femoral artery was cannulated with a modified Seldinger technique. Subsequently a 5 German sheath was  placed in the right femoral artery. The arterial sheath was sized up to 6 Tunisian. Selective coronary catheterization was performed using a 5 Fr catheter(s) exchanged over a guide wire to cannulate the coronary arteries. A JL 4 tip catheter was used for left coronary injections. A 3DRC tip catheter was used for right coronary injections.  Multiple injections of contrast were made into the left and right coronary arteries with angiograms recorded in multiple projections. Retrograde left heart catheterizion was accomplished with a 5 Fr. pigtail catheter. A single plane left ventriculogram was recorded in the 30 degree KELLEY projection. The contrast dose was 20 ml injected at 10 ml/sec. The catheter was then withdrawn across the aortic valve under continuous pressure monitoring and removed. After completion of the procedure, femoral artery angiography was performed. This demonstrated a common femoral artery puncture appropriate for closure. A Vascade 6/7Fr vascular closure Device was placed per protocol the arterial sheath was pulled and a Hemostatic patch was applied to the site.     Coronary Angiography:  The coronary circulation is right dominant.     Left Main Coronary Artery:  There is 0% stenosis in the entire left main coronary artery.     Left Anterior Descending Coronary Artery Distribution:  There is 50% stenosis in the proximal left anterior descending artery. This vessel is diffusely diseased. There is 99% stenosis in the mid left anterior descending artery. There is evidence of instent restenosis in this segment. The devices advanced to the mid LAD lesion were: a balloon was inflated for pre-dilation, Resolute Bryan 2.75x18 stent was deployed in the lesion a balloon was inflated for post-dilation. Residual stenosis is <10 %. There is 60% stenosis in the the distal left anterior descending artery.     Right Coronary Artery Distribution:     There is 10-30% stenosis in the the proximal, the mid and the distal  Right Coronary Artery.        Left Ventriculography:  The estimated left ventricular ejection fraction is normal at 60%. There is apical akinesis noted.     Coronary Interventions:  Angiography reveals a 99% stenosis of the mid left anterior descending coronary artery. Pre-intervention JAGUAR flow was 2. Percutaneous coronary intervention was performed within the mid left anterior descending. The stenosis was successfully reduced from 99% to <10%. Post-intervention JAGUAR flow was 3.     Hemo Personnel:  lobin A1C  Order: [id number]  Status: Final result       Visible to patient: Yes (seen)    0 Result Notes       Component Ref Range & Units 4 wk ago   Hemoglobin A1C % 5.3   Comment:      Diagnosis of Diabetes-Adults   Non-Diabetic: < or = 5.6%   Increased risk for developing diabetes: 5.7-6.4%   Diagnostic of diabetes: > or = 6.5%  .       Monitoring of Diabetes                Age (y)     Therapeutic Goal (%)   Adults:          >18           <7.0   Pediatrics:    13-18           <7.5                   7-12          agnesium  Order: [id number]  Status: Final result       Visible to patient: Yes (not seen)    0 Result Notes        Component Ref Range & Units 4 wk ago 1 yr ago   Magnesium 1.60 - 2.40 mg/dL 1.99 2.20   Resulting Agency  [facility]                Specimen Collected: 07/16/23 21:40 Last Resulted: 07/16/23 22:06        Lab Flowsheet        Order Details        View Encounter        Lab and Collection Details        Routing        Result History               [facility]  Order: [id number]  Status: Final result       Visible to patient: Yes (not seen)    0 Result Notes            Component Ref Range & Units 4 wk ago  (7/16/23) 5 mo ago  (3/10/23) 1 yr ago  (1/4/22) 2 yr ago  (7/9/21) 2 yr ago  (12/18/20) 4 yr ago  (6/11/19)   Glucose 74 - 99 mg/dL 103 High  98  99 95 100 High    Sodium 136 - 145 mmol/L 134 Low  137  135 Low  137 140   Potassium 3.5 - 5.3 mmol/L 3.8 4.8 4.1 4.9 4.5  4.8   Chloride 98 - 107 mmol/L 99 104  101 101 104   Bicarbonate 21 - 32 mmol/L 21 26  25 24 29   Anion Gap 10 - 20 mmol/L 18 12  14 17 12   Urea Nitrogen 6 - 23 mg/dL 9 9  7 8 10   Creatinine 0.50 - 1.30 mg/dL 0.78 0.78  0.65 0.65 0.60   GFR MALE >90 mL/min/1.73m2 >90 >90 CM       Comment:  CALCULATIONS OF ESTIMATED GFR ARE PERFORMED   USING THE 2021 CKD-EPI STUDY REFIT EQUATION   WITHOUT THE RACE VARIABLE FOR THE IDMS-TRACEABLE   CREATININE METHODS.    https://jasn.asnjournals.org/content/early/2021/09/22/ASN.3909382550   Calcium 8.6 - 10.3 mg/dL 9.4 9.3  9.7 9.3 10.2 R   Albumin 3.4 - 5.0 g/dL 4.8 4.5   4.5 4.8   Alkaline Phosphatase 33 - 136 U/L 227 High  91   82 R 69 R   Total Protein 6.4 - 8.2 g/dL 7.5 7.0   7.0 7.1   AST 9 - 39 U/L 17 19   29 21   Total Bilirubin 0.0 - 1.2 mg/dL 0.5 0.8   0.6 0.5   ALT (SGPT) 10 - 52 U/L 21 19 CM   26 CM 26 CM   Comment:  Patients treated with Sulfasalazine may generate   falsely decreased result                   CONCLUSIONS:  1. Left ventricular systolic function is normal.  2. Spectral Doppler shows an impaired relaxation pattern of left ventricular diastolic filling.  3. There is moderate thickening and calcification of the posterior mitral valve leaflet.  4. Trace to mild tricuspid regurgitation.  5. Mild to moderate aortic valve regurgitation.     QUANTITATIVE DATA SUMMARY:  Physical Exam  All signs reviewed and normal pulse ox normal  General-inspection well-developed well-nourished individual in no acute distress  Musculoskeletal continued pain in the anterior especially medial aspect of the right knee no redness warmth or effusion of the joint some tenderness the anterior aspect of both shoulders but good range of motion some tenderness of the paravertebral muscles from L3-L5.  Neck neck is supple no mass adenopathy bruits rigidity no JVD thyroid is normal  Respiratory-chest clear anteriorly and posteriorly  Cardiovascular grade 1/6 systolic ejection murmur but no  striking diastolic murmurs heard no S3 gallop no ectopy  Abdominal no organomegaly mass or rebound no distention bowel sounds are normal no CVA tenderness  Peripheral vascular no symmetric or asymmetric edema distal pulses are slightly reduced otherwise symmetrical no sensorimotor deficits.  Neurological no cranial nerve deficits no focal deficit upper or lower extremities  Mood-stable without anxiety depressive features  Skin no rash petechiae or jaundice      Assessment/Plan   Problem List Items Addressed This Visit          Cardiac and Vasculature    Coronary artery disease involving native coronary artery of native heart without angina pectoris (Chronic)    Coronary stent patent   Patient will follow with cardiology  Patient will continue wean off smoking  OARRS performed  Safety issues reviewed  Template for opioids completed today.  We will continue his beta-blocker aspirin Plavix and Lipitor.  Continue his if needed Xanax and also his pain medicine for his knee.  Use and side effects reviewed aware not to coadministered these 2 medicines.  Follow-up in 2 months aware of use of nitroglycerin this is renewed OARRS performed  If worsening chest pain or shortness of breath despite treatment with weakness, proceed to ER   @discharge  The above diagnosis and treatment plan was discussed with the patient patient will continue appropriate diet and exercise as reviewed  Patient will recheck earlier if any interval problems of significance or clinical worsening of the above problems.  Agrees above surveillance.  All question were addressed regarding above meds

## 2023-08-11 ENCOUNTER — APPOINTMENT (OUTPATIENT)
Dept: PRIMARY CARE | Facility: CLINIC | Age: 63
End: 2023-08-11
Payer: COMMERCIAL

## 2023-08-13 ASSESSMENT — ENCOUNTER SYMPTOMS
FATIGUE: 0
FREQUENCY: 0
UNEXPECTED WEIGHT CHANGE: 0
FEVER: 0
SHORTNESS OF BREATH: 0
DYSURIA: 0
BACK PAIN: 1
WHEEZING: 0
CHEST TIGHTNESS: 0
NERVOUS/ANXIOUS: 1
HEADACHES: 0
MYALGIAS: 0
ARTHRALGIAS: 1
HEMATURIA: 0
DYSPHORIC MOOD: 1
BLOOD IN STOOL: 0
COUGH: 0
SLEEP DISTURBANCE: 0
LIGHT-HEADEDNESS: 0
ABDOMINAL PAIN: 0
PALPITATIONS: 0
JOINT SWELLING: 1
WEAKNESS: 0

## 2023-08-28 PROBLEM — E78.2 MIXED HYPERLIPIDEMIA: Status: ACTIVE | Noted: 2023-08-28

## 2023-08-28 RX ORDER — AMLODIPINE BESYLATE 10 MG/1
1 TABLET ORAL DAILY
COMMUNITY
Start: 2021-04-08 | End: 2024-06-09 | Stop reason: HOSPADM

## 2023-08-31 DIAGNOSIS — F41.9 ANXIETY: ICD-10-CM

## 2023-08-31 RX ORDER — ALPRAZOLAM 0.5 MG/1
TABLET ORAL
Qty: 30 TABLET | Refills: 0 | Status: SHIPPED | OUTPATIENT
Start: 2023-08-31 | End: 2023-10-17 | Stop reason: SDUPTHER

## 2023-08-31 NOTE — TELEPHONE ENCOUNTER
Rx Refill Request     Name: Emerson Chilel  :  1960   Medication Name:  XANAX 0.5  Specific Pharmacy location:  [facility]  Date of last appointment:  2023   Date of next appointment:  10/13/2023   Best number to reach patient:  [phone]

## 2023-09-11 DIAGNOSIS — M17.11 OSTEOARTHRITIS OF RIGHT KNEE, UNSPECIFIED OSTEOARTHRITIS TYPE: ICD-10-CM

## 2023-09-11 RX ORDER — HYDROCODONE BITARTRATE AND ACETAMINOPHEN 5; 325 MG/1; MG/1
1 TABLET ORAL 2 TIMES DAILY PRN
Qty: 60 TABLET | Refills: 0 | Status: SHIPPED | OUTPATIENT
Start: 2023-09-11 | End: 2023-10-13 | Stop reason: SDUPTHER

## 2023-09-11 NOTE — TELEPHONE ENCOUNTER
Rx Refill Request Telephone Encounter    Name:  Emerson Chilel  :  [id number]  Medication Name:  Hydrocodone-acetaminophen (Norco) 5-325 mg   Specific Pharmacy location:  [facility]  Date of last appointment:    Date of next appointment:  10/13  Best number to reach patient:  [phone]

## 2023-09-28 LAB
ALANINE AMINOTRANSFERASE (SGPT) (U/L) IN SER/PLAS: 30 U/L (ref 10–52)
ALBUMIN (G/DL) IN SER/PLAS: 4.5 G/DL (ref 3.4–5)
ALKALINE PHOSPHATASE (U/L) IN SER/PLAS: 91 U/L (ref 33–136)
ANION GAP IN SER/PLAS: 14 MMOL/L (ref 10–20)
ASPARTATE AMINOTRANSFERASE (SGOT) (U/L) IN SER/PLAS: 26 U/L (ref 9–39)
BILIRUBIN TOTAL (MG/DL) IN SER/PLAS: 0.4 MG/DL (ref 0–1.2)
CALCIUM (MG/DL) IN SER/PLAS: 9.2 MG/DL (ref 8.6–10.3)
CARBON DIOXIDE, TOTAL (MMOL/L) IN SER/PLAS: 22 MMOL/L (ref 21–32)
CHLORIDE (MMOL/L) IN SER/PLAS: 104 MMOL/L (ref 98–107)
CHOLESTEROL (MG/DL) IN SER/PLAS: 131 MG/DL (ref 0–199)
CHOLESTEROL IN HDL (MG/DL) IN SER/PLAS: 57.3 MG/DL
CHOLESTEROL/HDL RATIO: 2.3
CREATININE (MG/DL) IN SER/PLAS: 0.74 MG/DL (ref 0.5–1.3)
GFR MALE: >90 ML/MIN/1.73M2
GLUCOSE (MG/DL) IN SER/PLAS: 92 MG/DL (ref 74–99)
LDL: 59 MG/DL (ref 0–99)
POTASSIUM (MMOL/L) IN SER/PLAS: 4.4 MMOL/L (ref 3.5–5.3)
PROTEIN TOTAL: 7.2 G/DL (ref 6.4–8.2)
SODIUM (MMOL/L) IN SER/PLAS: 136 MMOL/L (ref 136–145)
TRIGLYCERIDE (MG/DL) IN SER/PLAS: 74 MG/DL (ref 0–149)
UREA NITROGEN (MG/DL) IN SER/PLAS: 8 MG/DL (ref 6–23)
VLDL: 15 MG/DL (ref 0–40)

## 2023-10-03 DIAGNOSIS — J45.909 ASTHMA, UNSPECIFIED ASTHMA SEVERITY, UNSPECIFIED WHETHER COMPLICATED, UNSPECIFIED WHETHER PERSISTENT (HHS-HCC): Primary | ICD-10-CM

## 2023-10-04 RX ORDER — ALBUTEROL SULFATE 90 UG/1
AEROSOL, METERED RESPIRATORY (INHALATION)
Qty: 8.5 G | Refills: 5 | Status: SHIPPED | OUTPATIENT
Start: 2023-10-04 | End: 2024-06-09 | Stop reason: HOSPADM

## 2023-10-04 NOTE — TELEPHONE ENCOUNTER
Rx Refill Request Telephone Encounter    Name:  Emerson Chilel  :  [id number]  Medication Name:  albuterol inhaler 90   Specific Pharmacy location:  [facility]   Date of last appointment:  2023  Date of next appointment:  10/13/2023  Best number to reach patient:   [phone]

## 2023-10-05 ENCOUNTER — APPOINTMENT (OUTPATIENT)
Dept: CARDIOLOGY | Facility: CLINIC | Age: 63
End: 2023-10-05
Payer: COMMERCIAL

## 2023-10-11 ENCOUNTER — PATIENT OUTREACH (OUTPATIENT)
Dept: PRIMARY CARE | Facility: CLINIC | Age: 63
End: 2023-10-11

## 2023-10-11 ENCOUNTER — OFFICE VISIT (OUTPATIENT)
Dept: CARDIOLOGY | Facility: CLINIC | Age: 63
End: 2023-10-11
Payer: COMMERCIAL

## 2023-10-11 VITALS
DIASTOLIC BLOOD PRESSURE: 80 MMHG | HEIGHT: 66 IN | HEART RATE: 64 BPM | WEIGHT: 143.8 LBS | SYSTOLIC BLOOD PRESSURE: 156 MMHG | BODY MASS INDEX: 23.11 KG/M2

## 2023-10-11 DIAGNOSIS — I10 ESSENTIAL HYPERTENSION: ICD-10-CM

## 2023-10-11 DIAGNOSIS — F17.200 CURRENT EVERY DAY SMOKER: ICD-10-CM

## 2023-10-11 DIAGNOSIS — I35.1 AORTIC VALVE INSUFFICIENCY, ETIOLOGY OF CARDIAC VALVE DISEASE UNSPECIFIED: ICD-10-CM

## 2023-10-11 DIAGNOSIS — E78.2 MIXED HYPERLIPIDEMIA: ICD-10-CM

## 2023-10-11 DIAGNOSIS — I25.10 CORONARY ARTERY DISEASE INVOLVING NATIVE CORONARY ARTERY OF NATIVE HEART WITHOUT ANGINA PECTORIS: Chronic | ICD-10-CM

## 2023-10-11 DIAGNOSIS — I51.7 LVH (LEFT VENTRICULAR HYPERTROPHY): ICD-10-CM

## 2023-10-11 PROCEDURE — 3008F BODY MASS INDEX DOCD: CPT | Performed by: INTERNAL MEDICINE

## 2023-10-11 PROCEDURE — 3077F SYST BP >= 140 MM HG: CPT | Performed by: INTERNAL MEDICINE

## 2023-10-11 PROCEDURE — 3079F DIAST BP 80-89 MM HG: CPT | Performed by: INTERNAL MEDICINE

## 2023-10-11 PROCEDURE — 99214 OFFICE O/P EST MOD 30 MIN: CPT | Performed by: INTERNAL MEDICINE

## 2023-10-11 PROCEDURE — 4004F PT TOBACCO SCREEN RCVD TLK: CPT | Performed by: INTERNAL MEDICINE

## 2023-10-11 RX ORDER — HYDROCHLOROTHIAZIDE 12.5 MG/1
12.5 CAPSULE ORAL EVERY MORNING
Qty: 30 CAPSULE | Refills: 11 | Status: SHIPPED | OUTPATIENT
Start: 2023-10-11 | End: 2024-06-09 | Stop reason: HOSPADM

## 2023-10-11 NOTE — PROGRESS NOTES
Patient:  Emerson Chilel  YOB: 1960  MRN: [id number]       Chief Complaint/Active Symptoms:       Emerson Chilel is a 63 y.o. male who returns today for cardiac follow-up.      Objective:   Patient is a 63-year-old  male with past medical history significant for coronary artery disease, non-ST elevation myocardial infarction, multivessel PCI most recent drug-eluting stent LAD 2023, hypertension, left ventricular hypertrophy, aortic valve regurgitation, hyperlipidemia, tobacco abuse, COPD, asthma, anxiety, depression, spinal stenosis who presents for follow-up cardiovascular care.    Denies chest pain.  He has had less dyspnea. He denies palpitations, nausea, vomiting, dizziness, near-syncope, arianne syncope, edema. Currently smoking 1/2 pack of cigarettes per day. Trying to quit.  Home blood pressure readings are mild-moderately elevated.  He likely has excessive sodium in his diet.  No formal exercise program.    Past surgical history:  Right knee meniscus repair  Infant hernia repair    Social history:  Smoking up to 1 pack of cigarettes per day since age 12  Occasional alcohol use    Family history:  Mother  history of pancreatic cancer, cerebrovascular accident, heart disease  Father  suicide  Brother history of coronary artery disease/PCI  Brother history of coronary artery bypass    Review of systems have been reviewed and are negative, noncontributory, or as previous mentioned x 12 systems.    I personally reviewed EKG 2023: Normal sinus rhythm, frequent PVCs in bigeminal pattern  Vitals:    10/11/23 1048   BP: 156/80   Pulse: 64       Vitals:    10/11/23 1048   Weight: 65.2 kg (143 lb 12.8 oz)       Allergies:     Allergies   Allergen Reactions    Ramipril Unknown     hyperkalemia          Medications:     Current Outpatient Medications   Medication Instructions    albuterol 90 mcg/actuation inhaler INHALE TWO PUFFS EVERY FOUR HOURS AS NEEDED     ALPRAZolam (Xanax) 0.5 mg tablet TAKE ONE TABLET (0.5 MG) BY MOUTH ONCE DAILY AS NEEDED FOR ANXIETY    amLODIPine (Norvasc) 10 mg tablet 1 tablet, oral, Daily    aspirin 81 mg, oral, Daily    atorvastatin (Lipitor) 40 mg tablet 1 tablet, oral, Daily    clopidogrel (Plavix) 75 mg tablet 1 tablet, oral, Daily    FLUoxetine (PROzac) 20 mg capsule 1 capsule, oral, Daily    HYDROcodone-acetaminophen (Norco) 5-325 mg tablet 1 tablet, oral, 2 times daily PRN    metoprolol tartrate (Lopressor) 25 mg tablet take 1/2 of a tablet BY MOUTH AT BEDTIME    naloxone (Narcan) 4 mg/0.1 mL nasal spray nasal    nitroglycerin (NITROSTAT) 0.4 mg, sublingual, Every 5 min PRN       Physical Examination:   GENERAL:  Well developed, well nourished, in no acute distress.  CHEST:  Symmetric and nontender.  NEURO/PSYCH:  Alert and oriented times three with approppriate behavior and responses.  NECK:  Supple, no JVD, no bruit.  LUNGS:  Clear to auscultation bilaterally, normal respiratory effort. Bilateral wheezing  HEART:  Rate and rhythm regular with no evident murmur, no gallop appreciated.        There are no rubs, clicks or heaves.  EXTREMITIES:  Warm with good color, no clubbing or cyanosis.  There is no edema noted.  PERIPHERAL VASCULAR:  Pulses present and equally palpable; 2+ throughout.      Lab:     CBC:   Lab Results   Component Value Date    WBC 10.2 07/16/2023    RBC 4.98 07/16/2023    HGB 17.3 07/16/2023    HCT 48.5 07/16/2023     07/16/2023        CMP:    Lab Results   Component Value Date     09/28/2023    K 4.4 09/28/2023     09/28/2023    CO2 22 09/28/2023    BUN 8 09/28/2023    CREATININE 0.74 09/28/2023    GLUCOSE 92 09/28/2023    CALCIUM 9.2 09/28/2023       Magnesium:    Lab Results   Component Value Date    MG 1.99 07/16/2023       Lipid Profile:    Lab Results   Component Value Date    TRIG 74 09/28/2023    HDL 57.3 09/28/2023    LDLDIRECT 77 07/09/2021         HgBA1c:    Lab Results   Component Value  Date    HGBA1C 5.3 07/16/2023       BMP:  Lab Results   Component Value Date     09/28/2023     (L) 07/16/2023     03/10/2023    K 4.4 09/28/2023    K 3.8 07/16/2023    K 4.8 03/10/2023     09/28/2023    CL 99 07/16/2023     03/10/2023    CO2 22 09/28/2023    CO2 21 07/16/2023    CO2 26 03/10/2023    BUN 8 09/28/2023    BUN 9 07/16/2023    BUN 9 03/10/2023    CREATININE 0.74 09/28/2023    CREATININE 0.78 07/16/2023    CREATININE 0.78 03/10/2023       CBC:  Lab Results   Component Value Date    WBC 10.2 07/16/2023    RBC 4.98 07/16/2023    HGB 17.3 07/16/2023    HCT 48.5 07/16/2023    MCV 97 07/16/2023    MCHC 35.7 07/16/2023    RDW 12.5 07/16/2023     07/16/2023       Cardiac Enzymes:    Lab Results   Component Value Date    TROPHS 93 (H) 07/17/2023    TROPHS 71 (HH) 07/17/2023    TROPHS 41 (H) 07/17/2023       Hepatic Function Panel:    Lab Results   Component Value Date    ALKPHOS 91 09/28/2023    ALT 30 09/28/2023    AST 26 09/28/2023    PROT 7.2 09/28/2023    BILITOT 0.4 09/28/2023         Diagnostic Studies:     Electrocardiogram 12 Lead  Result Date: 7/20/2023  Sinus rhythm with frequent premature ventricular complexes Otherwise normal ECG When compared with ECG of 16-JUL-2023 21:41, Previous ECG has undetermined rhythm, needs review Confirmed by Zack Boston ([id number]) on 7/20/2023 8:18:14 AM        The following cardiovascular studies have been updated and reviewed    Echo 7/17/23  1. EF normal  2. Mild concentric LVH  3. Mild tricuspid regurgitation  4. Mild to moderate aortic valve regurgitation     Labs 9/28/23  , TG 74, HDL 57, LDL 59    XR chest 1 view  Result Date: 7/16/2023  No evidence of acute cardiopulmonary process. Hyperinflated lungs, as before, in keeping with emphysema/COPD.            Patient Active Problem List   Diagnosis    Anxiety    Acne rosacea    Coronary stent patent    Degeneration of cervical intervertebral disc with myelopathy     Dyslipidemia    Left lumbar radiculopathy    Lumbar disc disease with radiculopathy    Neck pain    Osseous stenosis of neural canal of cervical region    Osteoarthritis of right knee    Sciatica of right side    Tremor of right hand    Medication management    Asthma    Ataxia    Closed fracture of distal phalanx or phalanges of hand    Coronary artery disease involving native coronary artery of native heart without angina pectoris    Depression    Essential hypertension    SOB (shortness of breath)    Traumatic amputation of finger    Closed fracture of distal phalanx of digit of hand    Mixed hyperlipidemia         ASSESSMENT   Coronary artery disease/non-ST elevation myocardial infarction/multivessel PCI most recent drug-eluting stent LAD July 17, 2023  Hypertension  Left ventricular hypertrophy  Aortic valve regurgitation  Hyperlipidemia  Active tobacco abuse  COPD/asthma  Anxiety/depression  Spinal stenosis      PLAN     Patient appears to be stable from a cardiac standpoint.  No symptoms of angina.  No evidence of heart failure.  No symptomatic valvular disease.  No symptomatic arrhythmia.  Blood pressure remains elevated.  Add hydrochlorothiazide 12.5 mg daily.  I have asked the patient to maintain a blood pressure diary prior to office visits. Advise low-sodium diet less than 2000 mg sodium per day. Advise use of sublingual nitroglycerin as needed chest pain. I advised smoking cessation explained the adverse effects on cardiac, pulmonary, vascular systems. Will have the patient follow-up with myself in 2 months. Maintain blood pressure prior to office visits.  Advise exercise program 30 minutes/day.    Please excuse any errors in grammar or translation related to this dictation. Voice recognition software was utilized to prepare this document.

## 2023-10-11 NOTE — PROGRESS NOTES
Unable to reach patient for call back after patient's follow up appointment with PCP.   IMELDAM with call back number for patient to call if needed   If no voicemail available call attempts x 2 were made to contact the patient to assist with any questions or concerns patient may have.

## 2023-10-13 ENCOUNTER — OFFICE VISIT (OUTPATIENT)
Dept: PRIMARY CARE | Facility: CLINIC | Age: 63
End: 2023-10-13
Payer: COMMERCIAL

## 2023-10-13 VITALS
RESPIRATION RATE: 16 BRPM | TEMPERATURE: 97.5 F | HEART RATE: 68 BPM | DIASTOLIC BLOOD PRESSURE: 78 MMHG | OXYGEN SATURATION: 99 % | WEIGHT: 141 LBS | BODY MASS INDEX: 22.66 KG/M2 | HEIGHT: 66 IN | SYSTOLIC BLOOD PRESSURE: 116 MMHG

## 2023-10-13 DIAGNOSIS — M17.11 OSTEOARTHRITIS OF RIGHT KNEE, UNSPECIFIED OSTEOARTHRITIS TYPE: Primary | ICD-10-CM

## 2023-10-13 DIAGNOSIS — I10 ESSENTIAL HYPERTENSION: ICD-10-CM

## 2023-10-13 DIAGNOSIS — E78.5 DYSLIPIDEMIA: ICD-10-CM

## 2023-10-13 DIAGNOSIS — I25.10 CORONARY ARTERY DISEASE INVOLVING NATIVE CORONARY ARTERY OF NATIVE HEART WITHOUT ANGINA PECTORIS: Chronic | ICD-10-CM

## 2023-10-13 DIAGNOSIS — Z23 NEED FOR INFLUENZA VACCINATION: ICD-10-CM

## 2023-10-13 DIAGNOSIS — J41.0 SIMPLE CHRONIC BRONCHITIS (MULTI): ICD-10-CM

## 2023-10-13 DIAGNOSIS — F41.9 ANXIETY: ICD-10-CM

## 2023-10-13 PROCEDURE — 3078F DIAST BP <80 MM HG: CPT | Performed by: FAMILY MEDICINE

## 2023-10-13 PROCEDURE — 4004F PT TOBACCO SCREEN RCVD TLK: CPT | Performed by: FAMILY MEDICINE

## 2023-10-13 PROCEDURE — 3074F SYST BP LT 130 MM HG: CPT | Performed by: FAMILY MEDICINE

## 2023-10-13 PROCEDURE — 90471 IMMUNIZATION ADMIN: CPT | Performed by: FAMILY MEDICINE

## 2023-10-13 PROCEDURE — 3008F BODY MASS INDEX DOCD: CPT | Performed by: FAMILY MEDICINE

## 2023-10-13 PROCEDURE — 99214 OFFICE O/P EST MOD 30 MIN: CPT | Performed by: FAMILY MEDICINE

## 2023-10-13 PROCEDURE — 90686 IIV4 VACC NO PRSV 0.5 ML IM: CPT | Performed by: FAMILY MEDICINE

## 2023-10-13 RX ORDER — HYDROCODONE BITARTRATE AND ACETAMINOPHEN 5; 325 MG/1; MG/1
1 TABLET ORAL 2 TIMES DAILY PRN
Qty: 60 TABLET | Refills: 0 | Status: SHIPPED | OUTPATIENT
Start: 2023-10-13 | End: 2023-11-13 | Stop reason: SDUPTHER

## 2023-10-13 NOTE — PROGRESS NOTES
Subjective   Patient ID: Emerson Chilel is a 63 y.o. male who presents for Osteoarthritis of right knee (2 month follow up ).  HPI  63-year-old gentleman here for different reasons 1 is to recheck his painful medial and subpatellar right knee pain which bothers him throughout the workday.  Until he retires unable to really get his knee replaced.  Is taken in place of NSAIDs is taking his Vicodin twice a day benefits always a rest no evidence of abuse or side effects  Ortho performed  Safety is reviewed does not go administer his Xanax with his Vicodin  Template for opioid completed  For anxiety attacks takes Xanax if needed on daily basis benefits still always a risk benzodiazepine template completed safety issues reviewed  Above all last summer had the stenting because of a non-STEMI MI has done well on beta-blockers aspirin Plavix and also Lipitor 40 mg daily gratefully is returned to work no resting or exertional chest pain shortness of breath or palpitations.  Does take SSRI for earlier anxiety attack prevention as well as depression.  No evidence of abuse or side effects  Gratefully no new GI/ issues observing a better low fat and low salt diet.  Is followed by Dr. Boston his cardiologist  Mild aortic insufficiency and mild tricuspid insufficiency but normal ejection fraction on cath of the last July chronic meds including his diuretic and Norvasc but otherwise no side effects  OARRS:  Anthony Moore,  on 10/17/2023  2:04 PM  I have personally reviewed the OARRS report for Emerson Chilel. I have considered the risks of abuse, dependence, addiction and diversion    Is the patient prescribed a combination of a benzodiazepine and opioid?  Yes, I feel it is clincially indicated to continue the medication and have discussed with the patient risks/benefits/alternatives.    Last Urine Drug Screen / ordered today: Yes  Recent Results (from the past 8760 hour(s))   OPIATE/OPIOID/BENZO PRESCRIPTION COMPLIANCE     Collection Time: 05/31/23  4:02 PM   Result Value Ref Range    DRUG SCREEN COMMENT URINE SEE BELOW     Creatine, Urine 36.5 mg/dL    Amphetamine Screen, Urine PRESUMPTIVE NEGATIVE NEGATIVE    Barbiturate Screen, Urine PRESUMPTIVE NEGATIVE NEGATIVE    Cannabinoid Screen, Urine PRESUMPTIVE NEGATIVE NEGATIVE    Cocaine Screen, Urine PRESUMPTIVE NEGATIVE NEGATIVE    PCP Screen, Urine PRESUMPTIVE NEGATIVE NEGATIVE    7-Aminoclonazepam <25 Cutoff <25 ng/mL    Alpha-Hydroxyalprazolam 27 (A) Cutoff <25 ng/mL    Alpha-Hydroxymidazolam <25 Cutoff <25 ng/mL    Alprazolam <25 Cutoff <25 ng/mL    Chlordiazepoxide <25 Cutoff <25 ng/mL    Clonazepam <25 Cutoff <25 ng/mL    Diazepam <25 Cutoff <25 ng/mL    Lorazepam <25 Cutoff <25 ng/mL    Midazolam <25 Cutoff <25 ng/mL    Nordiazepam <25 Cutoff <25 ng/mL    Oxazepam <25 Cutoff <25 ng/mL    Temazepam <25 Cutoff <25 ng/mL    Zolpidem <25 Cutoff <25 ng/mL    Zolpidem Metabolite (ZCA) <25 Cutoff <25 ng/mL    6-Acetylmorphine <25 Cutoff <25 ng/mL    Codeine <50 Cutoff <50 ng/mL    Hydrocodone 590 (A) Cutoff <25 ng/mL    Hydromorphone 26 (A) Cutoff <25 ng/mL    Morphine Urine <50 Cutoff <50 ng/mL    Norhydrocodone 504 (A) Cutoff <25 ng/mL    Noroxycodone <25 Cutoff <25 ng/mL    Oxycodone <25 Cutoff <25 ng/mL    Oxymorphone <25 Cutoff <25 ng/mL    Tramadol <50 Cutoff <50 ng/mL    O-Desmethyltramadol <50 Cutoff <50 ng/mL    Fentanyl <2.5 Cutoff<2.5 ng/mL    Norfentanyl <2.5 Cutoff<2.5 ng/mL    METHADONE CONFIRMATION,URINE <25 Cutoff <25 ng/mL    EDDP <25 Cutoff <25 ng/mL     Results are as expected.         Controlled Substance Agreement:  Date of the Last Agreement: 05-  Reviewed Controlled Substance Agreement including but not limited to the benefits, risks, and alternatives to treatment with a Controlled Substance medication(s).    Opioids:  What is the patient's goal of therapy?  Of daily, live pain in the subpatellar and right medial knee during and after work hours.  Does not  coadministered this with the Xanax and definitely benefit still outweighs her risk Lohnes abuse side effects or divergence  Is this being achieved with current treatment? yes    I have calculated the patient's Morphine Dose Equivalent (MED):   I have considered referral to Pain Management and/or a specialist, and do not feel it is necessary at this time.    I feel that it is clinically indicated to continue this current medication regimen after consideration of alternative therapies, and other non-opioid treatment.OARRS:  Anthony Moore,  on 10/17/2023  2:04 PM  I have personally reviewed the OARRS report for Emerson Chilel. I have considered the risks of abuse, dependence, addiction and diversion    Is the patient prescribed a combination of a benzodiazepine and opioid?  Yes, I feel it is clincially indicated to continue the medication and have discussed with the patient risks/benefits/alternatives.    Last Urine Drug Screen / ordered today: Yes  Recent Results (from the past 8760 hour(s))   OPIATE/OPIOID/BENZO PRESCRIPTION COMPLIANCE    Collection Time: 05/31/23  4:02 PM   Result Value Ref Range    DRUG SCREEN COMMENT URINE SEE BELOW     Creatine, Urine 36.5 mg/dL    Amphetamine Screen, Urine PRESUMPTIVE NEGATIVE NEGATIVE    Barbiturate Screen, Urine PRESUMPTIVE NEGATIVE NEGATIVE    Cannabinoid Screen, Urine PRESUMPTIVE NEGATIVE NEGATIVE    Cocaine Screen, Urine PRESUMPTIVE NEGATIVE NEGATIVE    PCP Screen, Urine PRESUMPTIVE NEGATIVE NEGATIVE    7-Aminoclonazepam <25 Cutoff <25 ng/mL    Alpha-Hydroxyalprazolam 27 (A) Cutoff <25 ng/mL    Alpha-Hydroxymidazolam <25 Cutoff <25 ng/mL    Alprazolam <25 Cutoff <25 ng/mL    Chlordiazepoxide <25 Cutoff <25 ng/mL    Clonazepam <25 Cutoff <25 ng/mL    Diazepam <25 Cutoff <25 ng/mL    Lorazepam <25 Cutoff <25 ng/mL    Midazolam <25 Cutoff <25 ng/mL    Nordiazepam <25 Cutoff <25 ng/mL    Oxazepam <25 Cutoff <25 ng/mL    Temazepam <25 Cutoff <25 ng/mL    Zolpidem <25 Cutoff  <25 ng/mL    Zolpidem Metabolite (ZCA) <25 Cutoff <25 ng/mL    6-Acetylmorphine <25 Cutoff <25 ng/mL    Codeine <50 Cutoff <50 ng/mL    Hydrocodone 590 (A) Cutoff <25 ng/mL    Hydromorphone 26 (A) Cutoff <25 ng/mL    Morphine Urine <50 Cutoff <50 ng/mL    Norhydrocodone 504 (A) Cutoff <25 ng/mL    Noroxycodone <25 Cutoff <25 ng/mL    Oxycodone <25 Cutoff <25 ng/mL    Oxymorphone <25 Cutoff <25 ng/mL    Tramadol <50 Cutoff <50 ng/mL    O-Desmethyltramadol <50 Cutoff <50 ng/mL    Fentanyl <2.5 Cutoff<2.5 ng/mL    Norfentanyl <2.5 Cutoff<2.5 ng/mL    METHADONE CONFIRMATION,URINE <25 Cutoff <25 ng/mL    EDDP <25 Cutoff <25 ng/mL     Results are as expected.         Controlled Substance Agreement:  Date of the Last Agreement: 5-2 6-2023  Reviewed Controlled Substance Agreement including but not limited to the benefits, risks, and alternatives to treatment with a Controlled Substance medication(s).    Benzodiazepines:  What is the patient's goal of therapy?  Quality of control of his anxiety and anxiety attacks-palpitations.  Reduction of chest pain anxiety as well as palpitations with his Xanax daily if needed no evidence of abuse or side effects  Is this being achieved with current treatment? yes    FRANTZ-7:  No data recorded    Activities of Daily Living:   Is your overall impression that this patient is benefiting (symptom reduction outweighs side effects) from benzodiazepine therapy? Yes     1. Physical Functioning: Better  2. Family Relationship: Better  3. Social Relationship: Better  4. Mood: Better  5. Sleep Patterns: Better  6. Overall Function: Better    Opioid Risk Screening:  No data recorded    Pain Assessment:  No data recorded  Review of Systems   Constitutional:  Negative for fatigue, fever and unexpected weight change.   Eyes:  Negative for visual disturbance.   Respiratory:  Negative for cough, chest tightness, shortness of breath and wheezing.    Cardiovascular:  Negative for chest pain, palpitations and  "leg swelling.   Gastrointestinal:  Negative for abdominal pain, blood in stool and vomiting.   Genitourinary:  Positive for frequency. Negative for dysuria and hematuria.   Musculoskeletal:  Positive for arthralgias, gait problem and myalgias. Negative for back pain.   Skin:  Negative for rash.   Neurological:  Negative for weakness, light-headedness (Amount of dizziness on very low-dose 12.5 mg of metoprolol daily) and headaches.   Psychiatric/Behavioral:  Positive for dysphoric mood. Negative for behavioral problems, confusion, sleep disturbance and suicidal ideas. The patient is nervous/anxious.        Objective   /78 (BP Location: Left arm, Patient Position: Sitting, BP Cuff Size: Adult)   Pulse 68   Temp 36.4 °C (97.5 °F) (Temporal)   Resp 16   Ht 1.676 m (5' 6\")   Wt 64 kg (141 lb)   SpO2 99%   BMI 22.76 kg/m²     Order: [id number]  Status: Final result       Visible to patient: Yes (not seen)    0 Result Notes       1  Topic        Component  Ref Range & Units 2 wk ago 7 mo ago 4 yr ago   Cholesterol  0 - 199 mg/dL 131 150  CM   Comment: .      AGE      DESIRABLE   BORDERLINE HIGH   HIGH     0-19 Y     0 - 169       170 - 199     >/= 200    20-24 Y     0 - 189       190 - 224     >/= 225        >24 Y     0 - 199       200 - 239     >/= 240   **All ranges are based on fasting samples. Specific   therapeutic targets will vary based on patient-specific   cardiac risk.  .   Pediatric guidelines reference:Pediatrics 2011, 128(S5).   Adult guidelines reference: NCEP ATPIII Guidelines,    ANALI 2001, 258:2486-97  .   Venipuncture immediately after or during the   administration of Metamizole may lead to falsely   low results. Testing should be performed immediately   prior to Metamizole dosing.   HDL  mg/dL 57.3 63.7 CM 61.9 CM   Comment: .      AGE      VERY LOW   LOW     NORMAL    HIGH       0-19 Y       < 35   < 40     40-45     ----    20-24 Y       ----   < 40       >45     ----      >24 Y     "   ----   < 40     40-60      >60  .   Cholesterol/HDL Ratio 2.3 2.4 CM 3.1 CM   Comment: REF VALUES  DESIRABLE  < 3.4  HIGH RISK  > 5.0   LDL  0 - 99 mg/dL 59 73  High  CM   Comment: .                           NEAR      BORD      AGE      DESIRABLE  OPTIMAL    HIGH     HIGH     VERY HIGH     0-19 Y     0 - 109     ---    110-129   >/= 130     ----    20-24 Y     0 - 119     ---    120-159   >/= 160     ----      >24 Y     0 -  99   100-129  130-159   160-189     >/=190  .   VLDL  0 - 40 mg/dL 15 13 17   Triglycerides  0 - 149 mg/dL 74 66 CM 84 CM   Comment: .              (7/16/23) 7 mo ago  (3/10/23) 1 yr ago  (1/4/22) 2 yr ago  (7/9/21) 2 yr ago  (12/18/20) 4 yr ago  (6/11/19)     Glucose  74 - 99 mg/dL 92 103 High  98  99 95 100 High    Sodium  136 - 145 mmol/L 136 134 Low  137  135 Low  137 140   Potassium  3.5 - 5.3 mmol/L 4.4 3.8 4.8 4.1 4.9 4.5 4.8   Chloride  98 - 107 mmol/L 104 99 104  101 101 104   Bicarbonate  21 - 32 mmol/L 22 21 26  25 24 29   Anion Gap  10 - 20 mmol/L 14 18 12  14 17 12   Urea Nitrogen  6 - 23 mg/dL 8 9 9  7 8 10   Creatinine  0.50 - 1.30 mg/dL 0.74 0.78 0.78  0.65 0.65 0.60   GFR MALE  >90 mL/min/1.73m2 >90 >90 CM >90 CM       Comment:  CALCULATIONS OF ESTIMATED GFR ARE PERFORMED   USING THE 2021 CKD-EPI STUDY REFIT EQUATION   WITHOUT THE RACE VARIABLE FOR THE IDMS-TRACEABLE   CREATININE METHODS.    https://jasn.asnjournals.org/content/early/2021/09/22/ASN.1498683668   Calcium  8.6 - 10.3 mg/dL 9.2 9.4 9.3  9.7 9.3 10.2 R   Albumin  3.4 - 5.0 g/dL 4.5 4.8 4.5   4.5 4.8   Alkaline Phosphatase  33 - 136 U/L 91 227 High  91   82 R 69 R   Total Protein  6.4 - 8.2 g/dL 7.2 7.5 7.0   7.0 7.1   AST  9 - 39 U/L 26 17 19   29 21   Total Bilirubin  0.0 - 1.2 mg/dL 0.4 0.5 0.8   0.6 0.5   ALT (SGPT)  10 - 52 U/L 30 21 CM 19 CM   26 CM 26 CM   Comment:  Patients treated with Sulfasalazine may generate   falsely decreased results for ALT.   Resulting Agency [facility]  [facility]              Specimen Collected: 09/28/23 09:24 Last Resulted: 09/28/23 13:18        Lab Flowsheet        Order Details        View Encounter        Lab and Collection Details        Routing        Result History     View All Conversations on this Encounter        CM=Additional comments  R=Reference range differs from displayed range        Result Care Coordination      Patient Communication     Add Comments   Add Notifications  Back to Top           Result Report    Comprehensive Metabolic Panel (Order #[id number]) on 9/28/23     Lab Component SmartPhrase Guide  Prostate Specific Antigen, Screen  Order: [id number]  Status: Final result       Visible to patient: Yes (not seen)    2 Result Notes      Component  Ref Range & Units 7 mo ago   Prostate Specific Antigen,Screen  0.00 - 4.00 ng/mL 0.70   Comment: The FDA requires that the method used for PSA assay be  reported to the physician. Values obtained with different  assay methods must not be used interchangeably. This test  was performed at [facility] using the Access  Hybritech PSA assay is a two-site immunoenzymatic sandwich  assay. The assay is approved for measurement of  prostate-specific antigen (PSA)in serum and may be used  in conjunction with a digital rectal examination in men  50 years and older as an aid in detection of prostate  cancer.  5-Alpha-reductase inhibitors (e.g. Proscar, Finasteride,  Avodart, Dutasteride and Nadja) for the treatment of BPH  have been shown to lower PSA levels by an average of 50%  after 6 months of treatment.   Resulting Agency [facility]              Specimen Collected: 03/10/23 09:20 Last Resulted: 03/10/23 13:24               Physical Exam  All signs reviewed and are normal pulse ox good for him 98 to 99%  General-inspection feels more relaxed individual in no acute distress  Neck neck is  supple without masses adenopathy bruits or rigidity no JVD thyroid is normal  Pulmonary slightly reduced breath sounds at bases otherwise no wheezing rales or rhonchi  Cardiovascular soft grade 1/2-1/6 systolic murmur heard at the left sternal border otherwise no S3 gallop no ectopy today  Abdominal no organomegaly mass or rebound no CVA tenderness  Musculoskeletal continued pain in the right medial inferior SI joint especially the medial right knee there is no redness warmth or effusion no locking or giving out of the right knee left knee is unremarkable  Peripheral vascular  Asymmetric edema normal motor sensorivascular deficit lower extremities  Mood mood is stable anxiety depressive or cognitive issues  Skin no rash petechiae jaundice  Reviewed normal left CMP and excellent total cholesterol and LDL.  Liver and kidney functions and potassium are normal      Assessment/Plan   Problem List Items Addressed This Visit       Osteoarthritis of right knee     Other Visit Diagnoses       Need for influenza vaccination            Continue taking the Xanax daily on if needed basis benzodiazepine template was completed not to take this along with his Vicodin  Continue Vicodin 5 mg every 12 hours if needed template for opioids completed safety is reviewed OARRS reviewed and continue his aspirin Plavix as well as his Norvasc Lipitor and low-dose metoprolol.  Continue follow-up with cardiologist  Prozac for his mood  Otherwise importance of low-salt low-fat diet to be continued but otherwise clinically stable status post PCI and MI.  Follow-up in 2 to 3 months.  Flu vaccine given today  @discharge  The above diagnosis and treatment plan was discussed with the patient patient will continue appropriate diet and exercise as reviewed  Patient will recheck earlier if any interval problems of significance or clinical worsening of the above problems.  Agrees above surveillance.  All question were addressed regarding above meds

## 2023-10-17 DIAGNOSIS — F41.9 ANXIETY: ICD-10-CM

## 2023-10-17 PROBLEM — Z23 NEED FOR INFLUENZA VACCINATION: Status: ACTIVE | Noted: 2023-10-17

## 2023-10-17 PROBLEM — J41.0 SIMPLE CHRONIC BRONCHITIS (MULTI): Status: ACTIVE | Noted: 2023-10-17

## 2023-10-17 PROBLEM — R06.02 SOB (SHORTNESS OF BREATH): Status: RESOLVED | Noted: 2020-01-07 | Resolved: 2023-10-17

## 2023-10-17 RX ORDER — ALPRAZOLAM 0.5 MG/1
TABLET ORAL
Qty: 30 TABLET | Refills: 0 | Status: SHIPPED | OUTPATIENT
Start: 2023-10-17 | End: 2023-12-04 | Stop reason: SDUPTHER

## 2023-10-17 ASSESSMENT — ENCOUNTER SYMPTOMS
ABDOMINAL PAIN: 0
DYSPHORIC MOOD: 1
BLOOD IN STOOL: 0
COUGH: 0
MYALGIAS: 1
LIGHT-HEADEDNESS: 0
HEADACHES: 0
UNEXPECTED WEIGHT CHANGE: 0
WHEEZING: 0
SHORTNESS OF BREATH: 0
PALPITATIONS: 0
FREQUENCY: 1
CONFUSION: 0
VOMITING: 0
BACK PAIN: 0
DYSURIA: 0
ARTHRALGIAS: 1
HEMATURIA: 0
FEVER: 0
FATIGUE: 0
SLEEP DISTURBANCE: 0
CHEST TIGHTNESS: 0
WEAKNESS: 0
NERVOUS/ANXIOUS: 1

## 2023-10-17 NOTE — TELEPHONE ENCOUNTER
Rx Refill Request Telephone Encounter    Name:  Emerson Chilel  :  [id number]  Medication Name: XANAX 0.5MG     Specific Pharmacy location:  [facility]    Date of last appointment:  10/13/23   Date of next appointment:  24  Best number to reach patient:  [phone]

## 2023-11-13 DIAGNOSIS — M17.11 OSTEOARTHRITIS OF RIGHT KNEE, UNSPECIFIED OSTEOARTHRITIS TYPE: ICD-10-CM

## 2023-11-13 RX ORDER — HYDROCODONE BITARTRATE AND ACETAMINOPHEN 5; 325 MG/1; MG/1
1 TABLET ORAL 2 TIMES DAILY PRN
Qty: 60 TABLET | Refills: 0 | Status: SHIPPED | OUTPATIENT
Start: 2023-11-13 | End: 2023-12-12 | Stop reason: SDUPTHER

## 2023-11-13 NOTE — TELEPHONE ENCOUNTER
Rx Refill Request Telephone Encounter    Name:  Emerson Chilel  :  [id number]  Medication Name:  NORCO    Specific Pharmacy location:  [facility]    Date of last appointment:  10/13/23  Date of next appointment:  24  Best number to reach patient:  [phone]

## 2023-11-28 ENCOUNTER — TELEPHONE (OUTPATIENT)
Dept: CARDIOLOGY | Facility: CLINIC | Age: 63
End: 2023-11-28
Payer: COMMERCIAL

## 2023-11-28 NOTE — TELEPHONE ENCOUNTER
Left voicemail advising patient that we had to change the time of his appointment on 12/13/23 with Dr. Boston to 11am instead of 10am.

## 2023-12-04 DIAGNOSIS — F41.9 ANXIETY: ICD-10-CM

## 2023-12-04 RX ORDER — ALPRAZOLAM 0.5 MG/1
TABLET ORAL
Qty: 30 TABLET | Refills: 0 | Status: SHIPPED | OUTPATIENT
Start: 2023-12-04 | End: 2024-03-18 | Stop reason: SDUPTHER

## 2023-12-04 NOTE — TELEPHONE ENCOUNTER
Rx Refill Request     Name: Emerson Chilel  :  1960   Medication Name:  XANAX 0.5MG  Specific Pharmacy location:  [facility]  Date of last appointment:  10/13/2023   Date of next appointment:  2024   Best number to reach patient:  [phone]

## 2023-12-12 DIAGNOSIS — M17.11 OSTEOARTHRITIS OF RIGHT KNEE, UNSPECIFIED OSTEOARTHRITIS TYPE: ICD-10-CM

## 2023-12-12 RX ORDER — HYDROCODONE BITARTRATE AND ACETAMINOPHEN 5; 325 MG/1; MG/1
1 TABLET ORAL 2 TIMES DAILY PRN
Qty: 60 TABLET | Refills: 0 | Status: SHIPPED | OUTPATIENT
Start: 2023-12-12 | End: 2024-01-12 | Stop reason: SDUPTHER

## 2023-12-12 NOTE — TELEPHONE ENCOUNTER
Rx Refill Request     Name: Emerson Chilel  :  1960   Medication Name:  NORCO 5MG  Specific Pharmacy location:  [facility]  Date of last appointment:  10/13/2023   Date of next appointment:  2024   Best number to reach patient:  [phone]

## 2023-12-13 ENCOUNTER — APPOINTMENT (OUTPATIENT)
Dept: CARDIOLOGY | Facility: CLINIC | Age: 63
End: 2023-12-13
Payer: COMMERCIAL

## 2023-12-13 ENCOUNTER — OFFICE VISIT (OUTPATIENT)
Dept: CARDIOLOGY | Facility: CLINIC | Age: 63
End: 2023-12-13
Payer: COMMERCIAL

## 2023-12-13 VITALS
BODY MASS INDEX: 23.58 KG/M2 | WEIGHT: 146.1 LBS | SYSTOLIC BLOOD PRESSURE: 128 MMHG | DIASTOLIC BLOOD PRESSURE: 82 MMHG | HEART RATE: 60 BPM

## 2023-12-13 DIAGNOSIS — I10 ESSENTIAL HYPERTENSION: Primary | ICD-10-CM

## 2023-12-13 DIAGNOSIS — F17.200 CURRENT EVERY DAY SMOKER: ICD-10-CM

## 2023-12-13 DIAGNOSIS — E78.2 MIXED HYPERLIPIDEMIA: ICD-10-CM

## 2023-12-13 DIAGNOSIS — I51.7 LVH (LEFT VENTRICULAR HYPERTROPHY): ICD-10-CM

## 2023-12-13 DIAGNOSIS — I35.1 AORTIC VALVE INSUFFICIENCY, ETIOLOGY OF CARDIAC VALVE DISEASE UNSPECIFIED: ICD-10-CM

## 2023-12-13 DIAGNOSIS — I25.10 CORONARY ARTERY DISEASE INVOLVING NATIVE CORONARY ARTERY OF NATIVE HEART WITHOUT ANGINA PECTORIS: Chronic | ICD-10-CM

## 2023-12-13 DIAGNOSIS — E78.5 DYSLIPIDEMIA: ICD-10-CM

## 2023-12-13 PROCEDURE — 4004F PT TOBACCO SCREEN RCVD TLK: CPT | Performed by: INTERNAL MEDICINE

## 2023-12-13 PROCEDURE — 3074F SYST BP LT 130 MM HG: CPT | Performed by: INTERNAL MEDICINE

## 2023-12-13 PROCEDURE — 99214 OFFICE O/P EST MOD 30 MIN: CPT | Performed by: INTERNAL MEDICINE

## 2023-12-13 PROCEDURE — 3008F BODY MASS INDEX DOCD: CPT | Performed by: INTERNAL MEDICINE

## 2023-12-13 PROCEDURE — 3079F DIAST BP 80-89 MM HG: CPT | Performed by: INTERNAL MEDICINE

## 2023-12-13 NOTE — PATIENT INSTRUCTIONS
CHECK BLOOD PRESSURE 3 TIMES A DAY FOR 2 WEEKS PRIOR TO YOUR NEXT OFFICE VISIT. BRING YOUR BP READINGS AND YOUR BP MACHINE WITH YOU TO YOUR VISIT.    FOLLOW UP IN 1 YEAR  OBTAIN LAB WORK PRIOR TO THIS VISIT, THIS WILL BE FASTING

## 2023-12-13 NOTE — PROGRESS NOTES
CARDIOLOGY OFFICE VISIT      CHIEF COMPLAINT  No chief complaint on file.       HISTORY OF PRESENT ILLNESS  Patient is a 63-year-old  male with past medical history significant for coronary artery disease, non-ST elevation myocardial infarction, multivessel PCI most recent drug-eluting stent LAD 2023, hypertension, left ventricular hypertrophy, aortic valve regurgitation, hyperlipidemia, tobacco abuse, COPD, asthma, anxiety, depression, spinal stenosis who presents for follow-up cardiovascular care.     Denies chest pain.  He has had less dyspnea. He denies palpitations, nausea, vomiting, dizziness, near-syncope, arianne syncope, edema. Currently smoking 1/2 pack of cigarettes per day. Trying to quit.  Home blood pressure readings are under better control.  He likely has excessive sodium in his diet.  No formal exercise program.     Past surgical history:  Right knee meniscus repair  Infant hernia repair     Social history:  Smoking up to 1 pack of cigarettes per day since age 12  Occasional alcohol use     Family history:  Mother  history of pancreatic cancer, cerebrovascular accident, heart disease  Father  suicide  Brother history of coronary artery disease/PCI  Brother history of coronary artery bypass     Review of systems have been reviewed and are negative, noncontributory, or as previous mentioned x 12 systems.     I personally reviewed EKG 2023: Normal sinus rhythm, frequent PVCs in bigeminal pattern    ASSESSMENT  Coronary artery disease/non-ST elevation myocardial infarction/multivessel PCI most recent drug-eluting stent LAD 2023  Hypertension  Left ventricular hypertrophy  Aortic valve regurgitation  Hyperlipidemia  Active tobacco abuse  COPD/asthma  Anxiety/depression  Spinal stenosis    PLAN      Patient appears to be stable from a cardiac standpoint.  No symptoms of angina.  No evidence of heart failure.  No symptomatic valvular disease.  No  symptomatic arrhythmia.  Blood pressure remains is under better control.  I have asked the patient to maintain a blood pressure diary prior to office visits. Advise low-sodium diet less than 2000 mg sodium per day. Advise use of sublingual nitroglycerin as needed chest pain. I advised smoking cessation explained the adverse effects on cardiac, pulmonary, vascular systems.  Advise exercise program 30 minutes/day.  Follow-up in 1 year.  Obtain CMP, lipid profile 1 year.     Please excuse any errors in grammar or translation related to this dictation. Voice recognition software was utilized to prepare this document.   Recent Cardiovascular Testing:  The following results have been reviewed and updated.     Echo 7/17/23  1. EF normal  2. Mild concentric LVH  3. Mild tricuspid regurgitation  4. Mild to moderate aortic valve regurgitation      Labs 9/28/23  , TG 74, HDL 57, LDL 59     XR chest 1 view  Result Date: 7/16/2023  No evidence of acute cardiopulmonary process. Hyperinflated lungs, as before, in keeping with emphysema/COPD.      VITALS  There were no vitals filed for this visit.  Wt Readings from Last 4 Encounters:   10/13/23 64 kg (141 lb)   10/11/23 65.2 kg (143 lb 12.8 oz)   08/09/23 67.6 kg (149 lb)   05/26/23 66.7 kg (147 lb)       PHYSICAL EXAM:  GENERAL:  Well developed, well nourished, in no acute distress.  CHEST:  Symmetric and nontender.  NEURO/PSYCH:  Alert and oriented times three with approppriate behavior and responses.  NECK:  Supple, no JVD, no bruit.  LUNGS:  Clear to auscultation bilaterally, normal respiratory effort.  HEART:  Rate and rhythm regularly with no evident murmur, no gallop appreciated.    There are no rubs, clicks or heaves.  EXTREMITIES:  Warm with good color, no clubbing or cyanosis.  There is no edema noted.  PERIPHERAL VASCULAR:  Pulses present and equally palpable; 2+ throughout.    ASSESSMENT AND PLAN      Past Medical History  Past Medical History:   Diagnosis Date     CAD (coronary artery disease)     Chronic sinusitis, unspecified 07/13/2021    Sinobronchitis    Hyperlipidemia     Hypertension        Social History  Social History     Tobacco Use    Smoking status: Every Day     Packs/day: 1.00     Years: 50.00     Additional pack years: 0.00     Total pack years: 50.00     Types: Cigarettes    Smokeless tobacco: Never   Substance Use Topics    Alcohol use: Yes     Alcohol/week: 2.0 standard drinks of alcohol     Types: 2 Cans of beer per week     Comment: daily    Drug use: Yes     Types: Hydrocodone       Family History     Family History   Problem Relation Name Age of Onset    Other (cardiac disorder) Mother      Stroke Mother      Pancreatic cancer Mother      Suicide Attempts Father      Other (cabg) Brother      Coronary artery disease Brother      No Known Problems Other          Allergies:  Allergies   Allergen Reactions    Ramipril Unknown     hyperkalemia        Outpatient Medications:  Current Outpatient Medications   Medication Instructions    albuterol 90 mcg/actuation inhaler INHALE TWO PUFFS EVERY FOUR HOURS AS NEEDED    ALPRAZolam (Xanax) 0.5 mg tablet TAKE ONE TABLET (0.5 MG) BY MOUTH ONCE DAILY AS NEEDED FOR ANXIETY    amLODIPine (Norvasc) 10 mg tablet 1 tablet, oral, Daily    aspirin 81 mg, oral, Daily    atorvastatin (Lipitor) 40 mg tablet 1 tablet, oral, Daily    clopidogrel (Plavix) 75 mg tablet 1 tablet, oral, Daily    FLUoxetine (PROzac) 20 mg capsule 1 capsule, oral, Daily    hydroCHLOROthiazide (MICROZIDE) 12.5 mg, oral, Every morning    HYDROcodone-acetaminophen (Norco) 5-325 mg tablet 1 tablet, oral, 2 times daily PRN    metoprolol tartrate (Lopressor) 25 mg tablet take 1/2 of a tablet BY MOUTH AT BEDTIME    naloxone (Narcan) 4 mg/0.1 mL nasal spray nasal    nitroglycerin (NITROSTAT) 0.4 mg, sublingual, Every 5 min PRN        Recent Lab Results:    CMP:    Lab Results   Component Value Date     09/28/2023    K 4.4 09/28/2023     09/28/2023  "   CO2 22 09/28/2023    BUN 8 09/28/2023    CREATININE 0.74 09/28/2023    GLUCOSE 92 09/28/2023    CALCIUM 9.2 09/28/2023       Magnesium:    Lab Results   Component Value Date    MG 1.99 07/16/2023       Lipid Profile:    Lab Results   Component Value Date    TRIG 74 09/28/2023    HDL 57.3 09/28/2023    LDLDIRECT 77 07/09/2021       TSH:    No results found for: \"TSH\"    BNP:   No results found for: \"BNP\"     PT/INR:    No results found for: \"PROTIME\", \"INR\"    HgBA1c:    Lab Results   Component Value Date    HGBA1C 5.3 07/16/2023       BMP:  Lab Results   Component Value Date     09/28/2023     (L) 07/16/2023     03/10/2023    K 4.4 09/28/2023    K 3.8 07/16/2023    K 4.8 03/10/2023     09/28/2023    CL 99 07/16/2023     03/10/2023    CO2 22 09/28/2023    CO2 21 07/16/2023    CO2 26 03/10/2023    BUN 8 09/28/2023    BUN 9 07/16/2023    BUN 9 03/10/2023    CREATININE 0.74 09/28/2023    CREATININE 0.78 07/16/2023    CREATININE 0.78 03/10/2023       CBC:  Lab Results   Component Value Date    WBC 10.2 07/16/2023    RBC 4.98 07/16/2023    HGB 17.3 07/16/2023    HCT 48.5 07/16/2023    MCV 97 07/16/2023    MCHC 35.7 07/16/2023    RDW 12.5 07/16/2023     07/16/2023       Cardiac Enzymes:    Lab Results   Component Value Date    TROPHS 93 (H) 07/17/2023    TROPHS 71 (HH) 07/17/2023    TROPHS 41 (H) 07/17/2023       Hepatic Function Panel:    Lab Results   Component Value Date    ALKPHOS 91 09/28/2023    ALT 30 09/28/2023    AST 26 09/28/2023    PROT 7.2 09/28/2023    BILITOT 0.4 09/28/2023           Zack Boston, DO          "

## 2024-01-02 DIAGNOSIS — F32.9 MAJOR DEPRESSIVE DISORDER WITH CURRENT ACTIVE EPISODE, UNSPECIFIED DEPRESSION EPISODE SEVERITY, UNSPECIFIED WHETHER RECURRENT: ICD-10-CM

## 2024-01-02 RX ORDER — FLUOXETINE HYDROCHLORIDE 20 MG/1
20 CAPSULE ORAL DAILY
Qty: 90 CAPSULE | Refills: 1 | Status: SHIPPED | OUTPATIENT
Start: 2024-01-02

## 2024-01-04 RX ORDER — ALPRAZOLAM 0.5 MG/1
TABLET ORAL
Qty: 30 TABLET | Refills: 0 | Status: SHIPPED | OUTPATIENT
Start: 2024-01-04 | End: 2024-06-09 | Stop reason: HOSPADM

## 2024-01-12 ENCOUNTER — OFFICE VISIT (OUTPATIENT)
Dept: PRIMARY CARE | Facility: CLINIC | Age: 64
End: 2024-01-12
Payer: COMMERCIAL

## 2024-01-12 VITALS
SYSTOLIC BLOOD PRESSURE: 120 MMHG | OXYGEN SATURATION: 95 % | DIASTOLIC BLOOD PRESSURE: 78 MMHG | RESPIRATION RATE: 16 BRPM | HEIGHT: 66 IN | BODY MASS INDEX: 23.78 KG/M2 | TEMPERATURE: 97.8 F | HEART RATE: 90 BPM | WEIGHT: 148 LBS

## 2024-01-12 DIAGNOSIS — I25.10 CORONARY ARTERY DISEASE INVOLVING NATIVE CORONARY ARTERY OF NATIVE HEART WITHOUT ANGINA PECTORIS: Chronic | ICD-10-CM

## 2024-01-12 DIAGNOSIS — M17.11 OSTEOARTHRITIS OF RIGHT KNEE, UNSPECIFIED OSTEOARTHRITIS TYPE: ICD-10-CM

## 2024-01-12 DIAGNOSIS — J41.0 SIMPLE CHRONIC BRONCHITIS (MULTI): ICD-10-CM

## 2024-01-12 DIAGNOSIS — E78.5 DYSLIPIDEMIA: ICD-10-CM

## 2024-01-12 DIAGNOSIS — F41.9 ANXIETY: Primary | ICD-10-CM

## 2024-01-12 PROCEDURE — 3078F DIAST BP <80 MM HG: CPT | Performed by: FAMILY MEDICINE

## 2024-01-12 PROCEDURE — 3074F SYST BP LT 130 MM HG: CPT | Performed by: FAMILY MEDICINE

## 2024-01-12 PROCEDURE — 99214 OFFICE O/P EST MOD 30 MIN: CPT | Performed by: FAMILY MEDICINE

## 2024-01-12 PROCEDURE — 3008F BODY MASS INDEX DOCD: CPT | Performed by: FAMILY MEDICINE

## 2024-01-12 RX ORDER — HYDROCODONE BITARTRATE AND ACETAMINOPHEN 5; 325 MG/1; MG/1
1 TABLET ORAL 2 TIMES DAILY PRN
Qty: 60 TABLET | Refills: 0 | Status: SHIPPED | OUTPATIENT
Start: 2024-01-12 | End: 2024-02-12 | Stop reason: SDUPTHER

## 2024-01-12 NOTE — PROGRESS NOTES
Subjective   Patient ID: Emerson Chilel is a 63 y.o. male who presents for Osteoarthritis (3 month follow up ) and Anxiety.  HPI  63-year-old gent with a history of core insufficiency followed Dr. Boston she will review different issues first of which is a Xanax which he takes periodically for his anxiety attacks in the late afternoon does not coadministered this with his Vicodin for which she takes the most in the daytime for his knee pain.  Unable to take NSAIDs because of this  He does remain on his statin therapy for as well as aspirin and Plavix for recent stenting 9 Q wave MI last summer is done well without resting or exertional chest pain shortness of breath.  No new GI or  issues  Does take hydrochlorothiazide and Norvasc and metoprolol for hypertension he also takes Prozac for his depressed affect he also takes 1 next if needed as noted above and Vicodin twice a day for his knee pain no side effects on above medicines after careful review low-salt tobacco cessation and low-fat diet reviewed  Issues reviewed.  OARRS performed.  Templates for benzodiazepine and opioids completed today.  OARRS:  Anthony Moore,  on 1/12/2024 10:19 AM  I have personally reviewed the OARRS report for Emerson Chilel. I have considered the risks of abuse, dependence, addiction and diversion    Is the patient prescribed a combination of a benzodiazepine and opioid?  Yes, I feel it is clincially indicated to continue the medication and have discussed with the patient risks/benefits/alternatives.    Last Urine Drug Screen / ordered today: Yes  Recent Results (from the past 8760 hour(s))   OPIATE/OPIOID/BENZO PRESCRIPTION COMPLIANCE    Collection Time: 05/31/23  4:02 PM   Result Value Ref Range    DRUG SCREEN COMMENT URINE SEE BELOW     Creatine, Urine 36.5 mg/dL    Amphetamine Screen, Urine PRESUMPTIVE NEGATIVE NEGATIVE    Barbiturate Screen, Urine PRESUMPTIVE NEGATIVE NEGATIVE    Cannabinoid Screen, Urine PRESUMPTIVE  NEGATIVE NEGATIVE    Cocaine Screen, Urine PRESUMPTIVE NEGATIVE NEGATIVE    PCP Screen, Urine PRESUMPTIVE NEGATIVE NEGATIVE    7-Aminoclonazepam <25 Cutoff <25 ng/mL    Alpha-Hydroxyalprazolam 27 (A) Cutoff <25 ng/mL    Alpha-Hydroxymidazolam <25 Cutoff <25 ng/mL    Alprazolam <25 Cutoff <25 ng/mL    Chlordiazepoxide <25 Cutoff <25 ng/mL    Clonazepam <25 Cutoff <25 ng/mL    Diazepam <25 Cutoff <25 ng/mL    Lorazepam <25 Cutoff <25 ng/mL    Midazolam <25 Cutoff <25 ng/mL    Nordiazepam <25 Cutoff <25 ng/mL    Oxazepam <25 Cutoff <25 ng/mL    Temazepam <25 Cutoff <25 ng/mL    Zolpidem <25 Cutoff <25 ng/mL    Zolpidem Metabolite (ZCA) <25 Cutoff <25 ng/mL    6-Acetylmorphine <25 Cutoff <25 ng/mL    Codeine <50 Cutoff <50 ng/mL    Hydrocodone 590 (A) Cutoff <25 ng/mL    Hydromorphone 26 (A) Cutoff <25 ng/mL    Morphine Urine <50 Cutoff <50 ng/mL    Norhydrocodone 504 (A) Cutoff <25 ng/mL    Noroxycodone <25 Cutoff <25 ng/mL    Oxycodone <25 Cutoff <25 ng/mL    Oxymorphone <25 Cutoff <25 ng/mL    Tramadol <50 Cutoff <50 ng/mL    O-Desmethyltramadol <50 Cutoff <50 ng/mL    Fentanyl <2.5 Cutoff<2.5 ng/mL    Norfentanyl <2.5 Cutoff<2.5 ng/mL    METHADONE CONFIRMATION,URINE <25 Cutoff <25 ng/mL    EDDP <25 Cutoff <25 ng/mL     Results are as expected.         Controlled Substance Agreement:  Date of the Last Agreement: 05-  Reviewed Controlled Substance Agreement including but not limited to the benefits, risks, and alternatives to treatment with a Controlled Substance medication(s).    Opioids:  What is the patient's goal of therapy?  Daily alive pain in his anterior especially medial aspect of the knee after working a couple hours at work cannot really get his knee replaced until after he retires and unable to tolerate NSAIDs.  Benefit still outweighs the risk and does not coadministered Xanax with his Vicodin.  Is this being achieved with current treatment? yes    I have calculated the patient's Morphine Dose  Equivalent (MED):   I have considered referral to Pain Management and/or a specialist, and do not feel it is necessary at this time.    I feel that it is clinically indicated to continue this current medication regimen after consideration of alternative therapies, and other non-opioid treatment.OARRS:  Anthony Moore DO on 1/12/2024 10:19 AM  I have personally reviewed the OARRS report for Emerson Chilel. I have considered the risks of abuse, dependence, addiction and diversion    Is the patient prescribed a combination of a benzodiazepine and opioid?  Yes, I feel it is clincially indicated to continue the medication and have discussed with the patient risks/benefits/alternatives.    Last Urine Drug Screen / ordered today: Yes  Recent Results (from the past 8760 hour(s))   OPIATE/OPIOID/BENZO PRESCRIPTION COMPLIANCE    Collection Time: 05/31/23  4:02 PM   Result Value Ref Range    DRUG SCREEN COMMENT URINE SEE BELOW     Creatine, Urine 36.5 mg/dL    Amphetamine Screen, Urine PRESUMPTIVE NEGATIVE NEGATIVE    Barbiturate Screen, Urine PRESUMPTIVE NEGATIVE NEGATIVE    Cannabinoid Screen, Urine PRESUMPTIVE NEGATIVE NEGATIVE    Cocaine Screen, Urine PRESUMPTIVE NEGATIVE NEGATIVE    PCP Screen, Urine PRESUMPTIVE NEGATIVE NEGATIVE    7-Aminoclonazepam <25 Cutoff <25 ng/mL    Alpha-Hydroxyalprazolam 27 (A) Cutoff <25 ng/mL    Alpha-Hydroxymidazolam <25 Cutoff <25 ng/mL    Alprazolam <25 Cutoff <25 ng/mL    Chlordiazepoxide <25 Cutoff <25 ng/mL    Clonazepam <25 Cutoff <25 ng/mL    Diazepam <25 Cutoff <25 ng/mL    Lorazepam <25 Cutoff <25 ng/mL    Midazolam <25 Cutoff <25 ng/mL    Nordiazepam <25 Cutoff <25 ng/mL    Oxazepam <25 Cutoff <25 ng/mL    Temazepam <25 Cutoff <25 ng/mL    Zolpidem <25 Cutoff <25 ng/mL    Zolpidem Metabolite (ZCA) <25 Cutoff <25 ng/mL    6-Acetylmorphine <25 Cutoff <25 ng/mL    Codeine <50 Cutoff <50 ng/mL    Hydrocodone 590 (A) Cutoff <25 ng/mL    Hydromorphone 26 (A) Cutoff <25 ng/mL    Morphine  "Urine <50 Cutoff <50 ng/mL    Norhydrocodone 504 (A) Cutoff <25 ng/mL    Noroxycodone <25 Cutoff <25 ng/mL    Oxycodone <25 Cutoff <25 ng/mL    Oxymorphone <25 Cutoff <25 ng/mL    Tramadol <50 Cutoff <50 ng/mL    O-Desmethyltramadol <50 Cutoff <50 ng/mL    Fentanyl <2.5 Cutoff<2.5 ng/mL    Norfentanyl <2.5 Cutoff<2.5 ng/mL    METHADONE CONFIRMATION,URINE <25 Cutoff <25 ng/mL    EDDP <25 Cutoff <25 ng/mL     Results are as expected.         Controlled Substance Agreement:  Date of the Last Agreement: 5-  Reviewed Controlled Substance Agreement including but not limited to the benefits, risks, and alternatives to treatment with a Controlled Substance medication(s).    Benzodiazepines:  What is the patient's goal of therapy?  Daily \"life control of his anger anxiety attacks which is markedly improved the quality of his life at work as well as in the evening takes it once a day but never coadministered this with his Vicodin.  Use side effects or divergence of the buffed to controlled substances  Is this being achieved with current treatment? yes    FRANTZ-7:  No data recorded    Activities of Daily Living:   Is your overall impression that this patient is benefiting (symptom reduction outweighs side effects) from benzodiazepine therapy? Yes     1. Physical Functioning: Better  2. Family Relationship: Same  3. Social Relationship: Better  4. Mood: Better  5. Sleep Patterns: Better  6. Overall Function: Better    Opioid Risk Screening:  No data recorded    Pain Assessment:  No data recorded  Review of Systems   Constitutional:  Negative for fatigue, fever and unexpected weight change.   Eyes:  Negative for visual disturbance.   Respiratory:  Positive for cough. Negative for chest tightness, shortness of breath and wheezing.    Cardiovascular:  Negative for chest pain, palpitations and leg swelling.   Gastrointestinal:  Negative for abdominal pain and blood in stool.   Genitourinary:  Negative for dysuria, frequency " "and hematuria.   Musculoskeletal:  Positive for arthralgias, gait problem, joint swelling and myalgias. Negative for neck pain.   Skin:  Negative for rash.   Neurological:  Negative for weakness, light-headedness, numbness and headaches.   Psychiatric/Behavioral:  Positive for sleep disturbance. Negative for behavioral problems, confusion, decreased concentration and suicidal ideas. The patient is nervous/anxious.        Objective   /78 (BP Location: Right arm, Patient Position: Sitting, BP Cuff Size: Adult)   Pulse 90   Temp 36.6 °C (97.8 °F) (Temporal)   Resp 16   Ht 1.676 m (5' 6\")   Wt 67.1 kg (148 lb)   SpO2 95%   BMI 23.89 kg/m²     Order: [id number]  Status: Final result       Visible to patient: Yes (not seen)    0 Result Notes       1  Topic        Component  Ref Range & Units 3 mo ago 10 mo ago 4 yr ago   Cholesterol  0 - 199 mg/dL 131 150  CM   Comment: .      AGE      DESIRABLE   BORDERLINE HIGH   HIGH     0-19 Y     0 - 169       170 - 199     >/= 200    20-24 Y     0 - 189       190 - 224     >/= 225        >24 Y     0 - 199       200 - 239     >/= 240   **All ranges are based on fasting samples. Specific   therapeutic targets will vary based on patient-specific   cardiac risk.  .   Pediatric guidelines reference:Pediatrics 2011, 128(S5).   Adult guidelines reference: NCEP ATPIII Guidelines,    ANALI 2001, 258:2486-97  .   Venipuncture immediately after or during the   administration of Metamizole may lead to falsely   low results. Testing should be performed immediately   prior to Metamizole dosing.   HDL  mg/dL 57.3 63.7 CM 61.9 CM   Comment: .      AGE      VERY LOW   LOW     NORMAL    HIGH       0-19 Y       < 35   < 40     40-45     ----    20-24 Y       ----   < 40       >45     ----      >24 Y       ----   < 40     40-60      >60  .   Cholesterol/HDL Ratio 2.3 2.4 CM 3.1 CM   Comment: REF VALUES  DESIRABLE  < 3.4  HIGH RISK  > 5.0   LDL  0 - 99 mg/dL 59        Comprehensive " Metabolic Panel  Order: [id number]  Status: Final result       Visible to patient: Yes (not seen)    0 Result Notes            Component  Ref Range & Units 3 mo ago  (9/28/23) 6 mo ago  (7/16/23) 10 mo ago  (3/10/23) 2 yr ago  (1/4/22) 2 yr ago  (7/9/21) 3 yr ago  (12/18/20) 4 yr ago  (6/11/19)   Glucose  74 - 99 mg/dL 92 103 High  98  99 95 100 High    Sodium  136 - 145 mmol/L 136 134 Low  137  135 Low  137 140   Potassium  3.5 - 5.3 mmol/L 4.4 3.8 4.8 4.1 4.9 4.5 4.8   Chloride  98 - 107 mmol/L 104 99 104  101 101 104   Bicarbonate  21 - 32 mmol/L 22 21 26  25 24 29   Anion Gap  10 - 20 mmol/L 14 18 12  14 17 12   Urea Nitrogen  6 - 23 mg/dL 8 9 9  7 8 10   Creatinine  0.50 - 1.30 mg/dL 0.74 0.78 0.78  0.65 0.65 0.60   GFR MALE  >90 mL/min/1.73m2 >90 >90 CM >90 CM       Comment:  CALCULATIONS OF ESTIMATED GFR ARE PERFORMED   USING THE 2021 CKD-EPI STUDY REFIT EQUATION   WITHOUT THE RACE VARIABLE FOR THE IDMS-TRACEABLE   CREATININE METHODS.    https://jasn.asnjournals.org/content/early/2021/09/22/ASN.4246352134   Calcium  8.6 - 10.3 mg/dL 9.2 9.4 9.3  9.7 9.3 10.2 R   Albumin  3.4 - 5.0 g/dL 4.5 4.8 4.5   4.5 4.8   Alkaline Phosphatase  33 - 136 U/L 91 227 High  91   82 R 69 R   Total Protein  6.4 - 8.2 g/dL 7.2 7.5 7.0   7.0 7.1   AST  9 - 39 U/L 26 17 19   29 21   Total Bilirubin  0.0 - 1.2 mg/dL 0.4 0.5 0.8   0.6 0.5   ALT (SGPT)  10 - 52 U/L 30            emoglobin A1C  Order: [id number]  Status: Final result       Visible to patient: Yes (seen)    0 Result Notes      Component  Ref Range & Units 6 mo ago   Hemoglobin A1C  % 5.3   Comment:      Diagnosis of Diabetes-Adults   Non-Diabetic: < or = 5.6%   Increased risk for developing diabetes: 5.7-6.4%   Diagnostic of diabetes: > or = 6.5%  .       Nallely torres  Order: [id number]  Status: Final result       Visible to patient: Yes (not seen)    0 Result Notes       Component  Ref Range & Units 6 mo ago 2 yr ago   Magnesium  1.60 - 2.40 mg/dL 1.99 2.20   Resulting  Agency        ostate Specific Antigen, Screen  Order: [id number]  Status: Final result       Visible to patient: Yes (not seen)    2 Result Notes      Component  Ref Range & Units 10 mo ago   Prostate Specific Antigen,Screen  0.00 - 4.00 ng/mL 0.70   Comment: The FDA requires that the method used for PSA assay be  reported to the physician. Values obtained with different  assay methods must not be used interchangeably. This test              Physical Exam  Signs reviewed weight is up about 6 pounds over the last 3 to 4 months  General-inspection reveals pleasant interactive individual in no acute distress  ENT mild turbinate swelling clear rhinorrhea only eyes are clear  Neck neck is supple without masses adenopathy bruits rigidity no JVD thyroid is normal  Cardiovascular RSR without Silgen murmurs gallop or ectopy  Pulmonary no wheezing rales or rhonchi  Abdominal no organomegaly mass rebound mid upper abdomen no CVA tenderness  Musculoskeletal tenderness in the medial joint line +1/4 as well as anterior subpatellar area no redness warmth or joint effusion of the right knee no tenderness over the hips minimal tenderness of the low back in the medial right SI joint.  No acute synovitis the upper and lower extremities  Neurological speech gait and cognition is normal no new deficit the upper extremities  Peripheral vascular slightly reduced pulses but otherwise symmetrical no symmetric or asymmetric edema no sensory or motor deficits lower extremities  Skin no rash petechiae jaundice  Mood mood is stable without anxiety depressive or cognitive issues  Reviewed recent CMP and lipid panel for cardiology which is stable.  Reviewed as well as need for tobacco cessation and good low-salt low-fat diet      Assessment/Plan   Problem List Items Addressed This Visit       Osteoarthritis of right knee   Will not coadministered Xanax with his Vicodin safety is reviewed  Ortho performed  Templates for both benzodiazepines and  opiates performed  Continue follow-up with cardiology as well as 3 antihypertensive medicines aspirin and Plavix.  Continue Xanax once a day in the evening for his fear and anxiety and anxiety attacks continue daytime Vicodin for knee pain he is unable to tolerate NSAIDs  Continue tobacco weaning and tobacco cessation recommended  Continue healthy routines  Follow-up in 3 months follow-up earlier medical problems was asked for his depressive affect.  Otherwise neurologically stable and no active chest pain at this gurpreet  @discharge  The above diagnosis and treatment plan was discussed with the patient patient will continue appropriate diet and exercise as reviewed  Patient will recheck earlier if any interval problems of significance or clinical worsening of the above problems.  Agrees above surveillance.  All question were addressed regarding above meds

## 2024-01-16 ASSESSMENT — ENCOUNTER SYMPTOMS
NUMBNESS: 0
JOINT SWELLING: 1
BLOOD IN STOOL: 0
CONFUSION: 0
FATIGUE: 0
NECK PAIN: 0
MYALGIAS: 1
ABDOMINAL PAIN: 0
COUGH: 1
LIGHT-HEADEDNESS: 0
FREQUENCY: 0
NERVOUS/ANXIOUS: 1
SHORTNESS OF BREATH: 0
SLEEP DISTURBANCE: 1
DECREASED CONCENTRATION: 0
UNEXPECTED WEIGHT CHANGE: 0
HEADACHES: 0
DYSURIA: 0
HEMATURIA: 0
CHEST TIGHTNESS: 0
PALPITATIONS: 0
FEVER: 0
ARTHRALGIAS: 1
WEAKNESS: 0
WHEEZING: 0

## 2024-01-29 DIAGNOSIS — I10 ESSENTIAL HYPERTENSION: ICD-10-CM

## 2024-01-30 RX ORDER — METOPROLOL TARTRATE 25 MG/1
TABLET, FILM COATED ORAL
Qty: 45 TABLET | Refills: 5 | Status: SHIPPED | OUTPATIENT
Start: 2024-01-30 | End: 2024-06-09 | Stop reason: HOSPADM

## 2024-02-12 DIAGNOSIS — M17.11 OSTEOARTHRITIS OF RIGHT KNEE, UNSPECIFIED OSTEOARTHRITIS TYPE: ICD-10-CM

## 2024-02-12 RX ORDER — HYDROCODONE BITARTRATE AND ACETAMINOPHEN 5; 325 MG/1; MG/1
1 TABLET ORAL 2 TIMES DAILY PRN
Qty: 60 TABLET | Refills: 0 | Status: SHIPPED | OUTPATIENT
Start: 2024-02-12 | End: 2024-03-11 | Stop reason: SDUPTHER

## 2024-02-12 NOTE — TELEPHONE ENCOUNTER
Rx Refill Request     Name: Emerson Chilel  :  1960   Medication Name:  NORCO 5MG  Specific Pharmacy location:  [facility]  Date of last appointment:  2024   Date of next appointment:  2024   Best number to reach patient:  [phone]

## 2024-03-11 DIAGNOSIS — M17.11 OSTEOARTHRITIS OF RIGHT KNEE, UNSPECIFIED OSTEOARTHRITIS TYPE: ICD-10-CM

## 2024-03-11 RX ORDER — HYDROCODONE BITARTRATE AND ACETAMINOPHEN 5; 325 MG/1; MG/1
1 TABLET ORAL 2 TIMES DAILY PRN
Qty: 60 TABLET | Refills: 0 | Status: SHIPPED | OUTPATIENT
Start: 2024-03-11 | End: 2024-04-12 | Stop reason: SDUPTHER

## 2024-03-11 NOTE — TELEPHONE ENCOUNTER
Rx Refill Request     Name: Emerson Chilel  :  1960   Medication Name:  NORCO 5MG  Specific Pharmacy location:  [facility]  Date of last appointment:  2024   Date of next appointment:  2024   Best number to reach patient:  [phone]

## 2024-03-18 DIAGNOSIS — F41.9 ANXIETY: ICD-10-CM

## 2024-03-18 RX ORDER — ALPRAZOLAM 0.5 MG/1
TABLET ORAL
Qty: 30 TABLET | Refills: 0 | Status: SHIPPED | OUTPATIENT
Start: 2024-03-18 | End: 2024-05-08 | Stop reason: SDUPTHER

## 2024-03-18 NOTE — TELEPHONE ENCOUNTER
Rx Refill Request     Name: Emerson Chilel  :  1960   Medication Name:    ALPRAZolam (Xanax) 0.5 mg tablet - Take on tablet (0.5 mg) by mouth once daily as needed for anxiety.  Specific Pharmacy location:  [facility]  Date of last appointment:  2024   Date of next appointment:  2024   Best number to reach patient:  [phone]

## 2024-04-08 NOTE — TELEPHONE ENCOUNTER
We received a request for prior authorization on the patient's Xanax from their pharmacy. Prior authorization was submitted to insurance today. We will await their determination.

## 2024-04-12 ENCOUNTER — OFFICE VISIT (OUTPATIENT)
Dept: PRIMARY CARE | Facility: CLINIC | Age: 64
End: 2024-04-12
Payer: COMMERCIAL

## 2024-04-12 VITALS
OXYGEN SATURATION: 95 % | SYSTOLIC BLOOD PRESSURE: 114 MMHG | HEIGHT: 66 IN | WEIGHT: 147 LBS | TEMPERATURE: 97.7 F | BODY MASS INDEX: 23.63 KG/M2 | RESPIRATION RATE: 16 BRPM | DIASTOLIC BLOOD PRESSURE: 70 MMHG | HEART RATE: 64 BPM

## 2024-04-12 DIAGNOSIS — E78.5 DYSLIPIDEMIA: ICD-10-CM

## 2024-04-12 DIAGNOSIS — M17.11 PRIMARY OSTEOARTHRITIS OF RIGHT KNEE: ICD-10-CM

## 2024-04-12 DIAGNOSIS — Z79.891 ENCOUNTER FOR MONITORING OPIOID MAINTENANCE THERAPY: ICD-10-CM

## 2024-04-12 DIAGNOSIS — M51.16 LUMBAR DISC DISEASE WITH RADICULOPATHY: ICD-10-CM

## 2024-04-12 DIAGNOSIS — Z51.81 ENCOUNTER FOR MONITORING OPIOID MAINTENANCE THERAPY: ICD-10-CM

## 2024-04-12 DIAGNOSIS — M17.11 OSTEOARTHRITIS OF RIGHT KNEE, UNSPECIFIED OSTEOARTHRITIS TYPE: ICD-10-CM

## 2024-04-12 DIAGNOSIS — I10 ESSENTIAL HYPERTENSION: ICD-10-CM

## 2024-04-12 DIAGNOSIS — F41.9 ANXIETY: Primary | ICD-10-CM

## 2024-04-12 DIAGNOSIS — I25.10 CORONARY ARTERY DISEASE INVOLVING NATIVE CORONARY ARTERY OF NATIVE HEART WITHOUT ANGINA PECTORIS: Chronic | ICD-10-CM

## 2024-04-12 DIAGNOSIS — Z79.899 ENCOUNTER FOR LONG TERM BENZODIAZEPINE THERAPY: ICD-10-CM

## 2024-04-12 LAB
AMPHETAMINES UR QL SCN: NORMAL
BARBITURATES UR QL SCN: NORMAL
BZE UR QL SCN: NORMAL
CANNABINOIDS UR QL SCN: NORMAL
CREAT UR-MCNC: 28.4 MG/DL (ref 20–370)
PCP UR QL SCN: NORMAL

## 2024-04-12 PROCEDURE — 80307 DRUG TEST PRSMV CHEM ANLYZR: CPT

## 2024-04-12 PROCEDURE — 80354 DRUG SCREENING FENTANYL: CPT

## 2024-04-12 PROCEDURE — 80361 OPIATES 1 OR MORE: CPT

## 2024-04-12 PROCEDURE — 80346 BENZODIAZEPINES1-12: CPT

## 2024-04-12 PROCEDURE — 80358 DRUG SCREENING METHADONE: CPT

## 2024-04-12 PROCEDURE — 82570 ASSAY OF URINE CREATININE: CPT

## 2024-04-12 PROCEDURE — 80373 DRUG SCREENING TRAMADOL: CPT

## 2024-04-12 PROCEDURE — 80365 DRUG SCREENING OXYCODONE: CPT

## 2024-04-12 PROCEDURE — 3008F BODY MASS INDEX DOCD: CPT | Performed by: FAMILY MEDICINE

## 2024-04-12 PROCEDURE — 3078F DIAST BP <80 MM HG: CPT | Performed by: FAMILY MEDICINE

## 2024-04-12 PROCEDURE — 99214 OFFICE O/P EST MOD 30 MIN: CPT | Performed by: FAMILY MEDICINE

## 2024-04-12 PROCEDURE — 80368 SEDATIVE HYPNOTICS: CPT

## 2024-04-12 PROCEDURE — 3074F SYST BP LT 130 MM HG: CPT | Performed by: FAMILY MEDICINE

## 2024-04-12 RX ORDER — HYDROCODONE BITARTRATE AND ACETAMINOPHEN 5; 325 MG/1; MG/1
1 TABLET ORAL 2 TIMES DAILY PRN
Qty: 60 TABLET | Refills: 0 | Status: SHIPPED | OUTPATIENT
Start: 2024-04-12 | End: 2024-05-10 | Stop reason: SDUPTHER

## 2024-04-12 NOTE — PROGRESS NOTES
Subjective   Patient ID: Emerson Chilel is a 63 y.o. male who presents for Anxiety (3 month follow up ) and Knee Pain.  HPI  63-year-old gentleman history of coronary insufficiency dyslipidemia hypertension as well as lumbar and knee DJD.  The patient does remain on his antihypertensive medicines as well as aspirin and Plavix because of prior stenting because is unable to take NSAIDs for his knee and back and hence takes Norco twice a day for relief of pain this is definitely helped him and does not administer this with his as needed Xanax.  Take Xanax for intermittent anxiety especially panic attacks in the evening which is reduces palpitations and chest pain at nighttime  The patient denies any active or resting chest pain short of palpitations.  No new GI/ issues after careful review  Chronic meds reviewed.  No reported side effects.  Is on Norvasc in the morning as well as his Prozac in the morning and hydrochlorothiazide 12.5 mg daily.  Takes metoprolol also at a half a tablet in the evening hours.  Take Xanax if needed in the evening and also takes Lipitor at night.  Takes aspirin Plavix in the daytime.  Prozac definitely helped his depressed affect after his TIA.  OARRS:  Anthony Moore,  on 4/12/2024  9:47 AM  I have personally reviewed the OARRS report for Emerson Chilel. I have considered the risks of abuse, dependence, addiction and diversion    Is the patient prescribed a combination of a benzodiazepine and opioid?  Yes, I feel it is clincially indicated to continue the medication and have discussed with the patient risks/benefits/alternatives.  Does not go administer his Xanax with his pain medicine.  Last Urine Drug Screen / ordered today: Yes  Recent Results (from the past 8760 hour(s))   Screen Opiate/Opioid/Benzo Prescription Compliance    Collection Time: 04/12/24  9:19 AM   Result Value Ref Range    Creatinine, Urine Random 28.4 20.0 - 370.0 mg/dL    Amphetamine Screen, Urine Presumptive  Negative Presumptive Negative    Barbiturate Screen, Urine Presumptive Negative Presumptive Negative    Cannabinoid Screen, Urine Presumptive Negative Presumptive Negative    Cocaine Metabolite Screen, Urine Presumptive Negative Presumptive Negative    PCP Screen, Urine Presumptive Negative Presumptive Negative     Results are as expected.         Controlled Substance Agreement:  Date of the Last Agreement: 4-  Reviewed Controlled Substance Agreement including but not limited to the benefits, risks, and alternatives to treatment with a Controlled Substance medication(s).    Opioids:  What is the patient's goal of therapy?  Quality of life control of knowing his back pain but also is painful anterior medial knee pain.  Unable to take NSAIDs because of anticoagulants benefit still outweighs the risk and no evidence abuse side effects or divergence  Is this being achieved with current treatment? yes    I have calculated the patient's Morphine Dose Equivalent (MED):   I have considered referral to Pain Management and/or a specialist, and do not feel it is necessary at this time.    I feel that it is clinically indicated to continue this current medication regimen after consideration of alternative therapies, and other non-opioid treatment.OARRS:  Anthony Moore DO on 4/12/2024  9:47 AM  I have personally reviewed the OARRS report for Emerson Chilel. I have considered the risks of abuse, dependence, addiction and diversion    Is the patient prescribed a combination of a benzodiazepine and opioid?  Yes, I feel it is clincially indicated to continue the medication and have discussed with the patient risks/benefits/alternatives.    Last Urine Drug Screen / ordered today: Yes  Recent Results (from the past 8760 hour(s))   Screen Opiate/Opioid/Benzo Prescription Compliance    Collection Time: 04/12/24  9:19 AM   Result Value Ref Range    Creatinine, Urine Random 28.4 20.0 - 370.0 mg/dL    Amphetamine Screen, Urine  Presumptive Negative Presumptive Negative    Barbiturate Screen, Urine Presumptive Negative Presumptive Negative    Cannabinoid Screen, Urine Presumptive Negative Presumptive Negative    Cocaine Metabolite Screen, Urine Presumptive Negative Presumptive Negative    PCP Screen, Urine Presumptive Negative Presumptive Negative     Results are as expected.         Controlled Substance Agreement:  Date of the Last Agreement: 4-  Reviewed Controlled Substance Agreement including but not limited to the benefits, risks, and alternatives to treatment with a Controlled Substance medication(s).    Benzodiazepines:  What is the patient's goal of therapy?  Ehly life control of his anxiety throughout the course of the day and especially in the evening hours.  This is helped reduce palpitations chest pain.  Benefits always a risk and no evidence abuse side effects or divergence does not coadministered this with his Norco  Is this being achieved with current treatment? yes    FRANTZ-7:  No data recorded    Activities of Daily Living:   Is your overall impression that this patient is benefiting (symptom reduction outweighs side effects) from benzodiazepine therapy? Yes     1. Physical Functioning: Better  2. Family Relationship: Better  3. Social Relationship: Better  4. Mood: Better  5. Sleep Patterns: Better  6. Overall Function: Better    Opioid Risk Screening:  No data recorded    Pain Assessment:  No data recorded  Review of Systems   Constitutional:  Negative for fatigue and fever.   Eyes:  Negative for visual disturbance.   Respiratory:  Negative for cough, chest tightness, shortness of breath and wheezing.    Cardiovascular:  Negative for chest pain, palpitations and leg swelling.   Gastrointestinal:  Negative for abdominal pain and blood in stool.   Genitourinary:  Negative for dysuria, frequency and hematuria.   Musculoskeletal:  Positive for arthralgias, back pain, joint swelling and myalgias. Negative for gait  "problem, neck pain and neck stiffness.   Skin:  Negative for rash.   Neurological:  Negative for syncope, weakness, light-headedness and headaches.   Psychiatric/Behavioral:  Positive for sleep disturbance. Negative for behavioral problems, confusion and decreased concentration. The patient is nervous/anxious.        Objective   /70 (BP Location: Right arm, Patient Position: Sitting, BP Cuff Size: Adult)   Pulse 64   Temp 36.5 °C (97.7 °F) (Temporal)   Resp 16   Ht 1.676 m (5' 6\")   Wt 66.7 kg (147 lb)   SpO2 95%   BMI 23.73 kg/m²         Lipid Panel  Order: [id number]   Status: Final result       Visible to patient: Yes (not seen)    0 Result Notes       1 HM Topic        Component  Ref Range & Units 6 mo ago 1 yr ago 4 yr ago   Cholesterol  0 - 199 mg/dL 131 150  CM   Comment: .      AGE      DESIRABLE   BORDERLINE HIGH   HIGH     0-19 Y     0 - 169       170 - 199     >/= 200    20-24 Y     0 - 189       190 - 224     >/= 225        >24 Y     0 - 199       200 - 239     >/= 240   **All ranges are based on fasting samples. Specific   therapeutic targets will vary based on patient-specific   cardiac risk.  .   Pediatric guidelines reference:Pediatrics 2011, 128(S5).   Adult guidelines reference: NCEP ATPIII Guidelines,    ANALI 2001, 258:2486-97  .   Venipuncture immediately after or during the   administration of Metamizole may lead to falsely   low results. Testing should be performed immediately   prior to Metamizole dosing.   HDL  mg/dL 57.3 63.7 CM 61.9 CM   Comment: .      AGE      VERY LOW   LOW     NORMAL    HIGH       0-19 Y       < 35   < 40     40-45     ----    20-24 Y       ----   < 40       >45     ----      >24 Y       ----   < 40     40-60      >60  .   Cholesterol/HDL Ratio 2.3 2.4 CM 3.1 CM   Comment: REF VALUES  DESIRABLE  < 3.4  HIGH RISK  > 5.0   LDL  0 - 99 mg/dL 59 73  High  CM   Comment: .                           NEAR      BORD      AGE      DESIRABLE  OPTIMAL    HIGH "     HIGH     VERY HIGH      e Metabolic Panel  Order: [id number]   Status: Final result       Visible to patient: Yes (not seen)    0 Result Notes            Component  Ref Range & Units 6 mo ago  (9/28/23) 9 mo ago  (7/16/23) 1 yr ago  (3/10/23) 2 yr ago  (1/4/22) 2 yr ago  (7/9/21) 3 yr ago  (12/18/20) 4 yr ago  (6/11/19)   Glucose  74 - 99 mg/dL 92 103 High  98  99 95 100 High    Sodium  136 - 145 mmol/L 136 134 Low  137  135 Low  137 140   Potassium  3.5 - 5.3 mmol/L 4.4 3.8 4.8 4.1 4.9 4.5 4.8   Chloride  98 - 107 mmol/L 104 99 104  101 101 104   Bicarbonate  21 - 32 mmol/L 22 21 26  25 24 29   Anion Gap  10 - 20 mmol/L 14 18 12  14 17 12   Urea Nitrogen  6 - 23 mg/dL 8 9 9  7 8 10   Creatinine  0.50 - 1.30 mg/dL 0.74 0.78 0.78  0.65 0.65 0.60   GFR MALE  >90 mL/min/1.73m2 >90 >90 CM >90 CM       Comment:  CALCULATIONS OF ESTIMATED GFR ARE PERFORMED   USING THE 2021 CKD-EPI STUDY REFIT EQUATION   WITHOUT THE RACE VARIABLE FOR THE IDMS-TRACEABLE   CREATININE METHODS.    https://jasn.asnjournals.org/content/early/2021/09/22/ASN.4081369169   Calcium  8.6 - 10.3 mg/dL 9.2 9.4 9.3  9.7 9.3 10.2 R   Albumin  3.4 - 5.0 g/dL 4.5 4.8 4.5   4.5 4.8   Alkaline Phosphatase  33 - 136 U/L 91 227 High  91   82 R 69 R   Total Protein  6.4 - 8.2 g/dL 7.2 7.5 7.0   7.0 7.1   AST  9 - 39 U/L 26 17 19   29 21   Total Bilirubin  0.0 - 1.2 mg/dL 0.4 0.5 0.8   0.6 0.5   ALT (SGPT)  10 - 52 U/L 30            Hemoglobin A1C  Order: [id number]   Status: Final result       Visible to patient: Yes (seen)    0 Result Notes      Component  Ref Range & Units 9 mo ago   Hemoglobin A1C  % 5.3   Comment:      Diagnosis of Diabetes-Adults   Non-Diabetic: < or = 5.6%   Increased risk for developing diabetes: 5.7-6.4%   Diagnostic of diabetes: > or = 6.5%  .       Monitori      Magnesium  Order: [id number]   Status: Final result       Visible to patient: Yes (not seen)    0 Result Notes       Component  Ref Range & Units 9 mo ago 2 yr ago    Magnesium  1.60 - 2.40 mg/dL 1.99 2.20   Result          Physical Exam  Vital signs reviewed and are normal pulse ox is normal  General-inspection pleasant or active individual in no acute distress  Neck neck is supple without masses adenopathy bruits rigidity no JVD thyroid is normal  Chest chest shows bronchial breathing otherwise no wheezing rales or rhonchi  Cardiovascular grade 1/2 over 6 systolic extra murmur otherwise no S3 gallop or ectopy today  Abdominal no organomegaly mass rebound mid upper abdomen no CVA tenderness  Peripheral vascular-measure asymmetric edema no sensorimotor vascular deficits  Skin-no rash petechiae or jaundice  Muscular-some tenderness remaining in the medial aspect of the right knee and right anserine bursa otherwise no redness warmth or effusion of the right knee minimal tenderness of the lateral right hip but otherwise good range of motion some tenderness to the right medial SI joint and midline L4-L5 otherwise no posterior thoracic rashes straight leg raising is negative  Mood mood is stable on anxiety depressive or cognitive issues  Logic-cranial nerves are intact and no new focal deficits the upper and lower extremities  Reviewed chronic medicines diet and earlier labs.  Discussed safety of both the above Xanax and Norco.  Rieman and urine tox screen performed today      Assessment/Plan   Problem List Items Addressed This Visit       Anxiety    Relevant Orders    Opiate/Opioid/Benzo Prescription Compliance    Lumbar disc disease with radiculopathy    Relevant Orders    Opiate/Opioid/Benzo Prescription Compliance    Osteoarthritis of right knee    Relevant Orders    Opiate/Opioid/Benzo Prescription Compliance     Other Visit Diagnoses       Encounter for long term benzodiazepine therapy        Relevant Orders    Opiate/Opioid/Benzo Prescription Compliance    Encounter for monitoring opioid maintenance therapy        Relevant Orders    Opiate/Opioid/Benzo Prescription Compliance         Safety issues regarding the use of both Xanax and Norco  Med agreement as well as urine tox screen performed  OARRS performed and also template for both benzodiazepine and opiates performed  Continue his aspirin Plavix above antihypertensive medicines and statin therapy.  Follow-up with cardiology at their direction  Continue low-salt low-fat diet  Meds reviewed  Follow-up with interval problems they are aware of ER parameters regarding his heart follow-up in 3 months  @discharge  The above diagnosis and treatment plan was discussed with the patient patient will continue appropriate diet and exercise as reviewed  Patient will recheck earlier if any interval problems of significance or clinical worsening of the above problems.  Agrees above surveillance.  All question were addressed regarding above meds

## 2024-04-16 PROBLEM — Z51.81 ENCOUNTER FOR MONITORING OPIOID MAINTENANCE THERAPY: Status: ACTIVE | Noted: 2024-04-16

## 2024-04-16 PROBLEM — Z79.891 ENCOUNTER FOR MONITORING OPIOID MAINTENANCE THERAPY: Status: ACTIVE | Noted: 2024-04-16

## 2024-04-16 PROBLEM — Z79.899 ENCOUNTER FOR LONG TERM BENZODIAZEPINE THERAPY: Status: ACTIVE | Noted: 2024-04-16

## 2024-04-16 ASSESSMENT — ENCOUNTER SYMPTOMS
HEADACHES: 0
NECK STIFFNESS: 0
SHORTNESS OF BREATH: 0
PALPITATIONS: 0
BACK PAIN: 1
CONFUSION: 0
FEVER: 0
NERVOUS/ANXIOUS: 1
JOINT SWELLING: 1
CHEST TIGHTNESS: 0
FREQUENCY: 0
WHEEZING: 0
BLOOD IN STOOL: 0
ABDOMINAL PAIN: 0
MYALGIAS: 1
DYSURIA: 0
NECK PAIN: 0
FATIGUE: 0
SLEEP DISTURBANCE: 1
DECREASED CONCENTRATION: 0
LIGHT-HEADEDNESS: 0
HEMATURIA: 0
ARTHRALGIAS: 1
WEAKNESS: 0
COUGH: 0

## 2024-04-17 LAB
1OH-MIDAZOLAM UR CFM-MCNC: <25 NG/ML
6MAM UR CFM-MCNC: <25 NG/ML
7AMINOCLONAZEPAM UR CFM-MCNC: <25 NG/ML
A-OH ALPRAZ UR CFM-MCNC: <25 NG/ML
ALPRAZ UR CFM-MCNC: <25 NG/ML
CHLORDIAZEP UR CFM-MCNC: <25 NG/ML
CLONAZEPAM UR CFM-MCNC: <25 NG/ML
CODEINE UR CFM-MCNC: <50 NG/ML
DIAZEPAM UR CFM-MCNC: <25 NG/ML
EDDP UR CFM-MCNC: <25 NG/ML
FENTANYL UR CFM-MCNC: <2.5 NG/ML
HYDROCODONE CTO UR CFM-MCNC: 119 NG/ML
HYDROMORPHONE UR CFM-MCNC: <25 NG/ML
LORAZEPAM UR CFM-MCNC: <25 NG/ML
METHADONE UR CFM-MCNC: <25 NG/ML
MIDAZOLAM UR CFM-MCNC: <25 NG/ML
MORPHINE UR CFM-MCNC: <50 NG/ML
NORDIAZEPAM UR CFM-MCNC: <25 NG/ML
NORFENTANYL UR CFM-MCNC: <2.5 NG/ML
NORHYDROCODONE UR CFM-MCNC: 158 NG/ML
NOROXYCODONE UR CFM-MCNC: <25 NG/ML
NORTRAMADOL UR-MCNC: <50 NG/ML
OXAZEPAM UR CFM-MCNC: <25 NG/ML
OXYCODONE UR CFM-MCNC: <25 NG/ML
OXYMORPHONE UR CFM-MCNC: <25 NG/ML
TEMAZEPAM UR CFM-MCNC: <25 NG/ML
TRAMADOL UR CFM-MCNC: <50 NG/ML
ZOLPIDEM UR CFM-MCNC: <25 NG/ML
ZOLPIDEM UR-MCNC: <25 NG/ML

## 2024-05-08 ENCOUNTER — TELEPHONE (OUTPATIENT)
Dept: PRIMARY CARE | Facility: CLINIC | Age: 64
End: 2024-05-08
Payer: COMMERCIAL

## 2024-05-08 DIAGNOSIS — F41.9 ANXIETY: ICD-10-CM

## 2024-05-08 RX ORDER — ALPRAZOLAM 0.5 MG/1
TABLET ORAL
Qty: 30 TABLET | Refills: 0 | Status: SHIPPED | OUTPATIENT
Start: 2024-05-08 | End: 2024-06-09 | Stop reason: HOSPADM

## 2024-05-08 NOTE — TELEPHONE ENCOUNTER
Rx Refill Request     Name: Emerson Chilel  :  1960   Medication Name:    ALPRAZolam (Xanax) 0.5 mg tablet - Take one tablet (0.5 mg) by mouth once daily as needed for  anxiety.  Specific Pharmacy location:  [facility]  Date of last appointment:  2024   Date of next appointment:  5/10/2024   Best number to reach patient:  [phone]

## 2024-05-10 ENCOUNTER — OFFICE VISIT (OUTPATIENT)
Dept: PRIMARY CARE | Facility: CLINIC | Age: 64
End: 2024-05-10
Payer: COMMERCIAL

## 2024-05-10 VITALS
OXYGEN SATURATION: 97 % | BODY MASS INDEX: 22.82 KG/M2 | SYSTOLIC BLOOD PRESSURE: 114 MMHG | WEIGHT: 142 LBS | HEIGHT: 66 IN | RESPIRATION RATE: 16 BRPM | TEMPERATURE: 97.3 F | DIASTOLIC BLOOD PRESSURE: 70 MMHG | HEART RATE: 66 BPM

## 2024-05-10 DIAGNOSIS — Z95.5 CORONARY STENT PATENT: ICD-10-CM

## 2024-05-10 DIAGNOSIS — M17.11 OSTEOARTHRITIS OF RIGHT KNEE, UNSPECIFIED OSTEOARTHRITIS TYPE: ICD-10-CM

## 2024-05-10 DIAGNOSIS — I10 ESSENTIAL HYPERTENSION: Primary | ICD-10-CM

## 2024-05-10 DIAGNOSIS — E78.5 DYSLIPIDEMIA: ICD-10-CM

## 2024-05-10 DIAGNOSIS — M51.16 LUMBAR DISC DISEASE WITH RADICULOPATHY: ICD-10-CM

## 2024-05-10 DIAGNOSIS — K29.40 ATROPHIC GASTRITIS WITHOUT HEMORRHAGE: ICD-10-CM

## 2024-05-10 PROCEDURE — 3008F BODY MASS INDEX DOCD: CPT | Performed by: FAMILY MEDICINE

## 2024-05-10 PROCEDURE — 3078F DIAST BP <80 MM HG: CPT | Performed by: FAMILY MEDICINE

## 2024-05-10 PROCEDURE — 3074F SYST BP LT 130 MM HG: CPT | Performed by: FAMILY MEDICINE

## 2024-05-10 PROCEDURE — 99214 OFFICE O/P EST MOD 30 MIN: CPT | Performed by: FAMILY MEDICINE

## 2024-05-10 RX ORDER — ATORVASTATIN CALCIUM 40 MG/1
40 TABLET, FILM COATED ORAL DAILY
Qty: 90 TABLET | Refills: 0 | Status: SHIPPED | OUTPATIENT
Start: 2024-05-10

## 2024-05-10 RX ORDER — HYDROCODONE BITARTRATE AND ACETAMINOPHEN 5; 325 MG/1; MG/1
1 TABLET ORAL 2 TIMES DAILY PRN
Qty: 60 TABLET | Refills: 0 | Status: SHIPPED | OUTPATIENT
Start: 2024-05-10 | End: 2024-06-09 | Stop reason: HOSPADM

## 2024-05-10 RX ORDER — PANTOPRAZOLE SODIUM 40 MG/1
40 TABLET, DELAYED RELEASE ORAL DAILY
Qty: 30 TABLET | Refills: 1 | Status: SHIPPED | OUTPATIENT
Start: 2024-05-10 | End: 2024-07-09

## 2024-05-10 NOTE — PROGRESS NOTES
Subjective   Patient ID: Emerson Chilel is a 64 y.o. male who presents for Hypertension (3 week follow up, change in Metoprolol dose to 1 tablet nightly. ).  HPI  64year-old gentleman with history of generalized anxiety doing well on Xanax has been doing it for least last 5 or 6 years takes it in the evening and does not coadministered this with his Norco  With history of DJD significantly of his medial and anterior right knee takes his Norco twice a day with his swelling and pain during the workday he is unable to take NSAIDs because aspirin Plavix and GI distress  Some increased heartburn recently and does need to go back to his Protonix  I did do OARRS report as well as safety discussion  Template for both benzodiazepines and opiates performed today.  Patient has no chest pain shortness of breath with history of stenting.  Remote history of TIA also and generalized anxiety and depression.  Remains on his Prozac with good control with mood lability without side effects.  Remains on his above cardiac medicines of hydrochlorothiazide in the morning Norvasc in the morning and only 25 mg metoprolol at night gratefully cardiopulmonary wise is stable did review medicines as well as no side effects recently  OARRS:  Anthony Moore,  on 5/10/2024 11:05 AM  I have personally reviewed the OARRS report for Emerson Chilel. I have considered the risks of abuse, dependence, addiction and diversion    Is the patient prescribed a combination of a benzodiazepine and opioid?  Yes, I feel it is clincially indicated to continue the medication and have discussed with the patient risks/benefits/alternatives.    Last Urine Drug Screen / ordered today: Yes  Recent Results (from the past 8760 hour(s))   Confirmation Opiate/Opioid/Benzo Prescription Compliance    Collection Time: 04/12/24  9:19 AM   Result Value Ref Range    Clonazepam <25 <25 ng/mL    7-Aminoclonazepam <25 <25 ng/mL    Alprazolam <25 <25 ng/mL     Alpha-Hydroxyalprazolam <25 <25 ng/mL    Midazolam <25 <25 ng/mL    Alpha-Hydroxymidazolam <25 <25 ng/mL    Chlordiazepoxide <25 <25 ng/mL    Diazepam <25 <25 ng/mL    Nordiazepam <25 <25 ng/mL    Temazepam <25 <25 ng/mL    Oxazepam <25 <25 ng/mL    Lorazepam <25 <25 ng/mL    Methadone <25 <25 ng/mL    EDDP <25 <25 ng/mL    6-Acetylmorphine <25 <25 ng/mL    Codeine <50 <50 ng/mL    Hydrocodone 119 (H) <25 ng/mL    Hydromorphone <25 <25 ng/mL    Morphine  <50 <50 ng/mL    Norhydrocodone 158 (H) <25 ng/mL    Noroxycodone <25 <25 ng/mL    Oxycodone <25 <25 ng/mL    Oxymorphone <25 <25 ng/mL    Fentanyl <2.5 <2.5 ng/mL    Norfentanyl <2.5 <2.5 ng/mL    Tramadol <50 <50 ng/mL    O-Desmethyltramadol <50 <50 ng/mL    Zolpidem <25 <25 ng/mL    Zolpidem Metabolite (ZCA) <25 <25 ng/mL   Screen Opiate/Opioid/Benzo Prescription Compliance    Collection Time: 04/12/24  9:19 AM   Result Value Ref Range    Creatinine, Urine Random 28.4 20.0 - 370.0 mg/dL    Amphetamine Screen, Urine Presumptive Negative Presumptive Negative    Barbiturate Screen, Urine Presumptive Negative Presumptive Negative    Cannabinoid Screen, Urine Presumptive Negative Presumptive Negative    Cocaine Metabolite Screen, Urine Presumptive Negative Presumptive Negative    PCP Screen, Urine Presumptive Negative Presumptive Negative     Results are as expected.         Controlled Substance Agreement:  Date of the Last Agreement: 4-  Reviewed Controlled Substance Agreement including but not limited to the benefits, risks, and alternatives to treatment with a Controlled Substance medication(s).    Benzodiazepines:  What is the patient's goal of therapy?  Ehly, life control of his eating and anxiety attacks palpitations and generalized anxiety.  Benefit still outweighs the risk nubbins abuse side effects or divergence  Is this being achieved with current treatment? yes    FRANTZ-7:  No data recorded    Activities of Daily Living:   Is your overall impression  that this patient is benefiting (symptom reduction outweighs side effects) from benzodiazepine therapy? Yes     1. Physical Functioning: Same  2. Family Relationship: Better  3. Social Relationship: Better  4. Mood: Better  5. Sleep Patterns: Better  6. Overall Function: Better  OARRS:  Anthony NIELSON Oscar, DO on 5/10/2024 11:05 AM  I have personally reviewed the OARRS report for Emerson Chilel. I have considered the risks of abuse, dependence, addiction and diversion    Is the patient prescribed a combination of a benzodiazepine and opioid?  Yes, I feel it is clincially indicated to continue the medication and have discussed with the patient risks/benefits/alternatives.  Has been on this combination safely for over 5 years does not coadministered these 2 drugs i.e. her exact Xanax with his Leblanc he is tolerated them well without side effects divergence.  Last Urine Drug Screen / ordered today: Yes  Recent Results (from the past 8760 hour(s))   Confirmation Opiate/Opioid/Benzo Prescription Compliance    Collection Time: 04/12/24  9:19 AM   Result Value Ref Range    Clonazepam <25 <25 ng/mL    7-Aminoclonazepam <25 <25 ng/mL    Alprazolam <25 <25 ng/mL    Alpha-Hydroxyalprazolam <25 <25 ng/mL    Midazolam <25 <25 ng/mL    Alpha-Hydroxymidazolam <25 <25 ng/mL    Chlordiazepoxide <25 <25 ng/mL    Diazepam <25 <25 ng/mL    Nordiazepam <25 <25 ng/mL    Temazepam <25 <25 ng/mL    Oxazepam <25 <25 ng/mL    Lorazepam <25 <25 ng/mL    Methadone <25 <25 ng/mL    EDDP <25 <25 ng/mL    6-Acetylmorphine <25 <25 ng/mL    Codeine <50 <50 ng/mL    Hydrocodone 119 (H) <25 ng/mL    Hydromorphone <25 <25 ng/mL    Morphine  <50 <50 ng/mL    Norhydrocodone 158 (H) <25 ng/mL    Noroxycodone <25 <25 ng/mL    Oxycodone <25 <25 ng/mL    Oxymorphone <25 <25 ng/mL    Fentanyl <2.5 <2.5 ng/mL    Norfentanyl <2.5 <2.5 ng/mL    Tramadol <50 <50 ng/mL    O-Desmethyltramadol <50 <50 ng/mL    Zolpidem <25 <25 ng/mL    Zolpidem Metabolite (ZCA) <25 <25  ng/mL   Screen Opiate/Opioid/Benzo Prescription Compliance    Collection Time: 04/12/24  9:19 AM   Result Value Ref Range    Creatinine, Urine Random 28.4 20.0 - 370.0 mg/dL    Amphetamine Screen, Urine Presumptive Negative Presumptive Negative    Barbiturate Screen, Urine Presumptive Negative Presumptive Negative    Cannabinoid Screen, Urine Presumptive Negative Presumptive Negative    Cocaine Metabolite Screen, Urine Presumptive Negative Presumptive Negative    PCP Screen, Urine Presumptive Negative Presumptive Negative     Results are as expected.         Controlled Substance Agreement:  Date of the Last Agreement: 4-  Reviewed Controlled Substance Agreement including but not limited to the benefits, risks, and alternatives to treatment with a Controlled Substance medication(s).    Opioids:  What is the patient's goal of therapy?  Goal daily quality life pain in his right medial and anterior knee he is unable to take NSAIDs as he is on aspirin Plavix with this is a quality of life control of his pain while he is working.  The Xanax is for as needed evening anxiety attacks and palpitations.  He is tolerated both these well without abuse  Is this being achieved with current treatment? yes    I have calculated the patient's Morphine Dose Equivalent (MED):   I have considered referral to Pain Management and/or a specialist, and do not feel it is necessary at this time.    I feel that it is clinically indicated to continue this current medication regimen after consideration of alternative therapies, and other non-opioid treatment.    Opioid Risk Screening:  No data recorded    Pain Assessment:  No data recorded  Review of Systems   Constitutional:  Negative for fatigue, fever and unexpected weight change.   Eyes:  Negative for visual disturbance.   Respiratory:  Negative for cough, chest tightness and shortness of breath.    Cardiovascular:  Negative for chest pain, palpitations and leg swelling.  "  Gastrointestinal:  Positive for abdominal pain and nausea. Negative for abdominal distention, blood in stool, constipation, diarrhea and vomiting.   Genitourinary:  Negative for dysuria, frequency and hematuria.   Musculoskeletal:  Positive for arthralgias, back pain, joint swelling and myalgias.   Skin:  Negative for rash.   Neurological:  Negative for weakness, light-headedness and headaches.   Hematological:  Negative for adenopathy.   Psychiatric/Behavioral:  Positive for dysphoric mood and sleep disturbance. Negative for behavioral problems, confusion and suicidal ideas. The patient is nervous/anxious.        Objective   /70 (BP Location: Left arm, Patient Position: Sitting, BP Cuff Size: Adult)   Pulse 66   Temp 36.3 °C (97.3 °F) (Temporal)   Resp 16   Ht 1.676 m (5' 6\")   Wt 64.4 kg (142 lb)   SpO2 97%   BMI 22.92 kg/m²           Physical Exam  vitals signs reviewed and normal blood pressure more stable with more stable heart rate on low-dose metoprolol at nighttime.  General- pleasant interactive reserved individual in no acute distress  Neck neck is supple out masses adenopathy bruits or rigidity  Cardiovascular very soft grade 1/2 over 6 systolic extra murmur otherwise no S3 gallop or ectopy  Pulmonary bronchial breathing noted but otherwise no wheezing rales or rhonchi  Abdominal very minimal subxiphoid tenderness but otherwise no right or left subcostal tenderness no rebound bowel sounds are normal no bruits no lower abdominal tenderness or rebound  Peripheral vascular slightly reduced pulses but otherwise symmetrical no symmetric or asymmetric edema no sensorimotor deficit lower extremities  Skin no rash petechiae or jaundice  Musculoskeletal some tenderness in the medial aspect of the right knee but no redness warmth or effusion some tenderness to the medial right SI joint as well  Mood mood is stable without significant anxiety depressive or cognitive issues  Template for both benzos " and opiates performed as well as safety issues and long review of his above cardiac medicines.      Assessment/Plan   Problem List Items Addressed This Visit       Lumbar disc disease with radiculopathy    Osteoarthritis of right knee   As above, both templates were completed safety is reviewed.  OARRS performed.  Does not coadministered Xanax with his Norco  Will go back to his PPI with anti-GERD diet.  Will continue his aspirin Plavix statin therapy and his blood pressure medicines  Continue healthy routines  Salt low fat diet.  Follow cardiology at their direction  Neurologically stable cardiopulmonary stable check in 2 to 3 months for follow-up of the above multiple issues of abdominal pain and notify me  All medicines side effects reviewed as well as use  @discharge  The above diagnosis and treatment plan was discussed with the patient patient will continue appropriate diet and exercise as reviewed  Patient will recheck earlier if any interval problems of significance or clinical worsening of the above problems.  Agrees above surveillance.  All question were addressed regarding above meds

## 2024-05-14 ENCOUNTER — TELEPHONE (OUTPATIENT)
Dept: PRIMARY CARE | Facility: CLINIC | Age: 64
End: 2024-05-14
Payer: COMMERCIAL

## 2024-05-14 NOTE — TELEPHONE ENCOUNTER
Prior Authorization for HYDROcodone-acetaminophen (Norco) 5-325 mg tablet   has been DENIED due to patients prescription of ALPRAZolam (Xanax) 0.5 mg tablet.    Denial letter, in Part 6 of Notes states:  If an opioid is being prescribed concomitantly with a benzodiazepine, the member MUST meet the following criteria.  B. Documentation supports discontinuation of combination therapy has been attempted within the last three months without success,  C. Prescribed by or in consultation with pain management.     Pain management referral needed OR discontinue use of alprazolam OR pt is to pay out of pocket for Hydrocodone.    Denial letter scanned into media on 05.14.2024        Ple

## 2024-05-16 PROBLEM — K29.40 ATROPHIC GASTRITIS WITHOUT HEMORRHAGE: Status: ACTIVE | Noted: 2024-05-16

## 2024-05-16 ASSESSMENT — ENCOUNTER SYMPTOMS
NERVOUS/ANXIOUS: 1
HEADACHES: 0
ABDOMINAL PAIN: 1
ADENOPATHY: 0
DIARRHEA: 0
PALPITATIONS: 0
FREQUENCY: 0
MYALGIAS: 1
CONFUSION: 0
CONSTIPATION: 0
NAUSEA: 1
WEAKNESS: 0
JOINT SWELLING: 1
ABDOMINAL DISTENTION: 0
BLOOD IN STOOL: 0
ARTHRALGIAS: 1
SHORTNESS OF BREATH: 0
FATIGUE: 0
LIGHT-HEADEDNESS: 0
BACK PAIN: 1
VOMITING: 0
SLEEP DISTURBANCE: 1
COUGH: 0
CHEST TIGHTNESS: 0
UNEXPECTED WEIGHT CHANGE: 0
DYSPHORIC MOOD: 1
FEVER: 0
DYSURIA: 0
HEMATURIA: 0

## 2024-05-24 ENCOUNTER — HOSPITAL ENCOUNTER (EMERGENCY)
Facility: HOSPITAL | Age: 64
Discharge: OTHER INSTITUTION | End: 2024-05-25
Attending: STUDENT IN AN ORGANIZED HEALTH CARE EDUCATION/TRAINING PROGRAM
Payer: COMMERCIAL

## 2024-05-24 ENCOUNTER — APPOINTMENT (OUTPATIENT)
Dept: RADIOLOGY | Facility: HOSPITAL | Age: 64
End: 2024-05-24
Payer: COMMERCIAL

## 2024-05-24 ENCOUNTER — HOSPITAL ENCOUNTER (INPATIENT)
Facility: HOSPITAL | Age: 64
End: 2024-05-24
Payer: COMMERCIAL

## 2024-05-24 ENCOUNTER — APPOINTMENT (OUTPATIENT)
Dept: CARDIOLOGY | Facility: HOSPITAL | Age: 64
End: 2024-05-24
Payer: COMMERCIAL

## 2024-05-24 DIAGNOSIS — R17 JAUNDICE: Primary | ICD-10-CM

## 2024-05-24 DIAGNOSIS — R79.89 ELEVATED LFTS: ICD-10-CM

## 2024-05-24 DIAGNOSIS — K86.89 MASS OF HEAD OF PANCREAS (HHS-HCC): ICD-10-CM

## 2024-05-24 LAB
ALBUMIN SERPL BCP-MCNC: 4.1 G/DL (ref 3.4–5)
ALP SERPL-CCNC: 437 U/L (ref 33–136)
ALT SERPL W P-5'-P-CCNC: 300 U/L (ref 10–52)
AMMONIA PLAS-SCNC: 36 UMOL/L (ref 16–53)
ANION GAP SERPL CALC-SCNC: 16 MMOL/L (ref 10–20)
APAP SERPL-MCNC: <10 UG/ML
AST SERPL W P-5'-P-CCNC: 228 U/L (ref 9–39)
BASOPHILS # BLD AUTO: 0 X10*3/UL (ref 0–0.1)
BASOPHILS NFR BLD AUTO: 0 %
BILIRUB DIRECT SERPL-MCNC: 12.3 MG/DL (ref 0–0.3)
BILIRUB SERPL-MCNC: 19.5 MG/DL (ref 0–1.2)
BUN SERPL-MCNC: 9 MG/DL (ref 6–23)
CALCIUM SERPL-MCNC: 9.5 MG/DL (ref 8.6–10.3)
CHLORIDE SERPL-SCNC: 92 MMOL/L (ref 98–107)
CO2 SERPL-SCNC: 24 MMOL/L (ref 21–32)
CREAT SERPL-MCNC: 0.9 MG/DL (ref 0.5–1.3)
EGFRCR SERPLBLD CKD-EPI 2021: >90 ML/MIN/1.73M*2
EOSINOPHIL # BLD AUTO: 0.1 X10*3/UL (ref 0–0.7)
EOSINOPHIL NFR BLD AUTO: 0.9 %
ERYTHROCYTE [DISTWIDTH] IN BLOOD BY AUTOMATED COUNT: 16.9 % (ref 11.5–14.5)
GLUCOSE SERPL-MCNC: 95 MG/DL (ref 74–99)
HCT VFR BLD AUTO: 37.5 % (ref 41–52)
HGB BLD-MCNC: 14 G/DL (ref 13.5–17.5)
IMM GRANULOCYTES # BLD AUTO: 0.1 X10*3/UL (ref 0–0.7)
IMM GRANULOCYTES NFR BLD AUTO: 0.9 % (ref 0–0.9)
INR PPP: 1.1 (ref 0.9–1.1)
LACTATE SERPL-SCNC: 0.8 MMOL/L (ref 0.4–2)
LIPASE SERPL-CCNC: 208 U/L (ref 9–82)
LYMPHOCYTES # BLD AUTO: 1.05 X10*3/UL (ref 1.2–4.8)
LYMPHOCYTES NFR BLD AUTO: 9.4 %
MAGNESIUM SERPL-MCNC: 1.89 MG/DL (ref 1.6–2.4)
MCH RBC QN AUTO: 33.7 PG (ref 26–34)
MCHC RBC AUTO-ENTMCNC: 37.3 G/DL (ref 32–36)
MCV RBC AUTO: 90 FL (ref 80–100)
MONOCYTES # BLD AUTO: 1.15 X10*3/UL (ref 0.1–1)
MONOCYTES NFR BLD AUTO: 10.3 %
NEUTROPHILS # BLD AUTO: 8.8 X10*3/UL (ref 1.2–7.7)
NEUTROPHILS NFR BLD AUTO: 78.5 %
NRBC BLD-RTO: 0 /100 WBCS (ref 0–0)
PLATELET # BLD AUTO: 190 X10*3/UL (ref 150–450)
POTASSIUM SERPL-SCNC: 3.3 MMOL/L (ref 3.5–5.3)
PROT SERPL-MCNC: 7 G/DL (ref 6.4–8.2)
PROTHROMBIN TIME: 12.6 SECONDS (ref 9.8–12.8)
RBC # BLD AUTO: 4.15 X10*6/UL (ref 4.5–5.9)
SODIUM SERPL-SCNC: 129 MMOL/L (ref 136–145)
WBC # BLD AUTO: 11.2 X10*3/UL (ref 4.4–11.3)

## 2024-05-24 PROCEDURE — 83690 ASSAY OF LIPASE: CPT | Performed by: STUDENT IN AN ORGANIZED HEALTH CARE EDUCATION/TRAINING PROGRAM

## 2024-05-24 PROCEDURE — 83735 ASSAY OF MAGNESIUM: CPT | Performed by: STUDENT IN AN ORGANIZED HEALTH CARE EDUCATION/TRAINING PROGRAM

## 2024-05-24 PROCEDURE — 36415 COLL VENOUS BLD VENIPUNCTURE: CPT | Performed by: STUDENT IN AN ORGANIZED HEALTH CARE EDUCATION/TRAINING PROGRAM

## 2024-05-24 PROCEDURE — 80143 DRUG ASSAY ACETAMINOPHEN: CPT | Performed by: STUDENT IN AN ORGANIZED HEALTH CARE EDUCATION/TRAINING PROGRAM

## 2024-05-24 PROCEDURE — 85610 PROTHROMBIN TIME: CPT | Performed by: STUDENT IN AN ORGANIZED HEALTH CARE EDUCATION/TRAINING PROGRAM

## 2024-05-24 PROCEDURE — 99285 EMERGENCY DEPT VISIT HI MDM: CPT | Mod: 25

## 2024-05-24 PROCEDURE — 85025 COMPLETE CBC W/AUTO DIFF WBC: CPT | Performed by: STUDENT IN AN ORGANIZED HEALTH CARE EDUCATION/TRAINING PROGRAM

## 2024-05-24 PROCEDURE — 74177 CT ABD & PELVIS W/CONTRAST: CPT | Mod: FOREIGN READ | Performed by: RADIOLOGY

## 2024-05-24 PROCEDURE — 2550000001 HC RX 255 CONTRASTS: Performed by: STUDENT IN AN ORGANIZED HEALTH CARE EDUCATION/TRAINING PROGRAM

## 2024-05-24 PROCEDURE — 74177 CT ABD & PELVIS W/CONTRAST: CPT

## 2024-05-24 PROCEDURE — 82248 BILIRUBIN DIRECT: CPT | Performed by: STUDENT IN AN ORGANIZED HEALTH CARE EDUCATION/TRAINING PROGRAM

## 2024-05-24 PROCEDURE — 80074 ACUTE HEPATITIS PANEL: CPT | Mod: ELYLAB | Performed by: STUDENT IN AN ORGANIZED HEALTH CARE EDUCATION/TRAINING PROGRAM

## 2024-05-24 PROCEDURE — 93005 ELECTROCARDIOGRAM TRACING: CPT

## 2024-05-24 PROCEDURE — 82140 ASSAY OF AMMONIA: CPT | Performed by: STUDENT IN AN ORGANIZED HEALTH CARE EDUCATION/TRAINING PROGRAM

## 2024-05-24 PROCEDURE — 83605 ASSAY OF LACTIC ACID: CPT | Performed by: STUDENT IN AN ORGANIZED HEALTH CARE EDUCATION/TRAINING PROGRAM

## 2024-05-24 PROCEDURE — 80048 BASIC METABOLIC PNL TOTAL CA: CPT | Performed by: STUDENT IN AN ORGANIZED HEALTH CARE EDUCATION/TRAINING PROGRAM

## 2024-05-24 RX ADMIN — IOHEXOL 75 ML: 350 INJECTION, SOLUTION INTRAVENOUS at 17:48

## 2024-05-24 ASSESSMENT — LIFESTYLE VARIABLES
EVER HAD A DRINK FIRST THING IN THE MORNING TO STEADY YOUR NERVES TO GET RID OF A HANGOVER: NO
HAVE PEOPLE ANNOYED YOU BY CRITICIZING YOUR DRINKING: NO
TOTAL SCORE: 0
HAVE YOU EVER FELT YOU SHOULD CUT DOWN ON YOUR DRINKING: NO
EVER FELT BAD OR GUILTY ABOUT YOUR DRINKING: NO

## 2024-05-24 ASSESSMENT — PAIN DESCRIPTION - PAIN TYPE: TYPE: ACUTE PAIN

## 2024-05-24 ASSESSMENT — PAIN - FUNCTIONAL ASSESSMENT
PAIN_FUNCTIONAL_ASSESSMENT: 0-10
PAIN_FUNCTIONAL_ASSESSMENT: 0-10

## 2024-05-24 ASSESSMENT — PAIN DESCRIPTION - DESCRIPTORS
DESCRIPTORS: ACHING;DULL
DESCRIPTORS: ACHING;DULL

## 2024-05-24 ASSESSMENT — PAIN DESCRIPTION - ORIENTATION: ORIENTATION: RIGHT;UPPER

## 2024-05-24 ASSESSMENT — PAIN DESCRIPTION - FREQUENCY: FREQUENCY: CONSTANT/CONTINUOUS

## 2024-05-24 ASSESSMENT — PAIN SCALES - GENERAL
PAINLEVEL_OUTOF10: 5 - MODERATE PAIN
PAINLEVEL_OUTOF10: 1
PAINLEVEL_OUTOF10: 5 - MODERATE PAIN

## 2024-05-24 ASSESSMENT — PAIN DESCRIPTION - LOCATION: LOCATION: ABDOMEN

## 2024-05-24 NOTE — ED PROVIDER NOTES
HPI   Chief Complaint   Patient presents with    Abdominal Pain     RUQ abdominal pain, dark urine, N/V, and light color stools x 2 weeks. Pt reports jaundice  today.        64-year-old male presenting for evaluation of right upper quadrant abdominal pain, nausea, vomiting and light-colored stools over the last 2 weeks.  Symptoms have been progressive in nature.  Patient reports today he noticed his skin and eyes were yellow.  Denies any significant weight loss or night sweats.  Reports that he has a history of drinking approximately 6 beers per day.  No prior history of known liver disease.  Still has his gallbladder.  Denies fevers or chills.      History provided by:  Patient                      [place] Coma Scale Score: 15                     Patient History   Past Medical History:   Diagnosis Date    CAD (coronary artery disease)     Chronic sinusitis, unspecified 07/13/2021    Sinobronchitis    Hyperlipidemia     Hypertension      Past Surgical History:   Procedure Laterality Date    CARDIAC CATHETERIZATION      stent placement    HERNIA REPAIR      KNEE CARTILAGE SURGERY Right     MR HEAD ANGIO WO IV CONTRAST  12/21/2021    MR HEAD ANGIO WO IV CONTRAST 12/21/2021     Family History   Problem Relation Name Age of Onset    Other (cardiac disorder) Mother      Stroke Mother      Pancreatic cancer Mother      Suicide Attempts Father      Other (cabg) Brother      Coronary artery disease Brother      No Known Problems Other       Social History     Tobacco Use    Smoking status: Every Day     Current packs/day: 1.00     Average packs/day: 1 pack/day for 50.0 years (50.0 ttl pk-yrs)     Types: Cigarettes    Smokeless tobacco: Never   Substance Use Topics    Alcohol use: Yes     Alcohol/week: 2.0 standard drinks of alcohol     Types: 2 Cans of beer per week     Comment: daily    Drug use: Not Currently       Physical Exam   ED Triage Vitals [05/24/24 1602]   Temperature Heart Rate Respirations BP   36.4 °C (97.5  °F) 80 18 172/87      Pulse Ox Temp src Heart Rate Source Patient Position   98 % -- -- --      BP Location FiO2 (%)     -- --       Physical Exam  Vitals and nursing note reviewed.   Constitutional:       General: He is not in acute distress.  HENT:      Head: Atraumatic.      Mouth/Throat:      Mouth: Mucous membranes are moist.      Pharynx: Oropharynx is clear.   Eyes:      General: Scleral icterus present.      Extraocular Movements: Extraocular movements intact.      Pupils: Pupils are equal, round, and reactive to light.   Cardiovascular:      Rate and Rhythm: Normal rate and regular rhythm.      Pulses: Normal pulses.   Pulmonary:      Effort: Pulmonary effort is normal. No respiratory distress.      Breath sounds: Normal breath sounds.   Abdominal:      General: There is no distension.      Palpations: Abdomen is soft.      Tenderness: There is abdominal tenderness in the right upper quadrant. There is no guarding or rebound.   Musculoskeletal:         General: No deformity.      Cervical back: Neck supple.   Skin:     General: Skin is warm and dry.      Coloration: Skin is jaundiced.   Neurological:      Mental Status: He is alert and oriented to person, place, and time. Mental status is at baseline.      Cranial Nerves: No cranial nerve deficit.      Sensory: No sensory deficit.      Motor: No weakness.   Psychiatric:         Mood and Affect: Mood normal.         Behavior: Behavior normal.         ED Course & MDM   Diagnoses as of 05/24/24 2123   Jaundice   Mass of head of pancreas ([facility])   Elevated LFTs     Labs Reviewed   CBC WITH AUTO DIFFERENTIAL - Abnormal       Result Value    WBC 11.2      nRBC 0.0      RBC 4.15 (*)     Hemoglobin 14.0      Hematocrit 37.5 (*)     MCV 90      MCH 33.7      MCHC 37.3 (*)     RDW 16.9 (*)     Platelets 190      Neutrophils % 78.5      Immature Granulocytes %, Automated 0.9      Lymphocytes % 9.4      Monocytes % 10.3      Eosinophils % 0.9      Basophils % 0.0       Neutrophils Absolute 8.80 (*)     Immature Granulocytes Absolute, Automated 0.10      Lymphocytes Absolute 1.05 (*)     Monocytes Absolute 1.15 (*)     Eosinophils Absolute 0.10      Basophils Absolute 0.00     BASIC METABOLIC PANEL - Abnormal    Glucose 95      Sodium 129 (*)     Potassium 3.3 (*)     Chloride 92 (*)     Bicarbonate 24      Anion Gap 16      Urea Nitrogen 9      Creatinine 0.90      eGFR >90      Calcium 9.5     LIPASE - Abnormal    Lipase 208 (*)     Narrative:     Venipuncture immediately after or during the administration of Metamizole may lead to falsely low results. Testing should be performed immediately prior to Metamizole dosing.   HEPATIC FUNCTION PANEL - Abnormal    Albumin 4.1      Bilirubin, Total 19.5 (*)     Bilirubin, Direct 12.3 (*)     Alkaline Phosphatase 437 (*)      (*)      (*)     Total Protein 7.0     MAGNESIUM - Normal    Magnesium 1.89     LACTATE - Normal    Lactate 0.8      Narrative:     Venipuncture immediately after or during the administration of Metamizole may lead to falsely low results. Testing should be performed immediately  prior to Metamizole dosing.   AMMONIA - Normal    Ammonia 36     PROTIME-INR - Normal    Protime 12.6      INR 1.1     ACETAMINOPHEN - Normal    Acetaminophen <10.0     HEPATITIS PANEL, ACUTE     CT abdomen pelvis w IV contrast   Final Result   2.5 cm mass in the head of the pancreas.  There is dilatation of the   biliary ducts as well as the pancreatic duct and neoplasm cannot be   excluded.  This is best evaluated with ERCP or MRCP.  There are   findings consistent with sludge in the gallbladder.   Signed by Bhanu Taveras MD            Medical Decision Making  64-year-old male with a history of alcohol use presenting with right upper quadrant pain, nausea, vomiting and jaundice.  On evaluation patient is afebrile, no acute distress.  Significantly jaundiced on exam with no significant abdominal distention.  Liver edge  palpable below lower ribs.  Mild right upper quadrant tenderness.  Diagnostic workup performed in the ED to rule out acute biliary obstruction, hepatitis or other acute process.    Patient's lab work reviewed.  CBC without leukocytosis, no evidence of anemia.  Normal platelets.  Normal renal function on metabolic panel.  Mild hypokalemia potassium 3.3.  Normal serum lactate.  Normal coags.  Lipase mildly elevated at 208.  LFTs elevated in the 2-300 range.  Bilirubin significantly elevated with total bilirubin of 19.5.  CT demonstrating 2.5 cm mass at the head of the pancreas causing obstruction with biliary duct dilation.  Case discussed with GI on-call, Dr Lowe.  Recommended transfer to [facility] for urgent ERCP. discussed findings with patient who is agreeable for transfer.  Patient accepted to [facility] for transfer for definitive management.    Amount and/or Complexity of Data Reviewed  ECG/medicine tests: ordered and independent interpretation performed. Decision-making details documented in ED Course.     Details: Twelve-lead ECG obtained at 1645 by my interpretation demonstrates sinus rhythm with frequent PVCs, rate of 77, no acute ST elevation or depression.  Otherwise normal.        Procedure  Procedures     Zack Isaacs MD  05/24/24 2120

## 2024-05-25 ENCOUNTER — HOSPITAL ENCOUNTER (INPATIENT)
Facility: HOSPITAL | Age: 64
LOS: 15 days | Discharge: HOSPICE | DRG: 981 | End: 2024-06-09
Attending: INTERNAL MEDICINE | Admitting: INTERNAL MEDICINE
Payer: COMMERCIAL

## 2024-05-25 VITALS
WEIGHT: 145 LBS | RESPIRATION RATE: 18 BRPM | OXYGEN SATURATION: 97 % | SYSTOLIC BLOOD PRESSURE: 150 MMHG | HEART RATE: 75 BPM | HEIGHT: 67 IN | DIASTOLIC BLOOD PRESSURE: 81 MMHG | BODY MASS INDEX: 22.76 KG/M2 | TEMPERATURE: 97.5 F

## 2024-05-25 DIAGNOSIS — I10 ESSENTIAL HYPERTENSION: ICD-10-CM

## 2024-05-25 DIAGNOSIS — R10.84 GENERALIZED ABDOMINAL PAIN: ICD-10-CM

## 2024-05-25 DIAGNOSIS — C25.9 ADENOCARCINOMA OF PANCREAS (MULTI): ICD-10-CM

## 2024-05-25 DIAGNOSIS — K86.89 PANCREATIC MASS (HHS-HCC): ICD-10-CM

## 2024-05-25 DIAGNOSIS — R17 JAUNDICE: Primary | ICD-10-CM

## 2024-05-25 DIAGNOSIS — I21.4 NSTEMI (NON-ST ELEVATED MYOCARDIAL INFARCTION) (MULTI): ICD-10-CM

## 2024-05-25 PROBLEM — E87.6 HYPOKALEMIA: Status: ACTIVE | Noted: 2024-05-25

## 2024-05-25 LAB
ALBUMIN SERPL BCP-MCNC: 3.8 G/DL (ref 3.4–5)
ALP SERPL-CCNC: 396 U/L (ref 33–136)
ALT SERPL W P-5'-P-CCNC: 280 U/L (ref 10–52)
ANION GAP SERPL CALC-SCNC: 16 MMOL/L (ref 10–20)
AST SERPL W P-5'-P-CCNC: 212 U/L (ref 9–39)
ATRIAL RATE: 77 BPM
BILIRUB DIRECT SERPL-MCNC: 13.7 MG/DL
BILIRUB SERPL-MCNC: 19 MG/DL (ref 0–1.2)
BUN SERPL-MCNC: 14 MG/DL (ref 6–23)
CALCIUM SERPL-MCNC: 9.3 MG/DL (ref 8.6–10.3)
CANCER AG19-9 SERPL-ACNC: 6453.22 U/ML
CHLORIDE SERPL-SCNC: 94 MMOL/L (ref 98–107)
CO2 SERPL-SCNC: 24 MMOL/L (ref 21–32)
CREAT SERPL-MCNC: 0.77 MG/DL (ref 0.5–1.3)
EGFRCR SERPLBLD CKD-EPI 2021: >90 ML/MIN/1.73M*2
ERYTHROCYTE [DISTWIDTH] IN BLOOD BY AUTOMATED COUNT: 18.3 % (ref 11.5–14.5)
GLUCOSE SERPL-MCNC: 94 MG/DL (ref 74–99)
HAV IGM SER QL: NONREACTIVE
HBV CORE IGM SER QL: NONREACTIVE
HBV SURFACE AG SERPL QL IA: NONREACTIVE
HCT VFR BLD AUTO: 36.5 % (ref 41–52)
HCV AB SER QL: NONREACTIVE
HGB BLD-MCNC: 13.4 G/DL (ref 13.5–17.5)
INR PPP: 1.2 (ref 0.9–1.1)
MCH RBC QN AUTO: 34.5 PG (ref 26–34)
MCHC RBC AUTO-ENTMCNC: 36.7 G/DL (ref 32–36)
MCV RBC AUTO: 94 FL (ref 80–100)
NRBC BLD-RTO: 0 /100 WBCS (ref 0–0)
P AXIS: 71 DEGREES
P OFFSET: 205 MS
P ONSET: 139 MS
PLATELET # BLD AUTO: 196 X10*3/UL (ref 150–450)
POTASSIUM SERPL-SCNC: 3.2 MMOL/L (ref 3.5–5.3)
PR INTERVAL: 166 MS
PROT SERPL-MCNC: 6.3 G/DL (ref 6.4–8.2)
PROTHROMBIN TIME: 13.5 SECONDS (ref 9.8–12.8)
Q ONSET: 222 MS
QRS COUNT: 13 BEATS
QRS DURATION: 74 MS
QT INTERVAL: 420 MS
QTC CALCULATION(BAZETT): 475 MS
QTC FREDERICIA: 456 MS
R AXIS: 11 DEGREES
RBC # BLD AUTO: 3.88 X10*6/UL (ref 4.5–5.9)
SODIUM SERPL-SCNC: 131 MMOL/L (ref 136–145)
T AXIS: 63 DEGREES
T OFFSET: 432 MS
VENTRICULAR RATE: 77 BPM
WBC # BLD AUTO: 10.1 X10*3/UL (ref 4.4–11.3)

## 2024-05-25 PROCEDURE — 86301 IMMUNOASSAY TUMOR CA 19-9: CPT | Mod: STJLAB | Performed by: INTERNAL MEDICINE

## 2024-05-25 PROCEDURE — 36415 COLL VENOUS BLD VENIPUNCTURE: CPT | Performed by: INTERNAL MEDICINE

## 2024-05-25 PROCEDURE — 99223 1ST HOSP IP/OBS HIGH 75: CPT | Performed by: STUDENT IN AN ORGANIZED HEALTH CARE EDUCATION/TRAINING PROGRAM

## 2024-05-25 PROCEDURE — 2500000004 HC RX 250 GENERAL PHARMACY W/ HCPCS (ALT 636 FOR OP/ED): Performed by: INTERNAL MEDICINE

## 2024-05-25 PROCEDURE — 2500000002 HC RX 250 W HCPCS SELF ADMINISTERED DRUGS (ALT 637 FOR MEDICARE OP, ALT 636 FOR OP/ED): Performed by: INTERNAL MEDICINE

## 2024-05-25 PROCEDURE — 85027 COMPLETE CBC AUTOMATED: CPT | Performed by: INTERNAL MEDICINE

## 2024-05-25 PROCEDURE — 80053 COMPREHEN METABOLIC PANEL: CPT | Performed by: INTERNAL MEDICINE

## 2024-05-25 PROCEDURE — 82248 BILIRUBIN DIRECT: CPT | Performed by: INTERNAL MEDICINE

## 2024-05-25 PROCEDURE — 99223 1ST HOSP IP/OBS HIGH 75: CPT | Performed by: INTERNAL MEDICINE

## 2024-05-25 PROCEDURE — 2500000001 HC RX 250 WO HCPCS SELF ADMINISTERED DRUGS (ALT 637 FOR MEDICARE OP): Performed by: NURSE PRACTITIONER

## 2024-05-25 PROCEDURE — 2500000002 HC RX 250 W HCPCS SELF ADMINISTERED DRUGS (ALT 637 FOR MEDICARE OP, ALT 636 FOR OP/ED): Performed by: STUDENT IN AN ORGANIZED HEALTH CARE EDUCATION/TRAINING PROGRAM

## 2024-05-25 PROCEDURE — C9113 INJ PANTOPRAZOLE SODIUM, VIA: HCPCS | Performed by: INTERNAL MEDICINE

## 2024-05-25 PROCEDURE — S4991 NICOTINE PATCH NONLEGEND: HCPCS | Performed by: INTERNAL MEDICINE

## 2024-05-25 PROCEDURE — 85610 PROTHROMBIN TIME: CPT | Performed by: INTERNAL MEDICINE

## 2024-05-25 PROCEDURE — 1200000002 HC GENERAL ROOM WITH TELEMETRY DAILY

## 2024-05-25 RX ORDER — PANTOPRAZOLE SODIUM 40 MG/1
40 TABLET, DELAYED RELEASE ORAL DAILY
Status: DISCONTINUED | OUTPATIENT
Start: 2024-05-25 | End: 2024-05-25

## 2024-05-25 RX ORDER — ACETAMINOPHEN 650 MG/1
650 SUPPOSITORY RECTAL EVERY 4 HOURS PRN
Status: DISCONTINUED | OUTPATIENT
Start: 2024-05-25 | End: 2024-06-09 | Stop reason: HOSPADM

## 2024-05-25 RX ORDER — ALPRAZOLAM 0.5 MG/1
0.5 TABLET ORAL DAILY PRN
Status: DISCONTINUED | OUTPATIENT
Start: 2024-05-25 | End: 2024-06-08

## 2024-05-25 RX ORDER — IBUPROFEN 200 MG
1 TABLET ORAL DAILY
Status: DISCONTINUED | OUTPATIENT
Start: 2024-05-25 | End: 2024-06-09 | Stop reason: HOSPADM

## 2024-05-25 RX ORDER — TALC
3 POWDER (GRAM) TOPICAL NIGHTLY PRN
Status: DISCONTINUED | OUTPATIENT
Start: 2024-05-25 | End: 2024-06-09 | Stop reason: HOSPADM

## 2024-05-25 RX ORDER — PANTOPRAZOLE SODIUM 40 MG/1
40 TABLET, DELAYED RELEASE ORAL
Status: DISCONTINUED | OUTPATIENT
Start: 2024-05-25 | End: 2024-05-25

## 2024-05-25 RX ORDER — POLYETHYLENE GLYCOL 3350 17 G/17G
17 POWDER, FOR SOLUTION ORAL DAILY PRN
Status: DISCONTINUED | OUTPATIENT
Start: 2024-05-25 | End: 2024-06-09 | Stop reason: HOSPADM

## 2024-05-25 RX ORDER — ALBUTEROL SULFATE 0.83 MG/ML
2.5 SOLUTION RESPIRATORY (INHALATION) EVERY 4 HOURS PRN
Status: DISCONTINUED | OUTPATIENT
Start: 2024-05-25 | End: 2024-06-02

## 2024-05-25 RX ORDER — CLOPIDOGREL BISULFATE 75 MG/1
75 TABLET ORAL DAILY
Status: DISCONTINUED | OUTPATIENT
Start: 2024-05-25 | End: 2024-05-29

## 2024-05-25 RX ORDER — POTASSIUM CHLORIDE 20 MEQ/1
40 TABLET, EXTENDED RELEASE ORAL ONCE
Status: COMPLETED | OUTPATIENT
Start: 2024-05-25 | End: 2024-05-25

## 2024-05-25 RX ORDER — AMLODIPINE BESYLATE 10 MG/1
10 TABLET ORAL DAILY
Status: DISCONTINUED | OUTPATIENT
Start: 2024-05-25 | End: 2024-06-09 | Stop reason: HOSPADM

## 2024-05-25 RX ORDER — MORPHINE SULFATE 2 MG/ML
2 INJECTION, SOLUTION INTRAMUSCULAR; INTRAVENOUS EVERY 4 HOURS PRN
Status: DISCONTINUED | OUTPATIENT
Start: 2024-05-25 | End: 2024-06-09 | Stop reason: HOSPADM

## 2024-05-25 RX ORDER — ACETAMINOPHEN 325 MG/1
650 TABLET ORAL EVERY 4 HOURS PRN
Status: DISCONTINUED | OUTPATIENT
Start: 2024-05-25 | End: 2024-06-09 | Stop reason: HOSPADM

## 2024-05-25 RX ORDER — PANTOPRAZOLE SODIUM 40 MG/10ML
40 INJECTION, POWDER, LYOPHILIZED, FOR SOLUTION INTRAVENOUS
Status: DISCONTINUED | OUTPATIENT
Start: 2024-05-25 | End: 2024-05-28

## 2024-05-25 RX ORDER — ONDANSETRON HYDROCHLORIDE 2 MG/ML
4 INJECTION, SOLUTION INTRAVENOUS EVERY 8 HOURS PRN
Status: DISCONTINUED | OUTPATIENT
Start: 2024-05-25 | End: 2024-06-04

## 2024-05-25 RX ORDER — METOCLOPRAMIDE HYDROCHLORIDE 5 MG/ML
10 INJECTION INTRAMUSCULAR; INTRAVENOUS EVERY 6 HOURS PRN
Status: DISCONTINUED | OUTPATIENT
Start: 2024-05-25 | End: 2024-06-04

## 2024-05-25 RX ORDER — METOPROLOL TARTRATE 25 MG/1
25 TABLET, FILM COATED ORAL NIGHTLY
Status: DISCONTINUED | OUTPATIENT
Start: 2024-05-25 | End: 2024-05-30

## 2024-05-25 RX ORDER — ACETAMINOPHEN 160 MG/5ML
650 SOLUTION ORAL EVERY 4 HOURS PRN
Status: DISCONTINUED | OUTPATIENT
Start: 2024-05-25 | End: 2024-06-09 | Stop reason: HOSPADM

## 2024-05-25 RX ORDER — METOCLOPRAMIDE 10 MG/1
10 TABLET ORAL EVERY 6 HOURS PRN
Status: DISCONTINUED | OUTPATIENT
Start: 2024-05-25 | End: 2024-06-04

## 2024-05-25 RX ORDER — HYDROCODONE BITARTRATE AND ACETAMINOPHEN 5; 325 MG/1; MG/1
1 TABLET ORAL 2 TIMES DAILY PRN
Status: DISCONTINUED | OUTPATIENT
Start: 2024-05-25 | End: 2024-06-07

## 2024-05-25 RX ORDER — HYDROCHLOROTHIAZIDE 12.5 MG/1
12.5 CAPSULE ORAL EVERY MORNING
Status: DISCONTINUED | OUTPATIENT
Start: 2024-05-25 | End: 2024-06-09 | Stop reason: HOSPADM

## 2024-05-25 RX ORDER — NAPROXEN SODIUM 220 MG/1
81 TABLET, FILM COATED ORAL DAILY
Status: DISCONTINUED | OUTPATIENT
Start: 2024-05-25 | End: 2024-05-26

## 2024-05-25 RX ORDER — ONDANSETRON 4 MG/1
4 TABLET, ORALLY DISINTEGRATING ORAL EVERY 8 HOURS PRN
Status: DISCONTINUED | OUTPATIENT
Start: 2024-05-25 | End: 2024-06-04

## 2024-05-25 RX ORDER — MORPHINE SULFATE 4 MG/ML
4 INJECTION, SOLUTION INTRAMUSCULAR; INTRAVENOUS EVERY 4 HOURS PRN
Status: DISCONTINUED | OUTPATIENT
Start: 2024-05-25 | End: 2024-06-09 | Stop reason: HOSPADM

## 2024-05-25 RX ORDER — FLUOXETINE HYDROCHLORIDE 20 MG/1
20 CAPSULE ORAL DAILY
Status: DISCONTINUED | OUTPATIENT
Start: 2024-05-25 | End: 2024-06-09 | Stop reason: HOSPADM

## 2024-05-25 RX ORDER — HEPARIN SODIUM 5000 [USP'U]/ML
5000 INJECTION, SOLUTION INTRAVENOUS; SUBCUTANEOUS EVERY 8 HOURS SCHEDULED
Status: DISCONTINUED | OUTPATIENT
Start: 2024-05-25 | End: 2024-05-30

## 2024-05-25 RX ADMIN — HEPARIN SODIUM 5000 UNITS: 5000 INJECTION INTRAVENOUS; SUBCUTANEOUS at 05:32

## 2024-05-25 RX ADMIN — MORPHINE SULFATE 4 MG: 4 INJECTION, SOLUTION INTRAMUSCULAR; INTRAVENOUS at 05:59

## 2024-05-25 RX ADMIN — AMLODIPINE BESYLATE 10 MG: 10 TABLET ORAL at 12:15

## 2024-05-25 RX ADMIN — MORPHINE SULFATE 4 MG: 4 INJECTION, SOLUTION INTRAMUSCULAR; INTRAVENOUS at 23:19

## 2024-05-25 RX ADMIN — PANTOPRAZOLE SODIUM 40 MG: 40 INJECTION, POWDER, FOR SOLUTION INTRAVENOUS at 05:32

## 2024-05-25 RX ADMIN — METOPROLOL TARTRATE 25 MG: 25 TABLET, FILM COATED ORAL at 20:21

## 2024-05-25 RX ADMIN — POTASSIUM CHLORIDE 40 MEQ: 1500 TABLET, EXTENDED RELEASE ORAL at 11:27

## 2024-05-25 RX ADMIN — NICOTINE 1 PATCH: 21 PATCH, EXTENDED RELEASE TRANSDERMAL at 08:10

## 2024-05-25 RX ADMIN — MORPHINE SULFATE 2 MG: 2 INJECTION, SOLUTION INTRAMUSCULAR; INTRAVENOUS at 11:31

## 2024-05-25 RX ADMIN — MORPHINE SULFATE 2 MG: 2 INJECTION, SOLUTION INTRAMUSCULAR; INTRAVENOUS at 17:43

## 2024-05-25 RX ADMIN — FLUOXETINE HYDROCHLORIDE 20 MG: 20 CAPSULE ORAL at 12:15

## 2024-05-25 SDOH — SOCIAL STABILITY: SOCIAL INSECURITY: ABUSE: ADULT

## 2024-05-25 SDOH — SOCIAL STABILITY: SOCIAL INSECURITY: HAVE YOU HAD THOUGHTS OF HARMING ANYONE ELSE?: NO

## 2024-05-25 SDOH — SOCIAL STABILITY: SOCIAL INSECURITY: DO YOU FEEL UNSAFE GOING BACK TO THE PLACE WHERE YOU ARE LIVING?: NO

## 2024-05-25 SDOH — SOCIAL STABILITY: SOCIAL INSECURITY: ARE YOU OR HAVE YOU BEEN THREATENED OR ABUSED PHYSICALLY, EMOTIONALLY, OR SEXUALLY BY ANYONE?: NO

## 2024-05-25 SDOH — SOCIAL STABILITY: SOCIAL INSECURITY: HAS ANYONE EVER THREATENED TO HURT YOUR FAMILY OR YOUR PETS?: NO

## 2024-05-25 SDOH — SOCIAL STABILITY: SOCIAL INSECURITY: WERE YOU ABLE TO COMPLETE ALL THE BEHAVIORAL HEALTH SCREENINGS?: YES

## 2024-05-25 SDOH — SOCIAL STABILITY: SOCIAL INSECURITY: DO YOU FEEL ANYONE HAS EXPLOITED OR TAKEN ADVANTAGE OF YOU FINANCIALLY OR OF YOUR PERSONAL PROPERTY?: NO

## 2024-05-25 SDOH — SOCIAL STABILITY: SOCIAL INSECURITY: ARE THERE ANY APPARENT SIGNS OF INJURIES/BEHAVIORS THAT COULD BE RELATED TO ABUSE/NEGLECT?: NO

## 2024-05-25 SDOH — SOCIAL STABILITY: SOCIAL INSECURITY: DOES ANYONE TRY TO KEEP YOU FROM HAVING/CONTACTING OTHER FRIENDS OR DOING THINGS OUTSIDE YOUR HOME?: NO

## 2024-05-25 SDOH — SOCIAL STABILITY: SOCIAL INSECURITY: HAVE YOU HAD ANY THOUGHTS OF HARMING ANYONE ELSE?: NO

## 2024-05-25 ASSESSMENT — PAIN DESCRIPTION - DESCRIPTORS
DESCRIPTORS: ACHING
DESCRIPTORS: SPASM;PRESSURE
DESCRIPTORS: ACHING
DESCRIPTORS: ACHING

## 2024-05-25 ASSESSMENT — PAIN - FUNCTIONAL ASSESSMENT
PAIN_FUNCTIONAL_ASSESSMENT: 0-10

## 2024-05-25 ASSESSMENT — COGNITIVE AND FUNCTIONAL STATUS - GENERAL
MOBILITY SCORE: 23
DAILY ACTIVITIY SCORE: 24
DAILY ACTIVITIY SCORE: 24
PATIENT BASELINE BEDBOUND: NO
MOBILITY SCORE: 23
CLIMB 3 TO 5 STEPS WITH RAILING: A LITTLE
CLIMB 3 TO 5 STEPS WITH RAILING: A LITTLE

## 2024-05-25 ASSESSMENT — LIFESTYLE VARIABLES
HOW OFTEN DO YOU HAVE 6 OR MORE DRINKS ON ONE OCCASION: NEVER
SKIP TO QUESTIONS 9-10: 1
AUDIT-C TOTAL SCORE: 0
HOW OFTEN DO YOU HAVE A DRINK CONTAINING ALCOHOL: NEVER
SUBSTANCE_ABUSE_PAST_12_MONTHS: NO
HOW OFTEN DO YOU HAVE 6 OR MORE DRINKS ON ONE OCCASION: DAILY OR ALMOST DAILY
AUDIT-C TOTAL SCORE: 10
HOW MANY STANDARD DRINKS CONTAINING ALCOHOL DO YOU HAVE ON A TYPICAL DAY: PATIENT DOES NOT DRINK
SUBSTANCE_ABUSE_PAST_12_MONTHS: NO
SKIP TO QUESTIONS 9-10: 0
AUDIT-C TOTAL SCORE: 0
AUDIT-C TOTAL SCORE: 10
HOW OFTEN DO YOU HAVE A DRINK CONTAINING ALCOHOL: 4 OR MORE TIMES A WEEK
HOW MANY STANDARD DRINKS CONTAINING ALCOHOL DO YOU HAVE ON A TYPICAL DAY: 5 OR 6
PRESCIPTION_ABUSE_PAST_12_MONTHS: NO
PRESCIPTION_ABUSE_PAST_12_MONTHS: NO

## 2024-05-25 ASSESSMENT — PAIN SCALES - GENERAL
PAINLEVEL_OUTOF10: 2
PAINLEVEL_OUTOF10: 6
PAINLEVEL_OUTOF10: 5 - MODERATE PAIN
PAINLEVEL_OUTOF10: 0 - NO PAIN
PAINLEVEL_OUTOF10: 8
PAINLEVEL_OUTOF10: 5 - MODERATE PAIN
PAINLEVEL_OUTOF10: 0 - NO PAIN
PAINLEVEL_OUTOF10: 0 - NO PAIN
PAINLEVEL_OUTOF10: 2
PAINLEVEL_OUTOF10: 2

## 2024-05-25 ASSESSMENT — PATIENT HEALTH QUESTIONNAIRE - PHQ9
1. LITTLE INTEREST OR PLEASURE IN DOING THINGS: NOT AT ALL
SUM OF ALL RESPONSES TO PHQ9 QUESTIONS 1 & 2: 0
2. FEELING DOWN, DEPRESSED OR HOPELESS: NOT AT ALL

## 2024-05-25 ASSESSMENT — PAIN DESCRIPTION - LOCATION
LOCATION: ABDOMEN

## 2024-05-25 ASSESSMENT — ACTIVITIES OF DAILY LIVING (ADL)
JUDGMENT_ADEQUATE_SAFELY_COMPLETE_DAILY_ACTIVITIES: YES
HEARING - RIGHT EAR: FUNCTIONAL
BATHING: INDEPENDENT
PATIENT'S MEMORY ADEQUATE TO SAFELY COMPLETE DAILY ACTIVITIES?: YES
LACK_OF_TRANSPORTATION: NO
HEARING - LEFT EAR: FUNCTIONAL
GROOMING: INDEPENDENT
FEEDING YOURSELF: INDEPENDENT
WALKS IN HOME: INDEPENDENT
TOILETING: INDEPENDENT
ADEQUATE_TO_COMPLETE_ADL: YES
DRESSING YOURSELF: INDEPENDENT

## 2024-05-25 ASSESSMENT — COLUMBIA-SUICIDE SEVERITY RATING SCALE - C-SSRS
1. IN THE PAST MONTH, HAVE YOU WISHED YOU WERE DEAD OR WISHED YOU COULD GO TO SLEEP AND NOT WAKE UP?: NO
6. HAVE YOU EVER DONE ANYTHING, STARTED TO DO ANYTHING, OR PREPARED TO DO ANYTHING TO END YOUR LIFE?: NO
2. HAVE YOU ACTUALLY HAD ANY THOUGHTS OF KILLING YOURSELF?: NO

## 2024-05-25 ASSESSMENT — ENCOUNTER SYMPTOMS: COLOR CHANGE: 1

## 2024-05-25 ASSESSMENT — PAIN DESCRIPTION - ORIENTATION: ORIENTATION: RIGHT;UPPER

## 2024-05-25 ASSESSMENT — PAIN SCALES - WONG BAKER: WONGBAKER_NUMERICALRESPONSE: HURTS LITTLE BIT

## 2024-05-25 NOTE — H&P
History Of Present Illness  Emerson Chilel is a 64 y.o. male presenting with chief complaint of approximately 2 weeks of right upper quadrant abdominal pain, dark discoloration noted in his urine, as well as visible jaundice appreciated in his skin and eyes over the last 48 hours.  Patient states that the icteric sclera first noted approximately 48 hours prior has been the most concerning symptom.  Drinks approximately 6 beers per day.  No known prior history of liver disease.  No prior history of cholecystectomy.  Initial evaluation at outside hospital noted a 2.5 cm mass at the head of the pancreas concerning for underlying neoplasm.  Patient transferred to this facility after consultation with GI service for evaluation and possible ERCP.     Past Medical History  Past Medical History:   Diagnosis Date    CAD (coronary artery disease)     Chronic sinusitis, unspecified 07/13/2021    Sinobronchitis    Hyperlipidemia     Hypertension        Surgical History  Past Surgical History:   Procedure Laterality Date    CARDIAC CATHETERIZATION      stent placement    HERNIA REPAIR      KNEE CARTILAGE SURGERY Right     MR HEAD ANGIO WO IV CONTRAST  12/21/2021    MR HEAD ANGIO WO IV CONTRAST 12/21/2021        Social History  He reports that he has been smoking cigarettes. He has a 50 pack-year smoking history. He has never used smokeless tobacco. He reports current alcohol use of about 2.0 standard drinks of alcohol per week. He reports that he does not currently use drugs.    Family History  Family History   Problem Relation Name Age of Onset    Other (cardiac disorder) Mother      Stroke Mother      Pancreatic cancer Mother      Suicide Attempts Father      Other (cabg) Brother      Coronary artery disease Brother      No Known Problems Other          Allergies  Ramipril    Review of Systems   Skin:  Positive for color change.        Physical Exam  Vitals reviewed.   Constitutional:       Appearance: Normal appearance.  "  HENT:      Head: Normocephalic and atraumatic.      Nose: Nose normal.      Mouth/Throat:      Mouth: Mucous membranes are moist.   Eyes:      General: Scleral icterus present.      Extraocular Movements: Extraocular movements intact.      Conjunctiva/sclera: Conjunctivae normal.      Pupils: Pupils are equal, round, and reactive to light.   Cardiovascular:      Rate and Rhythm: Normal rate and regular rhythm.      Pulses: Normal pulses.      Heart sounds: Normal heart sounds.   Pulmonary:      Effort: Pulmonary effort is normal.      Breath sounds: Normal breath sounds.   Abdominal:      General: Bowel sounds are normal. There is no distension.      Palpations: Abdomen is soft.      Tenderness: There is no abdominal tenderness.   Musculoskeletal:         General: Normal range of motion.      Cervical back: Normal range of motion and neck supple.   Skin:     General: Skin is warm and dry.      Coloration: Skin is jaundiced.   Neurological:      General: No focal deficit present.      Mental Status: He is alert. Mental status is at baseline.   Psychiatric:         Mood and Affect: Mood normal.         Behavior: Behavior normal.          Last Recorded Vitals  Blood pressure 144/90, pulse 56, temperature 35.8 °C (96.4 °F), temperature source Temporal, resp. rate 14, height 1.702 m (5' 7\"), weight 65.8 kg (145 lb), SpO2 96%.    Relevant Results  CT abdomen pelvis w IV contrast    Result Date: 5/24/2024  STUDY: CT Abdomen and Pelvis with IV Contrast; 05/24/2024 at 5:51 PM INDICATION: Right upper quadrant abdominal pain. Jaundice. COMPARISON: None available. ACCESSION NUMBER(S): [id number] ORDERING CLINICIAN: TOBI WHITNEY TECHNIQUE: CT of the abdomen and pelvis was performed.  Contiguous axial images were obtained at 3 mm slice thickness through the abdomen and pelvis. Coronal and sagittal reconstructions at 3 mm slice thickness were performed.  Omnipaque-350 75 mL was administered intravenously.  FINDINGS: " LOWER CHEST: No cardiomegaly.  No pericardial effusion.  Lung bases are clear.  ABDOMEN:  LIVER: No hepatomegaly.  Smooth surface contour.  Normal attenuation.  BILE DUCTS: There is intrahepatic and common bile bile duct dilatation.  This can be followed into the head of the pancreas.  GALLBLADDER: The gallbladder is present.  There is intermediate attenuation the gallbladder most likely representing sludge. STOMACH: No abnormalities identified.  PANCREAS: There is an ill-defined 2.5 cm mass in the head of the pancreas.  This is best seen on series 201, slice 61 as well as series 202, slice 45. There is pancreatic duct dilatation.  SPLEEN: No splenomegaly or focal splenic lesion.  ADRENAL GLANDS: No thickening or nodules.  KIDNEYS AND URETERS: Kidneys are normal in size and location.  No renal or ureteral calculi.  PELVIS:  BLADDER: No abnormalities identified.  REPRODUCTIVE ORGANS: No abnormalities identified.  BOWEL: No abnormalities identified.  The appendix is identified and is normal.  VESSELS: No abnormalities identified.  Abdominal aorta is normal in caliber.  PERITONEUM/RETROPERITONEUM/LYMPH NODES: No free fluid.  No pneumoperitoneum. No lymphadenopathy.  ABDOMINAL WALL: No abnormalities identified. SOFT TISSUES: No abnormalities identified.  BONES: No acute fracture or aggressive osseous lesion.    2.5 cm mass in the head of the pancreas.  There is dilatation of the biliary ducts as well as the pancreatic duct and neoplasm cannot be excluded.  This is best evaluated with ERCP or MRCP.  There are findings consistent with sludge in the gallbladder. Signed by Bhanu Taveras MD    ECG 12 lead    Result Date: 5/24/2024  Sinus rhythm with frequent Premature ventricular complexes Otherwise normal ECG When compared with ECG of 24-MAY-2024 16:43, (unconfirmed) Premature ventricular complexes are now Present   Results for orders placed or performed during the hospital encounter of 05/24/24 (from the past 24 hour(s))    CBC and Auto Differential   Result Value Ref Range    WBC 11.2 4.4 - 11.3 x10*3/uL    nRBC 0.0 0.0 - 0.0 /100 WBCs    RBC 4.15 (L) 4.50 - 5.90 x10*6/uL    Hemoglobin 14.0 13.5 - 17.5 g/dL    Hematocrit 37.5 (L) 41.0 - 52.0 %    MCV 90 80 - 100 fL    MCH 33.7 26.0 - 34.0 pg    MCHC 37.3 (H) 32.0 - 36.0 g/dL    RDW 16.9 (H) 11.5 - 14.5 %    Platelets 190 150 - 450 x10*3/uL    Neutrophils % 78.5 40.0 - 80.0 %    Immature Granulocytes %, Automated 0.9 0.0 - 0.9 %    Lymphocytes % 9.4 13.0 - 44.0 %    Monocytes % 10.3 2.0 - 10.0 %    Eosinophils % 0.9 0.0 - 6.0 %    Basophils % 0.0 0.0 - 2.0 %    Neutrophils Absolute 8.80 (H) 1.20 - 7.70 x10*3/uL    Immature Granulocytes Absolute, Automated 0.10 0.00 - 0.70 x10*3/uL    Lymphocytes Absolute 1.05 (L) 1.20 - 4.80 x10*3/uL    Monocytes Absolute 1.15 (H) 0.10 - 1.00 x10*3/uL    Eosinophils Absolute 0.10 0.00 - 0.70 x10*3/uL    Basophils Absolute 0.00 0.00 - 0.10 x10*3/uL   Basic metabolic panel   Result Value Ref Range    Glucose 95 74 - 99 mg/dL    Sodium 129 (L) 136 - 145 mmol/L    Potassium 3.3 (L) 3.5 - 5.3 mmol/L    Chloride 92 (L) 98 - 107 mmol/L    Bicarbonate 24 21 - 32 mmol/L    Anion Gap 16 10 - 20 mmol/L    Urea Nitrogen 9 6 - 23 mg/dL    Creatinine 0.90 0.50 - 1.30 mg/dL    eGFR >90 >60 mL/min/1.73m*2    Calcium 9.5 8.6 - 10.3 mg/dL   Magnesium   Result Value Ref Range    Magnesium 1.89 1.60 - 2.40 mg/dL   Lipase   Result Value Ref Range    Lipase 208 (H) 9 - 82 U/L   Hepatic function panel   Result Value Ref Range    Albumin 4.1 3.4 - 5.0 g/dL    Bilirubin, Total 19.5 (H) 0.0 - 1.2 mg/dL    Bilirubin, Direct 12.3 (H) 0.0 - 0.3 mg/dL    Alkaline Phosphatase 437 (H) 33 - 136 U/L     (H) 10 - 52 U/L     (H) 9 - 39 U/L    Total Protein 7.0 6.4 - 8.2 g/dL   Lactate   Result Value Ref Range    Lactate 0.8 0.4 - 2.0 mmol/L   Ammonia   Result Value Ref Range    Ammonia 36 16 - 53 umol/L   Protime-INR   Result Value Ref Range    Protime 12.6 9.8 - 12.8  seconds    INR 1.1 0.9 - 1.1   Hepatitis panel, acute   Result Value Ref Range    Hepatitis B Surface AG Nonreactive Nonreactive    Hepatitis A  AB- IgM Nonreactive Nonreactive    Hepatitis B Core AB; IgM Nonreactive Nonreactive    Hepatitis C AB Nonreactive Nonreactive   Acetaminophen level   Result Value Ref Range    Acetaminophen <10.0 10.0 - 30.0 ug/mL   ECG 12 lead   Result Value Ref Range    Ventricular Rate 77 BPM    Atrial Rate 77 BPM    NC Interval 166 ms    QRS Duration 74 ms    QT Interval 420 ms    QTC Calculation(Bazett) 475 ms    P Axis 71 degrees    R Axis 11 degrees    T Axis 63 degrees    QRS Count 13 beats    Q Onset 222 ms    P Onset 139 ms    P Offset 205 ms    T Offset 432 ms    QTC Fredericia 456 ms          Assessment/Plan   Principal Problem:    Pancreatic mass ([facility])  Active Problems:    Jaundice    Hypokalemia    Generalized abdominal pain      Patient presented to an outside facility due to progressive abdominal symptoms including pain and nausea with more than 24 hours of new onset jaundice including icteric sclera.  CT scan obtained at outside facility notes an ill-defined 2.5 cm mass at the head of the pancreas.  There was corresponding pancreatic ductal dilation as well as intra hepatic and common bile duct dilation.    Findings concerning for underlying pancreatic malignancy.  Case was discussed with GI service prior to transfer.  Consultation has been placed.  Patient has been made n.p.o. with tentative plans at this time for an ERCP to be performed and possible stenting of the pancreatic/biliary ducts.    Will defer MRCP to GI service    Will obtain repeat laboratory studies in a.m. including CBC, CMP, coag studies, magnesium, as well as CA 19-9 levels.      Additional findings noted on imaging included gallbladder sludge without evidence of gallbladder wall thickening or pericholecystic fluid/edema.  Overall, low suspicion for acute cholecystitis.    Patient noted to be  hypokalemic upon arrival.  Supplement maintain potassium greater than 4.  Will obtain repeat magnesium levels.  Supplement maintain magnesium level greater than 2.    Patient does note a history of daily EtOH consumption.  Reports drinking several beers daily.  No prior history of EtOH withdrawal.  Deferring CIWA protocol at this time pending clinical course.    Home medications to be reviewed and resumed as indicated    I spent >75 minutes in the professional and overall care of this patient.      Musa Lizarraga, DO

## 2024-05-25 NOTE — Clinical Note
Angioplasty of the proximal left anterior descending lesion. Inflation 1: Pressure = 20 anushka; Duration = 10 sec.

## 2024-05-25 NOTE — Clinical Note
Angioplasty of the proximal left anterior descending lesion. Inflation 1: Pressure = 10 anushka; Duration = 10 sec. Inflation 2: Pressure = 10 anushka; Duration = 10 sec.

## 2024-05-25 NOTE — CONSULTS
Reason for consult: Pancreatic head mass + obstructive jaundice.    History of Present Illness:   Emerson Chilel is a 64 y.o. male with a PMH of HTN, HLD, CAD s/p PCI, tobacco abuse, alcohol abuse who presented to the ER with abdominal pain, N/V, and jaundice with imaging concerning for a pancreatic head mass and in whom we are consulted for further management. Patient states that his symptoms have been going on for the last several weeks.  He tried a PPI, but it did not seem to help.  His symptoms progressively worsened + his wife started to notice that his eyes and skin were turning yellow.  Thus, she brought him into the hospital.  Patient currently feels okay.  He is asking for diet.  No other concerns at this time.    LFTs: 212, 280, 396, 19.0.  INR: 1.2.  CBC: WNL.  Normal kidney function.    CT A/P with IV contrast: 2.5 cm mass in the head of the pancreas + biliary dilatation.    Review of Systems  ROS Negative unless otherwise stated above.    Past Medical/Surgical History  Past Medical History:   Diagnosis Date    CAD (coronary artery disease)     Chronic sinusitis, unspecified 07/13/2021    Sinobronchitis    Hyperlipidemia     Hypertension       Past Surgical History:   Procedure Laterality Date    CARDIAC CATHETERIZATION      stent placement    HERNIA REPAIR      KNEE CARTILAGE SURGERY Right     MR HEAD ANGIO WO IV CONTRAST  12/21/2021    MR HEAD ANGIO WO IV CONTRAST 12/21/2021        Social History   reports that he has been smoking cigarettes. He has a 50 pack-year smoking history. He has never used smokeless tobacco. He reports current alcohol use of about 2.0 standard drinks of alcohol per week. He reports that he does not currently use drugs.     Family History  family history includes Coronary artery disease in his brother; No Known Problems in an other family member; Pancreatic cancer in his mother; Stroke in his mother; Suicide Attempts in his father; cabg in his brother; cardiac disorder in his  mother.     Allergies  Allergies   Allergen Reactions    Ramipril Other     hyperkalemia       Medications  Current Outpatient Medications   Medication Instructions    albuterol 90 mcg/actuation inhaler INHALE TWO PUFFS EVERY FOUR HOURS AS NEEDED    ALPRAZolam (Xanax) 0.5 mg tablet TAKE ONE TABLET (0.5 MG) BY MOUTH DAILY AS NEEDED FOR ANXIETY    ALPRAZolam (Xanax) 0.5 mg tablet TAKE ONE TABLET (0.5 MG) BY MOUTH ONCE DAILY AS NEEDED FOR ANXIETY    amLODIPine (Norvasc) 10 mg tablet 1 tablet, oral, Daily    aspirin 81 mg, oral, Daily    atorvastatin (LIPITOR) 40 mg, oral, Daily    clopidogrel (Plavix) 75 mg tablet 1 tablet, oral, Daily    FLUoxetine (PROZAC) 20 mg, oral, Daily    hydroCHLOROthiazide (MICROZIDE) 12.5 mg, oral, Every morning    HYDROcodone-acetaminophen (Norco) 5-325 mg tablet 1 tablet, oral, 2 times daily PRN    metoprolol tartrate (Lopressor) 25 mg tablet TAKE ONE HALF OF A TABLET BY MOUTH AT BEDTIME    nitroglycerin (NITROSTAT) 0.4 mg, sublingual, Every 5 min PRN    pantoprazole (PROTONIX) 40 mg, oral, Daily, Do not crush, chew, or split.        Objective   Visit Vitals  BP (!) 163/98 (BP Location: Right arm, Patient Position: Lying) Comment: rn notified   Pulse 83   Temp 36 °C (96.8 °F) (Temporal)   Resp 16        General: A&Ox3, NAD.  HEENT: AT/NC.  + Scleral icterus.  CV: RRR. No murmur.  Resp: CTA bilaterally. No wheezing, rhonchi or rales.   GI: Soft, NT/ND.   Extrem: No edema. Pulses intact.  Skin: + Jaundice.   Neuro: No focal deficits.   Psych: Normal mood and affect.       ASSESSMENT/PLAN  Emerson Chilel is a 64 y.o. male with a PMH of HTN, HLD, CAD s/p PCI, tobacco abuse, alcohol abuse who presented to the ER with abdominal pain, N/V, and jaundice with imaging concerning for a pancreatic head mass and in whom we are consulted for further management.    Patient is afebrile, HDS.  LFTs: 212, 280, 396, 19.0.  INR: 1.2.  CBC: WNL.  Normal kidney function.    CT A/P with IV contrast: 2.5 cm  mass in the head of the pancreas + biliary dilatation.    -Trend LFTs.  -Advance diet as tolerated.  -N.p.o. after midnight on Monday.  -Will plan for EUS/ERCP for Tuesday.      Checo Lowe, DO

## 2024-05-25 NOTE — Clinical Note
Angioplasty of the proximal left anterior descending lesion. Inflation 1: Pressure = 16 anushka; Duration = 10 sec. Inflation 2: Pressure = 22 anushka; Duration = 10 sec. Inflation 3: Pressure = 18 anushka; Duration = 10 sec.

## 2024-05-25 NOTE — SIGNIFICANT EVENT
Hospital Medicine Clinical Event Note    -admitted this am to hospital medicine service with chief complaint of approximately 2 weeks of right upper quadrant abdominal pain, dark discoloration noted in his urine, as well as visible jaundice appreciated in his skin and eyes over the last 48 hours.   -Initial evaluation at outside hospital noted a 2.5 cm mass at the head of the pancreas concerning for underlying neoplasm.   -Patient transferred to this facility after consultation with GI service for evaluation and possible ERCP. Will defer MRCP to GI service   -GI on consult  -patient currently NPO  -patient seen and examined this am, resting in bed, NAD  -currently comfortable with IV morphine for pain control  -reported drinks approximately 6 beers per day-monitor for withdrawal, No prior history of EtOH withdrawal.   -am labs CBC, CMP, magnesium  -POC discussed with patient and attending  -dispo-Follow GI recommendations

## 2024-05-26 LAB
ALBUMIN SERPL BCP-MCNC: 3.4 G/DL (ref 3.4–5)
ALP SERPL-CCNC: 354 U/L (ref 33–136)
ALT SERPL W P-5'-P-CCNC: 273 U/L (ref 10–52)
ANION GAP SERPL CALC-SCNC: 13 MMOL/L (ref 10–20)
AST SERPL W P-5'-P-CCNC: 211 U/L (ref 9–39)
BILIRUB SERPL-MCNC: 19.2 MG/DL (ref 0–1.2)
BUN SERPL-MCNC: 11 MG/DL (ref 6–23)
CALCIUM SERPL-MCNC: 8.8 MG/DL (ref 8.6–10.3)
CHLORIDE SERPL-SCNC: 95 MMOL/L (ref 98–107)
CO2 SERPL-SCNC: 26 MMOL/L (ref 21–32)
CREAT SERPL-MCNC: 0.67 MG/DL (ref 0.5–1.3)
EGFRCR SERPLBLD CKD-EPI 2021: >90 ML/MIN/1.73M*2
ERYTHROCYTE [DISTWIDTH] IN BLOOD BY AUTOMATED COUNT: 17.8 % (ref 11.5–14.5)
GLUCOSE SERPL-MCNC: 90 MG/DL (ref 74–99)
HCT VFR BLD AUTO: 35 % (ref 41–52)
HGB BLD-MCNC: 12.8 G/DL (ref 13.5–17.5)
INR PPP: 1.2 (ref 0.9–1.1)
MAGNESIUM SERPL-MCNC: 2.03 MG/DL (ref 1.6–2.4)
MCH RBC QN AUTO: 34.4 PG (ref 26–34)
MCHC RBC AUTO-ENTMCNC: 36.6 G/DL (ref 32–36)
MCV RBC AUTO: 94 FL (ref 80–100)
NRBC BLD-RTO: 0 /100 WBCS (ref 0–0)
PHOSPHATE SERPL-MCNC: 2.3 MG/DL (ref 2.5–4.9)
PLATELET # BLD AUTO: 211 X10*3/UL (ref 150–450)
POTASSIUM SERPL-SCNC: 3.2 MMOL/L (ref 3.5–5.3)
PROT SERPL-MCNC: 5.8 G/DL (ref 6.4–8.2)
PROTHROMBIN TIME: 13.2 SECONDS (ref 9.8–12.8)
RBC # BLD AUTO: 3.72 X10*6/UL (ref 4.5–5.9)
SODIUM SERPL-SCNC: 131 MMOL/L (ref 136–145)
WBC # BLD AUTO: 8.7 X10*3/UL (ref 4.4–11.3)

## 2024-05-26 PROCEDURE — 2500000002 HC RX 250 W HCPCS SELF ADMINISTERED DRUGS (ALT 637 FOR MEDICARE OP, ALT 636 FOR OP/ED): Performed by: INTERNAL MEDICINE

## 2024-05-26 PROCEDURE — 2500000004 HC RX 250 GENERAL PHARMACY W/ HCPCS (ALT 636 FOR OP/ED): Performed by: INTERNAL MEDICINE

## 2024-05-26 PROCEDURE — 99232 SBSQ HOSP IP/OBS MODERATE 35: CPT | Performed by: STUDENT IN AN ORGANIZED HEALTH CARE EDUCATION/TRAINING PROGRAM

## 2024-05-26 PROCEDURE — 85027 COMPLETE CBC AUTOMATED: CPT | Performed by: INTERNAL MEDICINE

## 2024-05-26 PROCEDURE — 83735 ASSAY OF MAGNESIUM: CPT | Performed by: NURSE PRACTITIONER

## 2024-05-26 PROCEDURE — 84100 ASSAY OF PHOSPHORUS: CPT | Performed by: NURSE PRACTITIONER

## 2024-05-26 PROCEDURE — C9113 INJ PANTOPRAZOLE SODIUM, VIA: HCPCS | Performed by: INTERNAL MEDICINE

## 2024-05-26 PROCEDURE — 2500000004 HC RX 250 GENERAL PHARMACY W/ HCPCS (ALT 636 FOR OP/ED): Performed by: NURSE PRACTITIONER

## 2024-05-26 PROCEDURE — 36415 COLL VENOUS BLD VENIPUNCTURE: CPT | Performed by: INTERNAL MEDICINE

## 2024-05-26 PROCEDURE — 1200000002 HC GENERAL ROOM WITH TELEMETRY DAILY

## 2024-05-26 PROCEDURE — 2500000001 HC RX 250 WO HCPCS SELF ADMINISTERED DRUGS (ALT 637 FOR MEDICARE OP): Performed by: NURSE PRACTITIONER

## 2024-05-26 PROCEDURE — 80053 COMPREHEN METABOLIC PANEL: CPT | Performed by: INTERNAL MEDICINE

## 2024-05-26 PROCEDURE — 85610 PROTHROMBIN TIME: CPT | Performed by: NURSE PRACTITIONER

## 2024-05-26 PROCEDURE — S4991 NICOTINE PATCH NONLEGEND: HCPCS | Performed by: INTERNAL MEDICINE

## 2024-05-26 RX ORDER — NAPROXEN SODIUM 220 MG/1
81 TABLET, FILM COATED ORAL DAILY
Status: DISCONTINUED | OUTPATIENT
Start: 2024-05-26 | End: 2024-06-09 | Stop reason: HOSPADM

## 2024-05-26 RX ORDER — POTASSIUM CHLORIDE 20 MEQ/1
20 TABLET, EXTENDED RELEASE ORAL ONCE
Status: DISCONTINUED | OUTPATIENT
Start: 2024-05-26 | End: 2024-05-26

## 2024-05-26 RX ORDER — POTASSIUM CHLORIDE 14.9 MG/ML
20 INJECTION INTRAVENOUS
Status: COMPLETED | OUTPATIENT
Start: 2024-05-26 | End: 2024-05-26

## 2024-05-26 RX ADMIN — FLUOXETINE HYDROCHLORIDE 20 MG: 20 CAPSULE ORAL at 08:50

## 2024-05-26 RX ADMIN — MORPHINE SULFATE 4 MG: 4 INJECTION, SOLUTION INTRAMUSCULAR; INTRAVENOUS at 15:34

## 2024-05-26 RX ADMIN — PANTOPRAZOLE SODIUM 40 MG: 40 INJECTION, POWDER, FOR SOLUTION INTRAVENOUS at 06:04

## 2024-05-26 RX ADMIN — POTASSIUM PHOSPHATE, MONOBASIC 500 MG: 500 TABLET, SOLUBLE ORAL at 16:23

## 2024-05-26 RX ADMIN — ASPIRIN 81 MG 81 MG: 81 TABLET ORAL at 16:23

## 2024-05-26 RX ADMIN — AMLODIPINE BESYLATE 10 MG: 10 TABLET ORAL at 08:49

## 2024-05-26 RX ADMIN — HEPARIN SODIUM 5000 UNITS: 5000 INJECTION INTRAVENOUS; SUBCUTANEOUS at 15:35

## 2024-05-26 RX ADMIN — POTASSIUM CHLORIDE 20 MEQ: 14.9 INJECTION, SOLUTION INTRAVENOUS at 10:25

## 2024-05-26 RX ADMIN — METOPROLOL TARTRATE 25 MG: 25 TABLET, FILM COATED ORAL at 21:08

## 2024-05-26 RX ADMIN — MORPHINE SULFATE 4 MG: 4 INJECTION, SOLUTION INTRAMUSCULAR; INTRAVENOUS at 10:24

## 2024-05-26 RX ADMIN — MORPHINE SULFATE 4 MG: 4 INJECTION, SOLUTION INTRAMUSCULAR; INTRAVENOUS at 19:54

## 2024-05-26 RX ADMIN — POTASSIUM PHOSPHATE, MONOBASIC 500 MG: 500 TABLET, SOLUBLE ORAL at 12:20

## 2024-05-26 RX ADMIN — MORPHINE SULFATE 2 MG: 2 INJECTION, SOLUTION INTRAMUSCULAR; INTRAVENOUS at 06:09

## 2024-05-26 RX ADMIN — NICOTINE 1 PATCH: 21 PATCH, EXTENDED RELEASE TRANSDERMAL at 08:50

## 2024-05-26 RX ADMIN — POTASSIUM CHLORIDE 20 MEQ: 14.9 INJECTION, SOLUTION INTRAVENOUS at 12:20

## 2024-05-26 RX ADMIN — POTASSIUM PHOSPHATE, MONOBASIC 500 MG: 500 TABLET, SOLUBLE ORAL at 21:08

## 2024-05-26 RX ADMIN — HEPARIN SODIUM 5000 UNITS: 5000 INJECTION INTRAVENOUS; SUBCUTANEOUS at 21:08

## 2024-05-26 ASSESSMENT — PAIN DESCRIPTION - LOCATION
LOCATION: ABDOMEN

## 2024-05-26 ASSESSMENT — PAIN DESCRIPTION - ORIENTATION
ORIENTATION: RIGHT;UPPER
ORIENTATION: UPPER

## 2024-05-26 ASSESSMENT — COGNITIVE AND FUNCTIONAL STATUS - GENERAL
DAILY ACTIVITIY SCORE: 24
MOBILITY SCORE: 24
MOBILITY SCORE: 24
DAILY ACTIVITIY SCORE: 24

## 2024-05-26 ASSESSMENT — PAIN DESCRIPTION - DESCRIPTORS
DESCRIPTORS: SPASM;SHARP
DESCRIPTORS: ACHING

## 2024-05-26 ASSESSMENT — PAIN - FUNCTIONAL ASSESSMENT
PAIN_FUNCTIONAL_ASSESSMENT: 0-10

## 2024-05-26 ASSESSMENT — PAIN SCALES - GENERAL
PAINLEVEL_OUTOF10: 7
PAINLEVEL_OUTOF10: 8
PAINLEVEL_OUTOF10: 3
PAINLEVEL_OUTOF10: 7
PAINLEVEL_OUTOF10: 3
PAINLEVEL_OUTOF10: 7

## 2024-05-26 ASSESSMENT — PAIN SCALES - PAIN ASSESSMENT IN ADVANCED DEMENTIA (PAINAD): TOTALSCORE: MEDICATION (SEE MAR)

## 2024-05-26 ASSESSMENT — PAIN SCALES - WONG BAKER: WONGBAKER_NUMERICALRESPONSE: HURTS LITTLE BIT

## 2024-05-26 NOTE — CARE PLAN
The patient's goals for the shift include      The clinical goals for the shift include pt will state pain as controlled to level 3

## 2024-05-26 NOTE — PROGRESS NOTES
"Emerson Chilel is a 64 y.o. male presenting with chief complaint of approximately 2 weeks of right upper quadrant abdominal pain, dark discoloration noted in his urine, as well as visible jaundice appreciated in his skin and eyes over the last 48 hours.  Patient states that the icteric sclera first noted approximately 48 hours prior has been the most concerning symptom.  Drinks approximately 6 beers per day. Initial evaluation at outside hospital noted a 2.5 cm mass at the head of the pancreas concerning for underlying neoplasm. Patient transferred to this facility after consultation with GI service for evaluation and ERCP, which is scheduled tentatively for Tuesday morning.  Of note: Mother  history of pancreatic cancer.     Subjective   Patient seen and examined  Resting in bed, NAD  No acute events overnight  States mid abdominal pain about a \"2\" on 1-10 scale, tolerable and well controlled at this time  States did okay with clear liquids with no increased abdominal pain, nausea or vomiting  Denies any other chief complaints    Objective   All systems negative other than those listed above    Physical Exam  Constitutional:       Appearance: Normal appearance.   HENT:      Head: Normocephalic and atraumatic.      Mouth: Mucous membranes are moist.   Eyes:      Extraocular Movements: Extraocular movements intact.      Conjunctiva/sclera: jaundice     Pupils: Pupils are equal, round, and reactive to light.   Cardiovascular:      Rate and Rhythm: Normal rate and regular rhythm.      Pulses: Normal pulses.      Heart sounds: Normal heart sounds.   Pulmonary:      Effort: Pulmonary effort is normal.      Breath sounds: Normal breath sounds.   Abdominal:      General: Bowel sounds are normal. There is no distension.      Palpations: Abdomen is soft.      Tenderness: There is no abdominal tenderness on palaption  Musculoskeletal:         General: Normal range of motion.      Cervical back: Normal range of " "motion and neck supple.   Skin:     General: Skin is warm and dry.      Coloration: Skin is jaundiced.   Neurological:      General: No focal deficit present.      Mental Status: He is alert. Mental status is at baseline.   Psychiatric:         Mood and Affect: Mood normal.         Behavior: Behavior normal.     Last Recorded Vitals  Blood pressure 132/62, pulse 60, temperature 36 °C (96.8 °F), temperature source Temporal, resp. rate 16, height 1.702 m (5' 7\"), weight 65.8 kg (145 lb), SpO2 97%.  Intake/Output last 3 Shifts:  No intake/output data recorded.    Relevant Results  Scheduled medications  amLODIPine, 10 mg, oral, Daily  [Held by provider] aspirin, 81 mg, oral, Daily  [Held by provider] clopidogrel, 75 mg, oral, Daily  FLUoxetine, 20 mg, oral, Daily  [Held by provider] heparin (porcine), 5,000 Units, subcutaneous, q8h AMBROSIO  [Held by provider] hydroCHLOROthiazide, 12.5 mg, oral, q AM  metoprolol tartrate, 25 mg, oral, Nightly  nicotine, 1 patch, transdermal, Daily  pantoprazole, 40 mg, intravenous, Daily before breakfast      Continuous medications     PRN medications  PRN medications: acetaminophen **OR** acetaminophen **OR** acetaminophen, acetaminophen **OR** acetaminophen **OR** acetaminophen, albuterol, ALPRAZolam, [Held by provider] HYDROcodone-acetaminophen, melatonin, metoclopramide **OR** metoclopramide, morphine, morphine, ondansetron ODT **OR** ondansetron, polyethylene glycol  Results from last 7 days   Lab Units 05/26/24  0600 05/25/24  0619 05/24/24  1636   WBC AUTO x10*3/uL 8.7 10.1 11.2   RBC AUTO x10*6/uL 3.72* 3.88* 4.15*   HEMOGLOBIN g/dL 12.8* 13.4* 14.0     Results from last 7 days   Lab Units 05/26/24  0600 05/25/24  0618 05/24/24  1636   SODIUM mmol/L 131* 131* 129*   POTASSIUM mmol/L 3.2* 3.2* 3.3*   CHLORIDE mmol/L 95* 94* 92*   CO2 mmol/L 26 24 24   BUN mg/dL 11 14 9   CREATININE mg/dL 0.67 0.77 0.90   CALCIUM mg/dL 8.8 9.3 9.5   PHOSPHORUS mg/dL 2.3*  --   --    MAGNESIUM mg/dL " 2.03  --  1.89   BILIRUBIN TOTAL mg/dL 19.2* 19.0* 19.5*   ALT U/L 273* 280* 300*   AST U/L 211* 212* 228*       ECG 12 lead    Result Date: 5/25/2024  Sinus rhythm with frequent Premature ventricular complexes Otherwise normal ECG When compared with ECG of 24-MAY-2024 16:43, (unconfirmed) Premature ventricular complexes are now Present See ED provider note for full interpretation and clinical correlation Confirmed by Deyanira Lantigua ([id number]) on 5/25/2024 5:28:37 PM    CT abdomen pelvis w IV contrast    Result Date: 5/24/2024  STUDY: CT Abdomen and Pelvis with IV Contrast; 05/24/2024 at 5:51 PM INDICATION: Right upper quadrant abdominal pain. Jaundice. COMPARISON: None available. ACCESSION NUMBER(S): [id number] ORDERING CLINICIAN: TOBI WHITNEY TECHNIQUE: CT of the abdomen and pelvis was performed.  Contiguous axial images were obtained at 3 mm slice thickness through the abdomen and pelvis. Coronal and sagittal reconstructions at 3 mm slice thickness were performed.  Omnipaque-350 75 mL was administered intravenously.  FINDINGS: LOWER CHEST: No cardiomegaly.  No pericardial effusion.  Lung bases are clear.  ABDOMEN:  LIVER: No hepatomegaly.  Smooth surface contour.  Normal attenuation.  BILE DUCTS: There is intrahepatic and common bile bile duct dilatation.  This can be followed into the head of the pancreas.  GALLBLADDER: The gallbladder is present.  There is intermediate attenuation the gallbladder most likely representing sludge. STOMACH: No abnormalities identified.  PANCREAS: There is an ill-defined 2.5 cm mass in the head of the pancreas.  This is best seen on series 201, slice 61 as well as series 202, slice 45. There is pancreatic duct dilatation.  SPLEEN: No splenomegaly or focal splenic lesion.  ADRENAL GLANDS: No thickening or nodules.  KIDNEYS AND URETERS: Kidneys are normal in size and location.  No renal or ureteral calculi.  PELVIS:  BLADDER: No abnormalities identified.  REPRODUCTIVE  ORGANS: No abnormalities identified.  BOWEL: No abnormalities identified.  The appendix is identified and is normal.  VESSELS: No abnormalities identified.  Abdominal aorta is normal in caliber.  PERITONEUM/RETROPERITONEUM/LYMPH NODES: No free fluid.  No pneumoperitoneum. No lymphadenopathy.  ABDOMINAL WALL: No abnormalities identified. SOFT TISSUES: No abnormalities identified.  BONES: No acute fracture or aggressive osseous lesion.    2.5 cm mass in the head of the pancreas.  There is dilatation of the biliary ducts as well as the pancreatic duct and neoplasm cannot be excluded.  This is best evaluated with ERCP or MRCP.  There are findings consistent with sludge in the gallbladder. Signed by Bhanu Taveras MD      Assessment/Plan   Principal Problem:    Pancreatic mass (HHS-HCC)  Active Problems:    Jaundice    Hypokalemia    Generalized abdominal pain  Hypokalemia  Hyponatremia  History of Alcohol daily use  50 pack-year smoking cigarettes history since age 12  Anxiety  Hx of coronary artery disease, non-ST elevation myocardial infarction, multivessel PCI most recent drug-eluting stent LAD July 17, 2023  Hx of hypertension, left ventricular hypertrophy, aortic valve regurgitation, hyperlipidemia    Plan:    -Gastroenterology on consult-Dr. Lowe-N.p.o. after midnight on Monday.-Will tentatively plan for EUS/ERCP for Tuesday.  -cont to trend LFTs. Bilirubin this am 19.2  -cont morphine PRN for moderate to severe pain, patient reports this medication is controlling his pain well  -Cont to monitor lytes and replete-potassium 3.2 this am, phos 2.3, ordered 40 mEq potassium IV and k phos oral replacement, magnesium 2.03 no replacement needed  -currently no signs of alcohol withdrawal noted, cont to monitor for need of CIWA  -cont nicotine patch  -on aspirin and Plavix because of prior stenting -currently on hold for ERCP  -per patient's PCP note-Take Xanax for intermittent anxiety especially panic attacks -which has  been resumed  -currently on clear liquid diet, will need NPO Monday night for ERCP  -dvtp: heparin sub q, hold Monday evening prior to ERCP  -am labs including cbc, cmp, mag, phos  -POC discussed with patient and attending    I spent >35 minutes in the professional and overall care of this patient.    Code status: FULL CODE    [facility]ar[facility]  [address] [address]  Phone: [phone] Fax: [phone]

## 2024-05-26 NOTE — PROGRESS NOTES
Subjective: Patient is doing well this morning.  His only complaint is with his anxiety as he has had 2 family members die of pancreatic cancer.  He is tolerating liquid diet.      Review of Systems  ROS Negative unless otherwise stated above.    Allergies  Allergies   Allergen Reactions    Ramipril Other     hyperkalemia       Medications  Current Outpatient Medications   Medication Instructions    albuterol 90 mcg/actuation inhaler INHALE TWO PUFFS EVERY FOUR HOURS AS NEEDED    ALPRAZolam (Xanax) 0.5 mg tablet TAKE ONE TABLET (0.5 MG) BY MOUTH DAILY AS NEEDED FOR ANXIETY    ALPRAZolam (Xanax) 0.5 mg tablet TAKE ONE TABLET (0.5 MG) BY MOUTH ONCE DAILY AS NEEDED FOR ANXIETY    amLODIPine (Norvasc) 10 mg tablet 1 tablet, oral, Daily    aspirin 81 mg, oral, Daily    atorvastatin (LIPITOR) 40 mg, oral, Daily    clopidogrel (Plavix) 75 mg tablet 1 tablet, oral, Daily    FLUoxetine (PROZAC) 20 mg, oral, Daily    hydroCHLOROthiazide (MICROZIDE) 12.5 mg, oral, Every morning    HYDROcodone-acetaminophen (Norco) 5-325 mg tablet 1 tablet, oral, 2 times daily PRN    metoprolol tartrate (Lopressor) 25 mg tablet TAKE ONE HALF OF A TABLET BY MOUTH AT BEDTIME    nitroglycerin (NITROSTAT) 0.4 mg, sublingual, Every 5 min PRN    pantoprazole (PROTONIX) 40 mg, oral, Daily, Do not crush, chew, or split.        PHYSICAL EXAM:  Visit Vitals  /62 (BP Location: Left arm, Patient Position: Lying)   Pulse 60   Temp 36 °C (96.8 °F) (Temporal)   Resp 16        General: A&Ox3, NAD.  HEENT: AT/NC.  + Scleral icterus.  Skin: + Jaundice.   Neuro: No focal deficits.   Psych: Normal mood and affect.       Results from last 7 days   Lab Units 05/26/24  0600 05/25/24  0619 05/24/24  1636   WBC AUTO x10*3/uL 8.7 10.1 11.2   HEMOGLOBIN g/dL 12.8* 13.4* 14.0   HEMATOCRIT % 35.0* 36.5* 37.5*   PLATELETS AUTO x10*3/uL 211 196 190     Results from last 7 days   Lab Units 05/26/24  0600 05/25/24  0618 05/24/24  1636   SODIUM mmol/L 131* 131* 129*    POTASSIUM mmol/L 3.2* 3.2* 3.3*   CHLORIDE mmol/L 95* 94* 92*   CO2 mmol/L 26 24 24   BUN mg/dL 11 14 9   CREATININE mg/dL 0.67 0.77 0.90   CALCIUM mg/dL 8.8 9.3 9.5   PROTEIN TOTAL g/dL 5.8* 6.3* 7.0   BILIRUBIN TOTAL mg/dL 19.2* 19.0* 19.5*   ALK PHOS U/L 354* 396* 437*   ALT U/L 273* 280* 300*   AST U/L 211* 212* 228*   GLUCOSE mg/dL 90 94 95         ASSESSMENT/PLAN:  Emerson Chilel is a 64 y.o. male with a PMH of HTN, HLD, CAD s/p PCI, tobacco abuse, alcohol abuse who presented to the ER with abdominal pain, N/V, and jaundice with imaging concerning for a pancreatic head mass and in whom we are consulted for further management.     Patient is afebrile, HDS.  LFTs: 211, 273, 354, 19.2.  INR: 1.2.  CBC: WNL.  Normal kidney function.    CT A/P with IV contrast: 2.5 cm mass in the head of the pancreas + biliary dilatation.     -Trend LFTs.  -Advance diet as tolerated.  -N.p.o. after midnight on Monday.  -Will tentatively plan for EUS/ERCP for Tuesday.      Kermit Lowe DO  GI Attending

## 2024-05-26 NOTE — PROGRESS NOTES
Patient from home with wife, ercp scheduled for Tuesday. Care transitions will follow for needs.  MILES Lopez

## 2024-05-27 LAB
ALBUMIN SERPL BCP-MCNC: 3.4 G/DL (ref 3.4–5)
ALP SERPL-CCNC: 340 U/L (ref 33–136)
ALT SERPL W P-5'-P-CCNC: 266 U/L (ref 10–52)
ANION GAP SERPL CALC-SCNC: 12 MMOL/L (ref 10–20)
AST SERPL W P-5'-P-CCNC: 187 U/L (ref 9–39)
BILIRUB SERPL-MCNC: 20.5 MG/DL (ref 0–1.2)
BUN SERPL-MCNC: 10 MG/DL (ref 6–23)
CALCIUM SERPL-MCNC: 8.8 MG/DL (ref 8.6–10.3)
CHLORIDE SERPL-SCNC: 98 MMOL/L (ref 98–107)
CO2 SERPL-SCNC: 25 MMOL/L (ref 21–32)
CREAT SERPL-MCNC: 0.73 MG/DL (ref 0.5–1.3)
EGFRCR SERPLBLD CKD-EPI 2021: >90 ML/MIN/1.73M*2
ERYTHROCYTE [DISTWIDTH] IN BLOOD BY AUTOMATED COUNT: 18.1 % (ref 11.5–14.5)
GLUCOSE SERPL-MCNC: 80 MG/DL (ref 74–99)
HCT VFR BLD AUTO: 35.1 % (ref 41–52)
HGB BLD-MCNC: 12.7 G/DL (ref 13.5–17.5)
MAGNESIUM SERPL-MCNC: 2.01 MG/DL (ref 1.6–2.4)
MCH RBC QN AUTO: 34.8 PG (ref 26–34)
MCHC RBC AUTO-ENTMCNC: 36.2 G/DL (ref 32–36)
MCV RBC AUTO: 96 FL (ref 80–100)
NRBC BLD-RTO: 0 /100 WBCS (ref 0–0)
PHOSPHATE SERPL-MCNC: 2.9 MG/DL (ref 2.5–4.9)
PLATELET # BLD AUTO: 206 X10*3/UL (ref 150–450)
POTASSIUM SERPL-SCNC: 3.7 MMOL/L (ref 3.5–5.3)
PROT SERPL-MCNC: 5.6 G/DL (ref 6.4–8.2)
RBC # BLD AUTO: 3.65 X10*6/UL (ref 4.5–5.9)
SODIUM SERPL-SCNC: 131 MMOL/L (ref 136–145)
WBC # BLD AUTO: 8.3 X10*3/UL (ref 4.4–11.3)

## 2024-05-27 PROCEDURE — 2500000004 HC RX 250 GENERAL PHARMACY W/ HCPCS (ALT 636 FOR OP/ED): Performed by: NURSE PRACTITIONER

## 2024-05-27 PROCEDURE — 84075 ASSAY ALKALINE PHOSPHATASE: CPT | Performed by: INTERNAL MEDICINE

## 2024-05-27 PROCEDURE — C9113 INJ PANTOPRAZOLE SODIUM, VIA: HCPCS | Performed by: INTERNAL MEDICINE

## 2024-05-27 PROCEDURE — 2500000002 HC RX 250 W HCPCS SELF ADMINISTERED DRUGS (ALT 637 FOR MEDICARE OP, ALT 636 FOR OP/ED): Performed by: STUDENT IN AN ORGANIZED HEALTH CARE EDUCATION/TRAINING PROGRAM

## 2024-05-27 PROCEDURE — 99232 SBSQ HOSP IP/OBS MODERATE 35: CPT | Performed by: STUDENT IN AN ORGANIZED HEALTH CARE EDUCATION/TRAINING PROGRAM

## 2024-05-27 PROCEDURE — S4991 NICOTINE PATCH NONLEGEND: HCPCS | Performed by: INTERNAL MEDICINE

## 2024-05-27 PROCEDURE — 80053 COMPREHEN METABOLIC PANEL: CPT | Performed by: INTERNAL MEDICINE

## 2024-05-27 PROCEDURE — 36415 COLL VENOUS BLD VENIPUNCTURE: CPT | Performed by: INTERNAL MEDICINE

## 2024-05-27 PROCEDURE — 2500000004 HC RX 250 GENERAL PHARMACY W/ HCPCS (ALT 636 FOR OP/ED): Performed by: INTERNAL MEDICINE

## 2024-05-27 PROCEDURE — 1200000002 HC GENERAL ROOM WITH TELEMETRY DAILY

## 2024-05-27 PROCEDURE — 85027 COMPLETE CBC AUTOMATED: CPT | Performed by: INTERNAL MEDICINE

## 2024-05-27 PROCEDURE — 2500000002 HC RX 250 W HCPCS SELF ADMINISTERED DRUGS (ALT 637 FOR MEDICARE OP, ALT 636 FOR OP/ED): Performed by: INTERNAL MEDICINE

## 2024-05-27 PROCEDURE — 83735 ASSAY OF MAGNESIUM: CPT | Performed by: NURSE PRACTITIONER

## 2024-05-27 PROCEDURE — 2500000001 HC RX 250 WO HCPCS SELF ADMINISTERED DRUGS (ALT 637 FOR MEDICARE OP): Performed by: NURSE PRACTITIONER

## 2024-05-27 PROCEDURE — 84100 ASSAY OF PHOSPHORUS: CPT | Performed by: NURSE PRACTITIONER

## 2024-05-27 RX ORDER — POTASSIUM CHLORIDE 20 MEQ/1
40 TABLET, EXTENDED RELEASE ORAL ONCE
Status: COMPLETED | OUTPATIENT
Start: 2024-05-27 | End: 2024-05-27

## 2024-05-27 RX ADMIN — MORPHINE SULFATE 4 MG: 4 INJECTION, SOLUTION INTRAMUSCULAR; INTRAVENOUS at 13:56

## 2024-05-27 RX ADMIN — NICOTINE 1 PATCH: 21 PATCH, EXTENDED RELEASE TRANSDERMAL at 09:42

## 2024-05-27 RX ADMIN — AMLODIPINE BESYLATE 10 MG: 10 TABLET ORAL at 09:42

## 2024-05-27 RX ADMIN — HEPARIN SODIUM 5000 UNITS: 5000 INJECTION INTRAVENOUS; SUBCUTANEOUS at 21:50

## 2024-05-27 RX ADMIN — POTASSIUM PHOSPHATE, MONOBASIC 500 MG: 500 TABLET, SOLUBLE ORAL at 06:15

## 2024-05-27 RX ADMIN — MORPHINE SULFATE 4 MG: 4 INJECTION, SOLUTION INTRAMUSCULAR; INTRAVENOUS at 09:42

## 2024-05-27 RX ADMIN — POTASSIUM CHLORIDE 40 MEQ: 1500 TABLET, EXTENDED RELEASE ORAL at 18:08

## 2024-05-27 RX ADMIN — MORPHINE SULFATE 4 MG: 4 INJECTION, SOLUTION INTRAMUSCULAR; INTRAVENOUS at 04:51

## 2024-05-27 RX ADMIN — METOPROLOL TARTRATE 25 MG: 25 TABLET, FILM COATED ORAL at 20:44

## 2024-05-27 RX ADMIN — ASPIRIN 81 MG 81 MG: 81 TABLET ORAL at 09:42

## 2024-05-27 RX ADMIN — PANTOPRAZOLE SODIUM 40 MG: 40 INJECTION, POWDER, FOR SOLUTION INTRAVENOUS at 06:15

## 2024-05-27 RX ADMIN — MORPHINE SULFATE 4 MG: 4 INJECTION, SOLUTION INTRAMUSCULAR; INTRAVENOUS at 22:14

## 2024-05-27 RX ADMIN — MORPHINE SULFATE 4 MG: 4 INJECTION, SOLUTION INTRAMUSCULAR; INTRAVENOUS at 18:08

## 2024-05-27 RX ADMIN — HEPARIN SODIUM 5000 UNITS: 5000 INJECTION INTRAVENOUS; SUBCUTANEOUS at 13:56

## 2024-05-27 RX ADMIN — HEPARIN SODIUM 5000 UNITS: 5000 INJECTION INTRAVENOUS; SUBCUTANEOUS at 06:14

## 2024-05-27 RX ADMIN — FLUOXETINE HYDROCHLORIDE 20 MG: 20 CAPSULE ORAL at 09:42

## 2024-05-27 RX ADMIN — MORPHINE SULFATE 4 MG: 4 INJECTION, SOLUTION INTRAMUSCULAR; INTRAVENOUS at 00:19

## 2024-05-27 ASSESSMENT — PAIN SCALES - GENERAL
PAINLEVEL_OUTOF10: 7
PAINLEVEL_OUTOF10: 2
PAINLEVEL_OUTOF10: 9
PAINLEVEL_OUTOF10: 3
PAINLEVEL_OUTOF10: 0 - NO PAIN
PAINLEVEL_OUTOF10: 7
PAINLEVEL_OUTOF10: 8
PAINLEVEL_OUTOF10: 3
PAINLEVEL_OUTOF10: 7
PAINLEVEL_OUTOF10: 2

## 2024-05-27 ASSESSMENT — PAIN - FUNCTIONAL ASSESSMENT
PAIN_FUNCTIONAL_ASSESSMENT: 0-10

## 2024-05-27 ASSESSMENT — COGNITIVE AND FUNCTIONAL STATUS - GENERAL: MOBILITY SCORE: 24

## 2024-05-27 ASSESSMENT — PAIN DESCRIPTION - LOCATION
LOCATION: ABDOMEN
LOCATION: ABDOMEN

## 2024-05-27 ASSESSMENT — PAIN DESCRIPTION - DESCRIPTORS
DESCRIPTORS: ACHING
DESCRIPTORS: ACHING

## 2024-05-27 ASSESSMENT — PAIN SCALES - PAIN ASSESSMENT IN ADVANCED DEMENTIA (PAINAD): BREATHING: NORMAL

## 2024-05-27 ASSESSMENT — PAIN DESCRIPTION - ORIENTATION
ORIENTATION: UPPER
ORIENTATION: UPPER
ORIENTATION: LOWER

## 2024-05-27 NOTE — CARE PLAN
The patient's goals for the shift include pain and nausea control.    The clinical goals for the shift include pain & nausea management and hemodynamic stability.    Over the shift, the patient did not make progress toward the following goals. Barriers to progression include n/a. Recommendations to address these barriers include n/a.

## 2024-05-27 NOTE — PROGRESS NOTES
"Emerson Chilel is a 64 y.o. male on day 2 of admission presenting with Pancreatic mass (HHS-HCC).    Subjective   Patient seen and examined, no acute events overnight.  Patient has no complaints this morning, anxious for the ERCP procedure.       Objective     Constitutional: Well developed, awake/alert/oriented x3, no distress, alert and cooperative  Eyes: PERRL  ENMT: mucous membranes moist  Head/Neck: Neck supple  Respiratory/Thorax: CTA b/l.   Cardiovascular: Regular, rate and rhythm, no murmurs  Gastrointestinal: Nondistended, soft, non-tender  Musculoskeletal: ROM intact  Extremities: normal extremities  Skin Jaundiced      Last Recorded Vitals  Blood pressure 134/72, pulse 59, temperature 35.8 °C (96.4 °F), temperature source Temporal, resp. rate 16, height 1.702 m (5' 7\"), weight 65.8 kg (145 lb), SpO2 97%.  Intake/Output last 3 Shifts:  I/O last 3 completed shifts:  In: 440 (6.7 mL/kg) [P.O.:240; IV Piggyback:200]  Out: - (0 mL/kg)   Weight: 65.8 kg     Relevant Results  Scheduled medications  amLODIPine, 10 mg, oral, Daily  aspirin, 81 mg, oral, Daily  [Held by provider] clopidogrel, 75 mg, oral, Daily  FLUoxetine, 20 mg, oral, Daily  heparin (porcine), 5,000 Units, subcutaneous, q8h AMBROSIO  [Held by provider] hydroCHLOROthiazide, 12.5 mg, oral, q AM  metoprolol tartrate, 25 mg, oral, Nightly  nicotine, 1 patch, transdermal, Daily  pantoprazole, 40 mg, intravenous, Daily before breakfast      Continuous medications     PRN medications  PRN medications: acetaminophen **OR** acetaminophen **OR** acetaminophen, acetaminophen **OR** acetaminophen **OR** acetaminophen, albuterol, ALPRAZolam, [Held by provider] HYDROcodone-acetaminophen, melatonin, metoclopramide **OR** metoclopramide, morphine, morphine, ondansetron ODT **OR** ondansetron, polyethylene glycol  Results from last 7 days   Lab Units 05/27/24  0550 05/26/24  0600 05/25/24  0619   WBC AUTO x10*3/uL 8.3 8.7 10.1   RBC AUTO x10*6/uL 3.65* 3.72* 3.88* "   HEMOGLOBIN g/dL 12.7* 12.8* 13.4*     Results from last 7 days   Lab Units 05/27/24  0549 05/26/24  0600 05/25/24  0618 05/24/24  1636   SODIUM mmol/L 131* 131* 131* 129*   POTASSIUM mmol/L 3.7 3.2* 3.2* 3.3*   CHLORIDE mmol/L 98 95* 94* 92*   CO2 mmol/L 25 26 24 24   BUN mg/dL 10 11 14 9   CREATININE mg/dL 0.73 0.67 0.77 0.90   CALCIUM mg/dL 8.8 8.8 9.3 9.5   PHOSPHORUS mg/dL 2.9 2.3*  --   --    MAGNESIUM mg/dL 2.01 2.03  --  1.89   BILIRUBIN TOTAL mg/dL 20.5* 19.2* 19.0* 19.5*   ALT U/L 266* 273* 280* 300*   AST U/L 187* 211* 212* 228*         Assessment/Plan   Principal Problem:    Pancreatic mass (HHS-HCC)  Active Problems:    Jaundice    Hypokalemia    Generalized abdominal pain    Plan  GI on consult, currently plan for EUS/ERCP on Tuesday  - Bilirubin remains elevated, today is 20.5.  - Continue to trend CMP  - CA 19-9 is 6453, this result was discussed with the patient, aware the mass could be cancerous  - Potassium 3.7, magnesium 2.01, replace as needed  - Continue morphine as needed for pain  - Continue aspirin, hold Plavix for planned ERCP  - N.p.o. after midnight for ERCP tomorrow  - Continue diet as tolerated  - Heparin subcu for DVT prophylaxis       I spent 25 minutes in the professional and overall care of this patient.      Chintan Chowdhury MD

## 2024-05-27 NOTE — PROGRESS NOTES
Department of Internal Medicine  Gastroenterology  Progress note      Subjective  GI is following for pancreatic mass and jaundice    Patient reports diffuse abd aching/bloating that is managed with pain medication. Tolerating CLD with no N/V.       Current Medication    Current Facility-Administered Medications:     acetaminophen (Tylenol) tablet 650 mg, 650 mg, oral, q4h PRN **OR** acetaminophen (Tylenol) oral liquid 650 mg, 650 mg, nasogastric tube, q4h PRN **OR** acetaminophen (Tylenol) suppository 650 mg, 650 mg, rectal, q4h PRN, Musa Lizarraga,     acetaminophen (Tylenol) tablet 650 mg, 650 mg, oral, q4h PRN **OR** acetaminophen (Tylenol) oral liquid 650 mg, 650 mg, oral, q4h PRN **OR** acetaminophen (Tylenol) suppository 650 mg, 650 mg, rectal, q4h PRN, Musa Lizarraga,     albuterol 2.5 mg /3 mL (0.083 %) nebulizer solution 2.5 mg, 2.5 mg, nebulization, q4h PRN, SOLOMON Parmar-CNP    ALPRAZolam (Xanax) tablet 0.5 mg, 0.5 mg, oral, Daily PRN, SOLOMON Parmar-CNP    amLODIPine (Norvasc) tablet 10 mg, 10 mg, oral, Daily, SOLOMON Parmar-CNP, 10 mg at 05/27/24 0942    aspirin chewable tablet 81 mg, 81 mg, oral, Daily, SOLOMON Parmar-CNP, 81 mg at 05/27/24 0942    [Held by provider] clopidogrel (Plavix) tablet 75 mg, 75 mg, oral, Daily, Loli Suresh APRN-CNP    FLUoxetine (PROzac) capsule 20 mg, 20 mg, oral, Daily, SOLOMON Parmar-CNP, 20 mg at 05/27/24 0942    heparin (porcine) injection 5,000 Units, 5,000 Units, subcutaneous, q8h AMBROSIO, SOLOMON Parmar-CNP, 5,000 Units at 05/27/24 0614    [Held by provider] hydroCHLOROthiazide (Microzide) capsule 12.5 mg, 12.5 mg, oral, q AM, SESAR Parmar    [Held by provider] HYDROcodone-acetaminophen (Norco) 5-325 mg per tablet 1 tablet, 1 tablet, oral, BID PRN, SESAR Parmar    melatonin tablet 3 mg, 3 mg, oral, Nightly PRN, Musa Lizarraga DO    metoclopramide (Reglan) tablet 10 mg, 10 mg,  oral, q6h PRN **OR** metoclopramide (Reglan) injection 10 mg, 10 mg, intravenous, q6h PRN, Musa Lizarraga DO    metoprolol tartrate (Lopressor) tablet 25 mg, 25 mg, oral, Nightly, Loli Suresh, APRN-CNP, 25 mg at 05/26/24 2108    morphine injection 2 mg, 2 mg, intravenous, q4h PRN, Musa Lizarraga DO, 2 mg at 05/26/24 0609    morphine injection 4 mg, 4 mg, intravenous, q4h PRN, Musa Lizarraga DO, 4 mg at 05/27/24 0942    nicotine (Nicoderm CQ) 21 mg/24 hr patch 1 patch, 1 patch, transdermal, Daily, Musa Lizarraga DO, 1 patch at 05/27/24 0942    ondansetron ODT (Zofran-ODT) disintegrating tablet 4 mg, 4 mg, oral, q8h PRN **OR** ondansetron (Zofran) injection 4 mg, 4 mg, intravenous, q8h PRN, Musa Lizarraga DO    [DISCONTINUED] pantoprazole (ProtoNix) EC tablet 40 mg, 40 mg, oral, Daily before breakfast **OR** pantoprazole (ProtoNix) injection 40 mg, 40 mg, intravenous, Daily before breakfast, Musa Lizarraga DO, 40 mg at 05/27/24 0615    polyethylene glycol (Glycolax, Miralax) packet 17 g, 17 g, oral, Daily PRN, Musa Lizarraga DO    Past Medical History  Active Ambulatory Problems     Diagnosis Date Noted    Anxiety 03/23/2023    Acne rosacea 04/12/2023    Coronary stent patent 04/12/2023    Degeneration of cervical intervertebral disc with myelopathy 04/12/2023    Dyslipidemia 04/12/2023    Left lumbar radiculopathy 04/12/2023    Lumbar disc disease with radiculopathy 04/12/2023    Neck pain 04/12/2023    Osseous stenosis of neural canal of cervical region 04/12/2023    Osteoarthritis of right knee 04/12/2023    Sciatica of right side 04/12/2023    Tremor of right hand 04/12/2023    Medication management 05/29/2023    Asthma ([facility]) 01/31/2014    Ataxia 12/20/2021    Closed fracture of distal phalanx or phalanges of hand 05/10/2010    Coronary artery disease involving native coronary artery of native heart without angina pectoris 01/10/2013    Depression 06/14/2023    Essential hypertension  "11/09/2016    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    Closed fracture of distal phalanx of digit of hand 05/10/2010    Mixed hyperlipidemia 08/28/2023    LVH (left ventricular hypertrophy) 10/11/2023    Aortic valve regurgitation 10/11/2023    BMI 23.0-23.9, adult 10/11/2023    Current every day smoker 10/11/2023    Simple chronic bronchitis (Multi) 10/17/2023    Need for influenza vaccination 10/17/2023    Encounter for monitoring opioid maintenance therapy 04/16/2024    Encounter for long term benzodiazepine therapy 04/16/2024    Atrophic gastritis without hemorrhage 05/16/2024     Resolved Ambulatory Problems     Diagnosis Date Noted    SOB (shortness of breath) 01/07/2020     Past Medical History:   Diagnosis Date    CAD (coronary artery disease)     Chronic sinusitis, unspecified 07/13/2021    Hyperlipidemia     Hypertension        PHYSICAL EXAM  VS: /77 (BP Location: Left arm, Patient Position: Lying)   Pulse 59   Temp 35.7 °C (96.3 °F) (Temporal)   Resp 16   Ht 1.702 m (5' 7\")   Wt 65.8 kg (145 lb)   SpO2 98%   BMI 22.71 kg/m²  Body mass index is 22.71 kg/m².  Physical Exam   Alert and oriented.NAD, non-labored breathing. Abd soft, NT to palpation with mild bloating. +jaundice,   +scleral icterus  DATA  Recent blood work and relevant radiology and endoscopic studies were reviewed and discussed with the patient   Results from last 7 days   Lab Units 05/27/24  0550   WBC AUTO x10*3/uL 8.3   RBC AUTO x10*6/uL 3.65*   HEMOGLOBIN g/dL 12.7*   HEMATOCRIT % 35.1*   MCV fL 96   MCHC g/dL 36.2*   RDW % 18.1*   PLATELETS AUTO x10*3/uL 206       Results from last 72 hours   Lab Units 05/27/24  0549   SODIUM mmol/L 131*   POTASSIUM mmol/L 3.7   CHLORIDE mmol/L 98   CO2 mmol/L 25   BUN mg/dL 10   CREATININE mg/dL 0.73   CALCIUM mg/dL 8.8   PROTEIN TOTAL g/dL 5.6*   BILIRUBIN TOTAL mg/dL 20.5*   ALK PHOS U/L 340*   AST U/L 187*   ALT U/L 266*       Results from last 72 hours   Lab Units 05/26/24  0600 "   INR  1.2*       Results from last 72 hours   Lab Units 05/24/24  1636   LIPASE U/L 208*         IMPRESSION/RECOMMENDATIONS  Emerson Chilel is a 64 y.o. male with a PMH of HTN, HLD, CAD s/p PCI, tobacco abuse, alcohol abuse who presented to the ER with abdominal pain, N/V, and jaundice with imaging concerning for a pancreatic head mass and in whom we are consulted for further management.     Patient is afebrile, HDS.  Elevated LFTs: mixed pattern.  TB 20.5  INR: 1.2.  No leukocytosis  Normal kidney function.    CT A/P with IV contrast: 2.5 cm mass in the head of the pancreas + biliary dilatation.      PLAN  -Trend LFTs.  -Advance diet as tolerated.  -N.p.o. after midnight   -tentatively plan for EUS/ERCP for tomorrow, 5/28.              Plan discussed with Dr. Lowe. GI will continue to follow  Total of 25 min. Spent on visit and overall professional care of the patient including education  (Electronically signed bySESAR Feliz on 5/27/2024 at 10:24 AM)

## 2024-05-27 NOTE — CARE PLAN
The clinical goals for the shift include remain free from nausea this shift      Problem: Pain  Goal: My pain/discomfort is manageable  Outcome: Progressing  Flowsheets (Taken 5/27/2024 1540)  Resident's pain/discomfort is manageable:   Include resident/family/caregiver in decisions related to pain management   Offer non-pharmacological pain management interventions   Identify and avoid pain triggers   Administer pain medication prior to activities that may trigger pain     Problem: Safety  Goal: Patient will be injury free during hospitalization  Outcome: Progressing  Goal: I will remain free of falls  Outcome: Progressing  Flowsheets (Taken 5/27/2024 1540)  Resident will remain free of falls:   Use gait belt for all transfers   Apply bed/chair alarms as appropriate   Accompany resident as ordered (ex. 1:1, stand-by assist, dayroom monitoring, 15 minute checks, line of sight)   Assist with toileting as orderd   Visual checks per facility policy   Maintain bed at position as ordered (chair height, low bed)   Consult with physical therapy as needed   Assess and monitor medications that may increase fall risk     Problem: Daily Care  Goal: Daily care needs are met  Outcome: Progressing  Flowsheets (Taken 5/27/2024 1540)  Daily care needs are met:   Assess and monitor ability to perform self care and identify potential discharge needs   Assess skin integrity/risk for skin breakdown   Assist patient with activities of daily living as needed   Encourage independent activity per ability   Provide mouth care   Include patient/family/caregiver in decisions related to daily care     Problem: Psychosocial Needs  Goal: Demonstrates ability to cope with hospitalization/illness  Outcome: Progressing  Flowsheets (Taken 5/27/2024 1540)  Demonstrates ability to cope with hospitalization/illness:   Encourage verbalization of feelings/concerns/expectations   Provide low-stimulation environment as needed   Assist resident to  identify and practice own strengths and abilities   Encourage resident to set and complete small goals for self   Encourage participation in diversional activities   Reinforce positive adaptation of new coping behaviors   Include resident/family/caregiver in decisions related to psychosocial needs  Goal: Collaborate with me, my family, and caregiver to identify my specific goals  Outcome: Progressing     Problem: Discharge Barriers  Goal: My discharge needs are met  Outcome: Progressing  Flowsheets (Taken 5/27/2024 7346)  Resident's discharge needs are met:   Identify potential discharge barriers on admission and throughout stay   Involve resident/family/caregiver in discharge planning process     Problem: Pain  Goal: Takes deep breaths with improved pain control throughout the shift  Outcome: Progressing  Goal: Turns in bed with improved pain control throughout the shift  Outcome: Progressing  Goal: Walks with improved pain control throughout the shift  Outcome: Progressing  Goal: Performs ADL's with improved pain control throughout shift  Outcome: Progressing  Goal: Participates in PT with improved pain control throughout the shift  Outcome: Progressing  Goal: Free from opioid side effects throughout the shift  Outcome: Progressing  Goal: Free from acute confusion related to pain meds throughout the shift  Outcome: Progressing     Problem: Fall/Injury  Goal: Not fall by end of shift  Outcome: Progressing  Goal: Be free from injury by end of the shift  Outcome: Progressing  Goal: Verbalize understanding of personal risk factors for fall in the hospital  Outcome: Progressing  Goal: Verbalize understanding of risk factor reduction measures to prevent injury from fall in the home  Outcome: Progressing  Goal: Use assistive devices by end of the shift  Outcome: Progressing  Goal: Pace activities to prevent fatigue by end of the shift  Outcome: Progressing     Problem: Skin  Goal: Participates in  plan/prevention/treatment measures  Outcome: Progressing  Flowsheets (Taken 5/27/2024 1540)  Participates in plan/prevention/treatment measures:   Discuss with provider PT/OT consult   Elevate heels   Increase activity/out of bed for meals  Goal: Prevent/manage excess moisture  Outcome: Progressing  Flowsheets (Taken 5/27/2024 1540)  Prevent/manage excess moisture:   Monitor for/manage infection if present   Moisturize dry skin  Goal: Prevent/minimize sheer/friction injuries  Outcome: Progressing  Flowsheets (Taken 5/27/2024 1540)  Prevent/minimize sheer/friction injuries:   Complete micro-shifts as needed if patient unable. Adjust patient position to relieve pressure points, not a full turn   HOB 30 degrees or less   Increase activity/out of bed for meals   Turn/reposition every 2 hours/use positioning/transfer devices   Use pull sheet     Problem: Pain - Adult  Goal: Verbalizes/displays adequate comfort level or baseline comfort level  Outcome: Progressing  Flowsheets (Taken 5/27/2024 1540)  Verbalizes/displays adequate comfort level or baseline comfort level:   Encourage patient to monitor pain and request assistance   Assess pain using appropriate pain scale   Administer analgesics based on type and severity of pain and evaluate response   Implement non-pharmacological measures as appropriate and evaluate response   Consider cultural and social influences on pain and pain management   Notify Licensed Independent Practitioner if interventions unsuccessful or patient reports new pain     Problem: Safety - Adult  Goal: Free from fall injury  Outcome: Progressing  Flowsheets (Taken 5/27/2024 1540)  Free from fall injury:   Instruct family/caregiver on patient safety   Based on caregiver fall risk screen, instruct family/caregiver to ask for assistance with transferring infant if caregiver noted to have fall risk factors     Problem: Discharge Planning  Goal: Discharge to home or other facility with appropriate  resources  Outcome: Progressing  Flowsheets (Taken 5/27/2024 1540)  Discharge to home or other facility with appropriate resources:   Identify barriers to discharge with patient and caregiver   Arrange for needed discharge resources and transportation as appropriate   Identify discharge learning needs (meds, wound care, etc)   Arrange for interpreters to assist at discharge as needed   Refer to discharge planning if patient needs post-hospital services based on physician order or complex needs related to functional status, cognitive ability or social support system     Problem: Chronic Conditions and Co-morbidities  Goal: Patient's chronic conditions and co-morbidity symptoms are monitored and maintained or improved  Outcome: Progressing  Flowsheets (Taken 5/27/2024 1540)  Care Plan - Patient's Chronic Conditions and Co-Morbidity Symptoms are Monitored and Maintained or Improved:   Monitor and assess patient's chronic conditions and comorbid symptoms for stability, deterioration, or improvement   Collaborate with multidisciplinary team to address chronic and comorbid conditions and prevent exacerbation or deterioration   Update acute care plan with appropriate goals if chronic or comorbid symptoms are exacerbated and prevent overall improvement and discharge

## 2024-05-28 ENCOUNTER — APPOINTMENT (OUTPATIENT)
Dept: RADIOLOGY | Facility: HOSPITAL | Age: 64
DRG: 981 | End: 2024-05-28
Payer: COMMERCIAL

## 2024-05-28 ENCOUNTER — ANESTHESIA EVENT (OUTPATIENT)
Dept: GASTROENTEROLOGY | Facility: HOSPITAL | Age: 64
End: 2024-05-28
Payer: COMMERCIAL

## 2024-05-28 ENCOUNTER — ANESTHESIA (OUTPATIENT)
Dept: GASTROENTEROLOGY | Facility: HOSPITAL | Age: 64
End: 2024-05-28
Payer: COMMERCIAL

## 2024-05-28 ENCOUNTER — APPOINTMENT (OUTPATIENT)
Dept: CARDIOLOGY | Facility: HOSPITAL | Age: 64
DRG: 981 | End: 2024-05-28
Payer: COMMERCIAL

## 2024-05-28 ENCOUNTER — APPOINTMENT (OUTPATIENT)
Dept: GASTROENTEROLOGY | Facility: HOSPITAL | Age: 64
DRG: 981 | End: 2024-05-28
Payer: COMMERCIAL

## 2024-05-28 LAB
ALBUMIN SERPL BCP-MCNC: 3.4 G/DL (ref 3.4–5)
ALP SERPL-CCNC: 314 U/L (ref 33–136)
ALT SERPL W P-5'-P-CCNC: 256 U/L (ref 10–52)
ANION GAP SERPL CALC-SCNC: 12 MMOL/L (ref 10–20)
AST SERPL W P-5'-P-CCNC: 173 U/L (ref 9–39)
BILIRUB SERPL-MCNC: 21 MG/DL (ref 0–1.2)
BUN SERPL-MCNC: 11 MG/DL (ref 6–23)
CALCIUM SERPL-MCNC: 8.8 MG/DL (ref 8.6–10.3)
CHLORIDE SERPL-SCNC: 100 MMOL/L (ref 98–107)
CO2 SERPL-SCNC: 24 MMOL/L (ref 21–32)
CREAT SERPL-MCNC: 0.69 MG/DL (ref 0.5–1.3)
EGFRCR SERPLBLD CKD-EPI 2021: >90 ML/MIN/1.73M*2
ERYTHROCYTE [DISTWIDTH] IN BLOOD BY AUTOMATED COUNT: 18.2 % (ref 11.5–14.5)
GLUCOSE SERPL-MCNC: 90 MG/DL (ref 74–99)
HCT VFR BLD AUTO: 35.4 % (ref 41–52)
HGB BLD-MCNC: 12.4 G/DL (ref 13.5–17.5)
MCH RBC QN AUTO: 33.9 PG (ref 26–34)
MCHC RBC AUTO-ENTMCNC: 35 G/DL (ref 32–36)
MCV RBC AUTO: 97 FL (ref 80–100)
NRBC BLD-RTO: 0 /100 WBCS (ref 0–0)
PLATELET # BLD AUTO: 212 X10*3/UL (ref 150–450)
POTASSIUM SERPL-SCNC: 4.1 MMOL/L (ref 3.5–5.3)
PROT SERPL-MCNC: 5.9 G/DL (ref 6.4–8.2)
RBC # BLD AUTO: 3.66 X10*6/UL (ref 4.5–5.9)
SODIUM SERPL-SCNC: 132 MMOL/L (ref 136–145)
WBC # BLD AUTO: 9.1 X10*3/UL (ref 4.4–11.3)

## 2024-05-28 PROCEDURE — 1200000002 HC GENERAL ROOM WITH TELEMETRY DAILY

## 2024-05-28 PROCEDURE — 7100000001 HC RECOVERY ROOM TIME - INITIAL BASE CHARGE

## 2024-05-28 PROCEDURE — 36415 COLL VENOUS BLD VENIPUNCTURE: CPT | Performed by: STUDENT IN AN ORGANIZED HEALTH CARE EDUCATION/TRAINING PROGRAM

## 2024-05-28 PROCEDURE — 80053 COMPREHEN METABOLIC PANEL: CPT | Performed by: STUDENT IN AN ORGANIZED HEALTH CARE EDUCATION/TRAINING PROGRAM

## 2024-05-28 PROCEDURE — 2720000007 HC OR 272 NO HCPCS

## 2024-05-28 PROCEDURE — 2780000003 HC OR 278 NO HCPCS

## 2024-05-28 PROCEDURE — 2500000004 HC RX 250 GENERAL PHARMACY W/ HCPCS (ALT 636 FOR OP/ED): Performed by: NURSE ANESTHETIST, CERTIFIED REGISTERED

## 2024-05-28 PROCEDURE — S4991 NICOTINE PATCH NONLEGEND: HCPCS | Performed by: INTERNAL MEDICINE

## 2024-05-28 PROCEDURE — 93005 ELECTROCARDIOGRAM TRACING: CPT

## 2024-05-28 PROCEDURE — 2500000004 HC RX 250 GENERAL PHARMACY W/ HCPCS (ALT 636 FOR OP/ED): Performed by: STUDENT IN AN ORGANIZED HEALTH CARE EDUCATION/TRAINING PROGRAM

## 2024-05-28 PROCEDURE — C9113 INJ PANTOPRAZOLE SODIUM, VIA: HCPCS | Performed by: INTERNAL MEDICINE

## 2024-05-28 PROCEDURE — 7100000002 HC RECOVERY ROOM TIME - EACH INCREMENTAL 1 MINUTE

## 2024-05-28 PROCEDURE — C1769 GUIDE WIRE: HCPCS

## 2024-05-28 PROCEDURE — 3700000001 HC GENERAL ANESTHESIA TIME - INITIAL BASE CHARGE

## 2024-05-28 PROCEDURE — 2500000001 HC RX 250 WO HCPCS SELF ADMINISTERED DRUGS (ALT 637 FOR MEDICARE OP): Performed by: NURSE PRACTITIONER

## 2024-05-28 PROCEDURE — 88342 IMHCHEM/IMCYTCHM 1ST ANTB: CPT | Mod: TC,STJLAB | Performed by: INTERNAL MEDICINE

## 2024-05-28 PROCEDURE — 85027 COMPLETE CBC AUTOMATED: CPT | Performed by: STUDENT IN AN ORGANIZED HEALTH CARE EDUCATION/TRAINING PROGRAM

## 2024-05-28 PROCEDURE — 88305 TISSUE EXAM BY PATHOLOGIST: CPT | Mod: TC | Performed by: INTERNAL MEDICINE

## 2024-05-28 PROCEDURE — 2500000005 HC RX 250 GENERAL PHARMACY W/O HCPCS: Performed by: NURSE ANESTHETIST, CERTIFIED REGISTERED

## 2024-05-28 PROCEDURE — 43274 ERCP DUCT STENT PLACEMENT: CPT | Performed by: INTERNAL MEDICINE

## 2024-05-28 PROCEDURE — 2500000004 HC RX 250 GENERAL PHARMACY W/ HCPCS (ALT 636 FOR OP/ED): Performed by: INTERNAL MEDICINE

## 2024-05-28 PROCEDURE — 2500000002 HC RX 250 W HCPCS SELF ADMINISTERED DRUGS (ALT 637 FOR MEDICARE OP, ALT 636 FOR OP/ED): Performed by: INTERNAL MEDICINE

## 2024-05-28 PROCEDURE — 43242 EGD US FINE NEEDLE BX/ASPIR: CPT | Performed by: INTERNAL MEDICINE

## 2024-05-28 PROCEDURE — 43262 ENDO CHOLANGIOPANCREATOGRAPH: CPT | Performed by: INTERNAL MEDICINE

## 2024-05-28 PROCEDURE — 2500000005 HC RX 250 GENERAL PHARMACY W/O HCPCS

## 2024-05-28 PROCEDURE — 2500000004 HC RX 250 GENERAL PHARMACY W/ HCPCS (ALT 636 FOR OP/ED)

## 2024-05-28 PROCEDURE — 2500000004 HC RX 250 GENERAL PHARMACY W/ HCPCS (ALT 636 FOR OP/ED): Mod: JZ | Performed by: INTERNAL MEDICINE

## 2024-05-28 PROCEDURE — 0F798DZ DILATION OF COMMON BILE DUCT WITH INTRALUMINAL DEVICE, VIA NATURAL OR ARTIFICIAL OPENING ENDOSCOPIC: ICD-10-PCS | Performed by: INTERNAL MEDICINE

## 2024-05-28 PROCEDURE — 3700000002 HC GENERAL ANESTHESIA TIME - EACH INCREMENTAL 1 MINUTE

## 2024-05-28 PROCEDURE — 0FD98ZX EXTRACTION OF COMMON BILE DUCT, VIA NATURAL OR ARTIFICIAL OPENING ENDOSCOPIC, DIAGNOSTIC: ICD-10-PCS | Performed by: INTERNAL MEDICINE

## 2024-05-28 PROCEDURE — 99232 SBSQ HOSP IP/OBS MODERATE 35: CPT | Performed by: STUDENT IN AN ORGANIZED HEALTH CARE EDUCATION/TRAINING PROGRAM

## 2024-05-28 RX ORDER — PROPOFOL 10 MG/ML
INJECTION, EMULSION INTRAVENOUS AS NEEDED
Status: DISCONTINUED | OUTPATIENT
Start: 2024-05-28 | End: 2024-05-28

## 2024-05-28 RX ORDER — ROCURONIUM BROMIDE 50 MG/5 ML
SYRINGE (ML) INTRAVENOUS AS NEEDED
Status: DISCONTINUED | OUTPATIENT
Start: 2024-05-28 | End: 2024-05-28

## 2024-05-28 RX ORDER — HYDROMORPHONE HYDROCHLORIDE 1 MG/ML
INJECTION, SOLUTION INTRAMUSCULAR; INTRAVENOUS; SUBCUTANEOUS AS NEEDED
Status: DISCONTINUED | OUTPATIENT
Start: 2024-05-28 | End: 2024-05-28

## 2024-05-28 RX ORDER — SODIUM CHLORIDE, SODIUM LACTATE, POTASSIUM CHLORIDE, CALCIUM CHLORIDE 600; 310; 30; 20 MG/100ML; MG/100ML; MG/100ML; MG/100ML
100 INJECTION, SOLUTION INTRAVENOUS CONTINUOUS
Status: DISCONTINUED | OUTPATIENT
Start: 2024-05-28 | End: 2024-05-28

## 2024-05-28 RX ORDER — SODIUM CHLORIDE, SODIUM LACTATE, POTASSIUM CHLORIDE, CALCIUM CHLORIDE 600; 310; 30; 20 MG/100ML; MG/100ML; MG/100ML; MG/100ML
INJECTION, SOLUTION INTRAVENOUS CONTINUOUS PRN
Status: DISCONTINUED | OUTPATIENT
Start: 2024-05-28 | End: 2024-05-28

## 2024-05-28 RX ORDER — PANTOPRAZOLE SODIUM 40 MG/1
40 TABLET, DELAYED RELEASE ORAL
Status: DISCONTINUED | OUTPATIENT
Start: 2024-05-29 | End: 2024-06-09 | Stop reason: HOSPADM

## 2024-05-28 RX ORDER — LIDOCAINE HYDROCHLORIDE 20 MG/ML
INJECTION, SOLUTION EPIDURAL; INFILTRATION; INTRACAUDAL; PERINEURAL AS NEEDED
Status: DISCONTINUED | OUTPATIENT
Start: 2024-05-28 | End: 2024-05-28

## 2024-05-28 RX ORDER — OXYCODONE HYDROCHLORIDE 5 MG/1
5 TABLET ORAL EVERY 4 HOURS PRN
Status: DISCONTINUED | OUTPATIENT
Start: 2024-05-28 | End: 2024-05-28

## 2024-05-28 RX ORDER — ALBUTEROL SULFATE 0.83 MG/ML
2.5 SOLUTION RESPIRATORY (INHALATION) ONCE AS NEEDED
Status: DISCONTINUED | OUTPATIENT
Start: 2024-05-28 | End: 2024-05-30

## 2024-05-28 RX ORDER — HYDRALAZINE HYDROCHLORIDE 20 MG/ML
5 INJECTION INTRAMUSCULAR; INTRAVENOUS EVERY 30 MIN PRN
Status: DISCONTINUED | OUTPATIENT
Start: 2024-05-28 | End: 2024-05-28

## 2024-05-28 RX ORDER — GLYCOPYRROLATE 0.2 MG/ML
INJECTION INTRAMUSCULAR; INTRAVENOUS AS NEEDED
Status: DISCONTINUED | OUTPATIENT
Start: 2024-05-28 | End: 2024-05-28

## 2024-05-28 RX ORDER — PHENYLEPHRINE HCL IN 0.9% NACL 1 MG/10 ML
SYRINGE (ML) INTRAVENOUS AS NEEDED
Status: DISCONTINUED | OUTPATIENT
Start: 2024-05-28 | End: 2024-05-28

## 2024-05-28 RX ORDER — LIDOCAINE HYDROCHLORIDE 10 MG/ML
0.1 INJECTION, SOLUTION EPIDURAL; INFILTRATION; INTRACAUDAL; PERINEURAL ONCE
Status: DISCONTINUED | OUTPATIENT
Start: 2024-05-28 | End: 2024-05-28

## 2024-05-28 RX ORDER — KETOROLAC TROMETHAMINE 30 MG/ML
INJECTION, SOLUTION INTRAMUSCULAR; INTRAVENOUS AS NEEDED
Status: DISCONTINUED | OUTPATIENT
Start: 2024-05-28 | End: 2024-05-28

## 2024-05-28 RX ORDER — LABETALOL HYDROCHLORIDE 5 MG/ML
5 INJECTION, SOLUTION INTRAVENOUS ONCE AS NEEDED
Status: DISCONTINUED | OUTPATIENT
Start: 2024-05-28 | End: 2024-05-30

## 2024-05-28 RX ORDER — FENTANYL CITRATE 50 UG/ML
50 INJECTION, SOLUTION INTRAMUSCULAR; INTRAVENOUS EVERY 5 MIN PRN
Status: DISCONTINUED | OUTPATIENT
Start: 2024-05-28 | End: 2024-05-28

## 2024-05-28 RX ORDER — CIPROFLOXACIN 2 MG/ML
INJECTION, SOLUTION INTRAVENOUS CONTINUOUS PRN
Status: COMPLETED | OUTPATIENT
Start: 2024-05-28 | End: 2024-05-28

## 2024-05-28 RX ORDER — ONDANSETRON HYDROCHLORIDE 2 MG/ML
4 INJECTION, SOLUTION INTRAVENOUS ONCE AS NEEDED
Status: DISCONTINUED | OUTPATIENT
Start: 2024-05-28 | End: 2024-05-30

## 2024-05-28 RX ORDER — FENTANYL CITRATE 50 UG/ML
INJECTION, SOLUTION INTRAMUSCULAR; INTRAVENOUS AS NEEDED
Status: DISCONTINUED | OUTPATIENT
Start: 2024-05-28 | End: 2024-05-28

## 2024-05-28 RX ADMIN — Medication 50 MG: at 14:53

## 2024-05-28 RX ADMIN — ONDANSETRON 4 MG: 2 INJECTION INTRAMUSCULAR; INTRAVENOUS at 16:53

## 2024-05-28 RX ADMIN — LIDOCAINE HYDROCHLORIDE 100 MG: 20 INJECTION, SOLUTION EPIDURAL; INFILTRATION; INTRACAUDAL; PERINEURAL at 14:52

## 2024-05-28 RX ADMIN — GLUCAGON 0.5 MG: KIT at 16:01

## 2024-05-28 RX ADMIN — FLUOXETINE HYDROCHLORIDE 20 MG: 20 CAPSULE ORAL at 09:20

## 2024-05-28 RX ADMIN — HEPARIN SODIUM 5000 UNITS: 5000 INJECTION INTRAVENOUS; SUBCUTANEOUS at 21:46

## 2024-05-28 RX ADMIN — HYDROMORPHONE HYDROCHLORIDE 0.5 MG: 1 INJECTION, SOLUTION INTRAMUSCULAR; INTRAVENOUS; SUBCUTANEOUS at 16:52

## 2024-05-28 RX ADMIN — FENTANYL CITRATE 25 MCG: 50 INJECTION, SOLUTION INTRAMUSCULAR; INTRAVENOUS at 15:01

## 2024-05-28 RX ADMIN — ASPIRIN 81 MG 81 MG: 81 TABLET ORAL at 09:20

## 2024-05-28 RX ADMIN — CIPROFLOXACIN 400 MG: 400 INJECTION, SOLUTION INTRAVENOUS at 16:31

## 2024-05-28 RX ADMIN — PROPOFOL 180 MG: 10 INJECTION, EMULSION INTRAVENOUS at 14:52

## 2024-05-28 RX ADMIN — SODIUM CHLORIDE, POTASSIUM CHLORIDE, SODIUM LACTATE AND CALCIUM CHLORIDE: 600; 310; 30; 20 INJECTION, SOLUTION INTRAVENOUS at 14:47

## 2024-05-28 RX ADMIN — HYDROMORPHONE HYDROCHLORIDE 0.5 MG: 1 INJECTION, SOLUTION INTRAMUSCULAR; INTRAVENOUS; SUBCUTANEOUS at 17:34

## 2024-05-28 RX ADMIN — KETOROLAC TROMETHAMINE 30 MG: 30 INJECTION, SOLUTION INTRAMUSCULAR; INTRAVENOUS at 16:52

## 2024-05-28 RX ADMIN — GLYCOPYRROLATE 0.2 MG: 0.2 INJECTION, SOLUTION INTRAMUSCULAR; INTRAVENOUS at 15:15

## 2024-05-28 RX ADMIN — FENTANYL CITRATE 50 MCG: 50 INJECTION, SOLUTION INTRAMUSCULAR; INTRAVENOUS at 14:52

## 2024-05-28 RX ADMIN — MORPHINE SULFATE 4 MG: 4 INJECTION, SOLUTION INTRAMUSCULAR; INTRAVENOUS at 09:20

## 2024-05-28 RX ADMIN — METOPROLOL TARTRATE 25 MG: 25 TABLET, FILM COATED ORAL at 20:35

## 2024-05-28 RX ADMIN — GLUCAGON 0.5 MG: KIT at 15:59

## 2024-05-28 RX ADMIN — NICOTINE 1 PATCH: 21 PATCH, EXTENDED RELEASE TRANSDERMAL at 09:20

## 2024-05-28 RX ADMIN — PANTOPRAZOLE SODIUM 40 MG: 40 INJECTION, POWDER, FOR SOLUTION INTRAVENOUS at 05:15

## 2024-05-28 RX ADMIN — AMLODIPINE BESYLATE 10 MG: 10 TABLET ORAL at 09:20

## 2024-05-28 RX ADMIN — ONDANSETRON 4 MG: 2 INJECTION INTRAMUSCULAR; INTRAVENOUS at 16:48

## 2024-05-28 RX ADMIN — Medication 100 MCG: at 14:59

## 2024-05-28 RX ADMIN — PROPOFOL 20 MG: 10 INJECTION, EMULSION INTRAVENOUS at 15:01

## 2024-05-28 RX ADMIN — SUGAMMADEX 200 MG: 100 INJECTION, SOLUTION INTRAVENOUS at 16:44

## 2024-05-28 ASSESSMENT — PAIN SCALES - GENERAL
PAINLEVEL_OUTOF10: 0 - NO PAIN
PAINLEVEL_OUTOF10: 8
PAINLEVEL_OUTOF10: 3
PAINLEVEL_OUTOF10: 8
PAINLEVEL_OUTOF10: 8
PAINLEVEL_OUTOF10: 9
PAINLEVEL_OUTOF10: 7
PAINLEVEL_OUTOF10: 6
PAINLEVEL_OUTOF10: 9
PAINLEVEL_OUTOF10: 0 - NO PAIN
PAINLEVEL_OUTOF10: 8

## 2024-05-28 ASSESSMENT — PAIN - FUNCTIONAL ASSESSMENT
PAIN_FUNCTIONAL_ASSESSMENT: 0-10

## 2024-05-28 ASSESSMENT — ACTIVITIES OF DAILY LIVING (ADL): LACK_OF_TRANSPORTATION: NO

## 2024-05-28 ASSESSMENT — PAIN SCALES - WONG BAKER: WONGBAKER_NUMERICALRESPONSE: HURTS LITTLE BIT

## 2024-05-28 ASSESSMENT — PAIN DESCRIPTION - ORIENTATION: ORIENTATION: LOWER

## 2024-05-28 ASSESSMENT — PAIN DESCRIPTION - LOCATION: LOCATION: ABDOMEN

## 2024-05-28 NOTE — ANESTHESIA POSTPROCEDURE EVALUATION
Patient: Emerson Chilel    Procedure Summary       Date: 05/28/24 [place] [facility] [place]    Anesthesia Start: 1447 Anesthesia Stop: 1714    Procedures:       ENDOSCOPIC ULTRASOUND (UPPER)      ERCP Diagnosis:       Jaundice      Pancreatic mass (HHS-HCC)      Generalized abdominal pain    Scheduled Providers: Shanda Gong MD Responsible Provider: Macy Tovar MD    Anesthesia Type: general ASA Status: 3            Anesthesia Type: general    Vitals Value Taken Time   /94 05/28/24 1737   Temp 36 °C (96.8 °F) 05/28/24 1710   Pulse 90 05/28/24 1737   Resp 13 05/28/24 1737   SpO2 100 % 05/28/24 1737   Vitals shown include unfiled device data.    Anesthesia Post Evaluation    Patient location during evaluation: PACU  Patient participation: complete - patient participated  Level of consciousness: awake and alert  Pain management: adequate  Airway patency: patent  Cardiovascular status: acceptable  Respiratory status: acceptable  Hydration status: acceptable  Postoperative Nausea and Vomiting: none        No notable events documented.

## 2024-05-28 NOTE — DISCHARGE INSTRUCTIONS
#it was my pleasure taking care of you during this hospitalization    You were admitted to the hospital initially due to jaundice with a CT scan showing a mass in the pancreas.  Over the stay, you were seen by GI and procedure was done (ERCP) and a stent was placed to help open up the biliary tree.  This was complicated by postprocedure pancreatitis, which you are treated with IV fluid and pain control.  Your hospital course was further complicated due to new onset of chest pain, which required left heart catheterization which showed a blockage in one of the major arteries (LAD), stent was placed.  Your pain overall was still uncontrolled requiring pain medication pump.  Ultimately after thorough discussion, in the setting of your family history of pancreatic cancer, palliative care and hospice team were consulted, and you have decided to sign up with hospice and go home.  Please continue to follow with hospice team after discharge.

## 2024-05-28 NOTE — CARE PLAN
The patient's goals for the shift include      The clinical goals for the shift include control pain      Problem: Pain  Goal: My pain/discomfort is manageable  Outcome: Progressing     Problem: Safety  Goal: Patient will be injury free during hospitalization  Outcome: Progressing  Goal: I will remain free of falls  Outcome: Progressing     Problem: Daily Care  Goal: Daily care needs are met  Outcome: Progressing     Problem: Psychosocial Needs  Goal: Demonstrates ability to cope with hospitalization/illness  Outcome: Progressing  Goal: Collaborate with me, my family, and caregiver to identify my specific goals  Outcome: Progressing     Problem: Pain  Goal: Takes deep breaths with improved pain control throughout the shift  Outcome: Progressing  Goal: Turns in bed with improved pain control throughout the shift  Outcome: Progressing  Goal: Walks with improved pain control throughout the shift  Outcome: Progressing  Goal: Performs ADL's with improved pain control throughout shift  Outcome: Progressing  Goal: Free from opioid side effects throughout the shift  Outcome: Progressing  Goal: Free from acute confusion related to pain meds throughout the shift  Outcome: Progressing     Problem: Fall/Injury  Goal: Not fall by end of shift  Outcome: Progressing  Goal: Be free from injury by end of the shift  Outcome: Progressing  Goal: Verbalize understanding of personal risk factors for fall in the hospital  Outcome: Progressing  Goal: Verbalize understanding of risk factor reduction measures to prevent injury from fall in the home  Outcome: Progressing  Goal: Use assistive devices by end of the shift  Outcome: Progressing  Goal: Pace activities to prevent fatigue by end of the shift  Outcome: Progressing     Problem: Skin  Goal: Participates in plan/prevention/treatment measures  Outcome: Progressing  Goal: Prevent/manage excess moisture  Outcome: Progressing  Goal: Prevent/minimize sheer/friction injuries  Outcome:  Progressing     Problem: Pain - Adult  Goal: Verbalizes/displays adequate comfort level or baseline comfort level  Outcome: Progressing     Problem: Safety - Adult  Goal: Free from fall injury  Outcome: Progressing     Problem: Pain  Goal: My pain/discomfort is manageable  Outcome: Progressing     Problem: Safety  Goal: Patient will be injury free during hospitalization  Outcome: Progressing  Goal: I will remain free of falls  Outcome: Progressing     Problem: Daily Care  Goal: Daily care needs are met  Outcome: Progressing     Problem: Psychosocial Needs  Goal: Demonstrates ability to cope with hospitalization/illness  Outcome: Progressing  Goal: Collaborate with me, my family, and caregiver to identify my specific goals  Outcome: Progressing     Problem: Pain  Goal: Takes deep breaths with improved pain control throughout the shift  Outcome: Progressing  Goal: Turns in bed with improved pain control throughout the shift  Outcome: Progressing  Goal: Walks with improved pain control throughout the shift  Outcome: Progressing  Goal: Performs ADL's with improved pain control throughout shift  Outcome: Progressing  Goal: Free from opioid side effects throughout the shift  Outcome: Progressing  Goal: Free from acute confusion related to pain meds throughout the shift  Outcome: Progressing     Problem: Fall/Injury  Goal: Not fall by end of shift  Outcome: Progressing  Goal: Be free from injury by end of the shift  Outcome: Progressing  Goal: Verbalize understanding of personal risk factors for fall in the hospital  Outcome: Progressing  Goal: Verbalize understanding of risk factor reduction measures to prevent injury from fall in the home  Outcome: Progressing  Goal: Use assistive devices by end of the shift  Outcome: Progressing  Goal: Pace activities to prevent fatigue by end of the shift  Outcome: Progressing     Problem: Skin  Goal: Participates in plan/prevention/treatment measures  Outcome: Progressing  Goal:  Prevent/manage excess moisture  Outcome: Progressing  Goal: Prevent/minimize sheer/friction injuries  Outcome: Progressing     Problem: Pain - Adult  Goal: Verbalizes/displays adequate comfort level or baseline comfort level  Outcome: Progressing     Problem: Safety - Adult  Goal: Free from fall injury  Outcome: Progressing

## 2024-05-28 NOTE — PROGRESS NOTES
"Emerson Chilel is a 64 y.o. male on day 3 of admission presenting with Pancreatic mass (HHS-HCC).    Subjective   Patient seen and examined with wife at the bedside,No acute events overnight.  All questions answered.  Plan for ERCP today.       Objective     Constitutional: Well developed, awake/alert/oriented x3, no distress, alert and cooperative  Eyes: PERRL  ENMT: mucous membranes moist  Head/Neck: Neck supple  Respiratory/Thorax: CTA b/l.   Cardiovascular: Regular, rate and rhythm, no murmurs  Gastrointestinal: Nondistended, soft, non-tender  Musculoskeletal: ROM intact  Extremities: normal extremities  Skin Jaundiced    Last Recorded Vitals  Blood pressure 122/70, pulse 60, temperature 36.2 °C (97.2 °F), temperature source Temporal, resp. rate 16, height 1.702 m (5' 7\"), weight 65.8 kg (145 lb), SpO2 90%.  Intake/Output last 3 Shifts:  I/O last 3 completed shifts:  In: 100 (1.5 mL/kg) [P.O.:100]  Out: - (0 mL/kg)   Weight: 65.8 kg     Relevant Results  Scheduled medications  amLODIPine, 10 mg, oral, Daily  aspirin, 81 mg, oral, Daily  [Held by provider] clopidogrel, 75 mg, oral, Daily  FLUoxetine, 20 mg, oral, Daily  [Held by provider] heparin (porcine), 5,000 Units, subcutaneous, q8h AMBROSIO  [Held by provider] hydroCHLOROthiazide, 12.5 mg, oral, q AM  metoprolol tartrate, 25 mg, oral, Nightly  nicotine, 1 patch, transdermal, Daily  pantoprazole, 40 mg, intravenous, Daily before breakfast      Continuous medications     PRN medications  PRN medications: acetaminophen **OR** acetaminophen **OR** acetaminophen, acetaminophen **OR** acetaminophen **OR** acetaminophen, albuterol, ALPRAZolam, [Held by provider] HYDROcodone-acetaminophen, melatonin, metoclopramide **OR** metoclopramide, morphine, morphine, ondansetron ODT **OR** ondansetron, polyethylene glycol  Results from last 7 days   Lab Units 05/28/24  0525 05/27/24  0550 05/26/24  0600   WBC AUTO x10*3/uL 9.1 8.3 8.7   RBC AUTO x10*6/uL 3.66* 3.65* 3.72* "   HEMOGLOBIN g/dL 12.4* 12.7* 12.8*     Results from last 7 days   Lab Units 05/28/24  0525 05/27/24  0549 05/26/24  0600 05/25/24  0618 05/24/24  1636   SODIUM mmol/L 132* 131* 131*   < > 129*   POTASSIUM mmol/L 4.1 3.7 3.2*   < > 3.3*   CHLORIDE mmol/L 100 98 95*   < > 92*   CO2 mmol/L 24 25 26   < > 24   BUN mg/dL 11 10 11   < > 9   CREATININE mg/dL 0.69 0.73 0.67   < > 0.90   CALCIUM mg/dL 8.8 8.8 8.8   < > 9.5   PHOSPHORUS mg/dL  --  2.9 2.3*  --   --    MAGNESIUM mg/dL  --  2.01 2.03  --  1.89   BILIRUBIN TOTAL mg/dL 21.0* 20.5* 19.2*   < > 19.5*   ALT U/L 256* 266* 273*   < > 300*   AST U/L 173* 187* 211*   < > 228*    < > = values in this interval not displayed.         Assessment/Plan   Principal Problem:    Pancreatic mass (HHS-HCC)  Active Problems:    Jaundice    Hypokalemia    Generalized abdominal pain      Plan  GI on consult, currently plan for EUS/ERCP today  - Bilirubin remains elevated, today is 21.0.  - Continue to trend CMP  - CA 19-9 is 6453, this result was discussed with the patient, aware the mass could be cancerous  - Potassium 4.1, magnesium 2.01, replace as needed  - Continue morphine as needed for pain  - Continue aspirin, hold Plavix for planned ERCP  - N.p.o. for ERCP   - Continue diet as tolerated  - Heparin subcu for DVT prophylaxis       I spent 25 minutes in the professional and overall care of this patient.      Chintan Chowdhury MD

## 2024-05-28 NOTE — PROGRESS NOTES
05/28/24 1216   Discharge Planning   Living Arrangements Spouse/significant other   Support Systems Spouse/significant other   Assistance Needed none   Type of Residence Private residence   Home or Post Acute Services None   Patient expects to be discharged to: home   Housing Stability   In the last 12 months, was there a time when you were not able to pay the mortgage or rent on time? N   In the last 12 months, was there a time when you did not have a steady place to sleep or slept in a shelter (including now)? N   Transportation Needs   In the past 12 months, has lack of transportation kept you from medical appointments or from getting medications? no   In the past 12 months, has lack of transportation kept you from meetings, work, or from getting things needed for daily living? No     Spoke to patient at bedside to explain my role in discharge planning. Patient states he lives at home with his wife and is independent with his care. Patient states he feels he effectively manages his health at home and understands home medication. Patient states he plans to return home when medically ready.

## 2024-05-28 NOTE — ANESTHESIA PREPROCEDURE EVALUATION
Patient: Emerson Chilel    Procedure Information       Date/Time: 05/28/24 1350    Scheduled providers: Shanda Gong MD    Procedures:       ENDOSCOPIC ULTRASOUND (UPPER)      ERCP    Location: [facility] - [place]            Relevant Problems   Anesthesia (within normal limits)      Cardiac   (+) Aortic valve regurgitation   (+) Coronary artery disease involving native coronary artery of native heart without angina pectoris   (+) Essential hypertension   (+) Mixed hyperlipidemia      Pulmonary   (+) Asthma (HHS-HCC)   (+) Simple chronic bronchitis (Multi)      Neuro   (+) Anxiety   (+) Depression   (+) Left lumbar radiculopathy   (+) Lumbar disc disease with radiculopathy   (+) Sciatica of right side      Musculoskeletal   (+) Lumbar disc disease with radiculopathy   (+) Osseous stenosis of neural canal of cervical region   (+) Osteoarthritis of right knee      Circulatory   (+) LVH (left ventricular hypertrophy)       Clinical information reviewed:    Allergies  Meds               NPO Detail:  No data recorded     Physical Exam    Airway  Mallampati: II  TM distance: >3 FB  Neck ROM: full     Cardiovascular   Rhythm: regular  Rate: normal     Dental    Pulmonary   Breath sounds clear to auscultation     Abdominal   Abdomen: soft             Anesthesia Plan    History of general anesthesia?: yes  History of complications of general anesthesia?: no    ASA 3     general     intravenous induction   Postoperative administration of opioids is intended.  Anesthetic plan and risks discussed with patient.  Use of blood products discussed with patient who consented to blood products.    Plan discussed with CAA, CRNA and attending.

## 2024-05-28 NOTE — NURSING NOTE
Pt. Kept npo amn for EUS/ERCP. Pt. Was medicated w/ prn morphine at 2214. W/ some relief. Pt. Verbalized slept fairly.

## 2024-05-28 NOTE — ANESTHESIA PROCEDURE NOTES
Airway  Date/Time: 5/28/2024 2:55 PM  Urgency: elective    Airway not difficult    Staffing  Performed: CRNA   Authorized by: Macy Tovar MD    Performed by: SOLOMON Tony-IDA  Patient location during procedure: OR    Indications and Patient Condition  Indications for airway management: anesthesia  Spontaneous Ventilation: absent  Sedation level: deep  Preoxygenated: yes  Patient position: sniffing  Mask difficulty assessment: 2 - vent by mask + OA or adjuvant +/- NMBA  Planned trial extubation    Final Airway Details  Final airway type: endotracheal airway      Successful airway: ETT  Cuffed: yes   Successful intubation technique: direct laryngoscopy  Facilitating devices/methods: intubating stylet  Endotracheal tube insertion site: oral  Blade: Shamika  Blade size: #4  ETT size (mm): 7.0  Cormack-Lehane Classification: grade I - full view of glottis  Placement verified by: chest auscultation and capnometry   Measured from: lips  ETT to lips (cm): 23  Number of attempts at approach: 1  Number of other approaches attempted: 0    Additional Comments  Lips and teeth in preanesthetic condition after intubation.

## 2024-05-29 ENCOUNTER — APPOINTMENT (OUTPATIENT)
Dept: PRIMARY CARE | Facility: CLINIC | Age: 64
End: 2024-05-29
Payer: COMMERCIAL

## 2024-05-29 PROBLEM — K85.90 POST-ERCP ACUTE PANCREATITIS (HHS-HCC): Status: ACTIVE | Noted: 2024-05-29

## 2024-05-29 PROBLEM — K91.89 POST-ERCP ACUTE PANCREATITIS (HHS-HCC): Status: ACTIVE | Noted: 2024-05-29

## 2024-05-29 LAB
ALBUMIN SERPL BCP-MCNC: 3.2 G/DL (ref 3.4–5)
ALP SERPL-CCNC: 261 U/L (ref 33–136)
ALT SERPL W P-5'-P-CCNC: 229 U/L (ref 10–52)
ANION GAP SERPL CALC-SCNC: 10 MMOL/L (ref 10–20)
AST SERPL W P-5'-P-CCNC: 150 U/L (ref 9–39)
BILIRUB SERPL-MCNC: 19 MG/DL (ref 0–1.2)
BUN SERPL-MCNC: 9 MG/DL (ref 6–23)
CALCIUM SERPL-MCNC: 8.6 MG/DL (ref 8.6–10.3)
CARDIAC TROPONIN I PNL SERPL HS: 351 NG/L (ref 0–20)
CARDIAC TROPONIN I PNL SERPL HS: 656 NG/L (ref 0–20)
CHLORIDE SERPL-SCNC: 99 MMOL/L (ref 98–107)
CO2 SERPL-SCNC: 24 MMOL/L (ref 21–32)
CREAT SERPL-MCNC: 0.6 MG/DL (ref 0.5–1.3)
EGFRCR SERPLBLD CKD-EPI 2021: >90 ML/MIN/1.73M*2
ERYTHROCYTE [DISTWIDTH] IN BLOOD BY AUTOMATED COUNT: 17.9 % (ref 11.5–14.5)
GLUCOSE SERPL-MCNC: 121 MG/DL (ref 74–99)
HCT VFR BLD AUTO: 32.6 % (ref 41–52)
HGB BLD-MCNC: 11.5 G/DL (ref 13.5–17.5)
LIPASE SERPL-CCNC: 795 U/L (ref 9–82)
MAGNESIUM SERPL-MCNC: 1.75 MG/DL (ref 1.6–2.4)
MCH RBC QN AUTO: 34.4 PG (ref 26–34)
MCHC RBC AUTO-ENTMCNC: 35.3 G/DL (ref 32–36)
MCV RBC AUTO: 98 FL (ref 80–100)
NRBC BLD-RTO: 0 /100 WBCS (ref 0–0)
PLATELET # BLD AUTO: 231 X10*3/UL (ref 150–450)
POTASSIUM SERPL-SCNC: 3.6 MMOL/L (ref 3.5–5.3)
PROT SERPL-MCNC: 5.3 G/DL (ref 6.4–8.2)
RBC # BLD AUTO: 3.34 X10*6/UL (ref 4.5–5.9)
SODIUM SERPL-SCNC: 129 MMOL/L (ref 136–145)
WBC # BLD AUTO: 12.3 X10*3/UL (ref 4.4–11.3)

## 2024-05-29 PROCEDURE — 83735 ASSAY OF MAGNESIUM: CPT | Performed by: STUDENT IN AN ORGANIZED HEALTH CARE EDUCATION/TRAINING PROGRAM

## 2024-05-29 PROCEDURE — 93010 ELECTROCARDIOGRAM REPORT: CPT | Performed by: INTERNAL MEDICINE

## 2024-05-29 PROCEDURE — 2500000004 HC RX 250 GENERAL PHARMACY W/ HCPCS (ALT 636 FOR OP/ED): Performed by: STUDENT IN AN ORGANIZED HEALTH CARE EDUCATION/TRAINING PROGRAM

## 2024-05-29 PROCEDURE — 84484 ASSAY OF TROPONIN QUANT: CPT | Performed by: INTERNAL MEDICINE

## 2024-05-29 PROCEDURE — 84484 ASSAY OF TROPONIN QUANT: CPT | Performed by: STUDENT IN AN ORGANIZED HEALTH CARE EDUCATION/TRAINING PROGRAM

## 2024-05-29 PROCEDURE — 36415 COLL VENOUS BLD VENIPUNCTURE: CPT | Performed by: STUDENT IN AN ORGANIZED HEALTH CARE EDUCATION/TRAINING PROGRAM

## 2024-05-29 PROCEDURE — 2500000001 HC RX 250 WO HCPCS SELF ADMINISTERED DRUGS (ALT 637 FOR MEDICARE OP)

## 2024-05-29 PROCEDURE — 85027 COMPLETE CBC AUTOMATED: CPT | Performed by: STUDENT IN AN ORGANIZED HEALTH CARE EDUCATION/TRAINING PROGRAM

## 2024-05-29 PROCEDURE — 99232 SBSQ HOSP IP/OBS MODERATE 35: CPT | Performed by: STUDENT IN AN ORGANIZED HEALTH CARE EDUCATION/TRAINING PROGRAM

## 2024-05-29 PROCEDURE — 2500000001 HC RX 250 WO HCPCS SELF ADMINISTERED DRUGS (ALT 637 FOR MEDICARE OP): Performed by: INTERNAL MEDICINE

## 2024-05-29 PROCEDURE — 2060000001 HC INTERMEDIATE ICU ROOM DAILY

## 2024-05-29 PROCEDURE — S4991 NICOTINE PATCH NONLEGEND: HCPCS | Performed by: INTERNAL MEDICINE

## 2024-05-29 PROCEDURE — 83690 ASSAY OF LIPASE: CPT | Performed by: NURSE PRACTITIONER

## 2024-05-29 PROCEDURE — 2500000002 HC RX 250 W HCPCS SELF ADMINISTERED DRUGS (ALT 637 FOR MEDICARE OP, ALT 636 FOR OP/ED): Performed by: INTERNAL MEDICINE

## 2024-05-29 PROCEDURE — 2500000004 HC RX 250 GENERAL PHARMACY W/ HCPCS (ALT 636 FOR OP/ED): Performed by: INTERNAL MEDICINE

## 2024-05-29 PROCEDURE — 80053 COMPREHEN METABOLIC PANEL: CPT | Performed by: STUDENT IN AN ORGANIZED HEALTH CARE EDUCATION/TRAINING PROGRAM

## 2024-05-29 PROCEDURE — 2500000001 HC RX 250 WO HCPCS SELF ADMINISTERED DRUGS (ALT 637 FOR MEDICARE OP): Performed by: NURSE PRACTITIONER

## 2024-05-29 PROCEDURE — 36415 COLL VENOUS BLD VENIPUNCTURE: CPT | Performed by: NURSE PRACTITIONER

## 2024-05-29 PROCEDURE — 2500000004 HC RX 250 GENERAL PHARMACY W/ HCPCS (ALT 636 FOR OP/ED): Performed by: NURSE PRACTITIONER

## 2024-05-29 RX ORDER — SODIUM CHLORIDE, SODIUM LACTATE, POTASSIUM CHLORIDE, CALCIUM CHLORIDE 600; 310; 30; 20 MG/100ML; MG/100ML; MG/100ML; MG/100ML
100 INJECTION, SOLUTION INTRAVENOUS CONTINUOUS
Status: DISCONTINUED | OUTPATIENT
Start: 2024-05-29 | End: 2024-06-02

## 2024-05-29 RX ORDER — NITROGLYCERIN 0.4 MG/1
TABLET SUBLINGUAL
Status: COMPLETED
Start: 2024-05-29 | End: 2024-05-29

## 2024-05-29 RX ORDER — NITROGLYCERIN 0.4 MG/1
0.4 TABLET SUBLINGUAL EVERY 5 MIN PRN
Status: DISCONTINUED | OUTPATIENT
Start: 2024-05-29 | End: 2024-06-09 | Stop reason: HOSPADM

## 2024-05-29 RX ORDER — CLOPIDOGREL BISULFATE 75 MG/1
75 TABLET ORAL DAILY
Status: DISCONTINUED | OUTPATIENT
Start: 2024-05-29 | End: 2024-06-09 | Stop reason: HOSPADM

## 2024-05-29 RX ADMIN — HYDROMORPHONE HYDROCHLORIDE 0.5 MG: 1 INJECTION, SOLUTION INTRAMUSCULAR; INTRAVENOUS; SUBCUTANEOUS at 10:25

## 2024-05-29 RX ADMIN — CLOPIDOGREL BISULFATE 75 MG: 75 TABLET ORAL at 21:41

## 2024-05-29 RX ADMIN — MORPHINE SULFATE 4 MG: 4 INJECTION, SOLUTION INTRAMUSCULAR; INTRAVENOUS at 08:29

## 2024-05-29 RX ADMIN — HYDROMORPHONE HYDROCHLORIDE 0.5 MG: 1 INJECTION, SOLUTION INTRAMUSCULAR; INTRAVENOUS; SUBCUTANEOUS at 22:59

## 2024-05-29 RX ADMIN — MORPHINE SULFATE 4 MG: 4 INJECTION, SOLUTION INTRAMUSCULAR; INTRAVENOUS at 12:25

## 2024-05-29 RX ADMIN — HEPARIN SODIUM 5000 UNITS: 5000 INJECTION INTRAVENOUS; SUBCUTANEOUS at 21:39

## 2024-05-29 RX ADMIN — ASPIRIN 81 MG 81 MG: 81 TABLET ORAL at 08:30

## 2024-05-29 RX ADMIN — PANTOPRAZOLE SODIUM 40 MG: 40 TABLET, DELAYED RELEASE ORAL at 06:20

## 2024-05-29 RX ADMIN — NITROGLYCERIN 0.4 MG: 0.4 TABLET SUBLINGUAL at 20:11

## 2024-05-29 RX ADMIN — MORPHINE SULFATE 4 MG: 4 INJECTION, SOLUTION INTRAMUSCULAR; INTRAVENOUS at 03:25

## 2024-05-29 RX ADMIN — HYDROMORPHONE HYDROCHLORIDE 0.5 MG: 1 INJECTION, SOLUTION INTRAMUSCULAR; INTRAVENOUS; SUBCUTANEOUS at 18:44

## 2024-05-29 RX ADMIN — FLUOXETINE HYDROCHLORIDE 20 MG: 20 CAPSULE ORAL at 08:30

## 2024-05-29 RX ADMIN — HEPARIN SODIUM 5000 UNITS: 5000 INJECTION INTRAVENOUS; SUBCUTANEOUS at 06:20

## 2024-05-29 RX ADMIN — HYDROMORPHONE HYDROCHLORIDE 0.5 MG: 1 INJECTION, SOLUTION INTRAMUSCULAR; INTRAVENOUS; SUBCUTANEOUS at 14:25

## 2024-05-29 RX ADMIN — METOPROLOL TARTRATE 25 MG: 25 TABLET, FILM COATED ORAL at 20:18

## 2024-05-29 RX ADMIN — HEPARIN SODIUM 5000 UNITS: 5000 INJECTION INTRAVENOUS; SUBCUTANEOUS at 14:24

## 2024-05-29 RX ADMIN — MORPHINE SULFATE 4 MG: 4 INJECTION, SOLUTION INTRAMUSCULAR; INTRAVENOUS at 16:37

## 2024-05-29 RX ADMIN — MORPHINE SULFATE 4 MG: 4 INJECTION, SOLUTION INTRAMUSCULAR; INTRAVENOUS at 21:50

## 2024-05-29 RX ADMIN — SODIUM CHLORIDE, POTASSIUM CHLORIDE, SODIUM LACTATE AND CALCIUM CHLORIDE 1000 ML: 600; 310; 30; 20 INJECTION, SOLUTION INTRAVENOUS at 11:08

## 2024-05-29 RX ADMIN — NICOTINE 1 PATCH: 21 PATCH, EXTENDED RELEASE TRANSDERMAL at 08:30

## 2024-05-29 RX ADMIN — AMLODIPINE BESYLATE 10 MG: 10 TABLET ORAL at 08:30

## 2024-05-29 RX ADMIN — SODIUM CHLORIDE, POTASSIUM CHLORIDE, SODIUM LACTATE AND CALCIUM CHLORIDE 250 ML/HR: 600; 310; 30; 20 INJECTION, SOLUTION INTRAVENOUS at 21:40

## 2024-05-29 RX ADMIN — SODIUM CHLORIDE, POTASSIUM CHLORIDE, SODIUM LACTATE AND CALCIUM CHLORIDE 250 ML/HR: 600; 310; 30; 20 INJECTION, SOLUTION INTRAVENOUS at 12:25

## 2024-05-29 ASSESSMENT — PAIN - FUNCTIONAL ASSESSMENT
PAIN_FUNCTIONAL_ASSESSMENT: 0-10

## 2024-05-29 ASSESSMENT — COGNITIVE AND FUNCTIONAL STATUS - GENERAL
MOBILITY SCORE: 24
DAILY ACTIVITIY SCORE: 24

## 2024-05-29 ASSESSMENT — PAIN DESCRIPTION - LOCATION
LOCATION: ABDOMEN

## 2024-05-29 ASSESSMENT — PAIN SCALES - GENERAL
PAINLEVEL_OUTOF10: 0 - NO PAIN
PAINLEVEL_OUTOF10: 7
PAINLEVEL_OUTOF10: 3
PAINLEVEL_OUTOF10: 7
PAINLEVEL_OUTOF10: 9
PAINLEVEL_OUTOF10: 10 - WORST POSSIBLE PAIN
PAINLEVEL_OUTOF10: 8
PAINLEVEL_OUTOF10: 6
PAINLEVEL_OUTOF10: 10 - WORST POSSIBLE PAIN
PAINLEVEL_OUTOF10: 3
PAINLEVEL_OUTOF10: 8
PAINLEVEL_OUTOF10: 3
PAINLEVEL_OUTOF10: 9
PAINLEVEL_OUTOF10: 9
PAINLEVEL_OUTOF10: 7
PAINLEVEL_OUTOF10: 3
PAINLEVEL_OUTOF10: 7
PAINLEVEL_OUTOF10: 5 - MODERATE PAIN

## 2024-05-29 ASSESSMENT — PAIN SCALES - PAIN ASSESSMENT IN ADVANCED DEMENTIA (PAINAD): BREATHING: NORMAL

## 2024-05-29 ASSESSMENT — PAIN DESCRIPTION - DESCRIPTORS: DESCRIPTORS: SHARP

## 2024-05-29 NOTE — PROGRESS NOTES
"Subjective  Patient sitting up in bed s/p ERCP with metal stent placement.  Liver enzymes and bilirubin downtrending.  Patient reporting increased abdominal discomfort, shooting pains to back, no nausea, suspect post ERCP pancreatitis.  Plan to keep n.p.o. LR bolus and maintenance fluids ordered.  Will check lipase in afternoon.    Objective  Blood pressure 125/74, pulse 74, temperature 36.2 °C (97.2 °F), temperature source Temporal, resp. rate 16, height 1.702 m (5' 7\"), weight 65.8 kg (145 lb), SpO2 99%.    Physical Exam  Constitutional: Alert and interactive, in NAD  Eyes: PERRL, sclera clear, no conjunctival injection  Skin: Warm and dry, no rash or ecchymosis  ENMT: Mucous membranes moist, no lesions noted  Resp: CTAB, even and unlabored  CV: RRR, normal S1, S2, no m,r,g  GI: +BS, soft, round, diffuse TTP, no rebound tenderness or guarding, no palpable masses or organomegaly  Extremities: Extremities warm, no edema, contusions, wounds or cyanosis  Neuro: Alert and oriented x3  Psych: Appropriate mood and behavior    Medications  Scheduled medications  amLODIPine, 10 mg, oral, Daily  aspirin, 81 mg, oral, Daily  [Held by provider] clopidogrel, 75 mg, oral, Daily  FLUoxetine, 20 mg, oral, Daily  heparin (porcine), 5,000 Units, subcutaneous, q8h AMBROSIO  [Held by provider] hydroCHLOROthiazide, 12.5 mg, oral, q AM  lactated Ringer's, 1,000 mL, intravenous, q2h  metoprolol tartrate, 25 mg, oral, Nightly  nicotine, 1 patch, transdermal, Daily  pantoprazole, 40 mg, oral, Daily before breakfast      Continuous medications  lactated Ringer's, 250 mL/hr      PRN medications  PRN medications: acetaminophen **OR** acetaminophen **OR** acetaminophen, acetaminophen **OR** acetaminophen **OR** acetaminophen, albuterol, albuterol, ALPRAZolam, [Held by provider] HYDROcodone-acetaminophen, HYDROmorphone, labetaloL, melatonin, metoclopramide **OR** metoclopramide, morphine, morphine, ondansetron ODT **OR** ondansetron, ondansetron, " "polyethylene glycol     Labs  Lab Results   Component Value Date    WBC 12.3 (H) 05/29/2024    HGB 11.5 (L) 05/29/2024    HCT 32.6 (L) 05/29/2024    MCV 98 05/29/2024     05/29/2024     Lab Results   Component Value Date    GLUCOSE 121 (H) 05/29/2024    CALCIUM 8.6 05/29/2024     (L) 05/29/2024    K 3.6 05/29/2024    CO2 24 05/29/2024    CL 99 05/29/2024    BUN 9 05/29/2024    CREATININE 0.60 05/29/2024     Lab Results   Component Value Date     (H) 05/29/2024     (H) 05/29/2024    ALKPHOS 261 (H) 05/29/2024    BILITOT 19.0 (H) 05/29/2024     No results found for: \"IRON\", \"TIBC\", \"FERRITIN\"  Lab Results   Component Value Date    INR 1.2 (H) 05/26/2024    INR 1.2 (H) 05/25/2024    INR 1.1 05/24/2024    PROTIME 13.2 (H) 05/26/2024    PROTIME 13.5 (H) 05/25/2024    PROTIME 12.6 05/24/2024   Endoscopic reports:  EUS with Dr. Gong 5/28/2024 noting:  Impression  The cricopharynx, upper third of the esophagus, middle third of the esophagus, lower third of the esophagus and GE junction appeared normal.  The body of the stomach and antrum appeared normal.  Mild extrinsic compression was observed in the 2nd part of the duodenum  The duodenal bulb appeared normal.  The pancreas appeared atrophic. The parenchyma was visualized in the body of the pancreas and tail of the pancreas. The parenchyma was heterogeneous and had hyperechoic strands.  Single round, homogeneous, hypoechoic and solid mass measuring 30 mm x 25 mm with well-defined margins was visualized in the head of the pancreas. Apparent abutment of the portal vein and superior mesenteric vein; 3 fine needle aspiration passes were taken. An adequate sample of aspirate was obtained. The sample was sent for cytology analysis; 3 fine needle biopsy passes were taken. An adequate sample was obtained. A sample was sent for histology analysis  The proximal bile duct was dilated.  The parenchyma was visualized in the left lobe of the liver. The " parenchyma was homogeneous and hyperechoic. The common hepatic duct, left main hepatic duct and right main hepatic duct appeared dilated.  The left kidney, spleen and left adrenal appeared normal.  ERCP with Dr. Gong 5/28/2024 noting:  Impression  The common bile duct was deeply cannulated after 10 attempts. Cannulation was difficult due to inadvertent cannulation, small ampulla and Distal CBD stricture.  20 mm localized, intrinsic, smooth and severe stricture was visualized in the distal common bile duct  Complete major papilla sphincterotomy was performed.  Performed brushings with cytology brush in the distal common bile duct  One 8 mm x 8 cm fully covered stent was placed in the common bile duct    Radiology  CT A/P with IV contrast 5/24/2024 noting:  Impression:     2.5 cm mass in the head of the pancreas.  There is dilatation of the  biliary ducts as well as the pancreatic duct and neoplasm cannot be  excluded.  This is best evaluated with ERCP or MRCP.  There are  findings consistent with sludge in the gallbladder.  Signed by Bhanu Taveras MD       Assessment  Emerson Chilel is a 64 y.o. male presenting with pancreatic mass and juandice.  PMH of HTN, HLD, CAD s/p PCI, tobacco abuse, alcohol abuse who presented to the ER with abdominal pain, N/V, jaundice.  Liver enzymes elevated with bilirubin peak at 21.    Patient underwent EUS/ERCP with Dr. Gong with metal stent placement into distal CBD.  Liver enzymes downtrending with bilirubin 19 today.  However, patient is endorsing increased abdominal discomfort with sharp shooting pains through to his back bilaterally.  Suspect post ERCP pancreatitis.  No N/V though patient states he is tolerating clear liquids.    # pancreatic mass- s/p ERCP with metal stent placement  # transaminitis/ hyperbilirubinemia- downtrending  # ?post ERCP pancreatitis   # leukocytosis- likely reactive  # jaundice  # previous EtOH misuse    Plan:  - continue supportive care  - keep NPO    - give LR 2L IV bolus followed by 250ml/hr  - continue analgesics and antiemetics as needed  - follow up afternoon lipase  - follow up pathology  - pt should establish care with oncology/ pancreaticobiliary surgery    Plan has been discussed with Dr. oLwe. GI will continue to follow.     Mónica Gary APRN/CNP

## 2024-05-29 NOTE — PROGRESS NOTES
"Emerson Chilel is a 64 y.o. male on day 4 of admission presenting with Pancreatic mass ([facility]).    Subjective   Patient seen and examined this morning, remains hemodynamically stable overnight.  Patient had a ERCP yesterday with metal stent placement, per procedure report, cannulation was difficult requiring multiple attempts.  Patient reports since the procedure, his abdominal pain has been worsened compared to before.  Discussed with GI team this morning, concern for possible ERCP related pancreatitis.  Will treat the patient with IV fluid hydration and pain control.       Objective     Constitutional: Well developed, awake/alert/oriented x3, no distress, alert and cooperative  ENMT: mucous membranes moist  Head/Neck: Neck supple  Respiratory/Thorax: CTA b/l.   Cardiovascular: Regular, rate and rhythm, no murmurs  Gastrointestinal: Nondistended, soft, diffusely tender but more so in the epigastric region, new compare to before  Musculoskeletal: ROM intact  Extremities: normal extremities  Skin Jaundiced    Last Recorded Vitals  Blood pressure 109/69, pulse 66, temperature 36.8 °C (98.2 °F), temperature source Temporal, resp. rate 16, height 1.702 m (5' 7\"), weight 65.8 kg (145 lb), SpO2 98%.  Intake/Output last 3 Shifts:  I/O last 3 completed shifts:  In: 1250 (19 mL/kg) [P.O.:250; I.V.:1000 (15.2 mL/kg)]  Out: - (0 mL/kg)   Weight: 65.8 kg     Relevant Results  Scheduled medications  amLODIPine, 10 mg, oral, Daily  aspirin, 81 mg, oral, Daily  [Held by provider] clopidogrel, 75 mg, oral, Daily  FLUoxetine, 20 mg, oral, Daily  heparin (porcine), 5,000 Units, subcutaneous, q8h AMBROSIO  [Held by provider] hydroCHLOROthiazide, 12.5 mg, oral, q AM  metoprolol tartrate, 25 mg, oral, Nightly  nicotine, 1 patch, transdermal, Daily  pantoprazole, 40 mg, oral, Daily before breakfast      Continuous medications  lactated Ringer's, 250 mL/hr, Last Rate: 250 mL/hr (05/29/24 1225)      PRN medications  PRN medications: " acetaminophen **OR** acetaminophen **OR** acetaminophen, acetaminophen **OR** acetaminophen **OR** acetaminophen, albuterol, albuterol, ALPRAZolam, [Held by provider] HYDROcodone-acetaminophen, HYDROmorphone, labetaloL, melatonin, metoclopramide **OR** metoclopramide, morphine, morphine, ondansetron ODT **OR** ondansetron, ondansetron, polyethylene glycol  Results from last 7 days   Lab Units 05/29/24  0617 05/28/24  0525 05/27/24  0550   WBC AUTO x10*3/uL 12.3* 9.1 8.3   RBC AUTO x10*6/uL 3.34* 3.66* 3.65*   HEMOGLOBIN g/dL 11.5* 12.4* 12.7*     Results from last 7 days   Lab Units 05/29/24  0617 05/28/24  0525 05/27/24  0549 05/26/24  0600   SODIUM mmol/L 129* 132* 131* 131*   POTASSIUM mmol/L 3.6 4.1 3.7 3.2*   CHLORIDE mmol/L 99 100 98 95*   CO2 mmol/L 24 24 25 26   BUN mg/dL 9 11 10 11   CREATININE mg/dL 0.60 0.69 0.73 0.67   CALCIUM mg/dL 8.6 8.8 8.8 8.8   PHOSPHORUS mg/dL  --   --  2.9 2.3*   MAGNESIUM mg/dL 1.75  --  2.01 2.03   BILIRUBIN TOTAL mg/dL 19.0* 21.0* 20.5* 19.2*   ALT U/L 229* 256* 266* 273*   AST U/L 150* 173* 187* 211*         Assessment/Plan   Principal Problem:    Pancreatic mass ([facility])  Active Problems:    Jaundice    Hypokalemia    Generalized abdominal pain    Post-ERCP acute pancreatitis ([facility])           Plan  -GI on consult, status post EUS/ERCP on 5/29  -Postop, patient feels abdominal pain is worse.  The procedure was complicated due to difficulty with cannulation.  -Post ERCP, patient developing worsening abdominal pain, suspect this could be due to ERCP induced pancreatitis  -Patient started on /h along with pain control  -Will obtain lipase  -Bilirubin is improving, however only slightly from 21 down to 19.  - Continue to trend CMP  - CA 19-9 is 6453, this result was discussed with the patient, aware the mass could be cancerous  - Potassium 4.1, magnesium 2.01, replace as needed  - Continue morphine as needed for pain  - Continue aspirin, hold Plavix for now post ERCP  -  Continue diet as tolerated  - Heparin subcu for DVT prophylaxis             I spent 35 minutes in the professional and overall care of this patient.      Chintan Chowdhury MD

## 2024-05-29 NOTE — CARE PLAN
The patient's goals for the shift include      The clinical goals for the shift include CONTROL PAIN/ABLE TO GET SOME REST/SLEEP DURING THE NIGHT.    Problem: Pain  Goal: My pain/discomfort is manageable  Outcome: Progressing     Problem: Safety  Goal: Patient will be injury free during hospitalization  Outcome: Progressing  Goal: I will remain free of falls  Outcome: Progressing     Problem: Daily Care  Goal: Daily care needs are met  Outcome: Progressing     Problem: Psychosocial Needs  Goal: Demonstrates ability to cope with hospitalization/illness  Outcome: Progressing  Goal: Collaborate with me, my family, and caregiver to identify my specific goals  Outcome: Progressing     Problem: Pain  Goal: Takes deep breaths with improved pain control throughout the shift  Outcome: Progressing  Goal: Turns in bed with improved pain control throughout the shift  Outcome: Progressing  Goal: Walks with improved pain control throughout the shift  Outcome: Progressing  Goal: Performs ADL's with improved pain control throughout shift  Outcome: Progressing  Goal: Participates in PT with improved pain control throughout the shift  Outcome: Progressing  Goal: Free from opioid side effects throughout the shift  Outcome: Progressing  Goal: Free from acute confusion related to pain meds throughout the shift  Outcome: Progressing     Problem: Fall/Injury  Goal: Not fall by end of shift  Outcome: Progressing  Goal: Be free from injury by end of the shift  Outcome: Progressing  Goal: Verbalize understanding of personal risk factors for fall in the hospital  Outcome: Progressing  Goal: Verbalize understanding of risk factor reduction measures to prevent injury from fall in the home  Outcome: Progressing  Goal: Use assistive devices by end of the shift  Outcome: Progressing     Problem: Skin  Goal: Participates in plan/prevention/treatment measures  Outcome: Progressing  Goal: Prevent/manage excess moisture  Outcome: Progressing  Goal:  Prevent/minimize sheer/friction injuries  Outcome: Progressing     Problem: Pain - Adult  Goal: Verbalizes/displays adequate comfort level or baseline comfort level  Outcome: Progressing     Problem: Safety - Adult  Goal: Free from fall injury  Outcome: Progressing     Problem: Discharge Planning  Goal: Discharge to home or other facility with appropriate resources  Outcome: Progressing

## 2024-05-29 NOTE — CARE PLAN
The clinical goals for the shift include have a decrease in pain with PRN medication administration      Problem: Pain  Goal: My pain/discomfort is manageable  Outcome: Progressing  Flowsheets (Taken 5/27/2024 1540)  Resident's pain/discomfort is manageable:   Include resident/family/caregiver in decisions related to pain management   Offer non-pharmacological pain management interventions   Identify and avoid pain triggers   Administer pain medication prior to activities that may trigger pain     Problem: Safety  Goal: Patient will be injury free during hospitalization  Outcome: Progressing  Goal: I will remain free of falls  Outcome: Progressing  Flowsheets (Taken 5/27/2024 1540)  Resident will remain free of falls:   Use gait belt for all transfers   Apply bed/chair alarms as appropriate   Accompany resident as ordered (ex. 1:1, stand-by assist, dayroom monitoring, 15 minute checks, line of sight)   Assist with toileting as orderd   Visual checks per facility policy   Maintain bed at position as ordered (chair height, low bed)   Consult with physical therapy as needed   Assess and monitor medications that may increase fall risk     Problem: Daily Care  Goal: Daily care needs are met  Outcome: Progressing  Flowsheets (Taken 5/27/2024 1540)  Daily care needs are met:   Assess and monitor ability to perform self care and identify potential discharge needs   Assess skin integrity/risk for skin breakdown   Assist patient with activities of daily living as needed   Encourage independent activity per ability   Provide mouth care   Include patient/family/caregiver in decisions related to daily care     Problem: Psychosocial Needs  Goal: Demonstrates ability to cope with hospitalization/illness  Outcome: Progressing  Flowsheets (Taken 5/27/2024 1540)  Demonstrates ability to cope with hospitalization/illness:   Encourage verbalization of feelings/concerns/expectations   Provide low-stimulation environment as needed    Assist resident to identify and practice own strengths and abilities   Encourage resident to set and complete small goals for self   Encourage participation in diversional activities   Reinforce positive adaptation of new coping behaviors   Include resident/family/caregiver in decisions related to psychosocial needs  Goal: Collaborate with me, my family, and caregiver to identify my specific goals  Outcome: Progressing     Problem: Discharge Barriers  Goal: My discharge needs are met  Outcome: Progressing  Flowsheets (Taken 5/27/2024 4337)  Resident's discharge needs are met:   Identify potential discharge barriers on admission and throughout stay   Involve resident/family/caregiver in discharge planning process     Problem: Pain  Goal: Takes deep breaths with improved pain control throughout the shift  Outcome: Progressing  Goal: Turns in bed with improved pain control throughout the shift  Outcome: Progressing  Goal: Walks with improved pain control throughout the shift  Outcome: Progressing  Goal: Performs ADL's with improved pain control throughout shift  Outcome: Progressing  Goal: Participates in PT with improved pain control throughout the shift  Outcome: Progressing  Goal: Free from opioid side effects throughout the shift  Outcome: Progressing  Goal: Free from acute confusion related to pain meds throughout the shift  Outcome: Progressing     Problem: Fall/Injury  Goal: Not fall by end of shift  Outcome: Progressing  Goal: Be free from injury by end of the shift  Outcome: Progressing  Goal: Verbalize understanding of personal risk factors for fall in the hospital  Outcome: Progressing  Goal: Verbalize understanding of risk factor reduction measures to prevent injury from fall in the home  Outcome: Progressing  Goal: Use assistive devices by end of the shift  Outcome: Progressing  Goal: Pace activities to prevent fatigue by end of the shift  Outcome: Progressing     Problem: Skin  Goal: Participates in  plan/prevention/treatment measures  Outcome: Progressing  Flowsheets (Taken 5/29/2024 1401)  Participates in plan/prevention/treatment measures:   Discuss with provider PT/OT consult   Elevate heels   Increase activity/out of bed for meals  Goal: Prevent/manage excess moisture  Outcome: Progressing  Flowsheets (Taken 5/29/2024 1401)  Prevent/manage excess moisture:   Follow provider orders for dressing changes   Monitor for/manage infection if present   Moisturize dry skin  Goal: Prevent/minimize sheer/friction injuries  Outcome: Progressing  Flowsheets (Taken 5/29/2024 1401)  Prevent/minimize sheer/friction injuries:   Complete micro-shifts as needed if patient unable. Adjust patient position to relieve pressure points, not a full turn   HOB 30 degrees or less   Increase activity/out of bed for meals   Turn/reposition every 2 hours/use positioning/transfer devices   Use pull sheet     Problem: Pain - Adult  Goal: Verbalizes/displays adequate comfort level or baseline comfort level  Outcome: Progressing  Flowsheets (Taken 5/27/2024 1540)  Verbalizes/displays adequate comfort level or baseline comfort level:   Encourage patient to monitor pain and request assistance   Assess pain using appropriate pain scale   Administer analgesics based on type and severity of pain and evaluate response   Implement non-pharmacological measures as appropriate and evaluate response   Consider cultural and social influences on pain and pain management   Notify Licensed Independent Practitioner if interventions unsuccessful or patient reports new pain     Problem: Safety - Adult  Goal: Free from fall injury  Outcome: Progressing  Flowsheets (Taken 5/27/2024 1540)  Free from fall injury:   Instruct family/caregiver on patient safety   Based on caregiver fall risk screen, instruct family/caregiver to ask for assistance with transferring infant if caregiver noted to have fall risk factors     Problem: Discharge Planning  Goal: Discharge to  home or other facility with appropriate resources  Outcome: Progressing  Flowsheets (Taken 5/27/2024 1540)  Discharge to home or other facility with appropriate resources:   Identify barriers to discharge with patient and caregiver   Arrange for needed discharge resources and transportation as appropriate   Identify discharge learning needs (meds, wound care, etc)   Arrange for interpreters to assist at discharge as needed   Refer to discharge planning if patient needs post-hospital services based on physician order or complex needs related to functional status, cognitive ability or social support system     Problem: Chronic Conditions and Co-morbidities  Goal: Patient's chronic conditions and co-morbidity symptoms are monitored and maintained or improved  Outcome: Progressing  Flowsheets (Taken 5/27/2024 1540)  Care Plan - Patient's Chronic Conditions and Co-Morbidity Symptoms are Monitored and Maintained or Improved:   Monitor and assess patient's chronic conditions and comorbid symptoms for stability, deterioration, or improvement   Collaborate with multidisciplinary team to address chronic and comorbid conditions and prevent exacerbation or deterioration   Update acute care plan with appropriate goals if chronic or comorbid symptoms are exacerbated and prevent overall improvement and discharge

## 2024-05-30 ENCOUNTER — APPOINTMENT (OUTPATIENT)
Dept: CARDIOLOGY | Facility: HOSPITAL | Age: 64
DRG: 981 | End: 2024-05-30
Payer: COMMERCIAL

## 2024-05-30 PROBLEM — I21.4 NSTEMI (NON-ST ELEVATED MYOCARDIAL INFARCTION) (MULTI): Status: ACTIVE | Noted: 2024-05-30

## 2024-05-30 LAB
ALBUMIN SERPL BCP-MCNC: 2.9 G/DL (ref 3.4–5)
ALP SERPL-CCNC: 233 U/L (ref 33–136)
ALT SERPL W P-5'-P-CCNC: 184 U/L (ref 10–52)
ANION GAP SERPL CALC-SCNC: 11 MMOL/L (ref 10–20)
AORTIC VALVE PEAK VELOCITY: 1.56 M/S
AST SERPL W P-5'-P-CCNC: 114 U/L (ref 9–39)
AV PEAK GRADIENT: 9.7 MMHG
AVA (PEAK VEL): 1.71 CM2
BILIRUB SERPL-MCNC: 17.6 MG/DL (ref 0–1.2)
BUN SERPL-MCNC: 6 MG/DL (ref 6–23)
CALCIUM SERPL-MCNC: 8.4 MG/DL (ref 8.6–10.3)
CARDIAC TROPONIN I PNL SERPL HS: 341 NG/L (ref 0–20)
CARDIAC TROPONIN I PNL SERPL HS: 356 NG/L (ref 0–20)
CARDIAC TROPONIN I PNL SERPL HS: 534 NG/L (ref 0–20)
CHLORIDE SERPL-SCNC: 100 MMOL/L (ref 98–107)
CO2 SERPL-SCNC: 25 MMOL/L (ref 21–32)
CREAT SERPL-MCNC: 0.6 MG/DL (ref 0.5–1.3)
EGFRCR SERPLBLD CKD-EPI 2021: >90 ML/MIN/1.73M*2
EJECTION FRACTION APICAL 4 CHAMBER: 61
ERYTHROCYTE [DISTWIDTH] IN BLOOD BY AUTOMATED COUNT: 18.4 % (ref 11.5–14.5)
GLUCOSE SERPL-MCNC: 85 MG/DL (ref 74–99)
HCT VFR BLD AUTO: 30.2 % (ref 41–52)
HGB BLD-MCNC: 10.5 G/DL (ref 13.5–17.5)
LEFT ATRIUM VOLUME AREA LENGTH INDEX BSA: 24.7 ML/M2
LEFT VENTRICLE INTERNAL DIMENSION DIASTOLE: 4.81 CM (ref 3.5–6)
LEFT VENTRICULAR OUTFLOW TRACT DIAMETER: 1.7 CM
LV EJECTION FRACTION BIPLANE: 61 %
MAGNESIUM SERPL-MCNC: 1.62 MG/DL (ref 1.6–2.4)
MCH RBC QN AUTO: 34.7 PG (ref 26–34)
MCHC RBC AUTO-ENTMCNC: 34.8 G/DL (ref 32–36)
MCV RBC AUTO: 100 FL (ref 80–100)
MITRAL VALVE E/A RATIO: 0.9
NRBC BLD-RTO: 0 /100 WBCS (ref 0–0)
PLATELET # BLD AUTO: 256 X10*3/UL (ref 150–450)
POTASSIUM SERPL-SCNC: 3.4 MMOL/L (ref 3.5–5.3)
PROT SERPL-MCNC: 5 G/DL (ref 6.4–8.2)
RBC # BLD AUTO: 3.03 X10*6/UL (ref 4.5–5.9)
RIGHT VENTRICLE FREE WALL PEAK S': 16 CM/S
RIGHT VENTRICLE PEAK SYSTOLIC PRESSURE: 51.1 MMHG
SODIUM SERPL-SCNC: 133 MMOL/L (ref 136–145)
TRICUSPID ANNULAR PLANE SYSTOLIC EXCURSION: 2.1 CM
WBC # BLD AUTO: 15.1 X10*3/UL (ref 4.4–11.3)

## 2024-05-30 PROCEDURE — 84484 ASSAY OF TROPONIN QUANT: CPT | Performed by: STUDENT IN AN ORGANIZED HEALTH CARE EDUCATION/TRAINING PROGRAM

## 2024-05-30 PROCEDURE — 36415 COLL VENOUS BLD VENIPUNCTURE: CPT | Performed by: INTERNAL MEDICINE

## 2024-05-30 PROCEDURE — C1887 CATHETER, GUIDING: HCPCS | Performed by: STUDENT IN AN ORGANIZED HEALTH CARE EDUCATION/TRAINING PROGRAM

## 2024-05-30 PROCEDURE — 92978 ENDOLUMINL IVUS OCT C 1ST: CPT | Performed by: STUDENT IN AN ORGANIZED HEALTH CARE EDUCATION/TRAINING PROGRAM

## 2024-05-30 PROCEDURE — 99152 MOD SED SAME PHYS/QHP 5/>YRS: CPT | Performed by: STUDENT IN AN ORGANIZED HEALTH CARE EDUCATION/TRAINING PROGRAM

## 2024-05-30 PROCEDURE — 2780000003 HC OR 278 NO HCPCS: Performed by: STUDENT IN AN ORGANIZED HEALTH CARE EDUCATION/TRAINING PROGRAM

## 2024-05-30 PROCEDURE — 2500000002 HC RX 250 W HCPCS SELF ADMINISTERED DRUGS (ALT 637 FOR MEDICARE OP, ALT 636 FOR OP/ED): Performed by: INTERNAL MEDICINE

## 2024-05-30 PROCEDURE — 2500000001 HC RX 250 WO HCPCS SELF ADMINISTERED DRUGS (ALT 637 FOR MEDICARE OP): Performed by: NURSE PRACTITIONER

## 2024-05-30 PROCEDURE — 2500000004 HC RX 250 GENERAL PHARMACY W/ HCPCS (ALT 636 FOR OP/ED): Performed by: STUDENT IN AN ORGANIZED HEALTH CARE EDUCATION/TRAINING PROGRAM

## 2024-05-30 PROCEDURE — 84484 ASSAY OF TROPONIN QUANT: CPT | Performed by: INTERNAL MEDICINE

## 2024-05-30 PROCEDURE — C9600 PERC DRUG-EL COR STENT SING: HCPCS | Performed by: STUDENT IN AN ORGANIZED HEALTH CARE EDUCATION/TRAINING PROGRAM

## 2024-05-30 PROCEDURE — 93458 L HRT ARTERY/VENTRICLE ANGIO: CPT | Performed by: STUDENT IN AN ORGANIZED HEALTH CARE EDUCATION/TRAINING PROGRAM

## 2024-05-30 PROCEDURE — 2500000001 HC RX 250 WO HCPCS SELF ADMINISTERED DRUGS (ALT 637 FOR MEDICARE OP): Performed by: STUDENT IN AN ORGANIZED HEALTH CARE EDUCATION/TRAINING PROGRAM

## 2024-05-30 PROCEDURE — 2500000001 HC RX 250 WO HCPCS SELF ADMINISTERED DRUGS (ALT 637 FOR MEDICARE OP): Performed by: INTERNAL MEDICINE

## 2024-05-30 PROCEDURE — 93005 ELECTROCARDIOGRAM TRACING: CPT

## 2024-05-30 PROCEDURE — 85347 COAGULATION TIME ACTIVATED: CPT | Performed by: STUDENT IN AN ORGANIZED HEALTH CARE EDUCATION/TRAINING PROGRAM

## 2024-05-30 PROCEDURE — 99153 MOD SED SAME PHYS/QHP EA: CPT | Performed by: STUDENT IN AN ORGANIZED HEALTH CARE EDUCATION/TRAINING PROGRAM

## 2024-05-30 PROCEDURE — 027034Z DILATION OF CORONARY ARTERY, ONE ARTERY WITH DRUG-ELUTING INTRALUMINAL DEVICE, PERCUTANEOUS APPROACH: ICD-10-PCS | Performed by: STUDENT IN AN ORGANIZED HEALTH CARE EDUCATION/TRAINING PROGRAM

## 2024-05-30 PROCEDURE — 99223 1ST HOSP IP/OBS HIGH 75: CPT | Performed by: INTERNAL MEDICINE

## 2024-05-30 PROCEDURE — S4991 NICOTINE PATCH NONLEGEND: HCPCS | Performed by: INTERNAL MEDICINE

## 2024-05-30 PROCEDURE — 2060000001 HC INTERMEDIATE ICU ROOM DAILY

## 2024-05-30 PROCEDURE — 2500000005 HC RX 250 GENERAL PHARMACY W/O HCPCS: Performed by: STUDENT IN AN ORGANIZED HEALTH CARE EDUCATION/TRAINING PROGRAM

## 2024-05-30 PROCEDURE — C1753 CATH, INTRAVAS ULTRASOUND: HCPCS | Performed by: STUDENT IN AN ORGANIZED HEALTH CARE EDUCATION/TRAINING PROGRAM

## 2024-05-30 PROCEDURE — 92928 PRQ TCAT PLMT NTRAC ST 1 LES: CPT | Performed by: STUDENT IN AN ORGANIZED HEALTH CARE EDUCATION/TRAINING PROGRAM

## 2024-05-30 PROCEDURE — C1769 GUIDE WIRE: HCPCS | Performed by: STUDENT IN AN ORGANIZED HEALTH CARE EDUCATION/TRAINING PROGRAM

## 2024-05-30 PROCEDURE — 76937 US GUIDE VASCULAR ACCESS: CPT | Performed by: STUDENT IN AN ORGANIZED HEALTH CARE EDUCATION/TRAINING PROGRAM

## 2024-05-30 PROCEDURE — 99233 SBSQ HOSP IP/OBS HIGH 50: CPT | Performed by: STUDENT IN AN ORGANIZED HEALTH CARE EDUCATION/TRAINING PROGRAM

## 2024-05-30 PROCEDURE — 2720000007 HC OR 272 NO HCPCS: Performed by: STUDENT IN AN ORGANIZED HEALTH CARE EDUCATION/TRAINING PROGRAM

## 2024-05-30 PROCEDURE — 85027 COMPLETE CBC AUTOMATED: CPT | Performed by: STUDENT IN AN ORGANIZED HEALTH CARE EDUCATION/TRAINING PROGRAM

## 2024-05-30 PROCEDURE — C1874 STENT, COATED/COV W/DEL SYS: HCPCS | Performed by: STUDENT IN AN ORGANIZED HEALTH CARE EDUCATION/TRAINING PROGRAM

## 2024-05-30 PROCEDURE — 2500000004 HC RX 250 GENERAL PHARMACY W/ HCPCS (ALT 636 FOR OP/ED): Performed by: INTERNAL MEDICINE

## 2024-05-30 PROCEDURE — 93306 TTE W/DOPPLER COMPLETE: CPT

## 2024-05-30 PROCEDURE — C1760 CLOSURE DEV, VASC: HCPCS | Performed by: STUDENT IN AN ORGANIZED HEALTH CARE EDUCATION/TRAINING PROGRAM

## 2024-05-30 PROCEDURE — 4A023N7 MEASUREMENT OF CARDIAC SAMPLING AND PRESSURE, LEFT HEART, PERCUTANEOUS APPROACH: ICD-10-PCS | Performed by: STUDENT IN AN ORGANIZED HEALTH CARE EDUCATION/TRAINING PROGRAM

## 2024-05-30 PROCEDURE — 84484 ASSAY OF TROPONIN QUANT: CPT | Performed by: NURSE PRACTITIONER

## 2024-05-30 PROCEDURE — C1725 CATH, TRANSLUMIN NON-LASER: HCPCS | Performed by: STUDENT IN AN ORGANIZED HEALTH CARE EDUCATION/TRAINING PROGRAM

## 2024-05-30 PROCEDURE — C1894 INTRO/SHEATH, NON-LASER: HCPCS | Performed by: STUDENT IN AN ORGANIZED HEALTH CARE EDUCATION/TRAINING PROGRAM

## 2024-05-30 PROCEDURE — 93306 TTE W/DOPPLER COMPLETE: CPT | Performed by: INTERNAL MEDICINE

## 2024-05-30 PROCEDURE — 2550000001 HC RX 255 CONTRASTS: Performed by: STUDENT IN AN ORGANIZED HEALTH CARE EDUCATION/TRAINING PROGRAM

## 2024-05-30 PROCEDURE — 84075 ASSAY ALKALINE PHOSPHATASE: CPT | Performed by: STUDENT IN AN ORGANIZED HEALTH CARE EDUCATION/TRAINING PROGRAM

## 2024-05-30 PROCEDURE — 85347 COAGULATION TIME ACTIVATED: CPT

## 2024-05-30 PROCEDURE — B2111ZZ FLUOROSCOPY OF MULTIPLE CORONARY ARTERIES USING LOW OSMOLAR CONTRAST: ICD-10-PCS | Performed by: STUDENT IN AN ORGANIZED HEALTH CARE EDUCATION/TRAINING PROGRAM

## 2024-05-30 PROCEDURE — C1729 CATH, DRAINAGE: HCPCS | Performed by: STUDENT IN AN ORGANIZED HEALTH CARE EDUCATION/TRAINING PROGRAM

## 2024-05-30 PROCEDURE — 2500000004 HC RX 250 GENERAL PHARMACY W/ HCPCS (ALT 636 FOR OP/ED): Performed by: NURSE PRACTITIONER

## 2024-05-30 PROCEDURE — 83735 ASSAY OF MAGNESIUM: CPT | Performed by: STUDENT IN AN ORGANIZED HEALTH CARE EDUCATION/TRAINING PROGRAM

## 2024-05-30 PROCEDURE — 93010 ELECTROCARDIOGRAM REPORT: CPT | Performed by: INTERNAL MEDICINE

## 2024-05-30 DEVICE — STENT ONYXNG35026UX ONYX 3.50X26RX
Type: IMPLANTABLE DEVICE | Site: CORONARY | Status: FUNCTIONAL
Brand: ONYX FRONTIER™

## 2024-05-30 RX ORDER — ATORVASTATIN CALCIUM 40 MG/1
40 TABLET, FILM COATED ORAL DAILY
Status: DISCONTINUED | OUTPATIENT
Start: 2024-05-31 | End: 2024-06-09 | Stop reason: HOSPADM

## 2024-05-30 RX ORDER — MIDAZOLAM HYDROCHLORIDE 1 MG/ML
INJECTION, SOLUTION INTRAMUSCULAR; INTRAVENOUS AS NEEDED
Status: DISCONTINUED | OUTPATIENT
Start: 2024-05-30 | End: 2024-05-30 | Stop reason: HOSPADM

## 2024-05-30 RX ORDER — LIDOCAINE HYDROCHLORIDE 20 MG/ML
INJECTION, SOLUTION INFILTRATION; PERINEURAL AS NEEDED
Status: DISCONTINUED | OUTPATIENT
Start: 2024-05-30 | End: 2024-05-30 | Stop reason: HOSPADM

## 2024-05-30 RX ORDER — HEPARIN SODIUM 5000 [USP'U]/ML
INJECTION, SOLUTION INTRAVENOUS; SUBCUTANEOUS EVERY 4 HOURS PRN
Status: DISCONTINUED | OUTPATIENT
Start: 2024-05-30 | End: 2024-05-30

## 2024-05-30 RX ORDER — HEPARIN SODIUM 1000 [USP'U]/ML
INJECTION, SOLUTION INTRAVENOUS; SUBCUTANEOUS AS NEEDED
Status: DISCONTINUED | OUTPATIENT
Start: 2024-05-30 | End: 2024-05-30 | Stop reason: HOSPADM

## 2024-05-30 RX ORDER — NITROGLYCERIN 40 MG/100ML
INJECTION INTRAVENOUS AS NEEDED
Status: DISCONTINUED | OUTPATIENT
Start: 2024-05-30 | End: 2024-05-30 | Stop reason: HOSPADM

## 2024-05-30 RX ORDER — MAGNESIUM SULFATE HEPTAHYDRATE 40 MG/ML
4 INJECTION, SOLUTION INTRAVENOUS ONCE
Status: COMPLETED | OUTPATIENT
Start: 2024-05-30 | End: 2024-05-30

## 2024-05-30 RX ORDER — HEPARIN SODIUM 5000 [USP'U]/ML
4000 INJECTION, SOLUTION INTRAVENOUS; SUBCUTANEOUS ONCE
Status: COMPLETED | OUTPATIENT
Start: 2024-05-30 | End: 2024-05-30

## 2024-05-30 RX ORDER — SODIUM CHLORIDE 9 MG/ML
3 INJECTION, SOLUTION INTRAVENOUS CONTINUOUS
Status: ACTIVE | OUTPATIENT
Start: 2024-05-30 | End: 2024-05-30

## 2024-05-30 RX ORDER — METOPROLOL TARTRATE 25 MG/1
25 TABLET, FILM COATED ORAL 2 TIMES DAILY
Status: DISCONTINUED | OUTPATIENT
Start: 2024-05-30 | End: 2024-05-30

## 2024-05-30 RX ORDER — SODIUM CHLORIDE 9 MG/ML
INJECTION, SOLUTION INTRAVENOUS CONTINUOUS PRN
Status: COMPLETED | OUTPATIENT
Start: 2024-05-30 | End: 2024-05-30

## 2024-05-30 RX ORDER — METOPROLOL TARTRATE 25 MG/1
25 TABLET, FILM COATED ORAL 3 TIMES DAILY
Status: DISCONTINUED | OUTPATIENT
Start: 2024-05-30 | End: 2024-05-31

## 2024-05-30 RX ORDER — CLOPIDOGREL BISULFATE 300 MG/1
TABLET, FILM COATED ORAL AS NEEDED
Status: DISCONTINUED | OUTPATIENT
Start: 2024-05-30 | End: 2024-05-30 | Stop reason: HOSPADM

## 2024-05-30 RX ORDER — FENTANYL CITRATE 50 UG/ML
INJECTION, SOLUTION INTRAMUSCULAR; INTRAVENOUS AS NEEDED
Status: DISCONTINUED | OUTPATIENT
Start: 2024-05-30 | End: 2024-05-30 | Stop reason: HOSPADM

## 2024-05-30 RX ORDER — POTASSIUM CHLORIDE 20 MEQ/1
40 TABLET, EXTENDED RELEASE ORAL ONCE
Status: DISCONTINUED | OUTPATIENT
Start: 2024-05-30 | End: 2024-06-03

## 2024-05-30 RX ORDER — HEPARIN SODIUM 10000 [USP'U]/100ML
0-4000 INJECTION, SOLUTION INTRAVENOUS CONTINUOUS
Status: DISCONTINUED | OUTPATIENT
Start: 2024-05-30 | End: 2024-05-30

## 2024-05-30 RX ADMIN — HYDROMORPHONE HYDROCHLORIDE 0.5 MG: 1 INJECTION, SOLUTION INTRAMUSCULAR; INTRAVENOUS; SUBCUTANEOUS at 20:13

## 2024-05-30 RX ADMIN — HYDROMORPHONE HYDROCHLORIDE 0.5 MG: 1 INJECTION, SOLUTION INTRAMUSCULAR; INTRAVENOUS; SUBCUTANEOUS at 04:07

## 2024-05-30 RX ADMIN — MORPHINE SULFATE 2 MG: 2 INJECTION, SOLUTION INTRAMUSCULAR; INTRAVENOUS at 17:12

## 2024-05-30 RX ADMIN — HEPARIN SODIUM 816 UNITS/HR: 10000 INJECTION, SOLUTION INTRAVENOUS at 10:24

## 2024-05-30 RX ADMIN — PANTOPRAZOLE SODIUM 40 MG: 40 TABLET, DELAYED RELEASE ORAL at 06:46

## 2024-05-30 RX ADMIN — HYDROMORPHONE HYDROCHLORIDE 0.5 MG: 1 INJECTION, SOLUTION INTRAMUSCULAR; INTRAVENOUS; SUBCUTANEOUS at 09:19

## 2024-05-30 RX ADMIN — METOPROLOL TARTRATE 25 MG: 25 TABLET, FILM COATED ORAL at 12:54

## 2024-05-30 RX ADMIN — MORPHINE SULFATE 2 MG: 2 INJECTION, SOLUTION INTRAMUSCULAR; INTRAVENOUS at 11:24

## 2024-05-30 RX ADMIN — METOPROLOL TARTRATE 25 MG: 25 TABLET, FILM COATED ORAL at 20:13

## 2024-05-30 RX ADMIN — NICOTINE 1 PATCH: 21 PATCH, EXTENDED RELEASE TRANSDERMAL at 09:00

## 2024-05-30 RX ADMIN — SODIUM CHLORIDE 3 ML/KG/HR: 9 INJECTION, SOLUTION INTRAVENOUS at 17:13

## 2024-05-30 RX ADMIN — SODIUM CHLORIDE, POTASSIUM CHLORIDE, SODIUM LACTATE AND CALCIUM CHLORIDE 250 ML/HR: 600; 310; 30; 20 INJECTION, SOLUTION INTRAVENOUS at 09:19

## 2024-05-30 RX ADMIN — NITROGLYCERIN 0.4 MG: 0.4 TABLET SUBLINGUAL at 06:52

## 2024-05-30 RX ADMIN — HEPARIN SODIUM 4000 UNITS: 5000 INJECTION INTRAVENOUS; SUBCUTANEOUS at 10:24

## 2024-05-30 RX ADMIN — FLUOXETINE HYDROCHLORIDE 20 MG: 20 CAPSULE ORAL at 09:19

## 2024-05-30 RX ADMIN — ASPIRIN 81 MG 81 MG: 81 TABLET ORAL at 09:19

## 2024-05-30 RX ADMIN — MAGNESIUM SULFATE HEPTAHYDRATE 4 G: 40 INJECTION, SOLUTION INTRAVENOUS at 13:41

## 2024-05-30 RX ADMIN — POLYETHYLENE GLYCOL 3350 17 G: 17 POWDER, FOR SOLUTION ORAL at 21:40

## 2024-05-30 RX ADMIN — MORPHINE SULFATE 4 MG: 4 INJECTION, SOLUTION INTRAMUSCULAR; INTRAVENOUS at 01:05

## 2024-05-30 RX ADMIN — AMLODIPINE BESYLATE 10 MG: 10 TABLET ORAL at 09:19

## 2024-05-30 RX ADMIN — MORPHINE SULFATE 2 MG: 2 INJECTION, SOLUTION INTRAMUSCULAR; INTRAVENOUS at 21:40

## 2024-05-30 RX ADMIN — MORPHINE SULFATE 4 MG: 4 INJECTION, SOLUTION INTRAMUSCULAR; INTRAVENOUS at 06:46

## 2024-05-30 RX ADMIN — NITROGLYCERIN 0.4 MG: 0.4 TABLET SUBLINGUAL at 10:23

## 2024-05-30 RX ADMIN — SODIUM CHLORIDE, POTASSIUM CHLORIDE, SODIUM LACTATE AND CALCIUM CHLORIDE 250 ML/HR: 600; 310; 30; 20 INJECTION, SOLUTION INTRAVENOUS at 05:12

## 2024-05-30 RX ADMIN — SODIUM CHLORIDE, POTASSIUM CHLORIDE, SODIUM LACTATE AND CALCIUM CHLORIDE 250 ML/HR: 600; 310; 30; 20 INJECTION, SOLUTION INTRAVENOUS at 01:08

## 2024-05-30 RX ADMIN — HEPARIN SODIUM 5000 UNITS: 5000 INJECTION INTRAVENOUS; SUBCUTANEOUS at 06:46

## 2024-05-30 RX ADMIN — NITROGLYCERIN 0.4 MG: 0.4 TABLET SUBLINGUAL at 12:50

## 2024-05-30 RX ADMIN — HYDROMORPHONE HYDROCHLORIDE 0.5 MG: 1 INJECTION, SOLUTION INTRAMUSCULAR; INTRAVENOUS; SUBCUTANEOUS at 13:35

## 2024-05-30 ASSESSMENT — ENCOUNTER SYMPTOMS
ENDOCRINE NEGATIVE: 1
ABDOMINAL PAIN: 1
DEPRESSION: 1
COLOR CHANGE: 1
EYES NEGATIVE: 1
PALPITATIONS: 0
NEUROLOGICAL NEGATIVE: 1
DIAPHORESIS: 1
MUSCULOSKELETAL NEGATIVE: 1
SHORTNESS OF BREATH: 0

## 2024-05-30 ASSESSMENT — PAIN - FUNCTIONAL ASSESSMENT
PAIN_FUNCTIONAL_ASSESSMENT: 0-10

## 2024-05-30 ASSESSMENT — PAIN SCALES - GENERAL
PAINLEVEL_OUTOF10: 4
PAINLEVEL_OUTOF10: 3
PAINLEVEL_OUTOF10: 6
PAINLEVEL_OUTOF10: 4
PAINLEVEL_OUTOF10: 6
PAINLEVEL_OUTOF10: 7
PAINLEVEL_OUTOF10: 7
PAINLEVEL_OUTOF10: 2
PAINLEVEL_OUTOF10: 8
PAINLEVEL_OUTOF10: 2
PAINLEVEL_OUTOF10: 8

## 2024-05-30 ASSESSMENT — PAIN DESCRIPTION - LOCATION
LOCATION: ABDOMEN

## 2024-05-30 NOTE — PROGRESS NOTES
"Subjective  Patient sitting up in bed with continued abdominal pain, likely post ERCP pancreatitis. Lipase 795. Liver enzymes and bilirubin continue to downtrend.  Pt with possible NSTEMI, CP, diaphoresis this am. Plan for continued hydration for pancreatitis, management of NSTEMI ok for antiplatelet.     Objective  Blood pressure 120/64, pulse 82, temperature 36.3 °C (97.3 °F), temperature source Temporal, resp. rate 16, height 1.702 m (5' 7\"), weight 68 kg (149 lb 14.6 oz), SpO2 98%.    Physical Exam  Constitutional: Alert and interactive, in NAD  Eyes: PERRL, scleral cterus, no conjunctival injection  Skin: Warm and dry, no rash or ecchymosis, jaundice   ENMT: Mucous membranes moist, no lesions noted  Resp: CTAB, even and unlabored  CV: RRR, normal S1, S2, no m,r,g  GI: +BS, soft, round, upper abd TTP, no rebound tenderness or guarding, no palpable masses or organomegaly  Extremities: Extremities warm, no edema, contusions, wounds or cyanosis  Neuro: Alert and oriented x3  Psych: Appropriate mood and behavior    Medications  Scheduled medications  [Held by provider] amLODIPine, 10 mg, oral, Daily  aspirin, 81 mg, oral, Daily  clopidogrel, 75 mg, oral, Daily  FLUoxetine, 20 mg, oral, Daily  [Held by provider] hydroCHLOROthiazide, 12.5 mg, oral, q AM  metoprolol tartrate, 25 mg, oral, TID  nicotine, 1 patch, transdermal, Daily  pantoprazole, 40 mg, oral, Daily before breakfast      Continuous medications  heparin, 0-4,000 Units/hr, Last Rate: 816 Units/hr (05/30/24 1024)  lactated Ringer's, 100 mL/hr, Last Rate: 250 mL/hr (05/30/24 0919)      PRN medications  PRN medications: acetaminophen **OR** acetaminophen **OR** acetaminophen, acetaminophen **OR** acetaminophen **OR** acetaminophen, albuterol, ALPRAZolam, heparin, [Held by provider] HYDROcodone-acetaminophen, HYDROmorphone, labetaloL, melatonin, metoclopramide **OR** metoclopramide, morphine, morphine, nitroglycerin, ondansetron ODT **OR** ondansetron, " "ondansetron, polyethylene glycol     Labs  Lab Results   Component Value Date    WBC 15.1 (H) 05/30/2024    HGB 10.5 (L) 05/30/2024    HCT 30.2 (L) 05/30/2024     05/30/2024     05/30/2024     Lab Results   Component Value Date    GLUCOSE 85 05/30/2024    CALCIUM 8.4 (L) 05/30/2024     (L) 05/30/2024    K 3.4 (L) 05/30/2024    CO2 25 05/30/2024     05/30/2024    BUN 6 05/30/2024    CREATININE 0.60 05/30/2024     Lab Results   Component Value Date     (H) 05/30/2024     (H) 05/30/2024    ALKPHOS 233 (H) 05/30/2024    BILITOT 17.6 (H) 05/30/2024     No results found for: \"IRON\", \"TIBC\", \"FERRITIN\"  Lab Results   Component Value Date    INR 1.2 (H) 05/26/2024    INR 1.2 (H) 05/25/2024    INR 1.1 05/24/2024    PROTIME 13.2 (H) 05/26/2024    PROTIME 13.5 (H) 05/25/2024    PROTIME 12.6 05/24/2024   Endoscopic reports:  EUS with Dr. Gong 5/28/2024 noting:  Impression  The cricopharynx, upper third of the esophagus, middle third of the esophagus, lower third of the esophagus and GE junction appeared normal.  The body of the stomach and antrum appeared normal.  Mild extrinsic compression was observed in the 2nd part of the duodenum  The duodenal bulb appeared normal.  The pancreas appeared atrophic. The parenchyma was visualized in the body of the pancreas and tail of the pancreas. The parenchyma was heterogeneous and had hyperechoic strands.  Single round, homogeneous, hypoechoic and solid mass measuring 30 mm x 25 mm with well-defined margins was visualized in the head of the pancreas. Apparent abutment of the portal vein and superior mesenteric vein; 3 fine needle aspiration passes were taken. An adequate sample of aspirate was obtained. The sample was sent for cytology analysis; 3 fine needle biopsy passes were taken. An adequate sample was obtained. A sample was sent for histology analysis  The proximal bile duct was dilated.  The parenchyma was visualized in the left lobe of " the liver. The parenchyma was homogeneous and hyperechoic. The common hepatic duct, left main hepatic duct and right main hepatic duct appeared dilated.  The left kidney, spleen and left adrenal appeared normal.  ERCP with Dr. Gong 5/28/2024 noting:  Impression  The common bile duct was deeply cannulated after 10 attempts. Cannulation was difficult due to inadvertent cannulation, small ampulla and Distal CBD stricture.  20 mm localized, intrinsic, smooth and severe stricture was visualized in the distal common bile duct  Complete major papilla sphincterotomy was performed.  Performed brushings with cytology brush in the distal common bile duct  One 8 mm x 8 cm fully covered stent was placed in the common bile duct    Radiology  CT A/P with IV contrast 5/24/2024 noting:  Impression:     2.5 cm mass in the head of the pancreas.  There is dilatation of the  biliary ducts as well as the pancreatic duct and neoplasm cannot be  excluded.  This is best evaluated with ERCP or MRCP.  There are  findings consistent with sludge in the gallbladder.  Signed by Bhanu Taveras MD       Assessment  Emerson Chilel is a 64 y.o. male presenting with pancreatic mass and juandice.  PMH of HTN, HLD, CAD s/p PCI, tobacco abuse, alcohol abuse who presented to the ER with abdominal pain, N/V, jaundice.  Liver enzymes elevated with bilirubin peak at 21.    Patient underwent EUS/ERCP with Dr. Gong 5/28 with metal stent placement into distal CBD.  Liver enzymes downtrending with bilirubin 17.6 today. Suspect post ERCP pancreatitis. Lipase 795.  Patient also with CP overnight, likely NSTEMI.    # pancreatic mass- s/p ERCP with metal stent placement  # transaminitis/ hyperbilirubinemia- downtrending  # post ERCP pancreatitis   # leukocytosis-uptrending  # jaundice  # previous EtOH misuse    Plan:  - continue supportive care  - keep NPO   - decrease LR to 100 ml/hr  - continue analgesics and antiemetics as needed  - follow up pathology  - pt  ok for anticoagulation/ antiplatelet from GI standpoint  - pt should establish care with oncology/ pancreaticobiliary surgery once pathology finalizes  - pt can follow up with Dr. Gong in outpatient clinic    Plan has been discussed with Dr. Lowe. GI will continue to follow.     SOLOMON Gonzalez/CNP

## 2024-05-30 NOTE — CARE PLAN
Pt arrived to room 2108-2 from 3rd floor. Alert and oriented. Denies chest pain upon admission, but continues to endorse abdominal pain. Given iv dilaudid and morphine as ordered. LR infusing at 250     0340 pt complained of 9/10 chest pain, took one dose nitro on his own. MD notified and pt educated on importance of not taking his own meds. EKG done showing sinus rhythm with PACs, vital signs stable. Chest pain resolved with one nitroglycerin dose    0650 pt endorsed chest pain again, one dose nitroglycerin given with relief. EKG unchanged, vital signs stable. MD notified

## 2024-05-30 NOTE — H&P (VIEW-ONLY)
Cardiology Consult           PATIENT NAME:  Emerson Chilel       MRN: [id number]     DATE of SERVICE: May 30, 2024           Primary Care Physician: Anthony Moore DO      Consulting Physician:  Dr Peres          Reason for consult:  chest pain      HPI:  This is a 64 y.o. male ,a smoker 1 PPD since age 12 /alcohol user 5-6 beers a day with hx of CAD /NSTEMI /multiple PCI with last PCI to LAD on 7/17/23 .chronic  angna .HTN.LVH.,HLD.COPD/asthma and depression  who  presents with abdominal pain on 5/25/24 and found to have a pancreatic mass and developed pancreatitis  after his ERCP .However. The pt developed chest pain as west in nature radiated to left arm associated with sweat around 10 pm last night on the medical floor  and the sx was resolved by nitroglycerin x 1 .he had a similar chest pain later again and resolved by nitroglycerin  x 1 .THE EKG x 3 down with no significant acute finding .During my interview,he denies any chest pain .     The pt also states that he has a chronic angina and use nitro PRN at home     Lab; Trending troponin;[phone]     WBC 15.1 H/H 10.5/30,2  Echocardiogram:  7/23 CONCLUSIONS:  1. Left ventricular systolic function is normal.  2. Spectral Doppler shows an impaired relaxation pattern of left ventricular diastolic filling.  3. There is moderate thickening and calcification of the posterior mitral valve leaflet.  4. Trace to mild tricuspid regurgitation.  5. Mild to moderate aortic valve regurgitation.    Cardiac Cath:  7/23 CONCLUSIONS:  1. The entire Left Main: 0% stenosis.  2. Left Anterior Descending Artery: is diffusely diseased.  3. Proximal LAD Lesion: The percent stenosis is 50%.  4. Mid LAD Lesion: The percent stenosis is 99%.  5. Mid LAD Lesion: Instent Restenosis.  6. Mid LAD Lesion: pre-dilation, Resolute Bryan 2.75x18 post-dilation: <10% residual stenosis: pre-procedure JAGUAR flow was 2(partial perfusion) and post-procedure JAGUAR flow was  3(complete perfusion).  7. Distal LAD Lesion: The percent stenosis is 60%.  8. Proximal, mid and distal RCA Lesion: The percent stenosis is 10-30%.  9. The Left Ventricular Ejection Fraction is 60%.  10. LV: apical akinesis.    Past Medical History:     Past Medical History:   Diagnosis Date    CAD (coronary artery disease)     Chronic sinusitis, unspecified 07/13/2021    Sinobronchitis    Hyperlipidemia     Hypertension               Past Surgical History:     Past Surgical History:   Procedure Laterality Date    CARDIAC CATHETERIZATION      stent placement    HERNIA REPAIR      KNEE CARTILAGE SURGERY Right     MR HEAD ANGIO WO IV CONTRAST  12/21/2021    MR HEAD ANGIO WO IV CONTRAST 12/21/2021                    Family History:     Family History   Problem Relation Name Age of Onset    Other (cardiac disorder) Mother      Stroke Mother      Pancreatic cancer Mother      Suicide Attempts Father      Other (cabg) Brother      Coronary artery disease Brother      No Known Problems Other              Social History:    Social History     Tobacco Use    Smoking status: Every Day     Current packs/day: 1.00     Average packs/day: 1 pack/day for 50.0 years (50.0 ttl pk-yrs)     Types: Cigarettes    Smokeless tobacco: Never   Substance Use Topics    Alcohol use: Yes     Alcohol/week: 2.0 standard drinks of alcohol     Types: 2 Cans of beer per week     Comment: daily    Drug use: Not Currently            Allergies:      Allergies   Allergen Reactions    Ramipril Other     hyperkalemia              Medications:     Prior to Admission Medications   Prescriptions Last Dose Informant Patient Reported? Taking?   ALPRAZolam (Xanax) 0.5 mg tablet  Self No No   Sig: TAKE ONE TABLET (0.5 MG) BY MOUTH DAILY AS NEEDED FOR ANXIETY   ALPRAZolam (Xanax) 0.5 mg tablet Past Week Self No Yes   Sig: TAKE ONE TABLET (0.5 MG) BY MOUTH ONCE DAILY AS NEEDED FOR ANXIETY   FLUoxetine (PROzac) 20 mg capsule 5/23/2024 Self No No   Sig: TAKE ONE  CAPSULE BY MOUTH DAILY   HYDROcodone-acetaminophen (Norco) 5-325 mg tablet Past Month Self No Yes   Sig: Take 1 tablet by mouth 2 times a day as needed for moderate pain (4 - 6).   albuterol 90 mcg/actuation inhaler Past Month Self No Yes   Sig: INHALE TWO PUFFS EVERY FOUR HOURS AS NEEDED   Patient taking differently: Inhale 2 puffs every 4 hours if needed for shortness of breath.   amLODIPine (Norvasc) 10 mg tablet 5/23/2024 Self Yes No   Sig: Take 1 tablet (10 mg) by mouth once daily.   aspirin 81 mg chewable tablet 5/23/2024 Self Yes No   Sig: Chew 1 tablet (81 mg) once daily.   atorvastatin (Lipitor) 40 mg tablet 5/23/2024 Self No No   Sig: Take 1 tablet (40 mg) by mouth once daily.   clopidogrel (Plavix) 75 mg tablet 5/23/2024 Self Yes No   Sig: Take 1 tablet (75 mg) by mouth once daily.   hydroCHLOROthiazide (Microzide) 12.5 mg capsule 5/23/2024 Self No No   Sig: Take 1 capsule (12.5 mg) by mouth once daily in the morning.   metoprolol tartrate (Lopressor) 25 mg tablet 5/23/2024 Self No No   Sig: TAKE ONE HALF OF A TABLET BY MOUTH AT BEDTIME   Patient taking differently: Take 1 tablet (25 mg) by mouth once daily at bedtime.   nitroglycerin (Nitrostat) 0.4 mg SL tablet Past Month Self No Yes   Sig: Place 1 tablet (0.4 mg) under the tongue every 5 minutes if needed for chest pain.   pantoprazole (ProtoNix) 40 mg EC tablet 5/23/2024 Self No No   Sig: Take 1 tablet (40 mg) by mouth once daily. Do not crush, chew, or split.      Facility-Administered Medications: None      Scheduled medications   Medication Dose Route Frequency    [Held by provider] amLODIPine  10 mg oral Daily    aspirin  81 mg oral Daily    clopidogrel  75 mg oral Daily    FLUoxetine  20 mg oral Daily    heparin  4,000 Units intravenous Once    [Held by provider] hydroCHLOROthiazide  12.5 mg oral q AM    metoprolol tartrate  25 mg oral BID    nicotine  1 patch transdermal Daily    pantoprazole  40 mg oral Daily before breakfast     PRN  "medications   Medication    acetaminophen    Or    acetaminophen    Or    acetaminophen    acetaminophen    Or    acetaminophen    Or    acetaminophen    albuterol    ALPRAZolam    heparin    [Held by provider] HYDROcodone-acetaminophen    HYDROmorphone    labetaloL    melatonin    metoclopramide    Or    metoclopramide    morphine    morphine    nitroglycerin    ondansetron ODT    Or    ondansetron    ondansetron    polyethylene glycol     Continuous Medications   Medication Dose Last Rate    heparin  0-4,000 Units/hr      lactated Ringer's  250 mL/hr 250 mL/hr (05/30/24 0919)       Review of Systems   Constitutional: Positive for diaphoresis.   HENT: Negative.     Eyes: Negative.    Cardiovascular:  Positive for chest pain. Negative for leg swelling and palpitations.   Respiratory:  Negative for shortness of breath.    Endocrine: Negative.    Skin:  Positive for color change.   Musculoskeletal: Negative.    Gastrointestinal:  Positive for abdominal pain.   Genitourinary: Negative.    Neurological: Negative.    Psychiatric/Behavioral:  Positive for depression.         Vitals:     /62 (BP Location: Left arm, Patient Position: Lying)   Pulse 92   Temp 36.7 °C (98.1 °F) (Temporal)   Resp 16   Ht 1.702 m (5' 7\")   Wt 68 kg (149 lb 14.6 oz)   SpO2 98%   BMI 23.48 kg/m²     Patient Vitals for the past 24 hrs:   BP Temp Temp src Pulse Resp SpO2 Weight   05/30/24 0700 110/62 36.7 °C (98.1 °F) Temporal 92 16 98 % --   05/30/24 0350 133/69 36.7 °C (98.1 °F) Temporal 96 20 98 % 68 kg (149 lb 14.6 oz)   05/29/24 2252 109/67 36.1 °C (97 °F) Temporal 76 14 95 % --   05/29/24 2020 132/71 36.5 °C (97.7 °F) Temporal 56 16 100 % --   05/29/24 2015 154/84 -- -- 106 16 -- --   05/29/24 2010 175/85 -- -- 105 16 98 % --   05/29/24 1600 113/73 36.6 °C (97.9 °F) Temporal 82 16 96 % --   05/29/24 1200 109/69 36.8 °C (98.2 °F) Temporal 66 16 98 % --        Body mass index is 23.48 kg/m².     Vitals:    05/30/24 0350   Weight: " "68 kg (149 lb 14.6 oz)          Intake/Output Summary (Last 24 hours) at 5/30/2024 1016  Last data filed at 5/30/2024 0800  Gross per 24 hour   Intake 5000 ml   Output --   Net 5000 ml           Physical Exam  HENT:      Head: Normocephalic.   Cardiovascular:      Rate and Rhythm: Normal rate.   Pulmonary:      Effort: Pulmonary effort is normal.   Abdominal:      General: Abdomen is flat.   Musculoskeletal:         General: No swelling.      Cervical back: Normal range of motion.   Skin:     Coloration: Skin is jaundiced.   Neurological:      Mental Status: He is alert and oriented to person, place, and time.   Psychiatric:         Mood and Affect: Mood normal.          Labs:     No results found for: \"CKTOTAL\", \"CKMB\", \"CKMBINDEX\", \"TROPONINI\"      Lab Results   Component Value Date    TROPHS 534 () 05/30/2024    TROPHS 656 () 05/29/2024    TROPHS 351 () 05/29/2024      Lab Results   Component Value Date    GLUCOSE 85 05/30/2024    CALCIUM 8.4 (L) 05/30/2024     (L) 05/30/2024    K 3.4 (L) 05/30/2024    CO2 25 05/30/2024     05/30/2024    BUN 6 05/30/2024    CREATININE 0.60 05/30/2024      Lab Results   Component Value Date    WBC 15.1 (H) 05/30/2024    HGB 10.5 (L) 05/30/2024    HCT 30.2 (L) 05/30/2024     05/30/2024     05/30/2024        LABS:  ABGs: No results found for: \"PH\"  PRO-BNP: No results found for: \"PROBNP\"   TSH: No results found for: \"TSH\"   Lipid Profile: No results found for: \"TRIG\", \"HDL\", \"LDLCALC\", \"CHOL\"   Hemoglobin A1C: No results found for: \"HGBA1C\"   Magnesium:    Lab Results   Component Value Date    MG 1.62 05/30/2024       P  Electrocardiogram, 12-lead PRN ACS symptoms    Result Date: 5/29/2024  Normal sinus rhythm Possible Left atrial enlargement Low voltage QRS Septal infarct , age undetermined Abnormal ECG When compared with ECG of 24-MAY-2024 16:45, Premature ventricular complexes are no longer Present    FL fluoro images no charge    Result Date: " 5/28/2024  These images are not reportable by radiology and will not be interpreted by  Radiologists.    Endoscopic Retrograde Cholangiopancreatography (ERCP)    Result Date: 5/28/2024  Table formatting from the original result was not included. Impression The common bile duct was deeply cannulated after 10 attempts. Cannulation was difficult due to inadvertent cannulation, small ampulla and Distal CBD stricture. 20 mm localized, intrinsic, smooth and severe stricture was visualized in the distal common bile duct Complete major papilla sphincterotomy was performed. Performed brushings with cytology brush in the distal common bile duct One 8 mm x 8 cm fully covered stent was placed in the common bile duct Findings The major papilla was native. Ampullectomy was not performed. Small ampulla, mildly deformed due to external compression. The common bile duct was deeply cannulated after 10 attempts using a traction sphincterotome with 260 cm straight guidewire. Cannulation was difficult due to inadvertent cannulation, small ampulla and Distal CBD stricture. No bleeding was observed. . Unintentional cannulation PD was performed. 20 mm localized, intrinsic, smooth and severe stricture was visualized in the distal common bile duct. Complete 5 mm major papilla sphincterotomy was performed using a sphincterotome. No bleeding was noted at the procedure site. Performed 3 brushings with cytology brush in the distal common bile duct. One 8 mm x 8 cm fully covered metal stent was placed successfully in the common bile duct. Recommendation  Await pathology results  Follow up with oncology and pancreato biliary surgeon as outpatient Watch for complication  Indication Jaundice, Generalized abdominal pain, Pancreatic mass ([facility]) Staff Staff Role Shanda Gong MD Proceduralist Medications See Anesthesia Record. Preprocedure A history and physical has been performed, and patient medication allergies have been reviewed. The patient's  tolerance of previous anesthesia has been reviewed. The risks and benefits of the procedure and the sedation options and risks were discussed with the patient. All questions were answered and informed consent obtained. Rectal Indomethacin was not administered. Details of the Procedure The patient underwent general anesthesia, which was administered by an anesthesia professional. The patient's blood pressure, heart rate, level of consciousness, oxygen, respirations, ECG and ETCO2 were monitored throughout the procedure. The scope was introduced through the mouth and advanced to the second part of the duodenum. The patient experienced no blood loss. The procedure was not difficult. The patient tolerated the procedure well. There were no apparent adverse events. Events Procedure Events Event Event Time ENDO SCOPE IN TIME 5/28/2024  3:00 PM ENDO SCOPE OUT TIME 5/28/2024  3:00 PM ENDO SCOPE IN TIME 5/28/2024  3:02 PM ENDO SCOPE OUT TIME 5/28/2024  3:30 PM ENDO SCOPE IN TIME 5/28/2024  3:47 PM Specimens ID Type Source Tests Collected by Time 1 : FNA Pancreatic Head Mass Non-Gynecologic Cytology PANCREAS FINE NEEDLE ASPIRATION HEAD CYTOLOGY CONSULTATION (NON-GYNECOLOGIC) Devorah Yost 5/28/2024 1512 2 : FNB Pancreatic Head Mass Tissue PANCREAS BIOPSY SURGICAL PATHOLOGY EXAM Devorah Yost 5/28/2024 1513 3 :  Non-Gynecologic Cytology BILE DUCT BRUSH COMMON CYTOLOGY CONSULTATION (NON-GYNECOLOGIC) Devorah Yost 5/28/2024 1643 Procedure Location [facility] [id number] [place] [id number] [phone] Referring Provider Zack Isaacs MD [address] [address] Procedure Provider Shanda Gong MD     Endoscopic Ultrasound (Upper)    Result Date: 5/28/2024  Table formatting from the original result was not included. Impression The cricopharynx, upper third of the esophagus, middle third of the esophagus, lower third of the esophagus and GE junction  appeared normal. The body of the stomach and antrum appeared normal. Mild extrinsic compression was observed in the 2nd part of the duodenum The duodenal bulb appeared normal. The pancreas appeared atrophic. The parenchyma was visualized in the body of the pancreas and tail of the pancreas. The parenchyma was heterogeneous and had hyperechoic strands. Single round, homogeneous, hypoechoic and solid mass measuring 30 mm x 25 mm with well-defined margins was visualized in the head of the pancreas. Apparent abutment of the portal vein and superior mesenteric vein; 3 fine needle aspiration passes were taken. An adequate sample of aspirate was obtained. The sample was sent for cytology analysis; 3 fine needle biopsy passes were taken. An adequate sample was obtained. A sample was sent for histology analysis The proximal bile duct was dilated. The parenchyma was visualized in the left lobe of the liver. The parenchyma was homogeneous and hyperechoic. The common hepatic duct, left main hepatic duct and right main hepatic duct appeared dilated. The left kidney, spleen and left adrenal appeared normal. Findings The cricopharynx, upper third of the esophagus, middle third of the esophagus, lower third of the esophagus and GE junction appeared normal. The body of the stomach and antrum appeared normal. Mild, traversable extrinsic compression was observed in the 2nd part of the duodenum The duodenal bulb appeared normal. The pancreas appeared atrophic. The parenchyma was visualized in the body of the pancreas and tail of the pancreas. The parenchyma was heterogeneous and had hyperechoic strands. The pancreatic duct measured 2 mm at the head, 3 mm at the body and 1 mm at the tail. Single round, homogeneous, hypoechoic and solid mass measuring 30 mm x 25 mm with well-defined margins was visualized in the head of the pancreas. Apparent abutment of the portal vein and superior mesenteric vein; 3 fine needle aspiration passes were  taken with a 22 gauge Sharcore needle using a transduodenal approach guided by Doppler. An adequate sample of aspirate was obtained. The sample was sent for cytology analysis. Onsite cytologist was not present; 3 successful fine needle biopsy passes were taken with a 22 gauge Sharcore needle using a transduodenal approach. An adequate sample was obtained. A sample was sent for histology analysis. Onsite cytologist was not present The proximal bile duct was dilated. The bile duct measured 12 mm at the middle. The parenchyma was visualized in the left lobe of the liver. The parenchyma was homogeneous and hyperechoic. The common hepatic duct, left main hepatic duct and right main hepatic duct appeared dilated. The left kidney, spleen and left adrenal appeared normal. Recommendation  Await pathology results  Proceed to ERCP  Indication Jaundice, Generalized abdominal pain, Pancreatic mass ([facility]) Staff Staff Role Shanda Gong MD Proceduralist Medications See Anesthesia Record. Preprocedure A history and physical has been performed, and patient medication allergies have been reviewed. The patient's tolerance of previous anesthesia has been reviewed. The risks and benefits of the procedure and the sedation options and risks were discussed with the patient. All questions were answered and informed consent obtained. Details of the Procedure The patient underwent general anesthesia, which was administered by an anesthesia professional. The patient's blood pressure, heart rate, level of consciousness, oxygen, respirations, ECG and ETCO2 were monitored throughout the procedure. The radial scope was introduced through the mouth and advanced to the second part of the duodenum. The patient's estimated blood loss was minimal (<5 mL). The procedure was not difficult. The patient tolerated the procedure well. There were no apparent adverse events. Events Procedure Events Event Event Time ENDO SCOPE IN TIME 5/28/2024  3:00 PM ENDO  SCOPE OUT TIME 5/28/2024  3:00 PM ENDO SCOPE IN TIME 5/28/2024  3:02 PM ENDO SCOPE OUT TIME 5/28/2024  3:30 PM Specimens ID Type Source Tests Collected by Time 1 : FNA Pancreatic Head Mass Non-Gynecologic Cytology PANCREAS FINE NEEDLE ASPIRATION HEAD CYTOLOGY CONSULTATION (NON-GYNECOLOGIC) Devorah Yost 5/28/2024 1512 2 : FNB Pancreatic Head Mass Tissue PANCREAS BIOPSY SURGICAL PATHOLOGY EXAM Devorah Yost 5/28/2024 1513 Procedure Location [facility] [address] [facility] [id number] [phone] Referring Provider Zack Whitney MD [address] [facility] [address] [place] [address] Procedure Provider Shanda Gong MD     ECG 12 lead    Result Date: 5/25/2024  Sinus rhythm with frequent Premature ventricular complexes Otherwise normal ECG When compared with ECG of 24-MAY-2024 16:43, (unconfirmed) Premature ventricular complexes are now Present See ED provider note for full interpretation and clinical correlation Confirmed by Deyanira Lantigua ([id number]) on 5/25/2024 5:28:37 PM    CT abdomen pelvis w IV contrast    Result Date: 5/24/2024  STUDY: CT Abdomen and Pelvis with IV Contrast; 05/24/2024 at 5:51 PM INDICATION: Right upper quadrant abdominal pain. Jaundice. COMPARISON: None available. ACCESSION NUMBER(S): [id number] ORDERING CLINICIAN: ZACK WHITNEY TECHNIQUE: CT of the abdomen and pelvis was performed.  Contiguous axial images were obtained at 3 mm slice thickness through the abdomen and pelvis. Coronal and sagittal reconstructions at 3 mm slice thickness were performed.  Omnipaque-350 75 mL was administered intravenously.  FINDINGS: LOWER CHEST: No cardiomegaly.  No pericardial effusion.  Lung bases are clear.  ABDOMEN:  LIVER: No hepatomegaly.  Smooth surface contour.  Normal attenuation.  BILE DUCTS: There is intrahepatic and common bile bile duct dilatation.  This can be followed into the head of the pancreas.  GALLBLADDER: The gallbladder is present.   There is intermediate attenuation the gallbladder most likely representing sludge. STOMACH: No abnormalities identified.  PANCREAS: There is an ill-defined 2.5 cm mass in the head of the pancreas.  This is best seen on series 201, slice 61 as well as series 202, slice 45. There is pancreatic duct dilatation.  SPLEEN: No splenomegaly or focal splenic lesion.  ADRENAL GLANDS: No thickening or nodules.  KIDNEYS AND URETERS: Kidneys are normal in size and location.  No renal or ureteral calculi.  PELVIS:  BLADDER: No abnormalities identified.  REPRODUCTIVE ORGANS: No abnormalities identified.  BOWEL: No abnormalities identified.  The appendix is identified and is normal.  VESSELS: No abnormalities identified.  Abdominal aorta is normal in caliber.  PERITONEUM/RETROPERITONEUM/LYMPH NODES: No free fluid.  No pneumoperitoneum. No lymphadenopathy.  ABDOMINAL WALL: No abnormalities identified. SOFT TISSUES: No abnormalities identified.  BONES: No acute fracture or aggressive osseous lesion.    2.5 cm mass in the head of the pancreas.  There is dilatation of the biliary ducts as well as the pancreatic duct and neoplasm cannot be excluded.  This is best evaluated with ERCP or MRCP.  There are findings consistent with sludge in the gallbladder. Signed by Bhanu Taveras MD          Assessment/Plan:   1.NSTEMI:A 64 y.o. male ,a smoker 1 PPD since age 12 /alcohol user 5-6 beers a day with hx of CAD /NSTEMI /multiple PCI with last PCI to LAD on 7/17/23 .chronic  angna .HTN.LVH.,HLD.COPD/asthma and depression  who  presents with abdominal pain on 5/25/24 and found to have a pancreatic mass and developed pancreatitis  after his ERCP .However. The pt developed chest pain detailed in HPI . EKG x 3 ; no acute finding, troponin was trending up at second and trending down at 3rd as [phone] .  The pt has great pain from his pancreatitis and using pain killer q 2 hrs .    -Recommend : starting Heparin drip and increase B-Blocker . no  cardiac cath for now giving  he has a sx free now and he has a significant abdominal  pain for which required pain med q 2 hrs   -increase metoprolol to 25 mg tid from every day   Echo stat to assess LV function and wall motion   -continue ASA 81 mg,plavix 75 mg  -lipitor is on hold due to elevated liver enzyme -[id number]/     Above plan is directed by Dr Quintanilla and discussed to the pt ,he agrees with the plan       AT 1320, The pt c/o chest pain again along with sweat when he is on Heparin drip  The sx is improved by nitroglycerin x 1   A/P: cardiac cath this afternoon giving typical sx of angina recurrent on heparin drip  and significant cardiac hx. The pt and family at bedside agree the plan  After I explained in detail of the reason of the cath. I also explained in detail risk and benefits including infection,bleeding.MI.stroke and death.they understand well and agrees the plan.  -give Mag 4 g IV now for Mg 1.62  potassium 3.4 give 40 meq po     Above plan is directed by Dr Quintanilla .    Consults    ADDENDUM:   Patient developed recurrent chest pain which has since subsided. Last episode of angina at home was about 1 month ago. Per wife, he has more regular chest discomfort than he is admitting to now. Echo shows normal LV function with mild apical hypokinesis. Is heading to cardiac cath lab for diagnostic angiography and therapy based on those findings. Will continue conservative medical management in the interim.     Patient seen and examined independently, chart and prior imaging reviewed independently. Plan discussed with patient and wife at the bedside. Plan discussed with interventional cardiology.     Daina Peres MD

## 2024-05-30 NOTE — PROGRESS NOTES
Spiritual Care Visit    Clinical Encounter Type  Visited With: Patient and family together  Routine Visit: Introduction  Continue Visiting: No                                            Taxonomy  Intended Effects: Preserve dignity and respect, Promote sense of peace, Helping someone feel comforted  Methods: Offer support  Interventions: Share words of hope and inspiration    Patient was with his wife.   Patient shared his lifelong hobby of Motorcycles, particularly [employer].   provided companionship and validation.

## 2024-05-30 NOTE — PROGRESS NOTES
"Emerson Chilel is a 64 y.o. male on day 5 of admission presenting with Pancreatic mass ([facility]).    Subjective   Patient seen and examined this morning, overnight I was contacted by night nurse due to concern for patient developing chest pain.  Patient had multiple EKGs done overnight showing PVCs, otherwise no signs of ischemia.  However troponin was elevated up to 300s with a repeat uptrending.  Final repeat this morning slightly down trended.  At the time of evaluation this morning, patient continued to state he gets intermittent episode of chest pain.  Case discussed with cardiology team, will, evaluate the patient during the day.  May ultimately need to perform diagnostic heart cath pending evaluation.  Otherwise, patient states that he still has abdominal pain that is unchanged compared to previous day, however this is controlled by IV morphine and Dilaudid. Denies any nausea/vomiting.        Objective   Constitutional: Well developed, awake/alert/oriented x3, no distress, alert and cooperative  ENMT: mucous membranes moist  Head/Neck: Neck supple  Respiratory/Thorax: CTA b/l.   Cardiovascular: Regular, rate and rhythm, no murmurs  Gastrointestinal: Nondistended, soft, diffusely tender but more so in the epigastric region  Musculoskeletal: ROM intact  Extremities: normal extremities  Skin Jaundiced      Last Recorded Vitals  Blood pressure 110/62, pulse 92, temperature 36.7 °C (98.1 °F), temperature source Temporal, resp. rate 16, height 1.702 m (5' 7\"), weight 68 kg (149 lb 14.6 oz), SpO2 98%.  Intake/Output last 3 Shifts:  I/O last 3 completed shifts:  In: 3308.3 (48.7 mL/kg) [P.O.:150; I.V.:2158.3 (31.7 mL/kg); IV Piggyback:1000]  Out: - (0 mL/kg)   Weight: 68 kg     Relevant Results  Scheduled medications  amLODIPine, 10 mg, oral, Daily  aspirin, 81 mg, oral, Daily  clopidogrel, 75 mg, oral, Daily  FLUoxetine, 20 mg, oral, Daily  heparin (porcine), 5,000 Units, subcutaneous, q8h AMBROSIO  [Held by provider] " hydroCHLOROthiazide, 12.5 mg, oral, q AM  metoprolol tartrate, 25 mg, oral, Nightly  nicotine, 1 patch, transdermal, Daily  pantoprazole, 40 mg, oral, Daily before breakfast      Continuous medications  lactated Ringer's, 250 mL/hr, Last Rate: 250 mL/hr (05/30/24 0512)      PRN medications  PRN medications: acetaminophen **OR** acetaminophen **OR** acetaminophen, acetaminophen **OR** acetaminophen **OR** acetaminophen, albuterol, ALPRAZolam, [Held by provider] HYDROcodone-acetaminophen, HYDROmorphone, labetaloL, melatonin, metoclopramide **OR** metoclopramide, morphine, morphine, nitroglycerin, ondansetron ODT **OR** ondansetron, ondansetron, polyethylene glycol  Results from last 7 days   Lab Units 05/30/24  0427 05/29/24  0617 05/28/24  0525   WBC AUTO x10*3/uL 15.1* 12.3* 9.1   RBC AUTO x10*6/uL 3.03* 3.34* 3.66*   HEMOGLOBIN g/dL 10.5* 11.5* 12.4*     Results from last 7 days   Lab Units 05/29/24  0617 05/28/24  0525 05/27/24  0549 05/26/24  0600   SODIUM mmol/L 129* 132* 131* 131*   POTASSIUM mmol/L 3.6 4.1 3.7 3.2*   CHLORIDE mmol/L 99 100 98 95*   CO2 mmol/L 24 24 25 26   BUN mg/dL 9 11 10 11   CREATININE mg/dL 0.60 0.69 0.73 0.67   CALCIUM mg/dL 8.6 8.8 8.8 8.8   PHOSPHORUS mg/dL  --   --  2.9 2.3*   MAGNESIUM mg/dL 1.75  --  2.01 2.03   BILIRUBIN TOTAL mg/dL 19.0* 21.0* 20.5* 19.2*   ALT U/L 229* 256* 266* 273*   AST U/L 150* 173* 187* 211*         Assessment/Plan   Principal Problem:    Pancreatic mass ([facility])  Active Problems:    Jaundice    Hypokalemia    Generalized abdominal pain    Post-ERCP acute pancreatitis ([facility]-HCC)    NSTEMI (non-ST elevated myocardial infarction) (Multi)      Plan  -GI on consult, status post EUS/ERCP on 5/29  -Postop, patient feels abdominal pain is worse.  The procedure was complicated due to difficulty with cannulation.  -Post ERCP, patient developing worsening abdominal pain, suspect this could be due to ERCP induced pancreatitis  -Patient started on /h along with  pain control  -lipase 795 (was 208 on initial admission)  -Bilirubin is improving, however only slightly from 21 down to 19. Repeat pending today  - Continue to trend CMP  - CA 19-9 is 6453, this result was discussed with the patient, aware the mass could be cancerous  - Potassium 4.1, magnesium 2.01, replace as needed  - Patient developed chest pain overnight on 5/29.  Troponin elevated compared to baseline, concerning for NSTEMI.  Case discussed with cardiology team, will see the patient for evaluation, possibly need diagnostic heart cath.  This however may complicate patient's condition if a new stent is placed requiring patient to be on dual antiplatelet, thus resulting in increased the risk of bleeding if any pancreatic surgery is needed.  - Continue morphine / dialaudid as needed for pain  - Continue aspirin, plavix resumed on 5/29 post ERCP  - Continue diet as tolerated  - Heparin subcu for DVT prophylaxis          I spent 35 minutes in the professional and overall care of this patient.      Chintan Chowdhury MD

## 2024-05-30 NOTE — CONSULTS
Cardiology Consult           PATIENT NAME:  Emerson Chilel       MRN: [id number]     DATE of SERVICE: May 30, 2024           Primary Care Physician: Anthony Moore DO      Consulting Physician:  Dr Peres          Reason for consult:  chest pain      HPI:  This is a 64 y.o. male ,a smoker 1 PPD since age 12 /alcohol user 5-6 beers a day with hx of CAD /NSTEMI /multiple PCI with last PCI to LAD on 7/17/23 .chronic  angna .HTN.LVH.,HLD.COPD/asthma and depression  who  presents with abdominal pain on 5/25/24 and found to have a pancreatic mass and developed pancreatitis  after his ERCP .However. The pt developed chest pain as west in nature radiated to left arm associated with sweat around 10 pm last night on the medical floor  and the sx was resolved by nitroglycerin x 1 .he had a similar chest pain later again and resolved by nitroglycerin  x 1 .THE EKG x 3 down with no significant acute finding .During my interview,he denies any chest pain .     The pt also states that he has a chronic angina and use nitro PRN at home     Lab; Trending troponin;[phone]     WBC 15.1 H/H 10.5/30,2  Echocardiogram:  7/23 CONCLUSIONS:  1. Left ventricular systolic function is normal.  2. Spectral Doppler shows an impaired relaxation pattern of left ventricular diastolic filling.  3. There is moderate thickening and calcification of the posterior mitral valve leaflet.  4. Trace to mild tricuspid regurgitation.  5. Mild to moderate aortic valve regurgitation.    Cardiac Cath:  7/23 CONCLUSIONS:  1. The entire Left Main: 0% stenosis.  2. Left Anterior Descending Artery: is diffusely diseased.  3. Proximal LAD Lesion: The percent stenosis is 50%.  4. Mid LAD Lesion: The percent stenosis is 99%.  5. Mid LAD Lesion: Instent Restenosis.  6. Mid LAD Lesion: pre-dilation, Resolute Bryan 2.75x18 post-dilation: <10% residual stenosis: pre-procedure JAGUAR flow was 2(partial perfusion) and post-procedure JAGUAR flow was  3(complete perfusion).  7. Distal LAD Lesion: The percent stenosis is 60%.  8. Proximal, mid and distal RCA Lesion: The percent stenosis is 10-30%.  9. The Left Ventricular Ejection Fraction is 60%.  10. LV: apical akinesis.    Past Medical History:     Past Medical History:   Diagnosis Date    CAD (coronary artery disease)     Chronic sinusitis, unspecified 07/13/2021    Sinobronchitis    Hyperlipidemia     Hypertension               Past Surgical History:     Past Surgical History:   Procedure Laterality Date    CARDIAC CATHETERIZATION      stent placement    HERNIA REPAIR      KNEE CARTILAGE SURGERY Right     MR HEAD ANGIO WO IV CONTRAST  12/21/2021    MR HEAD ANGIO WO IV CONTRAST 12/21/2021                    Family History:     Family History   Problem Relation Name Age of Onset    Other (cardiac disorder) Mother      Stroke Mother      Pancreatic cancer Mother      Suicide Attempts Father      Other (cabg) Brother      Coronary artery disease Brother      No Known Problems Other              Social History:    Social History     Tobacco Use    Smoking status: Every Day     Current packs/day: 1.00     Average packs/day: 1 pack/day for 50.0 years (50.0 ttl pk-yrs)     Types: Cigarettes    Smokeless tobacco: Never   Substance Use Topics    Alcohol use: Yes     Alcohol/week: 2.0 standard drinks of alcohol     Types: 2 Cans of beer per week     Comment: daily    Drug use: Not Currently            Allergies:      Allergies   Allergen Reactions    Ramipril Other     hyperkalemia              Medications:     Prior to Admission Medications   Prescriptions Last Dose Informant Patient Reported? Taking?   ALPRAZolam (Xanax) 0.5 mg tablet  Self No No   Sig: TAKE ONE TABLET (0.5 MG) BY MOUTH DAILY AS NEEDED FOR ANXIETY   ALPRAZolam (Xanax) 0.5 mg tablet Past Week Self No Yes   Sig: TAKE ONE TABLET (0.5 MG) BY MOUTH ONCE DAILY AS NEEDED FOR ANXIETY   FLUoxetine (PROzac) 20 mg capsule 5/23/2024 Self No No   Sig: TAKE ONE  CAPSULE BY MOUTH DAILY   HYDROcodone-acetaminophen (Norco) 5-325 mg tablet Past Month Self No Yes   Sig: Take 1 tablet by mouth 2 times a day as needed for moderate pain (4 - 6).   albuterol 90 mcg/actuation inhaler Past Month Self No Yes   Sig: INHALE TWO PUFFS EVERY FOUR HOURS AS NEEDED   Patient taking differently: Inhale 2 puffs every 4 hours if needed for shortness of breath.   amLODIPine (Norvasc) 10 mg tablet 5/23/2024 Self Yes No   Sig: Take 1 tablet (10 mg) by mouth once daily.   aspirin 81 mg chewable tablet 5/23/2024 Self Yes No   Sig: Chew 1 tablet (81 mg) once daily.   atorvastatin (Lipitor) 40 mg tablet 5/23/2024 Self No No   Sig: Take 1 tablet (40 mg) by mouth once daily.   clopidogrel (Plavix) 75 mg tablet 5/23/2024 Self Yes No   Sig: Take 1 tablet (75 mg) by mouth once daily.   hydroCHLOROthiazide (Microzide) 12.5 mg capsule 5/23/2024 Self No No   Sig: Take 1 capsule (12.5 mg) by mouth once daily in the morning.   metoprolol tartrate (Lopressor) 25 mg tablet 5/23/2024 Self No No   Sig: TAKE ONE HALF OF A TABLET BY MOUTH AT BEDTIME   Patient taking differently: Take 1 tablet (25 mg) by mouth once daily at bedtime.   nitroglycerin (Nitrostat) 0.4 mg SL tablet Past Month Self No Yes   Sig: Place 1 tablet (0.4 mg) under the tongue every 5 minutes if needed for chest pain.   pantoprazole (ProtoNix) 40 mg EC tablet 5/23/2024 Self No No   Sig: Take 1 tablet (40 mg) by mouth once daily. Do not crush, chew, or split.      Facility-Administered Medications: None      Scheduled medications   Medication Dose Route Frequency    [Held by provider] amLODIPine  10 mg oral Daily    aspirin  81 mg oral Daily    clopidogrel  75 mg oral Daily    FLUoxetine  20 mg oral Daily    heparin  4,000 Units intravenous Once    [Held by provider] hydroCHLOROthiazide  12.5 mg oral q AM    metoprolol tartrate  25 mg oral BID    nicotine  1 patch transdermal Daily    pantoprazole  40 mg oral Daily before breakfast     PRN  "medications   Medication    acetaminophen    Or    acetaminophen    Or    acetaminophen    acetaminophen    Or    acetaminophen    Or    acetaminophen    albuterol    ALPRAZolam    heparin    [Held by provider] HYDROcodone-acetaminophen    HYDROmorphone    labetaloL    melatonin    metoclopramide    Or    metoclopramide    morphine    morphine    nitroglycerin    ondansetron ODT    Or    ondansetron    ondansetron    polyethylene glycol     Continuous Medications   Medication Dose Last Rate    heparin  0-4,000 Units/hr      lactated Ringer's  250 mL/hr 250 mL/hr (05/30/24 0919)       Review of Systems   Constitutional: Positive for diaphoresis.   HENT: Negative.     Eyes: Negative.    Cardiovascular:  Positive for chest pain. Negative for leg swelling and palpitations.   Respiratory:  Negative for shortness of breath.    Endocrine: Negative.    Skin:  Positive for color change.   Musculoskeletal: Negative.    Gastrointestinal:  Positive for abdominal pain.   Genitourinary: Negative.    Neurological: Negative.    Psychiatric/Behavioral:  Positive for depression.         Vitals:     /62 (BP Location: Left arm, Patient Position: Lying)   Pulse 92   Temp 36.7 °C (98.1 °F) (Temporal)   Resp 16   Ht 1.702 m (5' 7\")   Wt 68 kg (149 lb 14.6 oz)   SpO2 98%   BMI 23.48 kg/m²     Patient Vitals for the past 24 hrs:   BP Temp Temp src Pulse Resp SpO2 Weight   05/30/24 0700 110/62 36.7 °C (98.1 °F) Temporal 92 16 98 % --   05/30/24 0350 133/69 36.7 °C (98.1 °F) Temporal 96 20 98 % 68 kg (149 lb 14.6 oz)   05/29/24 2252 109/67 36.1 °C (97 °F) Temporal 76 14 95 % --   05/29/24 2020 132/71 36.5 °C (97.7 °F) Temporal 56 16 100 % --   05/29/24 2015 154/84 -- -- 106 16 -- --   05/29/24 2010 175/85 -- -- 105 16 98 % --   05/29/24 1600 113/73 36.6 °C (97.9 °F) Temporal 82 16 96 % --   05/29/24 1200 109/69 36.8 °C (98.2 °F) Temporal 66 16 98 % --        Body mass index is 23.48 kg/m².     Vitals:    05/30/24 0350   Weight: " "68 kg (149 lb 14.6 oz)          Intake/Output Summary (Last 24 hours) at 5/30/2024 1016  Last data filed at 5/30/2024 0800  Gross per 24 hour   Intake 5000 ml   Output --   Net 5000 ml           Physical Exam  HENT:      Head: Normocephalic.   Cardiovascular:      Rate and Rhythm: Normal rate.   Pulmonary:      Effort: Pulmonary effort is normal.   Abdominal:      General: Abdomen is flat.   Musculoskeletal:         General: No swelling.      Cervical back: Normal range of motion.   Skin:     Coloration: Skin is jaundiced.   Neurological:      Mental Status: He is alert and oriented to person, place, and time.   Psychiatric:         Mood and Affect: Mood normal.          Labs:     No results found for: \"CKTOTAL\", \"CKMB\", \"CKMBINDEX\", \"TROPONINI\"      Lab Results   Component Value Date    TROPHS 534 () 05/30/2024    TROPHS 656 () 05/29/2024    TROPHS 351 () 05/29/2024      Lab Results   Component Value Date    GLUCOSE 85 05/30/2024    CALCIUM 8.4 (L) 05/30/2024     (L) 05/30/2024    K 3.4 (L) 05/30/2024    CO2 25 05/30/2024     05/30/2024    BUN 6 05/30/2024    CREATININE 0.60 05/30/2024      Lab Results   Component Value Date    WBC 15.1 (H) 05/30/2024    HGB 10.5 (L) 05/30/2024    HCT 30.2 (L) 05/30/2024     05/30/2024     05/30/2024        LABS:  ABGs: No results found for: \"PH\"  PRO-BNP: No results found for: \"PROBNP\"   TSH: No results found for: \"TSH\"   Lipid Profile: No results found for: \"TRIG\", \"HDL\", \"LDLCALC\", \"CHOL\"   Hemoglobin A1C: No results found for: \"HGBA1C\"   Magnesium:    Lab Results   Component Value Date    MG 1.62 05/30/2024       P  Electrocardiogram, 12-lead PRN ACS symptoms    Result Date: 5/29/2024  Normal sinus rhythm Possible Left atrial enlargement Low voltage QRS Septal infarct , age undetermined Abnormal ECG When compared with ECG of 24-MAY-2024 16:45, Premature ventricular complexes are no longer Present    FL fluoro images no charge    Result Date: " 5/28/2024  These images are not reportable by radiology and will not be interpreted by  Radiologists.    Endoscopic Retrograde Cholangiopancreatography (ERCP)    Result Date: 5/28/2024  Table formatting from the original result was not included. Impression The common bile duct was deeply cannulated after 10 attempts. Cannulation was difficult due to inadvertent cannulation, small ampulla and Distal CBD stricture. 20 mm localized, intrinsic, smooth and severe stricture was visualized in the distal common bile duct Complete major papilla sphincterotomy was performed. Performed brushings with cytology brush in the distal common bile duct One 8 mm x 8 cm fully covered stent was placed in the common bile duct Findings The major papilla was native. Ampullectomy was not performed. Small ampulla, mildly deformed due to external compression. The common bile duct was deeply cannulated after 10 attempts using a traction sphincterotome with 260 cm straight guidewire. Cannulation was difficult due to inadvertent cannulation, small ampulla and Distal CBD stricture. No bleeding was observed. . Unintentional cannulation PD was performed. 20 mm localized, intrinsic, smooth and severe stricture was visualized in the distal common bile duct. Complete 5 mm major papilla sphincterotomy was performed using a sphincterotome. No bleeding was noted at the procedure site. Performed 3 brushings with cytology brush in the distal common bile duct. One 8 mm x 8 cm fully covered metal stent was placed successfully in the common bile duct. Recommendation  Await pathology results  Follow up with oncology and pancreato biliary surgeon as outpatient Watch for complication  Indication Jaundice, Generalized abdominal pain, Pancreatic mass ([facility]) Staff Staff Role Shanda Gong MD Proceduralist Medications See Anesthesia Record. Preprocedure A history and physical has been performed, and patient medication allergies have been reviewed. The patient's  tolerance of previous anesthesia has been reviewed. The risks and benefits of the procedure and the sedation options and risks were discussed with the patient. All questions were answered and informed consent obtained. Rectal Indomethacin was not administered. Details of the Procedure The patient underwent general anesthesia, which was administered by an anesthesia professional. The patient's blood pressure, heart rate, level of consciousness, oxygen, respirations, ECG and ETCO2 were monitored throughout the procedure. The scope was introduced through the mouth and advanced to the second part of the duodenum. The patient experienced no blood loss. The procedure was not difficult. The patient tolerated the procedure well. There were no apparent adverse events. Events Procedure Events Event Event Time ENDO SCOPE IN TIME 5/28/2024  3:00 PM ENDO SCOPE OUT TIME 5/28/2024  3:00 PM ENDO SCOPE IN TIME 5/28/2024  3:02 PM ENDO SCOPE OUT TIME 5/28/2024  3:30 PM ENDO SCOPE IN TIME 5/28/2024  3:47 PM Specimens ID Type Source Tests Collected by Time 1 : FNA Pancreatic Head Mass Non-Gynecologic Cytology PANCREAS FINE NEEDLE ASPIRATION HEAD CYTOLOGY CONSULTATION (NON-GYNECOLOGIC) Devorah Yost 5/28/2024 1512 2 : FNB Pancreatic Head Mass Tissue PANCREAS BIOPSY SURGICAL PATHOLOGY EXAM Devorah Yost 5/28/2024 1513 3 :  Non-Gynecologic Cytology BILE DUCT BRUSH COMMON CYTOLOGY CONSULTATION (NON-GYNECOLOGIC) Devorah Yost 5/28/2024 1643 Procedure Location [facility] 3 [facility] [id number] [place] [id number] [phone] Referring Provider Zack Isaacs MD [address] [address] Procedure Provider Shanda Gong MD     Endoscopic Ultrasound (Upper)    Result Date: 5/28/2024  Table formatting from the original result was not included. Impression The cricopharynx, upper third of the esophagus, middle third of the esophagus, lower third of the esophagus and GE junction  appeared normal. The body of the stomach and antrum appeared normal. Mild extrinsic compression was observed in the 2nd part of the duodenum The duodenal bulb appeared normal. The pancreas appeared atrophic. The parenchyma was visualized in the body of the pancreas and tail of the pancreas. The parenchyma was heterogeneous and had hyperechoic strands. Single round, homogeneous, hypoechoic and solid mass measuring 30 mm x 25 mm with well-defined margins was visualized in the head of the pancreas. Apparent abutment of the portal vein and superior mesenteric vein; 3 fine needle aspiration passes were taken. An adequate sample of aspirate was obtained. The sample was sent for cytology analysis; 3 fine needle biopsy passes were taken. An adequate sample was obtained. A sample was sent for histology analysis The proximal bile duct was dilated. The parenchyma was visualized in the left lobe of the liver. The parenchyma was homogeneous and hyperechoic. The common hepatic duct, left main hepatic duct and right main hepatic duct appeared dilated. The left kidney, spleen and left adrenal appeared normal. Findings The cricopharynx, upper third of the esophagus, middle third of the esophagus, lower third of the esophagus and GE junction appeared normal. The body of the stomach and antrum appeared normal. Mild, traversable extrinsic compression was observed in the 2nd part of the duodenum The duodenal bulb appeared normal. The pancreas appeared atrophic. The parenchyma was visualized in the body of the pancreas and tail of the pancreas. The parenchyma was heterogeneous and had hyperechoic strands. The pancreatic duct measured 2 mm at the head, 3 mm at the body and 1 mm at the tail. Single round, homogeneous, hypoechoic and solid mass measuring 30 mm x 25 mm with well-defined margins was visualized in the head of the pancreas. Apparent abutment of the portal vein and superior mesenteric vein; 3 fine needle aspiration passes were  taken with a 22 gauge Sharcore needle using a transduodenal approach guided by Doppler. An adequate sample of aspirate was obtained. The sample was sent for cytology analysis. Onsite cytologist was not present; 3 successful fine needle biopsy passes were taken with a 22 gauge Sharcore needle using a transduodenal approach. An adequate sample was obtained. A sample was sent for histology analysis. Onsite cytologist was not present The proximal bile duct was dilated. The bile duct measured 12 mm at the middle. The parenchyma was visualized in the left lobe of the liver. The parenchyma was homogeneous and hyperechoic. The common hepatic duct, left main hepatic duct and right main hepatic duct appeared dilated. The left kidney, spleen and left adrenal appeared normal. Recommendation  Await pathology results  Proceed to ERCP  Indication Jaundice, Generalized abdominal pain, Pancreatic mass ([facility]) Staff Staff Role Shanda Gong MD Proceduralist Medications See Anesthesia Record. Preprocedure A history and physical has been performed, and patient medication allergies have been reviewed. The patient's tolerance of previous anesthesia has been reviewed. The risks and benefits of the procedure and the sedation options and risks were discussed with the patient. All questions were answered and informed consent obtained. Details of the Procedure The patient underwent general anesthesia, which was administered by an anesthesia professional. The patient's blood pressure, heart rate, level of consciousness, oxygen, respirations, ECG and ETCO2 were monitored throughout the procedure. The radial scope was introduced through the mouth and advanced to the second part of the duodenum. The patient's estimated blood loss was minimal (<5 mL). The procedure was not difficult. The patient tolerated the procedure well. There were no apparent adverse events. Events Procedure Events Event Event Time ENDO SCOPE IN TIME 5/28/2024  3:00 PM ENDO  SCOPE OUT TIME 5/28/2024  3:00 PM ENDO SCOPE IN TIME 5/28/2024  3:02 PM ENDO SCOPE OUT TIME 5/28/2024  3:30 PM Specimens ID Type Source Tests Collected by Time 1 : FNA Pancreatic Head Mass Non-Gynecologic Cytology PANCREAS FINE NEEDLE ASPIRATION HEAD CYTOLOGY CONSULTATION (NON-GYNECOLOGIC) Devorah Yost 5/28/2024 1512 2 : FNB Pancreatic Head Mass Tissue PANCREAS BIOPSY SURGICAL PATHOLOGY EXAM Devorah Yost 5/28/2024 1513 Procedure Location [facility] [address] [facility] [id number] [phone] Referring Provider Zack Whitney MD [address] [facility] [address] [place] [address] Procedure Provider Shanda Gong MD     ECG 12 lead    Result Date: 5/25/2024  Sinus rhythm with frequent Premature ventricular complexes Otherwise normal ECG When compared with ECG of 24-MAY-2024 16:43, (unconfirmed) Premature ventricular complexes are now Present See ED provider note for full interpretation and clinical correlation Confirmed by Deyanira Lantigua ([id number]) on 5/25/2024 5:28:37 PM    CT abdomen pelvis w IV contrast    Result Date: 5/24/2024  STUDY: CT Abdomen and Pelvis with IV Contrast; 05/24/2024 at 5:51 PM INDICATION: Right upper quadrant abdominal pain. Jaundice. COMPARISON: None available. ACCESSION NUMBER(S): [id number] ORDERING CLINICIAN: ZACK WHITNEY TECHNIQUE: CT of the abdomen and pelvis was performed.  Contiguous axial images were obtained at 3 mm slice thickness through the abdomen and pelvis. Coronal and sagittal reconstructions at 3 mm slice thickness were performed.  Omnipaque-350 75 mL was administered intravenously.  FINDINGS: LOWER CHEST: No cardiomegaly.  No pericardial effusion.  Lung bases are clear.  ABDOMEN:  LIVER: No hepatomegaly.  Smooth surface contour.  Normal attenuation.  BILE DUCTS: There is intrahepatic and common bile bile duct dilatation.  This can be followed into the head of the pancreas.  GALLBLADDER: The gallbladder is present.   There is intermediate attenuation the gallbladder most likely representing sludge. STOMACH: No abnormalities identified.  PANCREAS: There is an ill-defined 2.5 cm mass in the head of the pancreas.  This is best seen on series 201, slice 61 as well as series 202, slice 45. There is pancreatic duct dilatation.  SPLEEN: No splenomegaly or focal splenic lesion.  ADRENAL GLANDS: No thickening or nodules.  KIDNEYS AND URETERS: Kidneys are normal in size and location.  No renal or ureteral calculi.  PELVIS:  BLADDER: No abnormalities identified.  REPRODUCTIVE ORGANS: No abnormalities identified.  BOWEL: No abnormalities identified.  The appendix is identified and is normal.  VESSELS: No abnormalities identified.  Abdominal aorta is normal in caliber.  PERITONEUM/RETROPERITONEUM/LYMPH NODES: No free fluid.  No pneumoperitoneum. No lymphadenopathy.  ABDOMINAL WALL: No abnormalities identified. SOFT TISSUES: No abnormalities identified.  BONES: No acute fracture or aggressive osseous lesion.    2.5 cm mass in the head of the pancreas.  There is dilatation of the biliary ducts as well as the pancreatic duct and neoplasm cannot be excluded.  This is best evaluated with ERCP or MRCP.  There are findings consistent with sludge in the gallbladder. Signed by Bhanu Taveras MD          Assessment/Plan:   1.NSTEMI:A 64 y.o. male ,a smoker 1 PPD since age 12 /alcohol user 5-6 beers a day with hx of CAD /NSTEMI /multiple PCI with last PCI to LAD on 7/17/23 .chronic  angna .HTN.LVH.,HLD.COPD/asthma and depression  who  presents with abdominal pain on 5/25/24 and found to have a pancreatic mass and developed pancreatitis  after his ERCP .However. The pt developed chest pain detailed in HPI . EKG x 3 ; no acute finding, troponin was trending up at second and trending down at 3rd as [phone] .  The pt has great pain from his pancreatitis and using pain killer q 2 hrs .    -Recommend : starting Heparin drip and increase B-Blocker . no  cardiac cath for now giving  he has a sx free now and he has a significant abdominal  pain for which required pain med q 2 hrs   -increase metoprolol to 25 mg tid from every day   Echo stat to assess LV function and wall motion   -continue ASA 81 mg,plavix 75 mg  -lipitor is on hold due to elevated liver enzyme -[id number]/     Above plan is directed by Dr Quintanilla and discussed to the pt ,he agrees with the plan       AT 1320, The pt c/o chest pain again along with sweat when he is on Heparin drip  The sx is improved by nitroglycerin x 1   A/P: cardiac cath this afternoon giving typical sx of angina recurrent on heparin drip  and significant cardiac hx. The pt and family at bedside agree the plan  After I explained in detail of the reason of the cath. I also explained in detail risk and benefits including infection,bleeding.MI.stroke and death.they understand well and agrees the plan.  -give Mag 4 g IV now for Mg 1.62  potassium 3.4 give 40 meq po     Above plan is directed by Dr Quintanilla .    Consults    ADDENDUM:   Patient developed recurrent chest pain which has since subsided. Last episode of angina at home was about 1 month ago. Per wife, he has more regular chest discomfort than he is admitting to now. Echo shows normal LV function with mild apical hypokinesis. Is heading to cardiac cath lab for diagnostic angiography and therapy based on those findings. Will continue conservative medical management in the interim.     Patient seen and examined independently, chart and prior imaging reviewed independently. Plan discussed with patient and wife at the bedside. Plan discussed with interventional cardiology.     Daina Peres MD

## 2024-05-30 NOTE — NURSING NOTE
Off unit to cath lab with Jean @[id number].    Received report from Rosemary; patient returned to unit at 1540 with Cath Lab RNs.

## 2024-05-30 NOTE — POST-PROCEDURE NOTE
Physician Transition of Care Summary  Invasive Cardiovascular Lab    Procedure Date: 5/30/2024  Attending:    * Karin Zavaleta - Primary  Resident/Fellow/Other Assistant: Surgeons and Role:  * No surgeons found with a matching role *    Indications:   Pre-op Diagnosis     * NSTEMI (non-ST elevated myocardial infarction) (Multi) [I21.4]    Post-procedure diagnosis:   Post-op Diagnosis     * NSTEMI (non-ST elevated myocardial infarction) (Multi) [I21.4]    Procedure(s):   Left Heart Cath  93599 - NE CATH PLMT L HRT & ARTS W/NJX & ANGIO IMG S&I    PCI MAYUR Stent- Coronary    IVUS - Coronary  72865 - NE ENDOLUMINAL CORONARY IVUS OCT I&R INITIAL VESSEL        Procedure Findings:   85% proximal LAD stenosis    Description of the Procedure:   Left heart catheterization via right radial artery  IVUS guided PCI to proximal LAD    Complications:   None    Stents/Implants:   Implants       Stent    Stent, [employer] Noti Mayur, 3.50 X 26rx - [id number] - Implanted        Inventory item: STENT, PALMER FRONTIER MAYUR, 3.50 X 26RX Model/Cat number: [id number]    : [employer] Lot number: [id number]    Device identifier: [id number]        As of 5/30/2024       Status: Implanted                              Anticoagulation/Antiplatelet Plan:   Continue DAPT with aspirin and Plavix for minimum 1 month.  Ideally 6 months    Estimated Blood Loss:   10 mL    Anesthesia: Moderate Sedation Anesthesia Staff: No anesthesia staff entered.    Any Specimen(s) Removed:   No specimens collected during this procedure.    Disposition:   Back to floor      Electronically signed by: Karin Zavaleta MD, 5/30/2024 3:36 PM

## 2024-05-31 ENCOUNTER — APPOINTMENT (OUTPATIENT)
Dept: RADIOLOGY | Facility: HOSPITAL | Age: 64
DRG: 981 | End: 2024-05-31
Payer: COMMERCIAL

## 2024-05-31 LAB
ALBUMIN SERPL BCP-MCNC: 2.9 G/DL (ref 3.4–5)
ALP SERPL-CCNC: 211 U/L (ref 33–136)
ALT SERPL W P-5'-P-CCNC: 155 U/L (ref 10–52)
ANION GAP SERPL CALC-SCNC: 10 MMOL/L (ref 10–20)
AST SERPL W P-5'-P-CCNC: 98 U/L (ref 9–39)
ATRIAL RATE: 97 BPM
BILIRUB SERPL-MCNC: 16 MG/DL (ref 0–1.2)
BUN SERPL-MCNC: 7 MG/DL (ref 6–23)
CALCIUM SERPL-MCNC: 8.1 MG/DL (ref 8.6–10.3)
CHLORIDE SERPL-SCNC: 100 MMOL/L (ref 98–107)
CO2 SERPL-SCNC: 25 MMOL/L (ref 21–32)
CREAT SERPL-MCNC: 0.66 MG/DL (ref 0.5–1.3)
EGFRCR SERPLBLD CKD-EPI 2021: >90 ML/MIN/1.73M*2
ERYTHROCYTE [DISTWIDTH] IN BLOOD BY AUTOMATED COUNT: 18.6 % (ref 11.5–14.5)
GLUCOSE SERPL-MCNC: 96 MG/DL (ref 74–99)
HCT VFR BLD AUTO: 29.2 % (ref 41–52)
HGB BLD-MCNC: 10 G/DL (ref 13.5–17.5)
LABORATORY COMMENT REPORT: NORMAL
LABORATORY COMMENT REPORT: NORMAL
MAGNESIUM SERPL-MCNC: 2.22 MG/DL (ref 1.6–2.4)
MCH RBC QN AUTO: 34.8 PG (ref 26–34)
MCHC RBC AUTO-ENTMCNC: 34.2 G/DL (ref 32–36)
MCV RBC AUTO: 102 FL (ref 80–100)
NRBC BLD-RTO: 0 /100 WBCS (ref 0–0)
P AXIS: 64 DEGREES
P OFFSET: 209 MS
P ONSET: 145 MS
PATH REPORT.FINAL DX SPEC: NORMAL
PATH REPORT.GROSS SPEC: NORMAL
PATH REPORT.TOTAL CANCER: NORMAL
PLATELET # BLD AUTO: 266 X10*3/UL (ref 150–450)
POTASSIUM SERPL-SCNC: 3.8 MMOL/L (ref 3.5–5.3)
PR INTERVAL: 158 MS
PROT SERPL-MCNC: 5 G/DL (ref 6.4–8.2)
Q ONSET: 224 MS
QRS COUNT: 16 BEATS
QRS DURATION: 74 MS
QT INTERVAL: 404 MS
QTC CALCULATION(BAZETT): 513 MS
QTC FREDERICIA: 474 MS
R AXIS: -16 DEGREES
RBC # BLD AUTO: 2.87 X10*6/UL (ref 4.5–5.9)
SODIUM SERPL-SCNC: 131 MMOL/L (ref 136–145)
T AXIS: 141 DEGREES
T OFFSET: 426 MS
VENTRICULAR RATE: 97 BPM
WBC # BLD AUTO: 13.7 X10*3/UL (ref 4.4–11.3)

## 2024-05-31 PROCEDURE — 36415 COLL VENOUS BLD VENIPUNCTURE: CPT | Performed by: STUDENT IN AN ORGANIZED HEALTH CARE EDUCATION/TRAINING PROGRAM

## 2024-05-31 PROCEDURE — S4991 NICOTINE PATCH NONLEGEND: HCPCS | Performed by: STUDENT IN AN ORGANIZED HEALTH CARE EDUCATION/TRAINING PROGRAM

## 2024-05-31 PROCEDURE — 2060000001 HC INTERMEDIATE ICU ROOM DAILY

## 2024-05-31 PROCEDURE — 82040 ASSAY OF SERUM ALBUMIN: CPT | Performed by: STUDENT IN AN ORGANIZED HEALTH CARE EDUCATION/TRAINING PROGRAM

## 2024-05-31 PROCEDURE — 2500000004 HC RX 250 GENERAL PHARMACY W/ HCPCS (ALT 636 FOR OP/ED): Performed by: STUDENT IN AN ORGANIZED HEALTH CARE EDUCATION/TRAINING PROGRAM

## 2024-05-31 PROCEDURE — 83735 ASSAY OF MAGNESIUM: CPT | Performed by: STUDENT IN AN ORGANIZED HEALTH CARE EDUCATION/TRAINING PROGRAM

## 2024-05-31 PROCEDURE — 99233 SBSQ HOSP IP/OBS HIGH 50: CPT | Performed by: STUDENT IN AN ORGANIZED HEALTH CARE EDUCATION/TRAINING PROGRAM

## 2024-05-31 PROCEDURE — 85027 COMPLETE CBC AUTOMATED: CPT | Performed by: STUDENT IN AN ORGANIZED HEALTH CARE EDUCATION/TRAINING PROGRAM

## 2024-05-31 PROCEDURE — 2500000002 HC RX 250 W HCPCS SELF ADMINISTERED DRUGS (ALT 637 FOR MEDICARE OP, ALT 636 FOR OP/ED): Performed by: STUDENT IN AN ORGANIZED HEALTH CARE EDUCATION/TRAINING PROGRAM

## 2024-05-31 PROCEDURE — 2550000001 HC RX 255 CONTRASTS: Performed by: STUDENT IN AN ORGANIZED HEALTH CARE EDUCATION/TRAINING PROGRAM

## 2024-05-31 PROCEDURE — 74177 CT ABD & PELVIS W/CONTRAST: CPT | Performed by: RADIOLOGY

## 2024-05-31 PROCEDURE — 99232 SBSQ HOSP IP/OBS MODERATE 35: CPT | Performed by: INTERNAL MEDICINE

## 2024-05-31 PROCEDURE — 2500000001 HC RX 250 WO HCPCS SELF ADMINISTERED DRUGS (ALT 637 FOR MEDICARE OP): Performed by: STUDENT IN AN ORGANIZED HEALTH CARE EDUCATION/TRAINING PROGRAM

## 2024-05-31 PROCEDURE — 74177 CT ABD & PELVIS W/CONTRAST: CPT

## 2024-05-31 PROCEDURE — 80053 COMPREHEN METABOLIC PANEL: CPT | Performed by: STUDENT IN AN ORGANIZED HEALTH CARE EDUCATION/TRAINING PROGRAM

## 2024-05-31 RX ORDER — METOPROLOL TARTRATE 25 MG/1
25 TABLET, FILM COATED ORAL 2 TIMES DAILY
Status: DISCONTINUED | OUTPATIENT
Start: 2024-06-01 | End: 2024-06-09 | Stop reason: HOSPADM

## 2024-05-31 RX ORDER — POTASSIUM CHLORIDE 20 MEQ/1
20 TABLET, EXTENDED RELEASE ORAL 2 TIMES DAILY
Status: COMPLETED | OUTPATIENT
Start: 2024-05-31 | End: 2024-05-31

## 2024-05-31 RX ADMIN — HYDROMORPHONE HYDROCHLORIDE 0.5 MG: 1 INJECTION, SOLUTION INTRAMUSCULAR; INTRAVENOUS; SUBCUTANEOUS at 16:41

## 2024-05-31 RX ADMIN — POLYETHYLENE GLYCOL 3350 17 G: 17 POWDER, FOR SOLUTION ORAL at 15:00

## 2024-05-31 RX ADMIN — FLUOXETINE HYDROCHLORIDE 20 MG: 20 CAPSULE ORAL at 10:24

## 2024-05-31 RX ADMIN — HYDROMORPHONE HYDROCHLORIDE 0.5 MG: 1 INJECTION, SOLUTION INTRAMUSCULAR; INTRAVENOUS; SUBCUTANEOUS at 12:07

## 2024-05-31 RX ADMIN — IOHEXOL 75 ML: 350 INJECTION, SOLUTION INTRAVENOUS at 13:53

## 2024-05-31 RX ADMIN — HYDROMORPHONE HYDROCHLORIDE 0.5 MG: 1 INJECTION, SOLUTION INTRAMUSCULAR; INTRAVENOUS; SUBCUTANEOUS at 08:05

## 2024-05-31 RX ADMIN — NICOTINE 1 PATCH: 21 PATCH, EXTENDED RELEASE TRANSDERMAL at 10:25

## 2024-05-31 RX ADMIN — POTASSIUM CHLORIDE 20 MEQ: 1500 TABLET, EXTENDED RELEASE ORAL at 12:07

## 2024-05-31 RX ADMIN — SODIUM CHLORIDE, POTASSIUM CHLORIDE, SODIUM LACTATE AND CALCIUM CHLORIDE 100 ML/HR: 600; 310; 30; 20 INJECTION, SOLUTION INTRAVENOUS at 14:15

## 2024-05-31 RX ADMIN — SODIUM CHLORIDE, POTASSIUM CHLORIDE, SODIUM LACTATE AND CALCIUM CHLORIDE 100 ML/HR: 600; 310; 30; 20 INJECTION, SOLUTION INTRAVENOUS at 23:58

## 2024-05-31 RX ADMIN — MORPHINE SULFATE 2 MG: 2 INJECTION, SOLUTION INTRAMUSCULAR; INTRAVENOUS at 14:09

## 2024-05-31 RX ADMIN — ASPIRIN 81 MG 81 MG: 81 TABLET ORAL at 10:24

## 2024-05-31 RX ADMIN — MORPHINE SULFATE 4 MG: 4 INJECTION, SOLUTION INTRAMUSCULAR; INTRAVENOUS at 00:12

## 2024-05-31 RX ADMIN — PANTOPRAZOLE SODIUM 40 MG: 40 TABLET, DELAYED RELEASE ORAL at 06:00

## 2024-05-31 RX ADMIN — MORPHINE SULFATE 4 MG: 4 INJECTION, SOLUTION INTRAMUSCULAR; INTRAVENOUS at 23:55

## 2024-05-31 RX ADMIN — MORPHINE SULFATE 4 MG: 4 INJECTION, SOLUTION INTRAMUSCULAR; INTRAVENOUS at 10:19

## 2024-05-31 RX ADMIN — MORPHINE SULFATE 4 MG: 4 INJECTION, SOLUTION INTRAMUSCULAR; INTRAVENOUS at 06:00

## 2024-05-31 RX ADMIN — METOPROLOL TARTRATE 25 MG: 25 TABLET, FILM COATED ORAL at 10:24

## 2024-05-31 RX ADMIN — SODIUM CHLORIDE, POTASSIUM CHLORIDE, SODIUM LACTATE AND CALCIUM CHLORIDE 100 ML/HR: 600; 310; 30; 20 INJECTION, SOLUTION INTRAVENOUS at 03:36

## 2024-05-31 RX ADMIN — HYDROMORPHONE HYDROCHLORIDE 0.5 MG: 1 INJECTION, SOLUTION INTRAMUSCULAR; INTRAVENOUS; SUBCUTANEOUS at 21:05

## 2024-05-31 RX ADMIN — POTASSIUM CHLORIDE 20 MEQ: 1500 TABLET, EXTENDED RELEASE ORAL at 21:04

## 2024-05-31 RX ADMIN — HYDROMORPHONE HYDROCHLORIDE 0.5 MG: 1 INJECTION, SOLUTION INTRAMUSCULAR; INTRAVENOUS; SUBCUTANEOUS at 03:17

## 2024-05-31 RX ADMIN — CLOPIDOGREL BISULFATE 75 MG: 75 TABLET ORAL at 10:24

## 2024-05-31 RX ADMIN — ATORVASTATIN CALCIUM 40 MG: 40 TABLET, FILM COATED ORAL at 10:24

## 2024-05-31 RX ADMIN — MORPHINE SULFATE 4 MG: 4 INJECTION, SOLUTION INTRAMUSCULAR; INTRAVENOUS at 18:55

## 2024-05-31 ASSESSMENT — PAIN DESCRIPTION - LOCATION
LOCATION: ABDOMEN

## 2024-05-31 ASSESSMENT — PAIN DESCRIPTION - ORIENTATION: ORIENTATION: MID

## 2024-05-31 ASSESSMENT — PAIN SCALES - GENERAL
PAINLEVEL_OUTOF10: 8
PAINLEVEL_OUTOF10: 8
PAINLEVEL_OUTOF10: 3
PAINLEVEL_OUTOF10: 8
PAINLEVEL_OUTOF10: 7
PAINLEVEL_OUTOF10: 9
PAINLEVEL_OUTOF10: 8
PAINLEVEL_OUTOF10: 7

## 2024-05-31 ASSESSMENT — PAIN SCALES - WONG BAKER: WONGBAKER_NUMERICALRESPONSE: HURTS WHOLE LOT

## 2024-05-31 ASSESSMENT — PAIN - FUNCTIONAL ASSESSMENT
PAIN_FUNCTIONAL_ASSESSMENT: 0-10

## 2024-05-31 ASSESSMENT — PAIN DESCRIPTION - DESCRIPTORS
DESCRIPTORS: ACHING
DESCRIPTORS: ACHING

## 2024-05-31 NOTE — PROGRESS NOTES
Cardiology Progress Note           Rounding Cardiologist:  Dr. Daina Peres  Primary Cardiologist: Dr. Zack Boston    Date:  5/31/2024  Patient:  Emerson Chilel  YOB: 1960  MRN:  [id number]   Admit Date:  5/25/2024      SUBJECTIVE    Emerson Chilel was seen and examined today at bedside.     Patient with no further chest discomfort / angina post PCI yesterday. Continues to have abdominal pain. No BM for past 4 days. Still requiring narcotics every 2-3 hours due to abdominal pain.     Review of Systems   No new complaints.   VITALS     Vitals:    05/31/24 0000 05/31/24 0315 05/31/24 0600 05/31/24 1019   BP: 97/63 121/65  134/68   BP Location: Left arm Left arm  Left arm   Patient Position: Sitting Lying  Lying   Pulse: 76 78  83   Resp: 18 16     Temp: 35.9 °C (96.6 °F) 36.1 °C (97 °F)     TempSrc: Temporal Temporal     SpO2: 95% 95%     Weight:   70.1 kg (154 lb 8.7 oz)    Height:           Intake/Output Summary (Last 24 hours) at 5/31/2024 1501  Last data filed at 5/31/2024 1046  Gross per 24 hour   Intake 3143.27 ml   Output 410 ml   Net 2733.27 ml       Vitals:    05/31/24 0600   Weight: 70.1 kg (154 lb 8.7 oz)       CURRENT MEDICATIONS    [Held by provider] amLODIPine, 10 mg, oral, Daily  aspirin, 81 mg, oral, Daily  atorvastatin, 40 mg, oral, Daily  clopidogrel, 75 mg, oral, Daily  FLUoxetine, 20 mg, oral, Daily  [Held by provider] hydroCHLOROthiazide, 12.5 mg, oral, q AM  [START ON 6/1/2024] metoprolol tartrate, 25 mg, oral, BID  nicotine, 1 patch, transdermal, Daily  pantoprazole, 40 mg, oral, Daily before breakfast  potassium chloride CR, 20 mEq, oral, BID  potassium chloride CR, 40 mEq, oral, Once      lactated Ringer's, 100 mL/hr, Last Rate: 100 mL/hr (05/31/24 1415)      Current Outpatient Medications   Medication Instructions    albuterol 90 mcg/actuation inhaler INHALE TWO PUFFS EVERY FOUR HOURS AS NEEDED    ALPRAZolam (Xanax) 0.5 mg tablet TAKE ONE TABLET (0.5 MG) BY MOUTH  DAILY AS NEEDED FOR ANXIETY    ALPRAZolam (Xanax) 0.5 mg tablet TAKE ONE TABLET (0.5 MG) BY MOUTH ONCE DAILY AS NEEDED FOR ANXIETY    amLODIPine (Norvasc) 10 mg tablet 1 tablet, oral, Daily    aspirin 81 mg, oral, Daily    atorvastatin (LIPITOR) 40 mg, oral, Daily    clopidogrel (Plavix) 75 mg tablet 1 tablet, oral, Daily    FLUoxetine (PROZAC) 20 mg, oral, Daily    hydroCHLOROthiazide (MICROZIDE) 12.5 mg, oral, Every morning    HYDROcodone-acetaminophen (Norco) 5-325 mg tablet 1 tablet, oral, 2 times daily PRN    metoprolol tartrate (Lopressor) 25 mg tablet TAKE ONE HALF OF A TABLET BY MOUTH AT BEDTIME    nitroglycerin (NITROSTAT) 0.4 mg, sublingual, Every 5 min PRN    pantoprazole (PROTONIX) 40 mg, oral, Daily, Do not crush, chew, or split.        PHYSICAL EXAMINATION   GENERAL:  Well developed, well nourished, in no acute distress.  CHEST:  Symmetric and nontender.  NECK:  Supple, no JVD, no bruit.  LUNGS:  Clear to auscultation bilaterally, normal respiratory effort.  HEART:  PMI nondisplaced, RR, normal S1 and S2, no S3  ABDOMEN: Soft, , ND without palpable organomegaly, hypoactive bowel sounds, mild tenderness without rebound  EXTREMITIES:  Warm with good color, no clubbing or cyanosis.  There is no edema noted.  PERIPHERAL VASCULAR:  Pulses present and equally palpable; 2+ throughout.  NEURO/PSYCH:  Alert and oriented times three with approppriate behavior and responses.  INTEGUMENT: No rashes, Mild jaundice / improved.     LAB DATA     CBC:   Results from last 7 days   Lab Units 05/31/24  0412   WBC AUTO x10*3/uL 13.7*   RBC AUTO x10*6/uL 2.87*   HEMOGLOBIN g/dL 10.0*   HEMATOCRIT % 29.2*   PLATELETS AUTO x10*3/uL 266        CMP:    Results from last 7 days   Lab Units 05/31/24  0412   SODIUM mmol/L 131*   POTASSIUM mmol/L 3.8   CHLORIDE mmol/L 100   CO2 mmol/L 25   BUN mg/dL 7   CREATININE mg/dL 0.66   GLUCOSE mg/dL 96   CALCIUM mg/dL 8.1*       Cardiac Enzymes:    Lab Results   Component Value Date     "TROPHS 356 (HH) 05/30/2024    TROPHS 341 (HH) 05/30/2024    TROPHS 534 (HH) 05/30/2024       Magnesium:    Lab Results   Component Value Date    MG 2.22 05/31/2024       Lipid Profile:  No results found for: \"CHLPL\", \"TRIG\", \"HDL\", \"LDLCALC\", \"LDLDIRECT\"    TSH:  No results found for: \"TSH\"    BNP: No results found for: \"BNP\"     PT/INR:  No results found for: \"PROTIME\", \"INR\"    HgBA1c:    Lab Results   Component Value Date    HGBA1C 5.3 07/16/2023       CBC:  Lab Results   Component Value Date    WBC 13.7 (H) 05/31/2024    WBC 15.1 (H) 05/30/2024    WBC 12.3 (H) 05/29/2024    RBC 2.87 (L) 05/31/2024    RBC 3.03 (L) 05/30/2024    RBC 3.34 (L) 05/29/2024    HGB 10.0 (L) 05/31/2024    HGB 10.5 (L) 05/30/2024    HGB 11.5 (L) 05/29/2024    HCT 29.2 (L) 05/31/2024    HCT 30.2 (L) 05/30/2024    HCT 32.6 (L) 05/29/2024     (H) 05/31/2024     05/30/2024    MCV 98 05/29/2024    MCH 34.8 (H) 05/31/2024    MCH 34.7 (H) 05/30/2024    MCH 34.4 (H) 05/29/2024    MCHC 34.2 05/31/2024    MCHC 34.8 05/30/2024    MCHC 35.3 05/29/2024    RDW 18.6 (H) 05/31/2024    RDW 18.4 (H) 05/30/2024    RDW 17.9 (H) 05/29/2024     05/31/2024     05/30/2024     05/29/2024       BMP:  Lab Results   Component Value Date     (L) 05/31/2024     (L) 05/30/2024     (L) 05/29/2024    K 3.8 05/31/2024    K 3.4 (L) 05/30/2024    K 3.6 05/29/2024     05/31/2024     05/30/2024    CL 99 05/29/2024    CO2 25 05/31/2024    CO2 25 05/30/2024    CO2 24 05/29/2024    BUN 7 05/31/2024    BUN 6 05/30/2024    BUN 9 05/29/2024    CREATININE 0.66 05/31/2024    CREATININE 0.60 05/30/2024    CREATININE 0.60 05/29/2024       Hepatic Function Panel:    Lab Results   Component Value Date    ALKPHOS 211 (H) 05/31/2024     (H) 05/31/2024    AST 98 (H) 05/31/2024    PROT 5.0 (L) 05/31/2024    BILITOT 16.0 (H) 05/31/2024         DIAGNOSTIC TESTING RESULTS     No results found.       RADIOLOGY     Cardiac " Catheterization Procedure   Final Result      Transthoracic Echo (TTE) Complete   Final Result      FL fluoro images no charge   Final Result      CT abdomen pelvis w IV contrast    (Results Pending)         ASSESSMENT     Problem List Items Addressed This Visit       Jaundice - Primary    Relevant Orders    Endoscopic Ultrasound (Upper) (Completed)    Endoscopic Retrograde Cholangiopancreatography (ERCP) (Completed)    Cytology Consultation (Non-Gynecologic) (Completed)    Surgical Pathology Exam (Completed)    * (Principal) Pancreatic mass (HHS-HCC)    Relevant Orders    Endoscopic Ultrasound (Upper) (Completed)    Endoscopic Retrograde Cholangiopancreatography (ERCP) (Completed)    Cytology Consultation (Non-Gynecologic) (Completed)    Surgical Pathology Exam (Completed)    Generalized abdominal pain    Relevant Orders    Endoscopic Ultrasound (Upper) (Completed)    Endoscopic Retrograde Cholangiopancreatography (ERCP) (Completed)    Cytology Consultation (Non-Gynecologic) (Completed)    Surgical Pathology Exam (Completed)    NSTEMI (non-ST elevated myocardial infarction) (Multi)    Relevant Medications    amLODIPine (Norvasc) tablet 10 mg    nitroglycerin (Nitrostat) SL tablet 0.4 mg    clopidogrel (Plavix) tablet 75 mg    metoprolol tartrate (Lopressor) tablet 25 mg (Start on 6/1/2024  9:00 AM)    Other Relevant Orders    Transthoracic Echo (TTE) Complete (Completed)    Case Request Cath Lab: Left Heart Cath (Completed)    Cardiac Catheterization Procedure (Completed)       Patient Active Problem List   Diagnosis    Anxiety    Acne rosacea    Coronary stent patent    Degeneration of cervical intervertebral disc with myelopathy    Dyslipidemia    Left lumbar radiculopathy    Lumbar disc disease with radiculopathy    Neck pain    Osseous stenosis of neural canal of cervical region    Osteoarthritis of right knee    Sciatica of right side    Tremor of right hand    Medication management    Asthma (HHS-HCC)     Ataxia    Closed fracture of distal phalanx or phalanges of hand    Coronary artery disease involving native coronary artery of native heart without angina pectoris    Depression    Essential hypertension    Traumatic amputation of finger    Closed fracture of distal phalanx of digit of hand    Mixed hyperlipidemia    LVH (left ventricular hypertrophy)    Aortic valve regurgitation    BMI 23.0-23.9, adult    Current every day smoker    Simple chronic bronchitis (Multi)    Need for influenza vaccination    Encounter for monitoring opioid maintenance therapy    Encounter for long term benzodiazepine therapy    Atrophic gastritis without hemorrhage    Jaundice    Pancreatic mass (HHS-HCC)    Hypokalemia    Generalized abdominal pain    Post-ERCP acute pancreatitis (HHS-HCC)    NSTEMI (non-ST elevated myocardial infarction) (Multi)       PLAN     NONSTEMI - patient had new high grade proximal LAD stenosis that was stented yesterday. Mid/distal LAD stent was patent. No further angina. Continue aspirin/plavix with GI protection. Continue statin. Will reduce beta blockers back down to bid dosing.   Abdominal pain / jaundice / pancreatic mass - pathology pending. Defer to primary service.   Asthma - no SOB / decompensation at this time.     Will continue to follow peripherally. Would watch his anemia. Patients outpatient cardiologist aware of hospitalization and findings. Should follow-up in 1-2 weeks post-DC. Call if any specific problems.     Please do not hesitate to call with questions.  Electronically signed by Daina Peres MD, on 5/31/2024 at 3:01 PM

## 2024-05-31 NOTE — PROGRESS NOTES
"Subjective  Patient sitting up in bed with worsening abdominal pain in the setting of post ERCP pancreatitis. Lipase 795. Liver enzymes and bilirubin continue to downtrend.  Pt with possible NSTEMI, s/p cardiac cath with stent placement and initiation of dual antiplatelets.  Plan to repeat abdominal CT.  Continue pancreatitis management per protocol.  Objective  Blood pressure 134/68, pulse 83, temperature 36.1 °C (97 °F), temperature source Temporal, resp. rate 16, height 1.702 m (5' 7\"), weight 70.1 kg (154 lb 8.7 oz), SpO2 95%.    Physical Exam  Constitutional: Alert and interactive, in NAD  Eyes: PERRL, scleral cterus, no conjunctival injection  Skin: Warm and dry, no rash or ecchymosis, jaundice   ENMT: Mucous membranes moist, no lesions noted  Resp: CTAB, even and unlabored  CV: RRR, normal S1, S2, no m,r,g  GI: +BS, soft, round, upper abd TTP, no rebound tenderness or guarding, no palpable masses or organomegaly  Extremities: Extremities warm, no edema, contusions, wounds or cyanosis  Neuro: Alert and oriented x3  Psych: Appropriate mood and behavior    Medications  Scheduled medications  [Held by provider] amLODIPine, 10 mg, oral, Daily  aspirin, 81 mg, oral, Daily  atorvastatin, 40 mg, oral, Daily  clopidogrel, 75 mg, oral, Daily  FLUoxetine, 20 mg, oral, Daily  [Held by provider] hydroCHLOROthiazide, 12.5 mg, oral, q AM  [START ON 6/1/2024] metoprolol tartrate, 25 mg, oral, BID  nicotine, 1 patch, transdermal, Daily  pantoprazole, 40 mg, oral, Daily before breakfast  potassium chloride CR, 20 mEq, oral, BID  potassium chloride CR, 40 mEq, oral, Once      Continuous medications  lactated Ringer's, 100 mL/hr, Last Rate: 100 mL/hr (05/31/24 1415)      PRN medications  PRN medications: acetaminophen **OR** acetaminophen **OR** acetaminophen, acetaminophen **OR** acetaminophen **OR** acetaminophen, albuterol, ALPRAZolam, [Held by provider] HYDROcodone-acetaminophen, HYDROmorphone, melatonin, metoclopramide " "**OR** metoclopramide, morphine, morphine, nitroglycerin, ondansetron ODT **OR** ondansetron, oxygen, polyethylene glycol     Labs  Lab Results   Component Value Date    WBC 13.7 (H) 05/31/2024    HGB 10.0 (L) 05/31/2024    HCT 29.2 (L) 05/31/2024     (H) 05/31/2024     05/31/2024     Lab Results   Component Value Date    GLUCOSE 96 05/31/2024    CALCIUM 8.1 (L) 05/31/2024     (L) 05/31/2024    K 3.8 05/31/2024    CO2 25 05/31/2024     05/31/2024    BUN 7 05/31/2024    CREATININE 0.66 05/31/2024     Lab Results   Component Value Date     (H) 05/31/2024    AST 98 (H) 05/31/2024    ALKPHOS 211 (H) 05/31/2024    BILITOT 16.0 (H) 05/31/2024     No results found for: \"IRON\", \"TIBC\", \"FERRITIN\"  Lab Results   Component Value Date    INR 1.2 (H) 05/26/2024    INR 1.2 (H) 05/25/2024    INR 1.1 05/24/2024    PROTIME 13.2 (H) 05/26/2024    PROTIME 13.5 (H) 05/25/2024    PROTIME 12.6 05/24/2024   Endoscopic reports:  EUS with Dr. Gong 5/28/2024 noting:  Impression  The cricopharynx, upper third of the esophagus, middle third of the esophagus, lower third of the esophagus and GE junction appeared normal.  The body of the stomach and antrum appeared normal.  Mild extrinsic compression was observed in the 2nd part of the duodenum  The duodenal bulb appeared normal.  The pancreas appeared atrophic. The parenchyma was visualized in the body of the pancreas and tail of the pancreas. The parenchyma was heterogeneous and had hyperechoic strands.  Single round, homogeneous, hypoechoic and solid mass measuring 30 mm x 25 mm with well-defined margins was visualized in the head of the pancreas. Apparent abutment of the portal vein and superior mesenteric vein; 3 fine needle aspiration passes were taken. An adequate sample of aspirate was obtained. The sample was sent for cytology analysis; 3 fine needle biopsy passes were taken. An adequate sample was obtained. A sample was sent for histology " analysis  The proximal bile duct was dilated.  The parenchyma was visualized in the left lobe of the liver. The parenchyma was homogeneous and hyperechoic. The common hepatic duct, left main hepatic duct and right main hepatic duct appeared dilated.  The left kidney, spleen and left adrenal appeared normal.  ERCP with Dr. Gong 5/28/2024 noting:  Impression  The common bile duct was deeply cannulated after 10 attempts. Cannulation was difficult due to inadvertent cannulation, small ampulla and Distal CBD stricture.  20 mm localized, intrinsic, smooth and severe stricture was visualized in the distal common bile duct  Complete major papilla sphincterotomy was performed.  Performed brushings with cytology brush in the distal common bile duct  One 8 mm x 8 cm fully covered stent was placed in the common bile duct    Radiology  CT A/P with IV contrast 5/24/2024 noting:  Impression:     2.5 cm mass in the head of the pancreas.  There is dilatation of the  biliary ducts as well as the pancreatic duct and neoplasm cannot be  excluded.  This is best evaluated with ERCP or MRCP.  There are  findings consistent with sludge in the gallbladder.  Signed by Bhanu Taveras MD       Assessment  Emerson Chilel is a 64 y.o. male presenting with pancreatic mass and juandice.  PMH of HTN, HLD, CAD s/p PCI, tobacco abuse, alcohol abuse who presented to the ER with abdominal pain, N/V, jaundice.  Liver enzymes elevated with bilirubin peak at 21.    Patient underwent EUS/ERCP with Dr. Gong 5/28 with metal stent placement into distal CBD.  Patient now with post ERCP pancreatitis.  Liver enzymes downtrending with bilirubin 16 today.  Lipase 795.  Patient with new STEMI s/p cath with stent placement 5/30/2024.    # pancreatic mass- s/p ERCP with metal stent placement  # transaminitis/ hyperbilirubinemia- downtrending  # post ERCP pancreatitis   # leukocytosis  # jaundice  # previous EtOH misuse    Plan:  - continue supportive care  - keep  NPO   - continue LR to 100 ml/hr  - continue analgesics and antiemetics as needed  - follow up pathology  - repeat abdominal CT given worsening abd pain  - pt ok for anticoagulation/ antiplatelet from GI standpoint  - pt should establish care with oncology/ pancreaticobiliary surgery once pathology finalizes  - pt can follow up with Dr. Gong in outpatient clinic    Plan has been discussed with Dr. Matos. GI will continue to follow.     Mónica Gary, SOLOMON/CNP

## 2024-05-31 NOTE — CARE PLAN
The clinical goals for the shift include patient will not complain of chest pain this shift. The patient met this goal    S: Patient admitted 5/25 for abdominal pain   B: History of: HTN, HLD, CAD, tobacco and ETOH use   A: Patient A&Ox4, denies complaints of chest pain and shortness of breath. Remains in NSR and on RA. Medicated per orders throughout shift. Cath site remains [facility]   R: Patient from home, to discharge home when medically ready. Call light left within reach

## 2024-05-31 NOTE — NURSING NOTE
Cardiac Rehab: Patient seen for qualifying diagnosis to cardiac rehab program. Described and discussed Phase II Cardiac Rehab program with patient, gave [facility] Handbook and pamphlet, and discussed enrollment into Phase II. Reviewed cardiac lifestyle modifications necessary for improved heart health. Encouraged cardiology follow up post hospitalization. Discussed the importance of medication compliance. Smoking Cessation completed. Cardiac rehab staff from [facility] will contact patient following discharge for enrollment in program. Patient requested [facility] because it is closer to his home.

## 2024-05-31 NOTE — PROGRESS NOTES
HOSPITAL MEDICINE - PROGRESS NOTE    Emerson Chilel is a 64 y.o. male on day 6 of admission presenting with Pancreatic mass (HHS-HCC).    Principal Problem:    Pancreatic mass (HHS-HCC)  Active Problems:    Jaundice    Hypokalemia    Generalized abdominal pain    Post-ERCP acute pancreatitis (HHS-HCC)    NSTEMI (non-ST elevated myocardial infarction) (Multi)      Assessment/Plan   This patient currently has cardiac telemetry ordered; if you would like to modify or discontinue the telemetry order, click here to go to the orders activity to modify/discontinue the order.     Acute pancreatitis s/p ERCP: Pt originally transferred from OSH for EUS/ERCP having presented with jaundice and pancreatic mass. Study was completed 5/29 with subsequent worsening abdominal pain; procedure was c/b difficulty cannulating. Lipase uptrended.  significantly elevated.  - Appreciate GI recs: Repeat abdominal CT today. LR @ 100ml/hr, supportive care with pain control/antiemetics, follow up pathology. Keep NPO for now. Follow outpatient with GI, and will need oncology/hepatobiliary follow up once pathology results.  - Bilirubin steadily improving, 16 from a peak of 21. Concurrent improvement in liver enzymes.  - Leukocytosis noted, likely acute reactive as it follows timeline of abdominal pain without other evidence of infection.  - AM CBC/CMP/Mg    Chest pain s/p PCI: Known history of chronic angina/prior stent. On ASA/plavix. Developed worsening episodic chest pain while admitted, with concurrent elevation of troponins concerning for NSTEMI. Seen by cardiology on 5/30 with subsequent LHC and KITTY placed to proximal LAD.  - Appreciate ongoing cardiology recs: Reduce metoprolol to BID dosing, continue ASA/plavix/PPI.  - Home norvasc/hydrochlorothiazide on hold pending metoprolol adjustment. Re-evaluate in AM.  - Maintain K>4, Mg>2    Anxiety/depression, asthma, tobacco use disorder, HTN, HLD: Home meds resumed as indicated       DVT  PPX: SCDs  Nutrition: cardiac    Subjective   Pt seen with wife at bedside. States abdominal pain is somewhat controlled but worse than yesterday. Denies any recurrent chest pain.       Objective     [Held by provider] amLODIPine, 10 mg, oral, Daily  aspirin, 81 mg, oral, Daily  atorvastatin, 40 mg, oral, Daily  clopidogrel, 75 mg, oral, Daily  FLUoxetine, 20 mg, oral, Daily  [Held by provider] hydroCHLOROthiazide, 12.5 mg, oral, q AM  metoprolol tartrate, 25 mg, oral, TID  nicotine, 1 patch, transdermal, Daily  pantoprazole, 40 mg, oral, Daily before breakfast  potassium chloride CR, 40 mEq, oral, Once       PRN medications: acetaminophen **OR** acetaminophen **OR** acetaminophen, acetaminophen **OR** acetaminophen **OR** acetaminophen, albuterol, ALPRAZolam, [Held by provider] HYDROcodone-acetaminophen, HYDROmorphone, melatonin, metoclopramide **OR** metoclopramide, morphine, morphine, nitroglycerin, ondansetron ODT **OR** ondansetron, oxygen, polyethylene glycol   lactated Ringer's, 100 mL/hr, Last Rate: 100 mL/hr (05/31/24 1046)         Last Recorded Vitals  /68 (BP Location: Left arm, Patient Position: Lying)   Pulse 83   Temp 36.1 °C (97 °F) (Temporal)   Resp 16   Wt 70.1 kg (154 lb 8.7 oz)   SpO2 95%   Intake/Output last 3 Shifts:    Intake/Output Summary (Last 24 hours) at 5/31/2024 1117  Last data filed at 5/31/2024 1046  Gross per 24 hour   Intake 3164.04 ml   Output 410 ml   Net 2754.04 ml       Admission Weight  Weight: 65.8 kg (145 lb) (05/25/24 0514)    Daily Weight  05/31/24 : 70.1 kg (154 lb 8.7 oz)    Image Results  Electrocardiogram, 12-lead PRN ACS symptoms  Sinus rhythm with Premature atrial complexes with Aberrant conduction  Septal infarct (cited on or before 28-MAY-2024)  T wave abnormality, consider anterolateral ischemia  Prolonged QT  Abnormal ECG  When compared with ECG of 29-MAY-2024 19:57, (unconfirmed)  Premature ventricular complexes are no longer Present  Aberrant  conduction is now Present  T wave inversion now evident in Anterior leads  QT has lengthened  Electrocardiogram 12 Lead  Normal sinus rhythm  Septal infarct (cited on or before 28-MAY-2024)  T wave abnormality, consider anterolateral ischemia  Prolonged QT  Abnormal ECG  When compared with ECG of 30-MAY-2024 06:54, (unconfirmed)  Premature ventricular complexes are no longer Present  Electrocardiogram, 12-lead PRN ACS symptoms  Sinus rhythm with occasional Premature ventricular complexes  Possible Left atrial enlargement  Septal infarct (cited on or before 28-MAY-2024)  T wave abnormality, consider anterolateral ischemia  Prolonged QT  Abnormal ECG  When compared with ECG of 30-MAY-2024 03:50, (unconfirmed)  Premature ventricular complexes are now Present  Aberrant conduction is no longer Present  Nonspecific T wave abnormality now evident in Inferior leads  Transthoracic Echo (TTE) Complete              [facility] [place]  [address] [facility] [address]     Tel [phone] Fax [phone]    TRANSTHORACIC ECHOCARDIOGRAM REPORT       Patient Name:      SOLOMON Lyons Physician:    [id number] Daina Peres MD  Study Date:        5/30/2024             Ordering Provider:    [id number] MELINA WATKINS  MRN/PID:           [id number]              Fellow:  Accession#:        [id number]          Nurse:  Date of Birth/Age: 1960 / 64 years  Sonographer:          Ivory Winslow RD  Gender:            M                     Additional Staff:  Height:            170.18 cm             Admit Date:           5/25/2024  Weight:            67.59 kg              Admission Status:     Inpatient - STAT  BSA / BMI:         1.78 m2 / 23.34 kg/m2 Department Location:  [facility] CCU [place]  Blood Pressure: 110 /62 mmHg    Study Type:    TRANSTHORACIC ECHO (TTE) COMPLETE  Diagnosis/ICD: Non ST elevation (NSTEMI) myocardial infarction-I21.4  Indication:    NSTEMI  CPT  Codes:     Echo Complete w Full [id number]    Patient History:  Pertinent History: CAD and Dyslipidemia. No previous echocardiogram.    Study Detail: The following Echo studies were performed: 2D, M-Mode, Doppler and                color flow.       PHYSICIAN INTERPRETATION:  Left Ventricle: Left ventricular systolic function is normal, with an estimated ejection fraction of 60%. Wall motion is abnormal. The left ventricular cavity size is normal. There is no evidence of left ventricular hypertrophy. Spectral Doppler shows a pseudonormal pattern of left ventricular diastolic filling. There is no definite left ventricular thrombus visualized. The intraventricular septum appears intact without evidence of shunting or a ventricular septal defect.  LV Wall Scoring:  The entire apex, mid and apical inferior septum, and mid and apical inferior  wall are hypokinetic. All remaining scored segments are normal.    Left Atrium: The left atrium is normal in size.  Right Ventricle: The right ventricle is normal in size. There is normal right ventricular global systolic function.  Right Atrium: The right atrium is normal in size.  Aortic Valve: The aortic valve is trileaflet. There is mild aortic valve cusp calcification. There is mild to moderate aortic valve thickening. There is aortic valve annular calcification. There is evidence of mildly elevated transaortic gradients consistent with sclerosis of the aortic valve.  There is moderate aortic valve regurgitation. The peak instantaneous gradient of the aortic valve is 9.7 mmHg.  Mitral Valve: The mitral valve is moderately thickened. There is no evidence of mitral valve prolapse. There is no evidence of mitral valve stenosis. The doppler estimated mean and peak diastolic pressure gradients are 3.1 mmHg and 5.0 mmHg respectively. There is mild mitral annular calcification. There is mild mitral valve regurgitation.  Tricuspid Valve: The tricuspid valve is structurally normal.  There is no evidence of tricuspid valve stenosis. There is mild tricuspid regurgitation. The Doppler estimated RVSP is moderate to severely elevated at 51.1 mmHg.  Pulmonic Valve: The pulmonic valve is abnormal. There is trace to mild pulmonic valve regurgitation.  Pericardium: There is no pericardial effusion noted.  Aorta: The aortic root is normal.  Systemic Veins: The inferior vena cava appears to be of normal size. There is IVC inspiratory collapse greater than 50%.  In comparison to the previous echocardiogram(s): Prior examinations are available and were reviewed for comparison purposes. Compared with echo 7/17/23 LVEF is unchanged. Grade I is now grade II diastolic dysfunction. Apical wall motion abnormality is new.       CONCLUSIONS:   1. Left ventricular systolic function is normal with a 60% estimated ejection fraction.   2. Entire apex, mid and apical inferior septum, and mid and apical inferior wall are abnormal.   3. Intact intraventricular septum without shunting or a ventricular septal defect.   4. No left ventricular thrombus visualized.   5. Spectral Doppler shows a pseudonormal pattern of left ventricular diastolic filling.   6. There is no evidence of left ventricular hypertrophy.   7. The mitral valve is moderately thickened.   8. No evidence of mitral valve prolapse.   9. There is No tricuspid stenosis.  10. Moderate to severely elevated right ventricular systolic pressure.  11. Aortic valve sclerosis.  12. There is aortic valve annular calcification.  13. Moderate aortic valve regurgitation.  14. Compared with echo 7/17/23 LVEF is unchanged. Grade I is now grade II diastolic dysfunction. Apical wall motion abnormality is new.    QUANTITATIVE DATA SUMMARY:  2D MEASUREMENTS:                           Normal Ranges:  LAs:           3.66 cm   (2.7-4.0cm)  IVSd:          0.87 cm   (0.6-1.1cm)  LVPWd:         0.64 cm   (0.6-1.1cm)  LVIDd:         4.81 cm   (3.9-5.9cm)  LVIDs:         2.99 cm  LV  Mass Index: 66.0 g/m2  LV % FS        37.9 %    LA VOLUME:                                Normal Ranges:  LA Vol A4C:        41.0 ml    (22+/-6mL/m2)  LA Vol A2C:        43.1 ml  LA Vol BP:         44.0 ml  LA Vol Index A4C:  23.0 ml/m2  LA Vol Index A2C:  24.2 ml/m2  LA Vol Index BP:   24.7 ml/m2  LA Area A4C:       14.9 cm2  LA Area A2C:       14.6 cm2  LA Major Axis A4C: 4.6 cm  LA Major Axis A2C: 4.2 cm  LA Volume Index:   24.5 ml/m2  LA Vol A4C:        35.2 ml  LA Vol A2C:        40.5 ml    AORTA MEASUREMENTS:                       Normal Ranges:  Ao Sinus, d: 3.10 cm (2.1-3.5cm)  Ao STJ, d:   3.20 cm (1.7-3.4cm)  Asc Ao, d:   3.50 cm (2.1-3.4cm)    LV SYSTOLIC FUNCTION BY 2D PLANIMETRY (MOD):                      Normal Ranges:  EF-A4C View: 61.0 % (>=55%)  EF-A2C View: 61.6 %  EF-Biplane:  61.3 %    LV DIASTOLIC FUNCTION:                               Normal Ranges:  MV Peak E:        0.85 m/s   (0.7-1.2 m/s)  MV Peak A:        0.95 m/s   (0.42-0.7 m/s)  E/A Ratio:        0.90       (1.0-2.2)  MV lateral e'     0.09 m/s  MV medial e'      0.07 m/s  PulmV Sys Keshawn:    59.28 cm/s  PulmV Hess Keshawn:   43.11 cm/s  PulmV S/D Keshawn:    1.38  PulmV A Revs Keshawn: 26.87 cm/s    MITRAL VALVE:                       Normal Ranges:  MV Vmax:    1.12 m/s (<=1.3m/s)  MV peak P.0 mmHg (<5mmHg)  MV mean PG: 3.1 mmHg (<48mmHg)  MV VTI:     35.83 cm (10-13cm)  MV DT:      160 msec (150-240msec)    AORTIC VALVE:                          Normal Ranges:  AoV Vmax:      1.56 m/s (<=1.7m/s)  AoV Peak P.7 mmHg (<20mmHg)  LVOT Max Keshawn:  1.18 m/s (<=1.1m/s)  LVOT VTI:      28.06 cm  LVOT Diameter: 1.70 cm  (1.8-2.4cm)  AoV Area,Vmax: 1.71 cm2 (2.5-4.5cm2)    AORTIC INSUFFICIENCY:  AI Vmax:       4.30 m/s  AI Half-time:  396 msec  AI Decel Time: 1364 msec  AI Decel Rate: 315.28 cm/s2       RIGHT VENTRICLE:  RV Basal 3.20 cm  RV Mid   7.20 cm  RV Major 7.2 cm  TAPSE:   21.0 mm  RV s'    0.16 m/s    TRICUSPID VALVE/RVSP:                               Normal Ranges:  Peak TR Velocity: 3.47 m/s  RV Syst Pressure: 51.1 mmHg (< 30mmHg)  IVC Diam:         1.31 cm    PULMONIC VALVE:                          Normal Ranges:  PV Accel Time: 66 msec  (>120ms)  PV Max Keshawn:    0.7 m/s  (0.6-0.9m/s)  PV Max P.2 mmHg    Pulmonary Veins:  PulmV A Revs Keshawn: 26.87 cm/s  PulmV Hess Keshawn:   43.11 cm/s  PulmV S/D Keshawn:    1.38  PulmV Sys Keshwan:    59.28 cm/s    AORTA:  Asc Ao Diam 3.49 cm       [id number] Daina Peres MD  Electronically signed on 2024 at 12:26:19 PM       Wall Scoring       ** Final **  ECG 12 Lead  Sinus tachycardia with frequent Premature ventricular complexes  Left axis deviation  Septal infarct (cited on or before 28-MAY-2024)  Abnormal ECG  When compared with ECG of 28-MAY-2024 17:18, (unconfirmed)  Premature ventricular complexes are now Present  Nonspecific T wave abnormality now evident in Anterolateral leads      Physical Exam  Vitals and nursing note reviewed.   Constitutional:       General: He is awake. He is not in acute distress.     Appearance: Normal appearance. He is ill-appearing. He is not toxic-appearing.   HENT:      Head: Normocephalic and atraumatic.      Right Ear: External ear normal.      Left Ear: External ear normal.      Nose: Nose normal.      Mouth/Throat:      Mouth: Mucous membranes are moist.      Pharynx: Oropharynx is clear.   Eyes:      Extraocular Movements: Extraocular movements intact.   Cardiovascular:      Rate and Rhythm: Normal rate and regular rhythm.   Pulmonary:      Effort: Pulmonary effort is normal. No respiratory distress.   Abdominal:      General: There is no distension.      Palpations: Abdomen is soft.      Tenderness: There is abdominal tenderness. There is guarding.   Musculoskeletal:         General: No signs of injury.      Cervical back: Normal range of motion.   Skin:     General: Skin is warm and dry.      Coloration: Skin is jaundiced. Skin is not pale.   Neurological:       General: No focal deficit present.      Mental Status: He is alert and oriented to person, place, and time. Mental status is at baseline.   Psychiatric:         Mood and Affect: Mood normal.         Behavior: Behavior normal. Behavior is cooperative.         Relevant Results    [Held by provider] amLODIPine, 10 mg, oral, Daily  aspirin, 81 mg, oral, Daily  atorvastatin, 40 mg, oral, Daily  clopidogrel, 75 mg, oral, Daily  FLUoxetine, 20 mg, oral, Daily  [Held by provider] hydroCHLOROthiazide, 12.5 mg, oral, q AM  metoprolol tartrate, 25 mg, oral, TID  nicotine, 1 patch, transdermal, Daily  pantoprazole, 40 mg, oral, Daily before breakfast  potassium chloride CR, 40 mEq, oral, Once      PRN medications: acetaminophen **OR** acetaminophen **OR** acetaminophen, acetaminophen **OR** acetaminophen **OR** acetaminophen, albuterol, ALPRAZolam, [Held by provider] HYDROcodone-acetaminophen, HYDROmorphone, melatonin, metoclopramide **OR** metoclopramide, morphine, morphine, nitroglycerin, ondansetron ODT **OR** ondansetron, oxygen, polyethylene glycol  lactated Ringer's, 100 mL/hr, Last Rate: 100 mL/hr (05/31/24 1046)          Electrocardiogram, 12-lead PRN ACS symptoms    Result Date: 5/30/2024  Sinus rhythm with Premature atrial complexes with Aberrant conduction Septal infarct (cited on or before 28-MAY-2024) T wave abnormality, consider anterolateral ischemia Prolonged QT Abnormal ECG When compared with ECG of 29-MAY-2024 19:57, (unconfirmed) Premature ventricular complexes are no longer Present Aberrant conduction is now Present T wave inversion now evident in Anterior leads QT has lengthened    Electrocardiogram, 12-lead PRN ACS symptoms    Result Date: 5/30/2024  Sinus rhythm with occasional Premature ventricular complexes Possible Left atrial enlargement Septal infarct (cited on or before 28-MAY-2024) T wave abnormality, consider anterolateral ischemia Prolonged QT Abnormal ECG When compared with ECG of 30-MAY-2024  03:50, (unconfirmed) Premature ventricular complexes are now Present Aberrant conduction is no longer Present Nonspecific T wave abnormality now evident in Inferior leads    Electrocardiogram 12 Lead    Result Date: 5/30/2024  Normal sinus rhythm Septal infarct (cited on or before 28-MAY-2024) T wave abnormality, consider anterolateral ischemia Prolonged QT Abnormal ECG When compared with ECG of 30-MAY-2024 06:54, (unconfirmed) Premature ventricular complexes are no longer Present    Transthoracic Echo (TTE) Complete    Result Date: 5/30/2024            [facility] [place] [address] [facility] [address]    Tel [phone] Fax [phone] TRANSTHORACIC ECHOCARDIOGRAM REPORT  Patient Name:      SOLOMON Lyons Physician:    [id number] Daina Peres MD Study Date:        5/30/2024             Ordering Provider:    [id number] MELINA WATKINS MRN/PID:           [id number]              Fellow: Accession#:        [id number]          Nurse: Date of Birth/Age: 1960 / 64 years  Sonographer:          Ivory Winslow [facility] Gender:            M                     Additional Staff: Height:            170.18 cm             Admit Date:           5/25/2024 Weight:            67.59 kg              Admission Status:     Inpatient - STAT BSA / BMI:         1.78 m2 / 23.34 kg/m2 Department Location:  [facility] CCU [place] Blood Pressure: 110 /62 mmHg Study Type:    TRANSTHORACIC ECHO (TTE) COMPLETE Diagnosis/ICD: Non ST elevation (NSTEMI) myocardial infarction-I21.4 Indication:    NSTEMI CPT Codes:     Echo Complete w Full Doppler-[id number] Patient History: Pertinent History: CAD and Dyslipidemia. No previous echocardiogram. Study Detail: The following Echo studies were performed: 2D, M-Mode, Doppler and               color flow.  PHYSICIAN INTERPRETATION: Left Ventricle: Left ventricular systolic function is normal, with an estimated ejection fraction of 60%. Wall motion  is abnormal. The left ventricular cavity size is normal. There is no evidence of left ventricular hypertrophy. Spectral Doppler shows a pseudonormal pattern of left ventricular diastolic filling. There is no definite left ventricular thrombus visualized. The intraventricular septum appears intact without evidence of shunting or a ventricular septal defect. LV Wall Scoring: The entire apex, mid and apical inferior septum, and mid and apical inferior wall are hypokinetic. All remaining scored segments are normal. Left Atrium: The left atrium is normal in size. Right Ventricle: The right ventricle is normal in size. There is normal right ventricular global systolic function. Right Atrium: The right atrium is normal in size. Aortic Valve: The aortic valve is trileaflet. There is mild aortic valve cusp calcification. There is mild to moderate aortic valve thickening. There is aortic valve annular calcification. There is evidence of mildly elevated transaortic gradients consistent with sclerosis of the aortic valve. There is moderate aortic valve regurgitation. The peak instantaneous gradient of the aortic valve is 9.7 mmHg. Mitral Valve: The mitral valve is moderately thickened. There is no evidence of mitral valve prolapse. There is no evidence of mitral valve stenosis. The doppler estimated mean and peak diastolic pressure gradients are 3.1 mmHg and 5.0 mmHg respectively. There is mild mitral annular calcification. There is mild mitral valve regurgitation. Tricuspid Valve: The tricuspid valve is structurally normal. There is no evidence of tricuspid valve stenosis. There is mild tricuspid regurgitation. The Doppler estimated RVSP is moderate to severely elevated at 51.1 mmHg. Pulmonic Valve: The pulmonic valve is abnormal. There is trace to mild pulmonic valve regurgitation. Pericardium: There is no pericardial effusion noted. Aorta: The aortic root is normal. Systemic Veins: The inferior vena cava appears to be of  normal size. There is IVC inspiratory collapse greater than 50%. In comparison to the previous echocardiogram(s): Prior examinations are available and were reviewed for comparison purposes. Compared with echo 7/17/23 LVEF is unchanged. Grade I is now grade II diastolic dysfunction. Apical wall motion abnormality is new.  CONCLUSIONS:  1. Left ventricular systolic function is normal with a 60% estimated ejection fraction.  2. Entire apex, mid and apical inferior septum, and mid and apical inferior wall are abnormal.  3. Intact intraventricular septum without shunting or a ventricular septal defect.  4. No left ventricular thrombus visualized.  5. Spectral Doppler shows a pseudonormal pattern of left ventricular diastolic filling.  6. There is no evidence of left ventricular hypertrophy.  7. The mitral valve is moderately thickened.  8. No evidence of mitral valve prolapse.  9. There is No tricuspid stenosis. 10. Moderate to severely elevated right ventricular systolic pressure. 11. Aortic valve sclerosis. 12. There is aortic valve annular calcification. 13. Moderate aortic valve regurgitation. 14. Compared with echo 7/17/23 LVEF is unchanged. Grade I is now grade II diastolic dysfunction. Apical wall motion abnormality is new. QUANTITATIVE DATA SUMMARY: 2D MEASUREMENTS:                          Normal Ranges: LAs:           3.66 cm   (2.7-4.0cm) IVSd:          0.87 cm   (0.6-1.1cm) LVPWd:         0.64 cm   (0.6-1.1cm) LVIDd:         4.81 cm   (3.9-5.9cm) LVIDs:         2.99 cm LV Mass Index: 66.0 g/m2 LV % FS        37.9 % LA VOLUME:                               Normal Ranges: LA Vol A4C:        41.0 ml    (22+/-6mL/m2) LA Vol A2C:        43.1 ml LA Vol BP:         44.0 ml LA Vol Index A4C:  23.0 ml/m2 LA Vol Index A2C:  24.2 ml/m2 LA Vol Index BP:   24.7 ml/m2 LA Area A4C:       14.9 cm2 LA Area A2C:       14.6 cm2 LA Major Axis A4C: 4.6 cm LA Major Axis A2C: 4.2 cm LA Volume Index:   24.5 ml/m2 LA Vol A4C:         35.2 ml LA Vol A2C:        40.5 ml AORTA MEASUREMENTS:                      Normal Ranges: Ao Sinus, d: 3.10 cm (2.1-3.5cm) Ao STJ, d:   3.20 cm (1.7-3.4cm) Asc Ao, d:   3.50 cm (2.1-3.4cm) LV SYSTOLIC FUNCTION BY 2D PLANIMETRY (MOD):                     Normal Ranges: EF-A4C View: 61.0 % (>=55%) EF-A2C View: 61.6 % EF-Biplane:  61.3 % LV DIASTOLIC FUNCTION:                              Normal Ranges: MV Peak E:        0.85 m/s   (0.7-1.2 m/s) MV Peak A:        0.95 m/s   (0.42-0.7 m/s) E/A Ratio:        0.90       (1.0-2.2) MV lateral e'     0.09 m/s MV medial e'      0.07 m/s PulmV Sys Keshawn:    59.28 cm/s PulmV Hess Keshawn:   43.11 cm/s PulmV S/D Keshawn:    1.38 PulmV A Revs Keshawn: 26.87 cm/s MITRAL VALVE:                      Normal Ranges: MV Vmax:    1.12 m/s (<=1.3m/s) MV peak P.0 mmHg (<5mmHg) MV mean PG: 3.1 mmHg (<48mmHg) MV VTI:     35.83 cm (10-13cm) MV DT:      160 msec (150-240msec) AORTIC VALVE:                         Normal Ranges: AoV Vmax:      1.56 m/s (<=1.7m/s) AoV Peak P.7 mmHg (<20mmHg) LVOT Max Keshawn:  1.18 m/s (<=1.1m/s) LVOT VTI:      28.06 cm LVOT Diameter: 1.70 cm  (1.8-2.4cm) AoV Area,Vmax: 1.71 cm2 (2.5-4.5cm2) AORTIC INSUFFICIENCY: AI Vmax:       4.30 m/s AI Half-time:  396 msec AI Decel Time: 1364 msec AI Decel Rate: 315.28 cm/s2  RIGHT VENTRICLE: RV Basal 3.20 cm RV Mid   7.20 cm RV Major 7.2 cm TAPSE:   21.0 mm RV s'    0.16 m/s TRICUSPID VALVE/RVSP:                             Normal Ranges: Peak TR Velocity: 3.47 m/s RV Syst Pressure: 51.1 mmHg (< 30mmHg) IVC Diam:         1.31 cm PULMONIC VALVE:                         Normal Ranges: PV Accel Time: 66 msec  (>120ms) PV Max Keshawn:    0.7 m/s  (0.6-0.9m/s) PV Max P.2 mmHg Pulmonary Veins: PulmV A Revs Keshawn: 26.87 cm/s PulmV Hess Keshawn:   43.11 cm/s PulmV S/D Keshawn:    1.38 PulmV Sys Keshawn:    59.28 cm/s AORTA: Asc Ao Diam 3.49 cm  [id number] Daina Peres MD Electronically signed on 2024 at 12:26:19 PM  Wall Scoring  ** Final **      ECG 12 Lead    Result Date: 5/30/2024  Sinus tachycardia with frequent Premature ventricular complexes Left axis deviation Septal infarct (cited on or before 28-MAY-2024) Abnormal ECG When compared with ECG of 28-MAY-2024 17:18, (unconfirmed) Premature ventricular complexes are now Present Nonspecific T wave abnormality now evident in Anterolateral leads    Electrocardiogram, 12-lead PRN ACS symptoms    Result Date: 5/29/2024  Normal sinus rhythm Possible Left atrial enlargement Low voltage QRS Septal infarct , age undetermined Abnormal ECG When compared with ECG of 24-MAY-2024 16:45, Premature ventricular complexes are no longer Present    FL fluoro images no charge    Result Date: 5/28/2024  These images are not reportable by radiology and will not be interpreted by  Radiologists.    Endoscopic Retrograde Cholangiopancreatography (ERCP)    Result Date: 5/28/2024  Table formatting from the original result was not included. Impression The common bile duct was deeply cannulated after 10 attempts. Cannulation was difficult due to inadvertent cannulation, small ampulla and Distal CBD stricture. 20 mm localized, intrinsic, smooth and severe stricture was visualized in the distal common bile duct Complete major papilla sphincterotomy was performed. Performed brushings with cytology brush in the distal common bile duct One 8 mm x 8 cm fully covered stent was placed in the common bile duct Findings The major papilla was native. Ampullectomy was not performed. Small ampulla, mildly deformed due to external compression. The common bile duct was deeply cannulated after 10 attempts using a traction sphincterotome with 260 cm straight guidewire. Cannulation was difficult due to inadvertent cannulation, small ampulla and Distal CBD stricture. No bleeding was observed. . Unintentional cannulation PD was performed. 20 mm localized, intrinsic, smooth and severe stricture was visualized in the distal common bile duct. Complete  5 mm major papilla sphincterotomy was performed using a sphincterotome. No bleeding was noted at the procedure site. Performed 3 brushings with cytology brush in the distal common bile duct. One 8 mm x 8 cm fully covered metal stent was placed successfully in the common bile duct. Recommendation  Await pathology results  Follow up with oncology and pancreato biliary surgeon as outpatient Watch for complication  Indication Jaundice, Generalized abdominal pain, Pancreatic mass ([facility]) Staff Staff Role Shanda Gong MD Proceduralist Medications See Anesthesia Record. Preprocedure A history and physical has been performed, and patient medication allergies have been reviewed. The patient's tolerance of previous anesthesia has been reviewed. The risks and benefits of the procedure and the sedation options and risks were discussed with the patient. All questions were answered and informed consent obtained. Rectal Indomethacin was not administered. Details of the Procedure The patient underwent general anesthesia, which was administered by an anesthesia professional. The patient's blood pressure, heart rate, level of consciousness, oxygen, respirations, ECG and ETCO2 were monitored throughout the procedure. The scope was introduced through the mouth and advanced to the second part of the duodenum. The patient experienced no blood loss. The procedure was not difficult. The patient tolerated the procedure well. There were no apparent adverse events. Events Procedure Events Event Event Time ENDO SCOPE IN TIME 5/28/2024  3:00 PM ENDO SCOPE OUT TIME 5/28/2024  3:00 PM ENDO SCOPE IN TIME 5/28/2024  3:02 PM ENDO SCOPE OUT TIME 5/28/2024  3:30 PM ENDO SCOPE IN TIME 5/28/2024  3:47 PM Specimens ID Type Source Tests Collected by Time 1 : FNA Pancreatic Head Mass Non-Gynecologic Cytology PANCREAS FINE NEEDLE ASPIRATION HEAD CYTOLOGY CONSULTATION (NON-GYNECOLOGIC) Devorah Yost 5/28/2024 1512 2 : FNB Pancreatic Head Mass Tissue  PANCREAS BIOPSY SURGICAL PATHOLOGY EXAM Devorah Yost 5/28/2024 1513 3 :  Non-Gynecologic Cytology BILE DUCT BRUSH COMMON CYTOLOGY CONSULTATION (NON-GYNECOLOGIC) Devorah Yost 5/28/2024 1643 Procedure Location [facility] [address] [address] [place] [address] [phone] Referring Provider Zack Isaacs MD [address] [place] [address] Procedure Provider Shanda Gong MD     Endoscopic Ultrasound (Upper)    Result Date: 5/28/2024  Table formatting from the original result was not included. Impression The cricopharynx, upper third of the esophagus, middle third of the esophagus, lower third of the esophagus and GE junction appeared normal. The body of the stomach and antrum appeared normal. Mild extrinsic compression was observed in the 2nd part of the duodenum The duodenal bulb appeared normal. The pancreas appeared atrophic. The parenchyma was visualized in the body of the pancreas and tail of the pancreas. The parenchyma was heterogeneous and had hyperechoic strands. Single round, homogeneous, hypoechoic and solid mass measuring 30 mm x 25 mm with well-defined margins was visualized in the head of the pancreas. Apparent abutment of the portal vein and superior mesenteric vein; 3 fine needle aspiration passes were taken. An adequate sample of aspirate was obtained. The sample was sent for cytology analysis; 3 fine needle biopsy passes were taken. An adequate sample was obtained. A sample was sent for histology analysis The proximal bile duct was dilated. The parenchyma was visualized in the left lobe of the liver. The parenchyma was homogeneous and hyperechoic. The common hepatic duct, left main hepatic duct and right main hepatic duct appeared dilated. The left kidney, spleen and left adrenal appeared normal. Findings The cricopharynx, upper third of the esophagus, middle third of the esophagus, lower third of the esophagus and GE junction appeared  normal. The body of the stomach and antrum appeared normal. Mild, traversable extrinsic compression was observed in the 2nd part of the duodenum The duodenal bulb appeared normal. The pancreas appeared atrophic. The parenchyma was visualized in the body of the pancreas and tail of the pancreas. The parenchyma was heterogeneous and had hyperechoic strands. The pancreatic duct measured 2 mm at the head, 3 mm at the body and 1 mm at the tail. Single round, homogeneous, hypoechoic and solid mass measuring 30 mm x 25 mm with well-defined margins was visualized in the head of the pancreas. Apparent abutment of the portal vein and superior mesenteric vein; 3 fine needle aspiration passes were taken with a 22 gauge Sharcore needle using a transduodenal approach guided by Doppler. An adequate sample of aspirate was obtained. The sample was sent for cytology analysis. Onsite cytologist was not present; 3 successful fine needle biopsy passes were taken with a 22 gauge Sharcore needle using a transduodenal approach. An adequate sample was obtained. A sample was sent for histology analysis. Onsite cytologist was not present The proximal bile duct was dilated. The bile duct measured 12 mm at the middle. The parenchyma was visualized in the left lobe of the liver. The parenchyma was homogeneous and hyperechoic. The common hepatic duct, left main hepatic duct and right main hepatic duct appeared dilated. The left kidney, spleen and left adrenal appeared normal. Recommendation  Await pathology results  Proceed to ERCP  Indication Jaundice, Generalized abdominal pain, Pancreatic mass ([facility]) Staff Staff Role Shanda Gong MD Proceduralist Medications See Anesthesia Record. Preprocedure A history and physical has been performed, and patient medication allergies have been reviewed. The patient's tolerance of previous anesthesia has been reviewed. The risks and benefits of the procedure and the sedation options and risks were  discussed with the patient. All questions were answered and informed consent obtained. Details of the Procedure The patient underwent general anesthesia, which was administered by an anesthesia professional. The patient's blood pressure, heart rate, level of consciousness, oxygen, respirations, ECG and ETCO2 were monitored throughout the procedure. The radial scope was introduced through the mouth and advanced to the second part of the duodenum. The patient's estimated blood loss was minimal (<5 mL). The procedure was not difficult. The patient tolerated the procedure well. There were no apparent adverse events. Events Procedure Events Event Event Time ENDO SCOPE IN TIME 5/28/2024  3:00 PM ENDO SCOPE OUT TIME 5/28/2024  3:00 PM ENDO SCOPE IN TIME 5/28/2024  3:02 PM ENDO SCOPE OUT TIME 5/28/2024  3:30 PM Specimens ID Type Source Tests Collected by Time 1 : FNA Pancreatic Head Mass Non-Gynecologic Cytology PANCREAS FINE NEEDLE ASPIRATION HEAD CYTOLOGY CONSULTATION (NON-GYNECOLOGIC) Devorah Ritika 5/28/2024 1512 2 : FNB Pancreatic Head Mass Tissue PANCREAS BIOPSY SURGICAL PATHOLOGY EXAM Devorah Ritika 5/28/2024 1513 Procedure Location [facility] [address] [address] [facility] [address] [phone] Referring Provider Zack Isaacs MD [address] [facility] [address] [place] [address] Procedure Provider Shanda Gong MD     ECG 12 lead    Result Date: 5/25/2024  Sinus rhythm with frequent Premature ventricular complexes Otherwise normal ECG When compared with ECG of 24-MAY-2024 16:43, (unconfirmed) Premature ventricular complexes are now Present See ED provider note for full interpretation and clinical correlation Confirmed by Deyanira Lantigua ([id number]) on 5/25/2024 5:28:37 PM    CT abdomen pelvis w IV contrast    Result Date: 5/24/2024  STUDY: CT Abdomen and Pelvis with IV Contrast; 05/24/2024 at 5:51 PM INDICATION: Right upper quadrant abdominal pain. Jaundice. COMPARISON:  None available. ACCESSION NUMBER(S): [id number] ORDERING CLINICIAN: TOBI WHITNEY TECHNIQUE: CT of the abdomen and pelvis was performed.  Contiguous axial images were obtained at 3 mm slice thickness through the abdomen and pelvis. Coronal and sagittal reconstructions at 3 mm slice thickness were performed.  Omnipaque-350 75 mL was administered intravenously.  FINDINGS: LOWER CHEST: No cardiomegaly.  No pericardial effusion.  Lung bases are clear.  ABDOMEN:  LIVER: No hepatomegaly.  Smooth surface contour.  Normal attenuation.  BILE DUCTS: There is intrahepatic and common bile bile duct dilatation.  This can be followed into the head of the pancreas.  GALLBLADDER: The gallbladder is present.  There is intermediate attenuation the gallbladder most likely representing sludge. STOMACH: No abnormalities identified.  PANCREAS: There is an ill-defined 2.5 cm mass in the head of the pancreas.  This is best seen on series 201, slice 61 as well as series 202, slice 45. There is pancreatic duct dilatation.  SPLEEN: No splenomegaly or focal splenic lesion.  ADRENAL GLANDS: No thickening or nodules.  KIDNEYS AND URETERS: Kidneys are normal in size and location.  No renal or ureteral calculi.  PELVIS:  BLADDER: No abnormalities identified.  REPRODUCTIVE ORGANS: No abnormalities identified.  BOWEL: No abnormalities identified.  The appendix is identified and is normal.  VESSELS: No abnormalities identified.  Abdominal aorta is normal in caliber.  PERITONEUM/RETROPERITONEUM/LYMPH NODES: No free fluid.  No pneumoperitoneum. No lymphadenopathy.  ABDOMINAL WALL: No abnormalities identified. SOFT TISSUES: No abnormalities identified.  BONES: No acute fracture or aggressive osseous lesion.    2.5 cm mass in the head of the pancreas.  There is dilatation of the biliary ducts as well as the pancreatic duct and neoplasm cannot be excluded.  This is best evaluated with ERCP or MRCP.  There are findings consistent with sludge in the  gallbladder. Signed by Bhanu Taveras MD       Results from last 7 days   Lab Units 05/31/24  0412 05/30/24  0427 05/29/24  0617   WBC AUTO x10*3/uL 13.7* 15.1* 12.3*   RBC AUTO x10*6/uL 2.87* 3.03* 3.34*   HEMOGLOBIN g/dL 10.0* 10.5* 11.5*   HEMATOCRIT % 29.2* 30.2* 32.6*   MCV fL 102* 100 98   PLATELETS AUTO x10*3/uL 266 256 231     Results from last 7 days   Lab Units 05/26/24  0600 05/25/24  0618 05/24/24  1636   INR  1.2* 1.2* 1.1         Results from last 7 days   Lab Units 05/24/24  1636   NEUTROS ABS x10*3/uL 8.80*   IG PCT AUTO % 0.9   BASOS ABS AUTO x10*3/uL 0.00   EOS ABS AUTO x10*3/uL 0.10   LYMPHS ABS AUTO x10*3/uL 1.05*         Results from last 7 days   Lab Units 05/31/24  0412 05/30/24  0427 05/29/24  0617 05/28/24  0525 05/27/24  0549 05/26/24  0600   SODIUM mmol/L 131* 133* 129*   < > 131* 131*   POTASSIUM mmol/L 3.8 3.4* 3.6   < > 3.7 3.2*   CHLORIDE mmol/L 100 100 99   < > 98 95*   CO2 mmol/L 25 25 24   < > 25 26   BUN mg/dL 7 6 9   < > 10 11   CREATININE mg/dL 0.66 0.60 0.60   < > 0.73 0.67   CALCIUM mg/dL 8.1* 8.4* 8.6   < > 8.8 8.8   PHOSPHORUS mg/dL  --   --   --   --  2.9 2.3*   MAGNESIUM mg/dL 2.22 1.62 1.75  --  2.01 2.03   BILIRUBIN TOTAL mg/dL 16.0* 17.6* 19.0*   < > 20.5* 19.2*   ALT U/L 155* 184* 229*   < > 266* 273*   AST U/L 98* 114* 150*   < > 187* 211*    < > = values in this interval not displayed.         Results from last 7 days   Lab Units 05/30/24  1315 05/30/24  1010 05/30/24  0427 05/29/24  2224 05/29/24 2016   TROPHS ng/L 356* 341* 534* 656* 351*                    This patient is intubated   Reason for patient to remain intubated today? Patient is NOT intubated.          Kemal Layne, DO

## 2024-06-01 LAB
ALBUMIN SERPL BCP-MCNC: 2.7 G/DL (ref 3.4–5)
ALP SERPL-CCNC: 186 U/L (ref 33–136)
ALT SERPL W P-5'-P-CCNC: 134 U/L (ref 10–52)
ANION GAP SERPL CALC-SCNC: 10 MMOL/L (ref 10–20)
AST SERPL W P-5'-P-CCNC: 93 U/L (ref 9–39)
BILIRUB SERPL-MCNC: 14.6 MG/DL (ref 0–1.2)
BUN SERPL-MCNC: 6 MG/DL (ref 6–23)
CALCIUM SERPL-MCNC: 7.9 MG/DL (ref 8.6–10.3)
CHLORIDE SERPL-SCNC: 100 MMOL/L (ref 98–107)
CO2 SERPL-SCNC: 23 MMOL/L (ref 21–32)
CREAT SERPL-MCNC: 0.57 MG/DL (ref 0.5–1.3)
EGFRCR SERPLBLD CKD-EPI 2021: >90 ML/MIN/1.73M*2
ERYTHROCYTE [DISTWIDTH] IN BLOOD BY AUTOMATED COUNT: 18.2 % (ref 11.5–14.5)
ERYTHROCYTE [DISTWIDTH] IN BLOOD BY AUTOMATED COUNT: 18.3 % (ref 11.5–14.5)
GLUCOSE SERPL-MCNC: 75 MG/DL (ref 74–99)
HCT VFR BLD AUTO: 26.7 % (ref 41–52)
HCT VFR BLD AUTO: 27.4 % (ref 41–52)
HGB BLD-MCNC: 9.2 G/DL (ref 13.5–17.5)
HGB BLD-MCNC: 9.3 G/DL (ref 13.5–17.5)
MCH RBC QN AUTO: 35.2 PG (ref 26–34)
MCH RBC QN AUTO: 35.4 PG (ref 26–34)
MCHC RBC AUTO-ENTMCNC: 33.9 G/DL (ref 32–36)
MCHC RBC AUTO-ENTMCNC: 34.5 G/DL (ref 32–36)
MCV RBC AUTO: 103 FL (ref 80–100)
MCV RBC AUTO: 104 FL (ref 80–100)
NRBC BLD-RTO: 0 /100 WBCS (ref 0–0)
NRBC BLD-RTO: 0 /100 WBCS (ref 0–0)
PLATELET # BLD AUTO: 278 X10*3/UL (ref 150–450)
PLATELET # BLD AUTO: 289 X10*3/UL (ref 150–450)
POTASSIUM SERPL-SCNC: 3.5 MMOL/L (ref 3.5–5.3)
PROT SERPL-MCNC: 4.8 G/DL (ref 6.4–8.2)
RBC # BLD AUTO: 2.6 X10*6/UL (ref 4.5–5.9)
RBC # BLD AUTO: 2.64 X10*6/UL (ref 4.5–5.9)
SODIUM SERPL-SCNC: 129 MMOL/L (ref 136–145)
WBC # BLD AUTO: 13.8 X10*3/UL (ref 4.4–11.3)
WBC # BLD AUTO: 14.3 X10*3/UL (ref 4.4–11.3)

## 2024-06-01 PROCEDURE — 2500000004 HC RX 250 GENERAL PHARMACY W/ HCPCS (ALT 636 FOR OP/ED): Performed by: STUDENT IN AN ORGANIZED HEALTH CARE EDUCATION/TRAINING PROGRAM

## 2024-06-01 PROCEDURE — 80053 COMPREHEN METABOLIC PANEL: CPT | Performed by: STUDENT IN AN ORGANIZED HEALTH CARE EDUCATION/TRAINING PROGRAM

## 2024-06-01 PROCEDURE — 2500000001 HC RX 250 WO HCPCS SELF ADMINISTERED DRUGS (ALT 637 FOR MEDICARE OP): Performed by: STUDENT IN AN ORGANIZED HEALTH CARE EDUCATION/TRAINING PROGRAM

## 2024-06-01 PROCEDURE — 2500000002 HC RX 250 W HCPCS SELF ADMINISTERED DRUGS (ALT 637 FOR MEDICARE OP, ALT 636 FOR OP/ED): Performed by: STUDENT IN AN ORGANIZED HEALTH CARE EDUCATION/TRAINING PROGRAM

## 2024-06-01 PROCEDURE — 99232 SBSQ HOSP IP/OBS MODERATE 35: CPT | Performed by: INTERNAL MEDICINE

## 2024-06-01 PROCEDURE — 99233 SBSQ HOSP IP/OBS HIGH 50: CPT | Performed by: STUDENT IN AN ORGANIZED HEALTH CARE EDUCATION/TRAINING PROGRAM

## 2024-06-01 PROCEDURE — S4991 NICOTINE PATCH NONLEGEND: HCPCS | Performed by: STUDENT IN AN ORGANIZED HEALTH CARE EDUCATION/TRAINING PROGRAM

## 2024-06-01 PROCEDURE — 85027 COMPLETE CBC AUTOMATED: CPT | Performed by: STUDENT IN AN ORGANIZED HEALTH CARE EDUCATION/TRAINING PROGRAM

## 2024-06-01 PROCEDURE — 36415 COLL VENOUS BLD VENIPUNCTURE: CPT | Performed by: STUDENT IN AN ORGANIZED HEALTH CARE EDUCATION/TRAINING PROGRAM

## 2024-06-01 PROCEDURE — 1200000002 HC GENERAL ROOM WITH TELEMETRY DAILY

## 2024-06-01 PROCEDURE — 2500000001 HC RX 250 WO HCPCS SELF ADMINISTERED DRUGS (ALT 637 FOR MEDICARE OP): Performed by: INTERNAL MEDICINE

## 2024-06-01 RX ORDER — MORPHINE SULFATE 4 MG/ML
4 INJECTION, SOLUTION INTRAMUSCULAR; INTRAVENOUS EVERY 4 HOURS
Status: COMPLETED | OUTPATIENT
Start: 2024-06-01 | End: 2024-06-02

## 2024-06-01 RX ADMIN — Medication 3 MG: at 20:24

## 2024-06-01 RX ADMIN — HYDROMORPHONE HYDROCHLORIDE 0.5 MG: 1 INJECTION, SOLUTION INTRAMUSCULAR; INTRAVENOUS; SUBCUTANEOUS at 08:22

## 2024-06-01 RX ADMIN — PANTOPRAZOLE SODIUM 40 MG: 40 TABLET, DELAYED RELEASE ORAL at 06:15

## 2024-06-01 RX ADMIN — MORPHINE SULFATE 4 MG: 4 INJECTION, SOLUTION INTRAMUSCULAR; INTRAVENOUS at 22:19

## 2024-06-01 RX ADMIN — HYDROMORPHONE HYDROCHLORIDE 0.5 MG: 1 INJECTION, SOLUTION INTRAMUSCULAR; INTRAVENOUS; SUBCUTANEOUS at 03:16

## 2024-06-01 RX ADMIN — HYDROMORPHONE HYDROCHLORIDE 0.5 MG: 1 INJECTION, SOLUTION INTRAMUSCULAR; INTRAVENOUS; SUBCUTANEOUS at 20:16

## 2024-06-01 RX ADMIN — SODIUM CHLORIDE, POTASSIUM CHLORIDE, SODIUM LACTATE AND CALCIUM CHLORIDE 100 ML/HR: 600; 310; 30; 20 INJECTION, SOLUTION INTRAVENOUS at 16:14

## 2024-06-01 RX ADMIN — HYDROMORPHONE HYDROCHLORIDE 0.5 MG: 1 INJECTION, SOLUTION INTRAMUSCULAR; INTRAVENOUS; SUBCUTANEOUS at 16:13

## 2024-06-01 RX ADMIN — METOPROLOL TARTRATE 25 MG: 25 TABLET, FILM COATED ORAL at 08:27

## 2024-06-01 RX ADMIN — FLUOXETINE HYDROCHLORIDE 20 MG: 20 CAPSULE ORAL at 08:21

## 2024-06-01 RX ADMIN — POLYETHYLENE GLYCOL 3350 17 G: 17 POWDER, FOR SOLUTION ORAL at 12:13

## 2024-06-01 RX ADMIN — MORPHINE SULFATE 4 MG: 4 INJECTION, SOLUTION INTRAMUSCULAR; INTRAVENOUS at 14:14

## 2024-06-01 RX ADMIN — ASPIRIN 81 MG 81 MG: 81 TABLET ORAL at 08:21

## 2024-06-01 RX ADMIN — ATORVASTATIN CALCIUM 40 MG: 40 TABLET, FILM COATED ORAL at 08:21

## 2024-06-01 RX ADMIN — NICOTINE 1 PATCH: 21 PATCH, EXTENDED RELEASE TRANSDERMAL at 08:22

## 2024-06-01 RX ADMIN — MORPHINE SULFATE 4 MG: 4 INJECTION, SOLUTION INTRAMUSCULAR; INTRAVENOUS at 17:59

## 2024-06-01 RX ADMIN — SODIUM CHLORIDE, POTASSIUM CHLORIDE, SODIUM LACTATE AND CALCIUM CHLORIDE 100 ML/HR: 600; 310; 30; 20 INJECTION, SOLUTION INTRAVENOUS at 08:23

## 2024-06-01 RX ADMIN — MORPHINE SULFATE 4 MG: 4 INJECTION, SOLUTION INTRAMUSCULAR; INTRAVENOUS at 10:22

## 2024-06-01 RX ADMIN — MORPHINE SULFATE 4 MG: 4 INJECTION, SOLUTION INTRAMUSCULAR; INTRAVENOUS at 05:55

## 2024-06-01 RX ADMIN — CLOPIDOGREL BISULFATE 75 MG: 75 TABLET ORAL at 08:21

## 2024-06-01 RX ADMIN — HYDROMORPHONE HYDROCHLORIDE 0.5 MG: 1 INJECTION, SOLUTION INTRAMUSCULAR; INTRAVENOUS; SUBCUTANEOUS at 12:08

## 2024-06-01 RX ADMIN — METOPROLOL TARTRATE 25 MG: 25 TABLET, FILM COATED ORAL at 21:00

## 2024-06-01 RX ADMIN — METOCLOPRAMIDE 10 MG: 10 TABLET ORAL at 20:24

## 2024-06-01 ASSESSMENT — PAIN DESCRIPTION - LOCATION
LOCATION: ABDOMEN

## 2024-06-01 ASSESSMENT — COGNITIVE AND FUNCTIONAL STATUS - GENERAL
MOBILITY SCORE: 24
DAILY ACTIVITIY SCORE: 24
DAILY ACTIVITIY SCORE: 24
MOBILITY SCORE: 24

## 2024-06-01 ASSESSMENT — PAIN SCALES - GENERAL
PAINLEVEL_OUTOF10: 8
PAINLEVEL_OUTOF10: 9
PAINLEVEL_OUTOF10: 4
PAINLEVEL_OUTOF10: 5 - MODERATE PAIN
PAINLEVEL_OUTOF10: 5 - MODERATE PAIN
PAINLEVEL_OUTOF10: 6
PAINLEVEL_OUTOF10: 8
PAINLEVEL_OUTOF10: 6
PAINLEVEL_OUTOF10: 7
PAINLEVEL_OUTOF10: 5 - MODERATE PAIN
PAINLEVEL_OUTOF10: 5 - MODERATE PAIN
PAINLEVEL_OUTOF10: 8
PAINLEVEL_OUTOF10: 7

## 2024-06-01 ASSESSMENT — PAIN DESCRIPTION - ORIENTATION
ORIENTATION: RIGHT
ORIENTATION: RIGHT

## 2024-06-01 ASSESSMENT — PAIN DESCRIPTION - DESCRIPTORS: DESCRIPTORS: ACHING

## 2024-06-01 NOTE — PROGRESS NOTES
Cardiology Progress Note           Rounding Cardiologist:  Dr. Daina Peres  Primary Cardiologist: Dr. Zack Boston    Date:  6/1/2024  Patient:  Emerson Chilel  YOB: 1960  MRN:  [id number]   Admit Date:  5/25/2024      SUBJECTIVE    Emerson Chilel was seen and examined today at bedside.     Patient stable without further chest pain or discomfort. No shortness of breath, dizziness or lightheadedness.     Still with abdominal pain. Now receiving IV fluids. Passing flatus, no BM. Receiving regular narcotics for abdominal pain.     Review of Systems   No new complaints.   VITALS     Vitals:    05/31/24 2348 06/01/24 0600 06/01/24 0700 06/01/24 0808   BP: 118/60 131/69  129/75   BP Location: Left arm Left arm  Left arm   Patient Position: Lying Lying  Lying   Pulse: 82 92 86 90   Resp: 20 20  20   Temp: 36.3 °C (97.3 °F) 36.5 °C (97.7 °F)  36.5 °C (97.7 °F)   TempSrc: Temporal Temporal  Temporal   SpO2: 95% 96%  96%   Weight:  70.9 kg (156 lb 4.9 oz)     Height:           Intake/Output Summary (Last 24 hours) at 6/1/2024 1220  Last data filed at 6/1/2024 0700  Gross per 24 hour   Intake 2160 ml   Output --   Net 2160 ml       Vitals:    06/01/24 0600   Weight: 70.9 kg (156 lb 4.9 oz)       CURRENT MEDICATIONS    [Held by provider] amLODIPine, 10 mg, oral, Daily  aspirin, 81 mg, oral, Daily  atorvastatin, 40 mg, oral, Daily  clopidogrel, 75 mg, oral, Daily  FLUoxetine, 20 mg, oral, Daily  [Held by provider] hydroCHLOROthiazide, 12.5 mg, oral, q AM  metoprolol tartrate, 25 mg, oral, BID  nicotine, 1 patch, transdermal, Daily  pantoprazole, 40 mg, oral, Daily before breakfast  potassium chloride CR, 40 mEq, oral, Once      lactated Ringer's, 100 mL/hr, Last Rate: 100 mL/hr (06/01/24 0823)      Current Outpatient Medications   Medication Instructions    albuterol 90 mcg/actuation inhaler INHALE TWO PUFFS EVERY FOUR HOURS AS NEEDED    ALPRAZolam (Xanax) 0.5 mg tablet TAKE ONE TABLET (0.5 MG) BY  MOUTH DAILY AS NEEDED FOR ANXIETY    ALPRAZolam (Xanax) 0.5 mg tablet TAKE ONE TABLET (0.5 MG) BY MOUTH ONCE DAILY AS NEEDED FOR ANXIETY    amLODIPine (Norvasc) 10 mg tablet 1 tablet, oral, Daily    aspirin 81 mg, oral, Daily    atorvastatin (LIPITOR) 40 mg, oral, Daily    clopidogrel (Plavix) 75 mg tablet 1 tablet, oral, Daily    FLUoxetine (PROZAC) 20 mg, oral, Daily    hydroCHLOROthiazide (MICROZIDE) 12.5 mg, oral, Every morning    HYDROcodone-acetaminophen (Norco) 5-325 mg tablet 1 tablet, oral, 2 times daily PRN    metoprolol tartrate (Lopressor) 25 mg tablet TAKE ONE HALF OF A TABLET BY MOUTH AT BEDTIME    nitroglycerin (NITROSTAT) 0.4 mg, sublingual, Every 5 min PRN    pantoprazole (PROTONIX) 40 mg, oral, Daily, Do not crush, chew, or split.        PHYSICAL EXAMINATION   GENERAL:  Well developed, well nourished, in no acute distress.  CHEST:  Symmetric and nontender.  NECK:  no JVD, no bruit.  LUNGS:  Clear to auscultation bilaterally, normal respiratory effort.  HEART:  Rate and rhythm regular with no evident murmur, no gallop appreciated.        There are no rubs, clicks or heaves.  ABDOMEN: Mild tenderness without rebound. Very hypoactive bowel sounds.   EXTREMITIES:  Warm with good color, no clubbing or cyanosis.  There is no edema noted.  PERIPHERAL VASCULAR:  Pulses present and equally palpable  NEURO/PSYCH:  Alert and oriented times three with approppriate behavior and responses. No focal deficits.   INTEGUMENT: No rashes    LAB DATA     CBC:   Results from last 7 days   Lab Units 06/01/24  0346   WBC AUTO x10*3/uL 13.8*   RBC AUTO x10*6/uL 2.60*   HEMOGLOBIN g/dL 9.2*   HEMATOCRIT % 26.7*   PLATELETS AUTO x10*3/uL 278        CMP:    Results from last 7 days   Lab Units 06/01/24  0346   SODIUM mmol/L 129*   POTASSIUM mmol/L 3.5   CHLORIDE mmol/L 100   CO2 mmol/L 23   BUN mg/dL 6   CREATININE mg/dL 0.57   GLUCOSE mg/dL 75   CALCIUM mg/dL 7.9*       Cardiac Enzymes:    Lab Results   Component Value Date  "   TROPHS 356 (HH) 05/30/2024    TROPHS 341 (HH) 05/30/2024    TROPHS 534 (HH) 05/30/2024       Magnesium:    Lab Results   Component Value Date    MG 2.22 05/31/2024       Lipid Profile:  No results found for: \"CHLPL\", \"TRIG\", \"HDL\", \"LDLCALC\", \"LDLDIRECT\"    TSH:  No results found for: \"TSH\"    BNP: No results found for: \"BNP\"     PT/INR:  No results found for: \"PROTIME\", \"INR\"    HgBA1c:    Lab Results   Component Value Date    HGBA1C 5.3 07/16/2023       CBC:  Lab Results   Component Value Date    WBC 13.8 (H) 06/01/2024    WBC 14.3 (H) 05/31/2024    WBC 13.7 (H) 05/31/2024    RBC 2.60 (L) 06/01/2024    RBC 2.64 (L) 05/31/2024    RBC 2.87 (L) 05/31/2024    HGB 9.2 (L) 06/01/2024    HGB 9.3 (L) 05/31/2024    HGB 10.0 (L) 05/31/2024    HCT 26.7 (L) 06/01/2024    HCT 27.4 (L) 05/31/2024    HCT 29.2 (L) 05/31/2024     (H) 06/01/2024     (H) 05/31/2024     (H) 05/31/2024    MCH 35.4 (H) 06/01/2024    MCH 35.2 (H) 05/31/2024    MCH 34.8 (H) 05/31/2024    MCHC 34.5 06/01/2024    MCHC 33.9 05/31/2024    MCHC 34.2 05/31/2024    RDW 18.3 (H) 06/01/2024    RDW 18.2 (H) 05/31/2024    RDW 18.6 (H) 05/31/2024     06/01/2024     05/31/2024     05/31/2024       BMP:  Lab Results   Component Value Date     (L) 06/01/2024     (L) 05/31/2024     (L) 05/30/2024    K 3.5 06/01/2024    K 3.8 05/31/2024    K 3.4 (L) 05/30/2024     06/01/2024     05/31/2024     05/30/2024    CO2 23 06/01/2024    CO2 25 05/31/2024    CO2 25 05/30/2024    BUN 6 06/01/2024    BUN 7 05/31/2024    BUN 6 05/30/2024    CREATININE 0.57 06/01/2024    CREATININE 0.66 05/31/2024    CREATININE 0.60 05/30/2024       Hepatic Function Panel:    Lab Results   Component Value Date    ALKPHOS 186 (H) 06/01/2024     (H) 06/01/2024    AST 93 (H) 06/01/2024    PROT 4.8 (L) 06/01/2024    BILITOT 14.6 (H) 06/01/2024         DIAGNOSTIC TESTING RESULTS     No results found.       RADIOLOGY "     CT abdomen pelvis w IV contrast   Final Result   1.  Interval placement of metallic common bile duct stents with   decreased biliary dilatation.   2. Findings of acute interstitial edematous pancreatitis. 37 mm acute   peripancreatic fluid collection along the distal greater curvature of   the stomach.   3. 25 mm pancreatic head mass unchanged 19 mm cystic lesion along the   pancreatic neck is also unchanged.        MACRO:   None.        Signed by: Jose May 5/31/2024 3:13 PM   Dictation workstation:   [id number]      Cardiac Catheterization Procedure   Final Result      Transthoracic Echo (TTE) Complete   Final Result      FL fluoro images no charge   Final Result            ASSESSMENT     Problem List Items Addressed This Visit       Jaundice - Primary    Relevant Orders    Endoscopic Ultrasound (Upper) (Completed)    Endoscopic Retrograde Cholangiopancreatography (ERCP) (Completed)    Cytology Consultation (Non-Gynecologic) (Completed)    Surgical Pathology Exam (Completed)    * (Principal) Pancreatic mass ([facility]CC)    Relevant Orders    Endoscopic Ultrasound (Upper) (Completed)    Endoscopic Retrograde Cholangiopancreatography (ERCP) (Completed)    Cytology Consultation (Non-Gynecologic) (Completed)    Surgical Pathology Exam (Completed)    Generalized abdominal pain    Relevant Orders    Endoscopic Ultrasound (Upper) (Completed)    Endoscopic Retrograde Cholangiopancreatography (ERCP) (Completed)    Cytology Consultation (Non-Gynecologic) (Completed)    Surgical Pathology Exam (Completed)    NSTEMI (non-ST elevated myocardial infarction) (Multi)    Relevant Medications    amLODIPine (Norvasc) tablet 10 mg    nitroglycerin (Nitrostat) SL tablet 0.4 mg    clopidogrel (Plavix) tablet 75 mg    metoprolol tartrate (Lopressor) tablet 25 mg    Other Relevant Orders    Transthoracic Echo (TTE) Complete (Completed)    Case Request Cath Lab: Left Heart Cath (Completed)    Cardiac Catheterization Procedure  (Completed)       Patient Active Problem List   Diagnosis    Anxiety    Acne rosacea    Coronary stent patent    Degeneration of cervical intervertebral disc with myelopathy    Dyslipidemia    Left lumbar radiculopathy    Lumbar disc disease with radiculopathy    Neck pain    Osseous stenosis of neural canal of cervical region    Osteoarthritis of right knee    Sciatica of right side    Tremor of right hand    Medication management    Asthma (HHS-HCC)    Ataxia    Closed fracture of distal phalanx or phalanges of hand    Coronary artery disease involving native coronary artery of native heart without angina pectoris    Depression    Essential hypertension    Traumatic amputation of finger    Closed fracture of distal phalanx of digit of hand    Mixed hyperlipidemia    LVH (left ventricular hypertrophy)    Aortic valve regurgitation    BMI 23.0-23.9, adult    Current every day smoker    Simple chronic bronchitis (Multi)    Need for influenza vaccination    Encounter for monitoring opioid maintenance therapy    Encounter for long term benzodiazepine therapy    Atrophic gastritis without hemorrhage    Jaundice    Pancreatic mass (HHS-HCC)    Hypokalemia    Generalized abdominal pain    Post-ERCP acute pancreatitis (HHS-HCC)    NSTEMI (non-ST elevated myocardial infarction) (Multi)       PLAN     NONSTEMI. LAD stenting. Continue aspirin and plavix, betablocker and statin. No complications.   Pancreatic mass / abdominal pain - awaiting pathology. Per primary team.     Will follow prn. Patient may downgrade to [facility] at this time and can be DC and follow-up with Dr. Boston in 1-2 weeks when otherwise ready for DC. Please call if any problems or questions while here.     Please do not hesitate to call with questions.  Electronically signed by Daina Peres MD, on 6/1/2024 at 12:20 PM

## 2024-06-01 NOTE — PROGRESS NOTES
HOSPITAL MEDICINE - PROGRESS NOTE    Emerson Chilel is a 64 y.o. male on day 7 of admission presenting with Pancreatic mass (HHS-HCC).    Principal Problem:    Pancreatic mass (HHS-HCC)  Active Problems:    Jaundice    Hypokalemia    Generalized abdominal pain    Post-ERCP acute pancreatitis (HHS-HCC)    NSTEMI (non-ST elevated myocardial infarction) (Multi)      Assessment/Plan   This patient currently has cardiac telemetry ordered; if you would like to modify or discontinue the telemetry order, click here to go to the orders activity to modify/discontinue the order.     Acute pancreatitis s/p ERCP: Pt originally transferred from OSH for EUS/ERCP having presented with jaundice and pancreatic mass. Study was completed 5/29 with subsequent worsening abdominal pain; procedure was c/b difficulty cannulating. Lipase uptrended.  significantly elevated.  - Repeat abdominal CT from 5/31 for further abdominal pain showing acute interstitial edematous pancreatitis, with 37mm of acute peripancreatic fluid along the distal greater curvature of the stomach.  - Will trial 24 hours of scheduled IV morphine 4mg q4h to obtain consistent pain control, then revert to PRN.  - Appreciate GI recs: Trial of PO (low fat/low residue), supportive care with pain control/antiemetics, follow up pathology. Follow outpatient with GI, and will need oncology/hepatobiliary follow up once pathology results.  - Bilirubin steadily improving from a peak of 21. Concurrent improvement in liver enzymes.  - Leukocytosis noted, likely acute reactive as it follows timeline of abdominal pain without other evidence of infection. Stable.  - AM CBC/CMP/Mg    Chest pain s/p PCI: Known history of chronic angina/prior stent. On ASA/plavix. Developed worsening episodic chest pain while admitted, with concurrent elevation of troponins concerning for NSTEMI. Seen by cardiology on 5/30 with subsequent LHC and KITTY placed to proximal LAD.  - Appreciate ongoing  cardiology recs: Continue metoprolol at 25mg BID, ASA/plavix/PPI. Follow up with Dr. Boston in 1-2 weeks post-discharge.  - VS appropriate on current medication regimen. Will continue holding home norvasc/hydrochlorothiazide and resume if evidence of derangement/reassess at least daily.  - Maintain K>4, Mg>2    Anxiety/depression, asthma, tobacco use disorder, HTN, HLD: Home meds resumed as indicated    Discharge pending appropriate pain control/PO tolerance.       DVT PPX: SCDs  Nutrition: low fat/low residue    Subjective   Pt seen with wife at bedside. States abdominal pain is somewhat controlled, similar to yesterday but was prolonged at night reporting delays of pain assessments.       Objective     [Held by provider] amLODIPine, 10 mg, oral, Daily  aspirin, 81 mg, oral, Daily  atorvastatin, 40 mg, oral, Daily  clopidogrel, 75 mg, oral, Daily  FLUoxetine, 20 mg, oral, Daily  [Held by provider] hydroCHLOROthiazide, 12.5 mg, oral, q AM  metoprolol tartrate, 25 mg, oral, BID  nicotine, 1 patch, transdermal, Daily  pantoprazole, 40 mg, oral, Daily before breakfast  potassium chloride CR, 40 mEq, oral, Once       PRN medications: acetaminophen **OR** acetaminophen **OR** acetaminophen, acetaminophen **OR** acetaminophen **OR** acetaminophen, albuterol, ALPRAZolam, [Held by provider] HYDROcodone-acetaminophen, HYDROmorphone, melatonin, metoclopramide **OR** metoclopramide, morphine, morphine, nitroglycerin, ondansetron ODT **OR** ondansetron, oxygen, polyethylene glycol   lactated Ringer's, 100 mL/hr, Last Rate: 100 mL/hr (06/01/24 0823)         Last Recorded Vitals  /75 (BP Location: Left arm, Patient Position: Lying)   Pulse 90   Temp 36.5 °C (97.7 °F) (Temporal)   Resp 20   Wt 70.9 kg (156 lb 4.9 oz)   SpO2 96%   Intake/Output last 3 Shifts:    Intake/Output Summary (Last 24 hours) at 6/1/2024 1249  Last data filed at 6/1/2024 0700  Gross per 24 hour   Intake 2160 ml   Output --   Net 2160 ml        Admission Weight  Weight: 65.8 kg (145 lb) (05/25/24 0514)    Daily Weight  06/01/24 : 70.9 kg (156 lb 4.9 oz)    Image Results  Electrocardiogram, 12-lead PRN ACS symptoms  Sinus rhythm with occasional Premature ventricular complexes  Possible Left atrial enlargement  Septal infarct (cited on or before 28-MAY-2024)  T wave abnormality, consider anterolateral ischemia  Prolonged QT  Abnormal ECG  When compared with ECG of 30-MAY-2024 03:50, (unconfirmed)  Premature ventricular complexes are now Present  Aberrant conduction is no longer Present  Nonspecific T wave abnormality now evident in Inferior leads  Confirmed by Carlos Mooney ([id number]) on 5/31/2024 4:19:05 PM  CT abdomen pelvis w IV contrast  Narrative: Interpreted By:  Jose May,   STUDY:  CT ABDOMEN PELVIS W IV CONTRAST;  5/31/2024 1:53 pm      INDICATION:  Signs/Symptoms:continuing abd pain, post ERCP pancreatitis.      COMPARISON:  05/24/2024      ACCESSION NUMBER(S):  [id number]      ORDERING CLINICIAN:  CHAR RICH      TECHNIQUE:  CT of the abdomen and pelvis was performed.  Contiguous axial images  were obtained at 3 mm slice thickness through the abdomen and pelvis.  Coronal and sagittal reconstructions at 3 mm slice thickness were  performed. 75 mL Omnipaque 350 administered intravenously without  immediate complication.      FINDINGS:  LOWER CHEST:  New trace pleural effusions. Bibasilar atelectasis. Coronary  atherosclerosis.      ABDOMEN:      LIVER:  Suspect steatosis. No definite focal lesion.      BILE DUCTS:  No calcified stone.      GALLBLADDER:  A metallic common bile duct stent has been placed. Biliary dilatation  is slightly decreased and there is now pneumobilia indicating stent  patency.      PANCREAS:  Mass along the pancreaticoduodenal groove is roughly unchanged  measuring about 25 mm. Mild upstream pancreatic ductal dilatation  appear minimally worsened. Cystic lesion along the pancreatic neck  measuring 19 mm is unchanged.  There is new diffuse mild  peripancreatic edema. No definite necrosis identified.      SPLEEN:  Unremarkable      ADRENAL GLANDS:  Unremarkable      KIDNEYS AND URETERS:  Small cyst in the right kidney. Otherwise unremarkable kidneys  without hydronephrosis.      PELVIS:      BLADDER:  Nondistended.      REPRODUCTIVE ORGANS:  Coarse calcifications of the prostate.      BOWEL:  37 x 27 mm collection of fluid along the distal greater curvature of  the stomach may be partially loculated. Trace ascites. No free air.  No findings of bowel obstruction or inflammation. Unremarkable  appendix.    Unremarkable mesentery.      VESSELS:  Aortoiliac system is patent without aneurysm.  Major visceral  branches are patent.Severe atherosclerosis. IVC and major branches  are grossly patent. Major portal venous branches are patent.      PERITONEUM AND RETROPERITONEUM:  No abdominal or pelvic lymphadenopathy.      BONE AND ABDOMINAL WALL:  Bilateral femoral head avascular necrosis without associated  flattened deformity. Lumbar degenerative changes with mild  levoscoliosis. Grade 1 anterolisthesis L4-5.      Impression: 1.  Interval placement of metallic common bile duct stents with  decreased biliary dilatation.  2. Findings of acute interstitial edematous pancreatitis. 37 mm acute  peripancreatic fluid collection along the distal greater curvature of  the stomach.  3. 25 mm pancreatic head mass unchanged 19 mm cystic lesion along the  pancreatic neck is also unchanged.      MACRO:  None.      Signed by: Jose May 5/31/2024 3:13 PM  Dictation workstation:   [id number]  Cardiac Catheterization Procedure     [facility] [place], Cath Lab       [address] [id number]    Cardiovascular Catheterization Report    Patient Name:      SOLOMON YEUNG     Performing Physician:  [id number] Karin Costello                                                                  MD  Study Date:        5/30/2024            Verifying Physician:   [id number] Karin Costello MD  MRN/PID:           [id number]             Cardiologist/Co-Scrub:  Accession#:        [id number]         Ordering Provider:     [id number] RACHEAL DUARTE  Date of Birth/Age: 1960 / 64 years Cardiologist:  Gender:            M                    Fellow:  Encounter#:        [id number]           Surgeon:       Study: Left Heart Cath with PCI       Indications:  SOLOMON YEUNG is a 64 year old male who presents with dyslipidemia, coronary artery disease, prior percutaneous coronary intervention, admitted with pancreatitis, who developed chest pain in the hospital overnight found to have elevated troponins consistent with NSTEMI and a chest pain assessment of typical angina. NSTE - ACS.  Stress test performed: No. CTA performed: No. Agatston accessed: No. LVEF  Assessed: Yes.  Cardiac arrest: No.  Cardiac surgical consult: No.  Cardiovascular Instability: No  Frailty status of patient entering lab: 5 = Mildly frail.       Procedure Description:  After infiltration with 2% Lidocaine, the radial artery was cannulated with a modified Seldinger technique. Selective coronary catheterization was performed using a 5 Fr catheter(s) exchanged over a guide wire to cannulate the coronary arteries. A JR 4 tip catheter was used for right coronary injections. A 5 Fr Dhiraj catheter was used for the left coronary artery injections.  Multiple injections of contrast were made into the left and right coronary arteries with angiograms recorded in multiple projections. After completion of the procedure, the arterial sheath was pulled and a TR Band Radial Compression Device was utilized to obtain patent hemostasis.     Coronary Angiography:  The coronary circulation is right dominant.     Left  Main Coronary Artery:  The left main coronary artery is a normal caliber vessel. The left main arises normally from the left coronary sinus of Valsalva. The left main coronary artery showed a normal vessel.     Left Anterior Descending Coronary Artery Distribution:  The left anterior descending coronary artery is a normal caliber vessel. The LAD arises normally from the left main coronary artery. The LAD demonstrated severe proximal atherosclerotic disease and a previous patent stent. The proximal left anterior descending coronary artery showed 90% stenosis. This lesion was involved a bifurcation.     Circumflex Coronary Artery Distribution:  The circumflex coronary artery is a normal caliber vessel. The circumflex arises normally from the left main coronary artery. The circumflex revealed mild luminal irregularities.     Right Coronary Artery Distribution:    The right coronary artery is a normal caliber vessel. The RCA arises normally from the right sinus of Valsalva. The RCA showed mild luminal irregularities.     Coronary Interventions:  An EBU 3.5 guide was used to intubate the left main. A runthrough wire was used to wire the LAD and a verona blue wire was used to wire the first diagonal branch.  Angiography reveals a 90% stenosis of the proximal left anterior descending coronary artery. Pre-intervention JAGUAR flow was 3. Intravascular Ultrasound (IVUS) was performed within the proximal left anterior descending. . The reference lumen diameter was 3.50 mm. Percutaneous coronary intervention was performed within the proximal left anterior descending. The vessel was pre-dilated using a compliant balloon 2.5 mm x 12 mm at 12 YAMILET. [place] drug-eluting stent 3.5 mm x 26 mm was advanced to the lesion and implanted at 12 YAMILET. The stent was post dilated using a non-compliant balloon 3.5 mm x 20 mm at 22 YAMILET. Additional dilatation was performed using a non-compliant balloon 3.75 mm x 12 mm at 20 YAMILET. The stenosis was  successfully reduced from 90% to 0%. Post intervention Intravascular Ultrasound (IVUS) was performed within the proximal left anterior descending . The stent demonstrated good apposition. No thrombus was visualized. No edge dissection is visualized. Post-intervention   JAGUAR flow was 3.     Coronary Lesion Summary:  Vessel   Stenosis   Vessel Segment  LAD    90% stenosis    proximal       Complications:  No in-lab complications observed.     Cardiac Cath Post Procedure Notes:  Post Procedure Diagnosis: KITTY of LAD.  Blood Loss:               Estimated blood loss during the procedure was 5 mls.  Specimens Removed:        Number of specimen(s) removed: none.       Recommendations:  Maximize medical therapy.  Anti-platelet therapy with Dual antiplatelet therapy.    ____________________________________________________________________________________  CONCLUSIONS:   1. Indication: NSTEMI.   2. Successful IVUS guided PCI to proximal LAD using KITTY.    ICD 10 Codes:  Non ST elevation (NSTEMI) myocardial infarction-I21.4     CPT Codes:  Left Heart Cath (visualization of coronaries) and LV-63355; IVUS, Left Anterior Descending initial Vessel (PCI)-52979.LD; Stent w angioplasty Left Anterior Descending single major Artery branch (PCI)-63973.LD; Moderate Sedation Services initial 15 minutes patient >5 years-22399; Moderate Sedation Services 1st additional 15 minutes patient >5 years-46102; Moderate Sedation Services 2nd additional 15 minutes patient >5 years-23560; Moderate Sedation Services 3rd additional 15 minutes patient >5 years-42964     [id number] Karin Costello MD  Performing Physician  Electronically signed by [id number] Karin Costello MD on 5/31/2024 at  11:19:34 AM         ** Final **      Physical Exam  Vitals and nursing note reviewed.   Constitutional:       General: He is awake. He is not in acute distress.     Appearance: Normal appearance. He is not toxic-appearing.   HENT:      Head: Normocephalic and  atraumatic.      Right Ear: External ear normal.      Left Ear: External ear normal.      Nose: Nose normal.      Mouth/Throat:      Mouth: Mucous membranes are moist.      Pharynx: Oropharynx is clear.   Eyes:      Extraocular Movements: Extraocular movements intact.   Cardiovascular:      Rate and Rhythm: Normal rate and regular rhythm.   Pulmonary:      Effort: Pulmonary effort is normal. No respiratory distress.   Abdominal:      General: There is no distension.      Palpations: Abdomen is soft.      Tenderness: There is abdominal tenderness. There is guarding.   Musculoskeletal:         General: No signs of injury.      Cervical back: Normal range of motion.   Skin:     General: Skin is warm and dry.      Coloration: Skin is jaundiced. Skin is not pale.   Neurological:      General: No focal deficit present.      Mental Status: He is alert and oriented to person, place, and time. Mental status is at baseline.   Psychiatric:         Mood and Affect: Mood normal.         Behavior: Behavior normal. Behavior is cooperative.         Relevant Results    [Held by provider] amLODIPine, 10 mg, oral, Daily  aspirin, 81 mg, oral, Daily  atorvastatin, 40 mg, oral, Daily  clopidogrel, 75 mg, oral, Daily  FLUoxetine, 20 mg, oral, Daily  [Held by provider] hydroCHLOROthiazide, 12.5 mg, oral, q AM  metoprolol tartrate, 25 mg, oral, BID  nicotine, 1 patch, transdermal, Daily  pantoprazole, 40 mg, oral, Daily before breakfast  potassium chloride CR, 40 mEq, oral, Once      PRN medications: acetaminophen **OR** acetaminophen **OR** acetaminophen, acetaminophen **OR** acetaminophen **OR** acetaminophen, albuterol, ALPRAZolam, [Held by provider] HYDROcodone-acetaminophen, HYDROmorphone, melatonin, metoclopramide **OR** metoclopramide, morphine, morphine, nitroglycerin, ondansetron ODT **OR** ondansetron, oxygen, polyethylene glycol  lactated Ringer's, 100 mL/hr, Last Rate: 100 mL/hr (06/01/24 0823)          Electrocardiogram,  12-lead PRN ACS symptoms    Result Date: 5/30/2024  Sinus rhythm with Premature atrial complexes with Aberrant conduction Septal infarct (cited on or before 28-MAY-2024) T wave abnormality, consider anterolateral ischemia Prolonged QT Abnormal ECG When compared with ECG of 29-MAY-2024 19:57, (unconfirmed) Premature ventricular complexes are no longer Present Aberrant conduction is now Present T wave inversion now evident in Anterior leads QT has lengthened    Electrocardiogram, 12-lead PRN ACS symptoms    Result Date: 5/30/2024  Sinus rhythm with occasional Premature ventricular complexes Possible Left atrial enlargement Septal infarct (cited on or before 28-MAY-2024) T wave abnormality, consider anterolateral ischemia Prolonged QT Abnormal ECG When compared with ECG of 30-MAY-2024 03:50, (unconfirmed) Premature ventricular complexes are now Present Aberrant conduction is no longer Present Nonspecific T wave abnormality now evident in Inferior leads    Electrocardiogram 12 Lead    Result Date: 5/30/2024  Normal sinus rhythm Septal infarct (cited on or before 28-MAY-2024) T wave abnormality, consider anterolateral ischemia Prolonged QT Abnormal ECG When compared with ECG of 30-MAY-2024 06:54, (unconfirmed) Premature ventricular complexes are no longer Present    Transthoracic Echo (TTE) Complete    Result Date: 5/30/2024            [facility] [address] [address]    Tel [phone] Fax [phone] TRANSTHORACIC ECHOCARDIOGRAM REPORT  Patient Name:      SOLOMON Lyons Physician:    [id number] Daina Peres MD Study Date:        5/30/2024             Ordering Provider:    [id number] MELINA WATKINS MRN/PID:           [id number]              Fellow: Accession#:        [id number]          Nurse: Date of Birth/Age: 1960 / 64 years  Sonographer:          Ivory Winslow [facility] Gender:            M                     Additional  Staff: Height:            170.18 cm             Admit Date:           5/25/2024 Weight:            67.59 kg              Admission Status:     Inpatient - STAT BSA / BMI:         1.78 m2 / 23.34 kg/m2 Department Location:  [facility] CCU SD Blood Pressure: 110 /62 mmHg Study Type:    TRANSTHORACIC ECHO (TTE) COMPLETE Diagnosis/ICD: Non ST elevation (NSTEMI) myocardial infarction-I21.4 Indication:    NSTEMI CPT Codes:     Echo Complete w Full Doppler-15664 Patient History: Pertinent History: CAD and Dyslipidemia. No previous echocardiogram. Study Detail: The following Echo studies were performed: 2D, M-Mode, Doppler and               color flow.  PHYSICIAN INTERPRETATION: Left Ventricle: Left ventricular systolic function is normal, with an estimated ejection fraction of 60%. Wall motion is abnormal. The left ventricular cavity size is normal. There is no evidence of left ventricular hypertrophy. Spectral Doppler shows a pseudonormal pattern of left ventricular diastolic filling. There is no definite left ventricular thrombus visualized. The intraventricular septum appears intact without evidence of shunting or a ventricular septal defect. LV Wall Scoring: The entire apex, mid and apical inferior septum, and mid and apical inferior wall are hypokinetic. All remaining scored segments are normal. Left Atrium: The left atrium is normal in size. Right Ventricle: The right ventricle is normal in size. There is normal right ventricular global systolic function. Right Atrium: The right atrium is normal in size. Aortic Valve: The aortic valve is trileaflet. There is mild aortic valve cusp calcification. There is mild to moderate aortic valve thickening. There is aortic valve annular calcification. There is evidence of mildly elevated transaortic gradients consistent with sclerosis of the aortic valve. There is moderate aortic valve regurgitation. The peak instantaneous gradient of the aortic valve is 9.7 mmHg. Mitral Valve: The  mitral valve is moderately thickened. There is no evidence of mitral valve prolapse. There is no evidence of mitral valve stenosis. The doppler estimated mean and peak diastolic pressure gradients are 3.1 mmHg and 5.0 mmHg respectively. There is mild mitral annular calcification. There is mild mitral valve regurgitation. Tricuspid Valve: The tricuspid valve is structurally normal. There is no evidence of tricuspid valve stenosis. There is mild tricuspid regurgitation. The Doppler estimated RVSP is moderate to severely elevated at 51.1 mmHg. Pulmonic Valve: The pulmonic valve is abnormal. There is trace to mild pulmonic valve regurgitation. Pericardium: There is no pericardial effusion noted. Aorta: The aortic root is normal. Systemic Veins: The inferior vena cava appears to be of normal size. There is IVC inspiratory collapse greater than 50%. In comparison to the previous echocardiogram(s): Prior examinations are available and were reviewed for comparison purposes. Compared with echo 7/17/23 LVEF is unchanged. Grade I is now grade II diastolic dysfunction. Apical wall motion abnormality is new.  CONCLUSIONS:  1. Left ventricular systolic function is normal with a 60% estimated ejection fraction.  2. Entire apex, mid and apical inferior septum, and mid and apical inferior wall are abnormal.  3. Intact intraventricular septum without shunting or a ventricular septal defect.  4. No left ventricular thrombus visualized.  5. Spectral Doppler shows a pseudonormal pattern of left ventricular diastolic filling.  6. There is no evidence of left ventricular hypertrophy.  7. The mitral valve is moderately thickened.  8. No evidence of mitral valve prolapse.  9. There is No tricuspid stenosis. 10. Moderate to severely elevated right ventricular systolic pressure. 11. Aortic valve sclerosis. 12. There is aortic valve annular calcification. 13. Moderate aortic valve regurgitation. 14. Compared with echo 7/17/23 LVEF is  unchanged. Grade I is now grade II diastolic dysfunction. Apical wall motion abnormality is new. QUANTITATIVE DATA SUMMARY: 2D MEASUREMENTS:                          Normal Ranges: LAs:           3.66 cm   (2.7-4.0cm) IVSd:          0.87 cm   (0.6-1.1cm) LVPWd:         0.64 cm   (0.6-1.1cm) LVIDd:         4.81 cm   (3.9-5.9cm) LVIDs:         2.99 cm LV Mass Index: 66.0 g/m2 LV % FS        37.9 % LA VOLUME:                               Normal Ranges: LA Vol A4C:        41.0 ml    (22+/-6mL/m2) LA Vol A2C:        43.1 ml LA Vol BP:         44.0 ml LA Vol Index A4C:  23.0 ml/m2 LA Vol Index A2C:  24.2 ml/m2 LA Vol Index BP:   24.7 ml/m2 LA Area A4C:       14.9 cm2 LA Area A2C:       14.6 cm2 LA Major Axis A4C: 4.6 cm LA Major Axis A2C: 4.2 cm LA Volume Index:   24.5 ml/m2 LA Vol A4C:        35.2 ml LA Vol A2C:        40.5 ml AORTA MEASUREMENTS:                      Normal Ranges: Ao Sinus, d: 3.10 cm (2.1-3.5cm) Ao STJ, d:   3.20 cm (1.7-3.4cm) Asc Ao, d:   3.50 cm (2.1-3.4cm) LV SYSTOLIC FUNCTION BY 2D PLANIMETRY (MOD):                     Normal Ranges: EF-A4C View: 61.0 % (>=55%) EF-A2C View: 61.6 % EF-Biplane:  61.3 % LV DIASTOLIC FUNCTION:                              Normal Ranges: MV Peak E:        0.85 m/s   (0.7-1.2 m/s) MV Peak A:        0.95 m/s   (0.42-0.7 m/s) E/A Ratio:        0.90       (1.0-2.2) MV lateral e'     0.09 m/s MV medial e'      0.07 m/s PulmV Sys Keshawn:    59.28 cm/s PulmV Hess Keshawn:   43.11 cm/s PulmV S/D Keshawn:    1.38 PulmV A Revs Keshawn: 26.87 cm/s MITRAL VALVE:                      Normal Ranges: MV Vmax:    1.12 m/s (<=1.3m/s) MV peak P.0 mmHg (<5mmHg) MV mean PG: 3.1 mmHg (<48mmHg) MV VTI:     35.83 cm (10-13cm) MV DT:      160 msec (150-240msec) AORTIC VALVE:                         Normal Ranges: AoV Vmax:      1.56 m/s (<=1.7m/s) AoV Peak P.7 mmHg (<20mmHg) LVOT Max Keshawn:  1.18 m/s (<=1.1m/s) LVOT VTI:      28.06 cm LVOT Diameter: 1.70 cm  (1.8-2.4cm) AoV Area,Vmax: 1.71 cm2  (2.5-4.5cm2) AORTIC INSUFFICIENCY: AI Vmax:       4.30 m/s AI Half-time:  396 msec AI Decel Time: 1364 msec AI Decel Rate: 315.28 cm/s2  RIGHT VENTRICLE: RV Basal 3.20 cm RV Mid   7.20 cm RV Major 7.2 cm TAPSE:   21.0 mm RV s'    0.16 m/s TRICUSPID VALVE/RVSP:                             Normal Ranges: Peak TR Velocity: 3.47 m/s RV Syst Pressure: 51.1 mmHg (< 30mmHg) IVC Diam:         1.31 cm PULMONIC VALVE:                         Normal Ranges: PV Accel Time: 66 msec  (>120ms) PV Max Keshawn:    0.7 m/s  (0.6-0.9m/s) PV Max P.2 mmHg Pulmonary Veins: PulmV A Revs Keshawn: 26.87 cm/s PulmV Hess Keshawn:   43.11 cm/s PulmV S/D Keshawn:    1.38 PulmV Sys Keshawn:    59.28 cm/s AORTA: Asc Ao Diam 3.49 cm  [id number] Daina Peres MD Electronically signed on 2024 at 12:26:19 PM  Wall Scoring  ** Final **     ECG 12 Lead    Result Date: 2024  Sinus tachycardia with frequent Premature ventricular complexes Left axis deviation Septal infarct (cited on or before 28-MAY-2024) Abnormal ECG When compared with ECG of 28-MAY-2024 17:18, (unconfirmed) Premature ventricular complexes are now Present Nonspecific T wave abnormality now evident in Anterolateral leads    Electrocardiogram, 12-lead PRN ACS symptoms    Result Date: 2024  Normal sinus rhythm Possible Left atrial enlargement Low voltage QRS Septal infarct , age undetermined Abnormal ECG When compared with ECG of 24-MAY-2024 16:45, Premature ventricular complexes are no longer Present    FL fluoro images no charge    Result Date: 2024  These images are not reportable by radiology and will not be interpreted by  Radiologists.    Endoscopic Retrograde Cholangiopancreatography (ERCP)    Result Date: 2024  Table formatting from the original result was not included. Impression The common bile duct was deeply cannulated after 10 attempts. Cannulation was difficult due to inadvertent cannulation, small ampulla and Distal CBD stricture. 20 mm localized, intrinsic,  smooth and severe stricture was visualized in the distal common bile duct Complete major papilla sphincterotomy was performed. Performed brushings with cytology brush in the distal common bile duct One 8 mm x 8 cm fully covered stent was placed in the common bile duct Findings The major papilla was native. Ampullectomy was not performed. Small ampulla, mildly deformed due to external compression. The common bile duct was deeply cannulated after 10 attempts using a traction sphincterotome with 260 cm straight guidewire. Cannulation was difficult due to inadvertent cannulation, small ampulla and Distal CBD stricture. No bleeding was observed. . Unintentional cannulation PD was performed. 20 mm localized, intrinsic, smooth and severe stricture was visualized in the distal common bile duct. Complete 5 mm major papilla sphincterotomy was performed using a sphincterotome. No bleeding was noted at the procedure site. Performed 3 brushings with cytology brush in the distal common bile duct. One 8 mm x 8 cm fully covered metal stent was placed successfully in the common bile duct. Recommendation  Await pathology results  Follow up with oncology and pancreato biliary surgeon as outpatient Watch for complication  Indication Jaundice, Generalized abdominal pain, Pancreatic mass ([facility]) Staff Staff Role Shanda Gong MD Proceduralist Medications See Anesthesia Record. Preprocedure A history and physical has been performed, and patient medication allergies have been reviewed. The patient's tolerance of previous anesthesia has been reviewed. The risks and benefits of the procedure and the sedation options and risks were discussed with the patient. All questions were answered and informed consent obtained. Rectal Indomethacin was not administered. Details of the Procedure The patient underwent general anesthesia, which was administered by an anesthesia professional. The patient's blood pressure, heart rate, level of  consciousness, oxygen, respirations, ECG and ETCO2 were monitored throughout the procedure. The scope was introduced through the mouth and advanced to the second part of the duodenum. The patient experienced no blood loss. The procedure was not difficult. The patient tolerated the procedure well. There were no apparent adverse events. Events Procedure Events Event Event Time ENDO SCOPE IN TIME 5/28/2024  3:00 PM ENDO SCOPE OUT TIME 5/28/2024  3:00 PM ENDO SCOPE IN TIME 5/28/2024  3:02 PM ENDO SCOPE OUT TIME 5/28/2024  3:30 PM ENDO SCOPE IN TIME 5/28/2024  3:47 PM Specimens ID Type Source Tests Collected by Time 1 : FNA Pancreatic Head Mass Non-Gynecologic Cytology PANCREAS FINE NEEDLE ASPIRATION HEAD CYTOLOGY CONSULTATION (NON-GYNECOLOGIC) Devorah Ritika 5/28/2024 1512 2 : FNB Pancreatic Head Mass Tissue PANCREAS BIOPSY SURGICAL PATHOLOGY EXAM Devorah Ritika 5/28/2024 1513 3 :  Non-Gynecologic Cytology BILE DUCT BRUSH COMMON CYTOLOGY CONSULTATION (NON-GYNECOLOGIC) Devorah Yost 5/28/2024 1643 Procedure Location [facility] [id number] [place] [id number] [phone] Referring Provider Zack Isaacs MD [address] [address] Procedure Provider Shanda Gong MD     Endoscopic Ultrasound (Upper)    Result Date: 5/28/2024  Table formatting from the original result was not included. Impression The cricopharynx, upper third of the esophagus, middle third of the esophagus, lower third of the esophagus and GE junction appeared normal. The body of the stomach and antrum appeared normal. Mild extrinsic compression was observed in the 2nd part of the duodenum The duodenal bulb appeared normal. The pancreas appeared atrophic. The parenchyma was visualized in the body of the pancreas and tail of the pancreas. The parenchyma was heterogeneous and had hyperechoic strands. Single round, homogeneous, hypoechoic and solid mass measuring 30 mm x 25 mm with  well-defined margins was visualized in the head of the pancreas. Apparent abutment of the portal vein and superior mesenteric vein; 3 fine needle aspiration passes were taken. An adequate sample of aspirate was obtained. The sample was sent for cytology analysis; 3 fine needle biopsy passes were taken. An adequate sample was obtained. A sample was sent for histology analysis The proximal bile duct was dilated. The parenchyma was visualized in the left lobe of the liver. The parenchyma was homogeneous and hyperechoic. The common hepatic duct, left main hepatic duct and right main hepatic duct appeared dilated. The left kidney, spleen and left adrenal appeared normal. Findings The cricopharynx, upper third of the esophagus, middle third of the esophagus, lower third of the esophagus and GE junction appeared normal. The body of the stomach and antrum appeared normal. Mild, traversable extrinsic compression was observed in the 2nd part of the duodenum The duodenal bulb appeared normal. The pancreas appeared atrophic. The parenchyma was visualized in the body of the pancreas and tail of the pancreas. The parenchyma was heterogeneous and had hyperechoic strands. The pancreatic duct measured 2 mm at the head, 3 mm at the body and 1 mm at the tail. Single round, homogeneous, hypoechoic and solid mass measuring 30 mm x 25 mm with well-defined margins was visualized in the head of the pancreas. Apparent abutment of the portal vein and superior mesenteric vein; 3 fine needle aspiration passes were taken with a 22 gauge Sharcore needle using a transduodenal approach guided by Doppler. An adequate sample of aspirate was obtained. The sample was sent for cytology analysis. Onsite cytologist was not present; 3 successful fine needle biopsy passes were taken with a 22 gauge Sharcore needle using a transduodenal approach. An adequate sample was obtained. A sample was sent for histology analysis. Onsite cytologist was not present The  proximal bile duct was dilated. The bile duct measured 12 mm at the middle. The parenchyma was visualized in the left lobe of the liver. The parenchyma was homogeneous and hyperechoic. The common hepatic duct, left main hepatic duct and right main hepatic duct appeared dilated. The left kidney, spleen and left adrenal appeared normal. Recommendation  Await pathology results  Proceed to ERCP  Indication Jaundice, Generalized abdominal pain, Pancreatic mass ([facility]) Staff Staff Role Shanda Gong MD Proceduralist Medications See Anesthesia Record. Preprocedure A history and physical has been performed, and patient medication allergies have been reviewed. The patient's tolerance of previous anesthesia has been reviewed. The risks and benefits of the procedure and the sedation options and risks were discussed with the patient. All questions were answered and informed consent obtained. Details of the Procedure The patient underwent general anesthesia, which was administered by an anesthesia professional. The patient's blood pressure, heart rate, level of consciousness, oxygen, respirations, ECG and ETCO2 were monitored throughout the procedure. The radial scope was introduced through the mouth and advanced to the second part of the duodenum. The patient's estimated blood loss was minimal (<5 mL). The procedure was not difficult. The patient tolerated the procedure well. There were no apparent adverse events. Events Procedure Events Event Event Time ENDO SCOPE IN TIME 5/28/2024  3:00 PM ENDO SCOPE OUT TIME 5/28/2024  3:00 PM ENDO SCOPE IN TIME 5/28/2024  3:02 PM ENDO SCOPE OUT TIME 5/28/2024  3:30 PM Specimens ID Type Source Tests Collected by Time 1 : FNA Pancreatic Head Mass Non-Gynecologic Cytology PANCREAS FINE NEEDLE ASPIRATION HEAD CYTOLOGY CONSULTATION (NON-GYNECOLOGIC) Devorah Yost 5/28/2024 1512 2 : FNB Pancreatic Head Mass Tissue PANCREAS BIOPSY SURGICAL PATHOLOGY EXAM Devorah Yost 5/28/2024 1513 Procedure  Location [facility] [address] [place] [address] [phone] Referring Provider Zack Whitney MD [address] [place] [address] Procedure Provider Shanda Gong MD     ECG 12 lead    Result Date: 5/25/2024  Sinus rhythm with frequent Premature ventricular complexes Otherwise normal ECG When compared with ECG of 24-MAY-2024 16:43, (unconfirmed) Premature ventricular complexes are now Present See ED provider note for full interpretation and clinical correlation Confirmed by Deyanira Lantigua ([id number]) on 5/25/2024 5:28:37 PM    CT abdomen pelvis w IV contrast    Result Date: 5/24/2024  STUDY: CT Abdomen and Pelvis with IV Contrast; 05/24/2024 at 5:51 PM INDICATION: Right upper quadrant abdominal pain. Jaundice. COMPARISON: None available. ACCESSION NUMBER(S): [id number] ORDERING CLINICIAN: ZACK WHITNEY TECHNIQUE: CT of the abdomen and pelvis was performed.  Contiguous axial images were obtained at 3 mm slice thickness through the abdomen and pelvis. Coronal and sagittal reconstructions at 3 mm slice thickness were performed.  Omnipaque-350 75 mL was administered intravenously.  FINDINGS: LOWER CHEST: No cardiomegaly.  No pericardial effusion.  Lung bases are clear.  ABDOMEN:  LIVER: No hepatomegaly.  Smooth surface contour.  Normal attenuation.  BILE DUCTS: There is intrahepatic and common bile bile duct dilatation.  This can be followed into the head of the pancreas.  GALLBLADDER: The gallbladder is present.  There is intermediate attenuation the gallbladder most likely representing sludge. STOMACH: No abnormalities identified.  PANCREAS: There is an ill-defined 2.5 cm mass in the head of the pancreas.  This is best seen on series 201, slice 61 as well as series 202, slice 45. There is pancreatic duct dilatation.  SPLEEN: No splenomegaly or focal splenic lesion.  ADRENAL GLANDS: No thickening or nodules.  KIDNEYS AND URETERS: Kidneys  are normal in size and location.  No renal or ureteral calculi.  PELVIS:  BLADDER: No abnormalities identified.  REPRODUCTIVE ORGANS: No abnormalities identified.  BOWEL: No abnormalities identified.  The appendix is identified and is normal.  VESSELS: No abnormalities identified.  Abdominal aorta is normal in caliber.  PERITONEUM/RETROPERITONEUM/LYMPH NODES: No free fluid.  No pneumoperitoneum. No lymphadenopathy.  ABDOMINAL WALL: No abnormalities identified. SOFT TISSUES: No abnormalities identified.  BONES: No acute fracture or aggressive osseous lesion.    2.5 cm mass in the head of the pancreas.  There is dilatation of the biliary ducts as well as the pancreatic duct and neoplasm cannot be excluded.  This is best evaluated with ERCP or MRCP.  There are findings consistent with sludge in the gallbladder. Signed by Bhanu Taveras MD       Results from last 7 days   Lab Units 06/01/24  0346 05/31/24  2341 05/31/24  0412   WBC AUTO x10*3/uL 13.8* 14.3* 13.7*   RBC AUTO x10*6/uL 2.60* 2.64* 2.87*   HEMOGLOBIN g/dL 9.2* 9.3* 10.0*   HEMATOCRIT % 26.7* 27.4* 29.2*   MCV fL 103* 104* 102*   PLATELETS AUTO x10*3/uL 278 289 266     Results from last 7 days   Lab Units 05/26/24  0600   INR  1.2*                   Results from last 7 days   Lab Units 06/01/24  0346 05/31/24  0412 05/30/24  0427 05/29/24  0617 05/28/24  0525 05/27/24  0549 05/26/24  0600   SODIUM mmol/L 129* 131* 133* 129*   < > 131* 131*   POTASSIUM mmol/L 3.5 3.8 3.4* 3.6   < > 3.7 3.2*   CHLORIDE mmol/L 100 100 100 99   < > 98 95*   CO2 mmol/L 23 25 25 24   < > 25 26   BUN mg/dL 6 7 6 9   < > 10 11   CREATININE mg/dL 0.57 0.66 0.60 0.60   < > 0.73 0.67   CALCIUM mg/dL 7.9* 8.1* 8.4* 8.6   < > 8.8 8.8   PHOSPHORUS mg/dL  --   --   --   --   --  2.9 2.3*   MAGNESIUM mg/dL  --  2.22 1.62 1.75  --  2.01 2.03   BILIRUBIN TOTAL mg/dL 14.6* 16.0* 17.6* 19.0*   < > 20.5* 19.2*   ALT U/L 134* 155* 184* 229*   < > 266* 273*   AST U/L 93* 98* 114* 150*   < > 187* 211*     < > = values in this interval not displayed.         Results from last 7 days   Lab Units 05/30/24  1315 05/30/24  1010 05/30/24  0427 05/29/24  2224 05/29/24 2016   TROPHS ng/L 356* 341* 534* 656* 351*                    This patient is intubated   Reason for patient to remain intubated today? Patient is NOT intubated.          Kemal Layne, DO

## 2024-06-01 NOTE — CARE PLAN
The patient's goals for the shift include    Problem: Pain  Goal: My pain/discomfort is manageable  Outcome: Progressing       The clinical goals for the shift include Pt will remain free of injury

## 2024-06-01 NOTE — NURSING NOTE
Patient complained of diffuse abdominal pain described as an ache feeling. Pain rated 7/10. Patient said pain had decreased to 4 or 5/10 after medicated with Dilaudid at 2100 but has gradually been increasing. He was medicated with Morphine 4 mg IVP

## 2024-06-01 NOTE — PROGRESS NOTES
"Subjective  -Pain improved today. Was able to eat Kinyarwanda toast  -liver enzymes improving. CT AP showing stent in stable position, worsening inflammation  -passing gas    Objective  Blood pressure 131/69, pulse 86, temperature 36.5 °C (97.7 °F), temperature source Temporal, resp. rate 20, height 1.702 m (5' 7\"), weight 70.9 kg (156 lb 4.9 oz), SpO2 96%.    Physical Exam  Constitutional: Alert and interactive, in NAD  Eyes: PERRL, scleral cterus, no conjunctival injection  Skin: Warm and dry, no rash or ecchymosis, jaundice   ENMT: Mucous membranes moist, no lesions noted  Resp: CTAB, even and unlabored  CV: RRR, normal S1, S2, no m,r,g  GI: +BS, soft, round, upper abd TTP, no rebound tenderness or guarding, no palpable masses or organomegaly  Extremities: Extremities warm, no edema, contusions, wounds or cyanosis  Neuro: Alert and oriented x3  Psych: Appropriate mood and behavior    Medications  Scheduled medications  [Held by provider] amLODIPine, 10 mg, oral, Daily  aspirin, 81 mg, oral, Daily  atorvastatin, 40 mg, oral, Daily  clopidogrel, 75 mg, oral, Daily  FLUoxetine, 20 mg, oral, Daily  [Held by provider] hydroCHLOROthiazide, 12.5 mg, oral, q AM  metoprolol tartrate, 25 mg, oral, BID  nicotine, 1 patch, transdermal, Daily  pantoprazole, 40 mg, oral, Daily before breakfast  potassium chloride CR, 40 mEq, oral, Once      Continuous medications  lactated Ringer's, 100 mL/hr, Last Rate: 100 mL/hr (06/01/24 0823)      PRN medications  PRN medications: acetaminophen **OR** acetaminophen **OR** acetaminophen, acetaminophen **OR** acetaminophen **OR** acetaminophen, albuterol, ALPRAZolam, [Held by provider] HYDROcodone-acetaminophen, HYDROmorphone, melatonin, metoclopramide **OR** metoclopramide, morphine, morphine, nitroglycerin, ondansetron ODT **OR** ondansetron, oxygen, polyethylene glycol     Labs  Lab Results   Component Value Date    WBC 13.8 (H) 06/01/2024    HGB 9.2 (L) 06/01/2024    HCT 26.7 (L) 06/01/2024 " "    (H) 06/01/2024     06/01/2024     Lab Results   Component Value Date    GLUCOSE 75 06/01/2024    CALCIUM 7.9 (L) 06/01/2024     (L) 06/01/2024    K 3.5 06/01/2024    CO2 23 06/01/2024     06/01/2024    BUN 6 06/01/2024    CREATININE 0.57 06/01/2024     Lab Results   Component Value Date     (H) 06/01/2024    AST 93 (H) 06/01/2024    ALKPHOS 186 (H) 06/01/2024    BILITOT 14.6 (H) 06/01/2024     No results found for: \"IRON\", \"TIBC\", \"FERRITIN\"  Lab Results   Component Value Date    INR 1.2 (H) 05/26/2024    INR 1.2 (H) 05/25/2024    INR 1.1 05/24/2024    PROTIME 13.2 (H) 05/26/2024    PROTIME 13.5 (H) 05/25/2024    PROTIME 12.6 05/24/2024   Endoscopic reports:  EUS with Dr. Gong 5/28/2024 noting:  Impression  The cricopharynx, upper third of the esophagus, middle third of the esophagus, lower third of the esophagus and GE junction appeared normal.  The body of the stomach and antrum appeared normal.  Mild extrinsic compression was observed in the 2nd part of the duodenum  The duodenal bulb appeared normal.  The pancreas appeared atrophic. The parenchyma was visualized in the body of the pancreas and tail of the pancreas. The parenchyma was heterogeneous and had hyperechoic strands.  Single round, homogeneous, hypoechoic and solid mass measuring 30 mm x 25 mm with well-defined margins was visualized in the head of the pancreas. Apparent abutment of the portal vein and superior mesenteric vein; 3 fine needle aspiration passes were taken. An adequate sample of aspirate was obtained. The sample was sent for cytology analysis; 3 fine needle biopsy passes were taken. An adequate sample was obtained. A sample was sent for histology analysis  The proximal bile duct was dilated.  The parenchyma was visualized in the left lobe of the liver. The parenchyma was homogeneous and hyperechoic. The common hepatic duct, left main hepatic duct and right main hepatic duct appeared dilated.  The left " kidney, spleen and left adrenal appeared normal.  ERCP with Dr. Gong 5/28/2024 noting:  Impression  The common bile duct was deeply cannulated after 10 attempts. Cannulation was difficult due to inadvertent cannulation, small ampulla and Distal CBD stricture.  20 mm localized, intrinsic, smooth and severe stricture was visualized in the distal common bile duct  Complete major papilla sphincterotomy was performed.  Performed brushings with cytology brush in the distal common bile duct  One 8 mm x 8 cm fully covered stent was placed in the common bile duct    Radiology  CT A/P with IV contrast 5/24/2024 noting:  Impression:     2.5 cm mass in the head of the pancreas.  There is dilatation of the  biliary ducts as well as the pancreatic duct and neoplasm cannot be  excluded.  This is best evaluated with ERCP or MRCP.  There are  findings consistent with sludge in the gallbladder.  Signed by Bhanu Taveras MD       Assessment  Emerson Chilel is a 64 y.o. male presenting with pancreatic mass and juandice.  PMH of HTN, HLD, CAD s/p PCI, tobacco abuse, alcohol abuse who presented to the ER with abdominal pain, N/V, jaundice.  Liver enzymes elevated with bilirubin peak at 21.    Patient underwent EUS/ERCP with Dr. Gong 5/28 with metal stent placement into distal CBD.  Liver enzymes downtrending with bilirubin 17.6 today. Suspect post ERCP pancreatitis. Lipase 795.  Patient also with CP overnight, likely NSTEMI.    # pancreatic mass- s/p ERCP with metal stent placement  # transaminitis/ hyperbilirubinemia- downtrending  # post ERCP pancreatitis   # leukocytosis-uptrending  # jaundice  # previous EtOH misuse    Plan:  -agree with diet trial, low fat and low residue. Will advance slowly. No need for enteral feeding at this time  - continue analgesics and antiemetics as needed  - follow up pathology  - pt ok for anticoagulation/ antiplatelet from GI standpoint  - pt should establish care with oncology/ pancreaticobiliary  surgery once pathology finalizes  - pt can follow up with Dr. Gong in outpatient clinic  -we will follow    Keshia Matos MD

## 2024-06-02 ENCOUNTER — APPOINTMENT (OUTPATIENT)
Dept: RADIOLOGY | Facility: HOSPITAL | Age: 64
DRG: 981 | End: 2024-06-02
Payer: COMMERCIAL

## 2024-06-02 LAB
ALBUMIN SERPL BCP-MCNC: 2.6 G/DL (ref 3.4–5)
ALP SERPL-CCNC: 156 U/L (ref 33–136)
ALT SERPL W P-5'-P-CCNC: 142 U/L (ref 10–52)
ANION GAP SERPL CALC-SCNC: 10 MMOL/L (ref 10–20)
AST SERPL W P-5'-P-CCNC: 132 U/L (ref 9–39)
BILIRUB SERPL-MCNC: 14.1 MG/DL (ref 0–1.2)
BUN SERPL-MCNC: 6 MG/DL (ref 6–23)
CALCIUM SERPL-MCNC: 7.9 MG/DL (ref 8.6–10.3)
CHLORIDE SERPL-SCNC: 100 MMOL/L (ref 98–107)
CO2 SERPL-SCNC: 23 MMOL/L (ref 21–32)
CREAT SERPL-MCNC: 0.57 MG/DL (ref 0.5–1.3)
EGFRCR SERPLBLD CKD-EPI 2021: >90 ML/MIN/1.73M*2
ERYTHROCYTE [DISTWIDTH] IN BLOOD BY AUTOMATED COUNT: 18.2 % (ref 11.5–14.5)
GLUCOSE SERPL-MCNC: 72 MG/DL (ref 74–99)
HCT VFR BLD AUTO: 25.6 % (ref 41–52)
HGB BLD-MCNC: 8.5 G/DL (ref 13.5–17.5)
MCH RBC QN AUTO: 34.7 PG (ref 26–34)
MCHC RBC AUTO-ENTMCNC: 33.2 G/DL (ref 32–36)
MCV RBC AUTO: 105 FL (ref 80–100)
NRBC BLD-RTO: 0 /100 WBCS (ref 0–0)
PLATELET # BLD AUTO: 298 X10*3/UL (ref 150–450)
POTASSIUM SERPL-SCNC: 3.4 MMOL/L (ref 3.5–5.3)
PROT SERPL-MCNC: 4.6 G/DL (ref 6.4–8.2)
RBC # BLD AUTO: 2.45 X10*6/UL (ref 4.5–5.9)
SODIUM SERPL-SCNC: 130 MMOL/L (ref 136–145)
WBC # BLD AUTO: 16.7 X10*3/UL (ref 4.4–11.3)

## 2024-06-02 PROCEDURE — 2500000001 HC RX 250 WO HCPCS SELF ADMINISTERED DRUGS (ALT 637 FOR MEDICARE OP): Performed by: INTERNAL MEDICINE

## 2024-06-02 PROCEDURE — 1200000002 HC GENERAL ROOM WITH TELEMETRY DAILY

## 2024-06-02 PROCEDURE — 2500000001 HC RX 250 WO HCPCS SELF ADMINISTERED DRUGS (ALT 637 FOR MEDICARE OP): Performed by: STUDENT IN AN ORGANIZED HEALTH CARE EDUCATION/TRAINING PROGRAM

## 2024-06-02 PROCEDURE — 94640 AIRWAY INHALATION TREATMENT: CPT

## 2024-06-02 PROCEDURE — 99232 SBSQ HOSP IP/OBS MODERATE 35: CPT | Performed by: PHYSICIAN ASSISTANT

## 2024-06-02 PROCEDURE — 2500000004 HC RX 250 GENERAL PHARMACY W/ HCPCS (ALT 636 FOR OP/ED): Performed by: STUDENT IN AN ORGANIZED HEALTH CARE EDUCATION/TRAINING PROGRAM

## 2024-06-02 PROCEDURE — 99233 SBSQ HOSP IP/OBS HIGH 50: CPT | Performed by: STUDENT IN AN ORGANIZED HEALTH CARE EDUCATION/TRAINING PROGRAM

## 2024-06-02 PROCEDURE — S4991 NICOTINE PATCH NONLEGEND: HCPCS | Performed by: STUDENT IN AN ORGANIZED HEALTH CARE EDUCATION/TRAINING PROGRAM

## 2024-06-02 PROCEDURE — 94664 DEMO&/EVAL PT USE INHALER: CPT

## 2024-06-02 PROCEDURE — 2500000002 HC RX 250 W HCPCS SELF ADMINISTERED DRUGS (ALT 637 FOR MEDICARE OP, ALT 636 FOR OP/ED): Performed by: STUDENT IN AN ORGANIZED HEALTH CARE EDUCATION/TRAINING PROGRAM

## 2024-06-02 PROCEDURE — 2500000005 HC RX 250 GENERAL PHARMACY W/O HCPCS: Performed by: STUDENT IN AN ORGANIZED HEALTH CARE EDUCATION/TRAINING PROGRAM

## 2024-06-02 PROCEDURE — 85027 COMPLETE CBC AUTOMATED: CPT | Performed by: STUDENT IN AN ORGANIZED HEALTH CARE EDUCATION/TRAINING PROGRAM

## 2024-06-02 PROCEDURE — 36415 COLL VENOUS BLD VENIPUNCTURE: CPT | Performed by: STUDENT IN AN ORGANIZED HEALTH CARE EDUCATION/TRAINING PROGRAM

## 2024-06-02 PROCEDURE — 71045 X-RAY EXAM CHEST 1 VIEW: CPT

## 2024-06-02 PROCEDURE — 71045 X-RAY EXAM CHEST 1 VIEW: CPT | Performed by: RADIOLOGY

## 2024-06-02 PROCEDURE — 80053 COMPREHEN METABOLIC PANEL: CPT | Performed by: STUDENT IN AN ORGANIZED HEALTH CARE EDUCATION/TRAINING PROGRAM

## 2024-06-02 RX ORDER — POTASSIUM CHLORIDE 20 MEQ/1
40 TABLET, EXTENDED RELEASE ORAL ONCE
Status: COMPLETED | OUTPATIENT
Start: 2024-06-02 | End: 2024-06-02

## 2024-06-02 RX ORDER — IPRATROPIUM BROMIDE AND ALBUTEROL SULFATE 2.5; .5 MG/3ML; MG/3ML
3 SOLUTION RESPIRATORY (INHALATION)
Status: DISCONTINUED | OUTPATIENT
Start: 2024-06-02 | End: 2024-06-09 | Stop reason: HOSPADM

## 2024-06-02 RX ORDER — IPRATROPIUM BROMIDE AND ALBUTEROL SULFATE 2.5; .5 MG/3ML; MG/3ML
3 SOLUTION RESPIRATORY (INHALATION)
Status: DISCONTINUED | OUTPATIENT
Start: 2024-06-02 | End: 2024-06-02

## 2024-06-02 RX ORDER — IPRATROPIUM BROMIDE AND ALBUTEROL SULFATE 2.5; .5 MG/3ML; MG/3ML
3 SOLUTION RESPIRATORY (INHALATION) EVERY 2 HOUR PRN
Status: DISCONTINUED | OUTPATIENT
Start: 2024-06-02 | End: 2024-06-09 | Stop reason: HOSPADM

## 2024-06-02 RX ORDER — FUROSEMIDE 10 MG/ML
20 INJECTION INTRAMUSCULAR; INTRAVENOUS ONCE
Status: COMPLETED | OUTPATIENT
Start: 2024-06-03 | End: 2024-06-03

## 2024-06-02 RX ADMIN — HYDROMORPHONE HYDROCHLORIDE 0.5 MG: 1 INJECTION, SOLUTION INTRAMUSCULAR; INTRAVENOUS; SUBCUTANEOUS at 09:38

## 2024-06-02 RX ADMIN — HYDROMORPHONE HYDROCHLORIDE 0.2 MG: 0.2 INJECTION, SOLUTION INTRAMUSCULAR; INTRAVENOUS; SUBCUTANEOUS at 13:25

## 2024-06-02 RX ADMIN — MORPHINE SULFATE 4 MG: 4 INJECTION, SOLUTION INTRAMUSCULAR; INTRAVENOUS at 01:59

## 2024-06-02 RX ADMIN — HYDROMORPHONE HYDROCHLORIDE 0.5 MG: 1 INJECTION, SOLUTION INTRAMUSCULAR; INTRAVENOUS; SUBCUTANEOUS at 20:58

## 2024-06-02 RX ADMIN — ASPIRIN 81 MG 81 MG: 81 TABLET ORAL at 09:37

## 2024-06-02 RX ADMIN — FLUOXETINE HYDROCHLORIDE 20 MG: 20 CAPSULE ORAL at 09:38

## 2024-06-02 RX ADMIN — CLOPIDOGREL BISULFATE 75 MG: 75 TABLET ORAL at 09:37

## 2024-06-02 RX ADMIN — METOPROLOL TARTRATE 25 MG: 25 TABLET, FILM COATED ORAL at 20:56

## 2024-06-02 RX ADMIN — HYDROMORPHONE HYDROCHLORIDE 0.5 MG: 1 INJECTION, SOLUTION INTRAMUSCULAR; INTRAVENOUS; SUBCUTANEOUS at 16:28

## 2024-06-02 RX ADMIN — IPRATROPIUM BROMIDE AND ALBUTEROL SULFATE 3 ML: 2.5; .5 SOLUTION RESPIRATORY (INHALATION) at 19:38

## 2024-06-02 RX ADMIN — POTASSIUM CHLORIDE 40 MEQ: 1500 TABLET, EXTENDED RELEASE ORAL at 09:36

## 2024-06-02 RX ADMIN — Medication 3 L/MIN: at 13:30

## 2024-06-02 RX ADMIN — METOCLOPRAMIDE 10 MG: 5 INJECTION, SOLUTION INTRAMUSCULAR; INTRAVENOUS at 04:54

## 2024-06-02 RX ADMIN — IPRATROPIUM BROMIDE AND ALBUTEROL SULFATE 3 ML: 2.5; .5 SOLUTION RESPIRATORY (INHALATION) at 14:43

## 2024-06-02 RX ADMIN — SODIUM CHLORIDE, POTASSIUM CHLORIDE, SODIUM LACTATE AND CALCIUM CHLORIDE 100 ML/HR: 600; 310; 30; 20 INJECTION, SOLUTION INTRAVENOUS at 21:00

## 2024-06-02 RX ADMIN — MORPHINE SULFATE 4 MG: 4 INJECTION, SOLUTION INTRAMUSCULAR; INTRAVENOUS at 10:26

## 2024-06-02 RX ADMIN — NICOTINE 1 PATCH: 21 PATCH, EXTENDED RELEASE TRANSDERMAL at 09:38

## 2024-06-02 RX ADMIN — MORPHINE SULFATE 4 MG: 4 INJECTION, SOLUTION INTRAMUSCULAR; INTRAVENOUS at 18:34

## 2024-06-02 RX ADMIN — ATORVASTATIN CALCIUM 40 MG: 40 TABLET, FILM COATED ORAL at 09:37

## 2024-06-02 RX ADMIN — PANTOPRAZOLE SODIUM 40 MG: 40 TABLET, DELAYED RELEASE ORAL at 06:32

## 2024-06-02 RX ADMIN — METOCLOPRAMIDE 10 MG: 10 TABLET ORAL at 13:31

## 2024-06-02 RX ADMIN — HYDROMORPHONE HYDROCHLORIDE 0.5 MG: 1 INJECTION, SOLUTION INTRAMUSCULAR; INTRAVENOUS; SUBCUTANEOUS at 04:54

## 2024-06-02 RX ADMIN — MORPHINE SULFATE 4 MG: 4 INJECTION, SOLUTION INTRAMUSCULAR; INTRAVENOUS at 14:27

## 2024-06-02 RX ADMIN — MORPHINE SULFATE 4 MG: 4 INJECTION, SOLUTION INTRAMUSCULAR; INTRAVENOUS at 06:32

## 2024-06-02 RX ADMIN — HYDROMORPHONE HYDROCHLORIDE 0.5 MG: 1 INJECTION, SOLUTION INTRAMUSCULAR; INTRAVENOUS; SUBCUTANEOUS at 00:23

## 2024-06-02 RX ADMIN — METOPROLOL TARTRATE 25 MG: 25 TABLET, FILM COATED ORAL at 09:38

## 2024-06-02 RX ADMIN — SODIUM CHLORIDE, POTASSIUM CHLORIDE, SODIUM LACTATE AND CALCIUM CHLORIDE 100 ML/HR: 600; 310; 30; 20 INJECTION, SOLUTION INTRAVENOUS at 02:02

## 2024-06-02 RX ADMIN — SODIUM CHLORIDE, POTASSIUM CHLORIDE, SODIUM LACTATE AND CALCIUM CHLORIDE 100 ML/HR: 600; 310; 30; 20 INJECTION, SOLUTION INTRAVENOUS at 10:27

## 2024-06-02 RX ADMIN — ONDANSETRON 4 MG: 4 TABLET, ORALLY DISINTEGRATING ORAL at 18:52

## 2024-06-02 ASSESSMENT — COGNITIVE AND FUNCTIONAL STATUS - GENERAL
DAILY ACTIVITIY SCORE: 24
MOBILITY SCORE: 24
MOBILITY SCORE: 24
DAILY ACTIVITIY SCORE: 24

## 2024-06-02 ASSESSMENT — PAIN - FUNCTIONAL ASSESSMENT
PAIN_FUNCTIONAL_ASSESSMENT: 0-10

## 2024-06-02 ASSESSMENT — PAIN DESCRIPTION - DESCRIPTORS
DESCRIPTORS: ACHING

## 2024-06-02 ASSESSMENT — PAIN DESCRIPTION - LOCATION
LOCATION: ABDOMEN

## 2024-06-02 ASSESSMENT — PAIN SCALES - GENERAL
PAINLEVEL_OUTOF10: 6
PAINLEVEL_OUTOF10: 8
PAINLEVEL_OUTOF10: 8
PAINLEVEL_OUTOF10: 7
PAINLEVEL_OUTOF10: 8
PAINLEVEL_OUTOF10: 7
PAINLEVEL_OUTOF10: 7
PAINLEVEL_OUTOF10: 6
PAINLEVEL_OUTOF10: 9
PAINLEVEL_OUTOF10: 8
PAINLEVEL_OUTOF10: 7

## 2024-06-02 ASSESSMENT — PAIN SCALES - PAIN ASSESSMENT IN ADVANCED DEMENTIA (PAINAD)
TOTALSCORE: MEDICATION (SEE MAR)
TOTALSCORE: MEDICATION (SEE MAR)
BREATHING: NORMAL
TOTALSCORE: MEDICATION (SEE MAR)
BREATHING: NORMAL

## 2024-06-02 NOTE — PROGRESS NOTES
HOSPITAL MEDICINE - PROGRESS NOTE    Emerson Chilel is a 64 y.o. male on day 8 of admission presenting with Pancreatic mass (HHS-HCC).    Principal Problem:    Pancreatic mass (HHS-HCC)  Active Problems:    Jaundice    Hypokalemia    Generalized abdominal pain    Post-ERCP acute pancreatitis (HHS-HCC)    NSTEMI (non-ST elevated myocardial infarction) (Multi)      Assessment/Plan   This patient currently has cardiac telemetry ordered; if you would like to modify or discontinue the telemetry order, click here to go to the orders activity to modify/discontinue the order.     Acute pancreatitis s/p ERCP: Pt originally transferred from OSH for EUS/ERCP having presented with jaundice and pancreatic mass. Study was completed 5/29 with subsequent worsening abdominal pain; procedure was c/b difficulty cannulating. Lipase uptrended.  significantly elevated. Repeat abdominal CT from 5/31 for further abdominal pain showing acute interstitial edematous pancreatitis, with 37mm of acute peripancreatic fluid along the distal greater curvature of the stomach.  - Attempted trial of scheduled morphine for acute pain control, however negligible difference. Will revert to PRN with breakthrough.  - Appreciate GI recs: Revert to CLD, check lactate/hydration status, supportive care with pain control/antiemetics, follow up pathology. Follow outpatient with GI, and will need oncology/hepatobiliary follow up once pathology results.  - Bilirubin steadily improving from a peak of 21. Liver enzymes had similarly been improving, however today appears to have some stagnation/mild worsening.  - Leukocytosis noted, had been stable until 6/2 when noted to increase significantly.  - AM CBC/CMP/Mg    Hemoptysis: Sporadic appearance, streaked in sputum. More likely related to dry respiratory tract with trace bleeding rather than true hemoptysis, given appearance that follows placement of non-humidified NC.  - Supportive care with humidification,  nasal saline. Hb stable.  - Does have mild intermittent desaturations, no known h/o COPD but active tobacco use. Most likely related to atelectasis given limited inspiratory effort secondary to abdominal pain. Supportive care with nebs, IS (though likely unable to tolerate IS).    Chest pain s/p PCI: Known history of chronic angina/prior stent. On ASA/plavix. Developed worsening episodic chest pain while admitted, with concurrent elevation of troponins concerning for NSTEMI. Seen by cardiology on 5/30 with subsequent LHC and KITTY placed to proximal LAD.  - Appreciate ongoing cardiology recs: Continue metoprolol at 25mg BID, ASA/plavix/PPI. Follow up with Dr. Boston in 1-2 weeks post-discharge.  - VS appropriate on current medication regimen. Will continue holding home norvasc/hydrochlorothiazide and resume if evidence of derangement/reassess at least daily.  - Maintain K>4, Mg>2    Anxiety/depression, asthma, tobacco use disorder, HTN, HLD: Home meds resumed as indicated    Discharge pending appropriate pain control/PO tolerance.       DVT PPX: SCDs  Nutrition: low fat/low residue    Subjective   Pt seen with wife at bedside. Both express concern about the prolonged symptoms and admission. Abdominal pain continues to be persistent and severe, despite trial of scheduled morphine with breakthrough. Tolerated very little PO. Reported more blood-streaked sputum today, but did add that it appeared shortly after being placed on nonhumidified O2. I looked at sputum at bedside with pt.       Objective     [Held by provider] amLODIPine, 10 mg, oral, Daily  aspirin, 81 mg, oral, Daily  atorvastatin, 40 mg, oral, Daily  clopidogrel, 75 mg, oral, Daily  FLUoxetine, 20 mg, oral, Daily  [Held by provider] hydroCHLOROthiazide, 12.5 mg, oral, q AM  metoprolol tartrate, 25 mg, oral, BID  morphine, 4 mg, intravenous, q4h  nicotine, 1 patch, transdermal, Daily  pantoprazole, 40 mg, oral, Daily before breakfast  potassium chloride CR,  40 mEq, oral, Once       PRN medications: acetaminophen **OR** acetaminophen **OR** acetaminophen, acetaminophen **OR** acetaminophen **OR** acetaminophen, albuterol, ALPRAZolam, [Held by provider] HYDROcodone-acetaminophen, HYDROmorphone, melatonin, metoclopramide **OR** metoclopramide, [Held by provider] morphine, [Held by provider] morphine, nitroglycerin, ondansetron ODT **OR** ondansetron, oxygen, polyethylene glycol   lactated Ringer's, 100 mL/hr, Last Rate: 100 mL/hr (06/02/24 1027)         Last Recorded Vitals  /71   Pulse 100   Temp 36.8 °C (98.2 °F)   Resp 22   Wt 70.9 kg (156 lb 4.9 oz)   SpO2 90%   Intake/Output last 3 Shifts:    Intake/Output Summary (Last 24 hours) at 6/2/2024 1109  Last data filed at 6/2/2024 0202  Gross per 24 hour   Intake 1903.34 ml   Output --   Net 1903.34 ml       Admission Weight  Weight: 65.8 kg (145 lb) (05/25/24 0514)    Daily Weight  06/01/24 : 70.9 kg (156 lb 4.9 oz)    Image Results  Electrocardiogram, 12-lead PRN ACS symptoms  Sinus rhythm with occasional Premature ventricular complexes  Possible Left atrial enlargement  Septal infarct (cited on or before 28-MAY-2024)  T wave abnormality, consider anterolateral ischemia  Prolonged QT  Abnormal ECG  When compared with ECG of 30-MAY-2024 03:50, (unconfirmed)  Premature ventricular complexes are now Present  Aberrant conduction is no longer Present  Nonspecific T wave abnormality now evident in Inferior leads  Confirmed by Carlos Mooney ([id number]) on 5/31/2024 4:19:05 PM  CT abdomen pelvis w IV contrast  Narrative: Interpreted By:  Jose May,   STUDY:  CT ABDOMEN PELVIS W IV CONTRAST;  5/31/2024 1:53 pm      INDICATION:  Signs/Symptoms:continuing abd pain, post ERCP pancreatitis.      COMPARISON:  05/24/2024      ACCESSION NUMBER(S):  [id number]      ORDERING CLINICIAN:  CHAR RICH      TECHNIQUE:  CT of the abdomen and pelvis was performed.  Contiguous axial images  were obtained at 3 mm slice thickness through  the abdomen and pelvis.  Coronal and sagittal reconstructions at 3 mm slice thickness were  performed. 75 mL Omnipaque 350 administered intravenously without  immediate complication.      FINDINGS:  LOWER CHEST:  New trace pleural effusions. Bibasilar atelectasis. Coronary  atherosclerosis.      ABDOMEN:      LIVER:  Suspect steatosis. No definite focal lesion.      BILE DUCTS:  No calcified stone.      GALLBLADDER:  A metallic common bile duct stent has been placed. Biliary dilatation  is slightly decreased and there is now pneumobilia indicating stent  patency.      PANCREAS:  Mass along the pancreaticoduodenal groove is roughly unchanged  measuring about 25 mm. Mild upstream pancreatic ductal dilatation  appear minimally worsened. Cystic lesion along the pancreatic neck  measuring 19 mm is unchanged. There is new diffuse mild  peripancreatic edema. No definite necrosis identified.      SPLEEN:  Unremarkable      ADRENAL GLANDS:  Unremarkable      KIDNEYS AND URETERS:  Small cyst in the right kidney. Otherwise unremarkable kidneys  without hydronephrosis.      PELVIS:      BLADDER:  Nondistended.      REPRODUCTIVE ORGANS:  Coarse calcifications of the prostate.      BOWEL:  37 x 27 mm collection of fluid along the distal greater curvature of  the stomach may be partially loculated. Trace ascites. No free air.  No findings of bowel obstruction or inflammation. Unremarkable  appendix.    Unremarkable mesentery.      VESSELS:  Aortoiliac system is patent without aneurysm.  Major visceral  branches are patent.Severe atherosclerosis. IVC and major branches  are grossly patent. Major portal venous branches are patent.      PERITONEUM AND RETROPERITONEUM:  No abdominal or pelvic lymphadenopathy.      BONE AND ABDOMINAL WALL:  Bilateral femoral head avascular necrosis without associated  flattened deformity. Lumbar degenerative changes with mild  levoscoliosis. Grade 1 anterolisthesis L4-5.      Impression: 1.  Interval  placement of metallic common bile duct stents with  decreased biliary dilatation.  2. Findings of acute interstitial edematous pancreatitis. 37 mm acute  peripancreatic fluid collection along the distal greater curvature of  the stomach.  3. 25 mm pancreatic head mass unchanged 19 mm cystic lesion along the  pancreatic neck is also unchanged.      MACRO:  None.      Signed by: Jose May 5/31/2024 3:13 PM  Dictation workstation:   [id number]  Cardiac Catheterization Procedure     [facility], Cath Lab       [id number] [address]    Cardiovascular Catheterization Report    Patient Name:      SOLOMON YEUNG     Performing Physician:  [id number] Karin Costello MD  Study Date:        5/30/2024            Verifying Physician:   [id number] Costello MD  MRN/PID:           [id number]             Cardiologist/Co-Scrub:  Accession#:        [id number]         Ordering Provider:     [id number] DUARTE  Date of Birth/Age: 1960 / 64 years Cardiologist:  Gender:            M                    Fellow:  Encounter#:        [id number]           Surgeon:       Study: Left Heart Cath with PCI       Indications:  SOLOMON YEUNG is a 64 year old male who presents with dyslipidemia, coronary artery disease, prior percutaneous coronary intervention, admitted with pancreatitis, who developed chest pain in the hospital overnight found to have elevated troponins consistent with NSTEMI and a chest pain assessment of typical angina. NSTE - ACS.  Stress test performed: No. CTA performed: No. Dorothea accessed: No. LVEF  Assessed: Yes.  Cardiac arrest: No.  Cardiac surgical consult: No.  Cardiovascular Instability: No  Frailty  status of patient entering lab: 5 = Mildly frail.       Procedure Description:  After infiltration with 2% Lidocaine, the radial artery was cannulated with a modified Seldinger technique. Selective coronary catheterization was performed using a 5 Fr catheter(s) exchanged over a guide wire to cannulate the coronary arteries. A JR 4 tip catheter was used for right coronary injections. A 5 Fr Dhiraj catheter was used for the left coronary artery injections.  Multiple injections of contrast were made into the left and right coronary arteries with angiograms recorded in multiple projections. After completion of the procedure, the arterial sheath was pulled and a TR Band Radial Compression Device was utilized to obtain patent hemostasis.     Coronary Angiography:  The coronary circulation is right dominant.     Left Main Coronary Artery:  The left main coronary artery is a normal caliber vessel. The left main arises normally from the left coronary sinus of Valsalva. The left main coronary artery showed a normal vessel.     Left Anterior Descending Coronary Artery Distribution:  The left anterior descending coronary artery is a normal caliber vessel. The LAD arises normally from the left main coronary artery. The LAD demonstrated severe proximal atherosclerotic disease and a previous patent stent. The proximal left anterior descending coronary artery showed 90% stenosis. This lesion was involved a bifurcation.     Circumflex Coronary Artery Distribution:  The circumflex coronary artery is a normal caliber vessel. The circumflex arises normally from the left main coronary artery. The circumflex revealed mild luminal irregularities.     Right Coronary Artery Distribution:    The right coronary artery is a normal caliber vessel. The RCA arises normally from the right sinus of Valsalva. The RCA showed mild luminal irregularities.     Coronary Interventions:  An EBU 3.5 guide was used to intubate the left main. A runthrough wire  was used to wire the LAD and a verona blue wire was used to wire the first diagonal branch.  Angiography reveals a 90% stenosis of the proximal left anterior descending coronary artery. Pre-intervention JAGUAR flow was 3. Intravascular Ultrasound (IVUS) was performed within the proximal left anterior descending. . The reference lumen diameter was 3.50 mm. Percutaneous coronary intervention was performed within the proximal left anterior descending. The vessel was pre-dilated using a compliant balloon 2.5 mm x 12 mm at 12 YAMILET. [place] drug-eluting stent 3.5 mm x 26 mm was advanced to the lesion and implanted at 12 YAMILET. The stent was post dilated using a non-compliant balloon 3.5 mm x 20 mm at 22 YAMILET. Additional dilatation was performed using a non-compliant balloon 3.75 mm x 12 mm at 20 YAMILET. The stenosis was successfully reduced from 90% to 0%. Post intervention Intravascular Ultrasound (IVUS) was performed within the proximal left anterior descending . The stent demonstrated good apposition. No thrombus was visualized. No edge dissection is visualized. Post-intervention   JAGUAR flow was 3.     Coronary Lesion Summary:  Vessel   Stenosis   Vessel Segment  LAD    90% stenosis    proximal       Complications:  No in-lab complications observed.     Cardiac Cath Post Procedure Notes:  Post Procedure Diagnosis: KITTY of LAD.  Blood Loss:               Estimated blood loss during the procedure was 5 mls.  Specimens Removed:        Number of specimen(s) removed: none.       Recommendations:  Maximize medical therapy.  Anti-platelet therapy with Dual antiplatelet therapy.    ____________________________________________________________________________________  CONCLUSIONS:   1. Indication: NSTEMI.   2. Successful IVUS guided PCI to proximal LAD using KITTY.    ICD 10 Codes:  Non ST elevation (NSTEMI) myocardial infarction-I21.4     CPT Codes:  Left Heart Cath (visualization of coronaries) and LV-28651; IVUS, Left Anterior  Descending initial Vessel (PCI)-71541.LD; Stent w angioplasty Left Anterior Descending single major Artery branch (PCI)-10145.LD; Moderate Sedation Services initial 15 minutes patient >5 years-27744; Moderate Sedation Services 1st additional 15 minutes patient >5 years-65283; Moderate Sedation Services 2nd additional 15 minutes patient >5 years-37567; Moderate Sedation Services 3rd additional 15 minutes patient >5 years-30502     [id number] Karin Costello MD  Performing Physician  Electronically signed by [id number] Karin Costello MD on 5/31/2024 at  11:19:34 AM         ** Final **      Physical Exam  Vitals and nursing note reviewed.   Constitutional:       General: He is awake. He is not in acute distress.     Appearance: Normal appearance. He is ill-appearing. He is not toxic-appearing.   HENT:      Head: Normocephalic and atraumatic.      Right Ear: External ear normal.      Left Ear: External ear normal.      Nose: Nose normal.   Eyes:      Extraocular Movements: Extraocular movements intact.   Cardiovascular:      Rate and Rhythm: Normal rate and regular rhythm.   Pulmonary:      Effort: Pulmonary effort is normal. No respiratory distress.      Breath sounds: No rhonchi.      Comments: Observed sputum with pt at bedside, scant blood streaking otherwise unremarakble.  Abdominal:      General: There is no distension.      Palpations: Abdomen is soft.      Tenderness: There is abdominal tenderness. There is guarding.   Musculoskeletal:         General: No signs of injury.      Cervical back: Normal range of motion.   Skin:     General: Skin is warm and dry.      Coloration: Skin is jaundiced. Skin is not pale.   Neurological:      General: No focal deficit present.      Mental Status: He is alert and oriented to person, place, and time. Mental status is at baseline.   Psychiatric:         Mood and Affect: Mood normal.         Behavior: Behavior normal. Behavior is cooperative.         Relevant  Results    [Held by provider] amLODIPine, 10 mg, oral, Daily  aspirin, 81 mg, oral, Daily  atorvastatin, 40 mg, oral, Daily  clopidogrel, 75 mg, oral, Daily  FLUoxetine, 20 mg, oral, Daily  [Held by provider] hydroCHLOROthiazide, 12.5 mg, oral, q AM  metoprolol tartrate, 25 mg, oral, BID  morphine, 4 mg, intravenous, q4h  nicotine, 1 patch, transdermal, Daily  pantoprazole, 40 mg, oral, Daily before breakfast  potassium chloride CR, 40 mEq, oral, Once      PRN medications: acetaminophen **OR** acetaminophen **OR** acetaminophen, acetaminophen **OR** acetaminophen **OR** acetaminophen, albuterol, ALPRAZolam, [Held by provider] HYDROcodone-acetaminophen, HYDROmorphone, melatonin, metoclopramide **OR** metoclopramide, [Held by provider] morphine, [Held by provider] morphine, nitroglycerin, ondansetron ODT **OR** ondansetron, oxygen, polyethylene glycol  lactated Ringer's, 100 mL/hr, Last Rate: 100 mL/hr (06/02/24 1027)          Electrocardiogram, 12-lead PRN ACS symptoms    Result Date: 5/30/2024  Sinus rhythm with Premature atrial complexes with Aberrant conduction Septal infarct (cited on or before 28-MAY-2024) T wave abnormality, consider anterolateral ischemia Prolonged QT Abnormal ECG When compared with ECG of 29-MAY-2024 19:57, (unconfirmed) Premature ventricular complexes are no longer Present Aberrant conduction is now Present T wave inversion now evident in Anterior leads QT has lengthened    Electrocardiogram, 12-lead PRN ACS symptoms    Result Date: 5/30/2024  Sinus rhythm with occasional Premature ventricular complexes Possible Left atrial enlargement Septal infarct (cited on or before 28-MAY-2024) T wave abnormality, consider anterolateral ischemia Prolonged QT Abnormal ECG When compared with ECG of 30-MAY-2024 03:50, (unconfirmed) Premature ventricular complexes are now Present Aberrant conduction is no longer Present Nonspecific T wave abnormality now evident in Inferior leads    Electrocardiogram 12  Lead    Result Date: 5/30/2024  Normal sinus rhythm Septal infarct (cited on or before 28-MAY-2024) T wave abnormality, consider anterolateral ischemia Prolonged QT Abnormal ECG When compared with ECG of 30-MAY-2024 06:54, (unconfirmed) Premature ventricular complexes are no longer Present    Transthoracic Echo (TTE) Complete    Result Date: 5/30/2024            [facility] [address] [facility], [facility] [address]    Tel [phone] Fax [phone] TRANSTHORACIC ECHOCARDIOGRAM REPORT  Patient Name:      SOLOMON Lyons Physician:    [id number] Daina Peres MD Study Date:        5/30/2024             Ordering Provider:    [id number] MELINA WATKINS MRN/PID:           [id number]              Fellow: Accession#:        [id number]          Nurse: Date of Birth/Age: 1960 / 64 years  Sonographer:          Ivory Winslow RDCS Gender:            M                     Additional Staff: Height:            170.18 cm             Admit Date:           5/25/2024 Weight:            67.59 kg              Admission Status:     Inpatient - STAT BSA / BMI:         1.78 m2 / 23.34 kg/m2 Department Location:  [facility] [place] Blood Pressure: 110 /62 mmHg Study Type:    TRANSTHORACIC ECHO (TTE) COMPLETE Diagnosis/ICD: Non ST elevation (NSTEMI) myocardial infarction-I21.4 Indication:    NSTEMI CPT Codes:     Echo Complete w Full Doppler-46442 Patient History: Pertinent History: CAD and Dyslipidemia. No previous echocardiogram. Study Detail: The following Echo studies were performed: 2D, M-Mode, Doppler and               color flow.  PHYSICIAN INTERPRETATION: Left Ventricle: Left ventricular systolic function is normal, with an estimated ejection fraction of 60%. Wall motion is abnormal. The left ventricular cavity size is normal. There is no evidence of left ventricular hypertrophy. Spectral Doppler shows a pseudonormal pattern of left ventricular diastolic filling.  There is no definite left ventricular thrombus visualized. The intraventricular septum appears intact without evidence of shunting or a ventricular septal defect. LV Wall Scoring: The entire apex, mid and apical inferior septum, and mid and apical inferior wall are hypokinetic. All remaining scored segments are normal. Left Atrium: The left atrium is normal in size. Right Ventricle: The right ventricle is normal in size. There is normal right ventricular global systolic function. Right Atrium: The right atrium is normal in size. Aortic Valve: The aortic valve is trileaflet. There is mild aortic valve cusp calcification. There is mild to moderate aortic valve thickening. There is aortic valve annular calcification. There is evidence of mildly elevated transaortic gradients consistent with sclerosis of the aortic valve. There is moderate aortic valve regurgitation. The peak instantaneous gradient of the aortic valve is 9.7 mmHg. Mitral Valve: The mitral valve is moderately thickened. There is no evidence of mitral valve prolapse. There is no evidence of mitral valve stenosis. The doppler estimated mean and peak diastolic pressure gradients are 3.1 mmHg and 5.0 mmHg respectively. There is mild mitral annular calcification. There is mild mitral valve regurgitation. Tricuspid Valve: The tricuspid valve is structurally normal. There is no evidence of tricuspid valve stenosis. There is mild tricuspid regurgitation. The Doppler estimated RVSP is moderate to severely elevated at 51.1 mmHg. Pulmonic Valve: The pulmonic valve is abnormal. There is trace to mild pulmonic valve regurgitation. Pericardium: There is no pericardial effusion noted. Aorta: The aortic root is normal. Systemic Veins: The inferior vena cava appears to be of normal size. There is IVC inspiratory collapse greater than 50%. In comparison to the previous echocardiogram(s): Prior examinations are available and were reviewed for comparison purposes. Compared  with echo 7/17/23 LVEF is unchanged. Grade I is now grade II diastolic dysfunction. Apical wall motion abnormality is new.  CONCLUSIONS:  1. Left ventricular systolic function is normal with a 60% estimated ejection fraction.  2. Entire apex, mid and apical inferior septum, and mid and apical inferior wall are abnormal.  3. Intact intraventricular septum without shunting or a ventricular septal defect.  4. No left ventricular thrombus visualized.  5. Spectral Doppler shows a pseudonormal pattern of left ventricular diastolic filling.  6. There is no evidence of left ventricular hypertrophy.  7. The mitral valve is moderately thickened.  8. No evidence of mitral valve prolapse.  9. There is No tricuspid stenosis. 10. Moderate to severely elevated right ventricular systolic pressure. 11. Aortic valve sclerosis. 12. There is aortic valve annular calcification. 13. Moderate aortic valve regurgitation. 14. Compared with echo 7/17/23 LVEF is unchanged. Grade I is now grade II diastolic dysfunction. Apical wall motion abnormality is new. QUANTITATIVE DATA SUMMARY: 2D MEASUREMENTS:                          Normal Ranges: LAs:           3.66 cm   (2.7-4.0cm) IVSd:          0.87 cm   (0.6-1.1cm) LVPWd:         0.64 cm   (0.6-1.1cm) LVIDd:         4.81 cm   (3.9-5.9cm) LVIDs:         2.99 cm LV Mass Index: 66.0 g/m2 LV % FS        37.9 % LA VOLUME:                               Normal Ranges: LA Vol A4C:        41.0 ml    (22+/-6mL/m2) LA Vol A2C:        43.1 ml LA Vol BP:         44.0 ml LA Vol Index A4C:  23.0 ml/m2 LA Vol Index A2C:  24.2 ml/m2 LA Vol Index BP:   24.7 ml/m2 LA Area A4C:       14.9 cm2 LA Area A2C:       14.6 cm2 LA Major Axis A4C: 4.6 cm LA Major Axis A2C: 4.2 cm LA Volume Index:   24.5 ml/m2 LA Vol A4C:        35.2 ml LA Vol A2C:        40.5 ml AORTA MEASUREMENTS:                      Normal Ranges: Ao Sinus, d: 3.10 cm (2.1-3.5cm) Ao STJ, d:   3.20 cm (1.7-3.4cm) Asc Ao, d:   3.50 cm (2.1-3.4cm) LV  SYSTOLIC FUNCTION BY 2D PLANIMETRY (MOD):                     Normal Ranges: EF-A4C View: 61.0 % (>=55%) EF-A2C View: 61.6 % EF-Biplane:  61.3 % LV DIASTOLIC FUNCTION:                              Normal Ranges: MV Peak E:        0.85 m/s   (0.7-1.2 m/s) MV Peak A:        0.95 m/s   (0.42-0.7 m/s) E/A Ratio:        0.90       (1.0-2.2) MV lateral e'     0.09 m/s MV medial e'      0.07 m/s PulmV Sys Keshawn:    59.28 cm/s PulmV Hess Keshawn:   43.11 cm/s PulmV S/D Keshawn:    1.38 PulmV A Revs Keshawn: 26.87 cm/s MITRAL VALVE:                      Normal Ranges: MV Vmax:    1.12 m/s (<=1.3m/s) MV peak P.0 mmHg (<5mmHg) MV mean PG: 3.1 mmHg (<48mmHg) MV VTI:     35.83 cm (10-13cm) MV DT:      160 msec (150-240msec) AORTIC VALVE:                         Normal Ranges: AoV Vmax:      1.56 m/s (<=1.7m/s) AoV Peak P.7 mmHg (<20mmHg) LVOT Max Keshawn:  1.18 m/s (<=1.1m/s) LVOT VTI:      28.06 cm LVOT Diameter: 1.70 cm  (1.8-2.4cm) AoV Area,Vmax: 1.71 cm2 (2.5-4.5cm2) AORTIC INSUFFICIENCY: AI Vmax:       4.30 m/s AI Half-time:  396 msec AI Decel Time: 1364 msec AI Decel Rate: 315.28 cm/s2  RIGHT VENTRICLE: RV Basal 3.20 cm RV Mid   7.20 cm RV Major 7.2 cm TAPSE:   21.0 mm RV s'    0.16 m/s TRICUSPID VALVE/RVSP:                             Normal Ranges: Peak TR Velocity: 3.47 m/s RV Syst Pressure: 51.1 mmHg (< 30mmHg) IVC Diam:         1.31 cm PULMONIC VALVE:                         Normal Ranges: PV Accel Time: 66 msec  (>120ms) PV Max Keshawn:    0.7 m/s  (0.6-0.9m/s) PV Max P.2 mmHg Pulmonary Veins: PulmV A Revs Keshawn: 26.87 cm/s PulmV Hess Keshawn:   43.11 cm/s PulmV S/D Keshawn:    1.38 PulmV Sys Keshawn:    59.28 cm/s AORTA: Asc Ao Diam 3.49 cm  [id number] Daina Peres MD Electronically signed on 2024 at 12:26:19 PM  Wall Scoring  ** Final **     ECG 12 Lead    Result Date: 2024  Sinus tachycardia with frequent Premature ventricular complexes Left axis deviation Septal infarct (cited on or before 28-MAY-2024) Abnormal ECG When  compared with ECG of 28-MAY-2024 17:18, (unconfirmed) Premature ventricular complexes are now Present Nonspecific T wave abnormality now evident in Anterolateral leads    Electrocardiogram, 12-lead PRN ACS symptoms    Result Date: 5/29/2024  Normal sinus rhythm Possible Left atrial enlargement Low voltage QRS Septal infarct , age undetermined Abnormal ECG When compared with ECG of 24-MAY-2024 16:45, Premature ventricular complexes are no longer Present    FL fluoro images no charge    Result Date: 5/28/2024  These images are not reportable by radiology and will not be interpreted by  Radiologists.    Endoscopic Retrograde Cholangiopancreatography (ERCP)    Result Date: 5/28/2024  Table formatting from the original result was not included. Impression The common bile duct was deeply cannulated after 10 attempts. Cannulation was difficult due to inadvertent cannulation, small ampulla and Distal CBD stricture. 20 mm localized, intrinsic, smooth and severe stricture was visualized in the distal common bile duct Complete major papilla sphincterotomy was performed. Performed brushings with cytology brush in the distal common bile duct One 8 mm x 8 cm fully covered stent was placed in the common bile duct Findings The major papilla was native. Ampullectomy was not performed. Small ampulla, mildly deformed due to external compression. The common bile duct was deeply cannulated after 10 attempts using a traction sphincterotome with 260 cm straight guidewire. Cannulation was difficult due to inadvertent cannulation, small ampulla and Distal CBD stricture. No bleeding was observed. . Unintentional cannulation PD was performed. 20 mm localized, intrinsic, smooth and severe stricture was visualized in the distal common bile duct. Complete 5 mm major papilla sphincterotomy was performed using a sphincterotome. No bleeding was noted at the procedure site. Performed 3 brushings with cytology brush in the distal common bile duct.  One 8 mm x 8 cm fully covered metal stent was placed successfully in the common bile duct. Recommendation  Await pathology results  Follow up with oncology and pancreato biliary surgeon as outpatient Watch for complication  Indication Jaundice, Generalized abdominal pain, Pancreatic mass ([facility]) Staff Staff Role Shanda Gong MD Proceduralist Medications See Anesthesia Record. Preprocedure A history and physical has been performed, and patient medication allergies have been reviewed. The patient's tolerance of previous anesthesia has been reviewed. The risks and benefits of the procedure and the sedation options and risks were discussed with the patient. All questions were answered and informed consent obtained. Rectal Indomethacin was not administered. Details of the Procedure The patient underwent general anesthesia, which was administered by an anesthesia professional. The patient's blood pressure, heart rate, level of consciousness, oxygen, respirations, ECG and ETCO2 were monitored throughout the procedure. The scope was introduced through the mouth and advanced to the second part of the duodenum. The patient experienced no blood loss. The procedure was not difficult. The patient tolerated the procedure well. There were no apparent adverse events. Events Procedure Events Event Event Time ENDO SCOPE IN TIME 5/28/2024  3:00 PM ENDO SCOPE OUT TIME 5/28/2024  3:00 PM ENDO SCOPE IN TIME 5/28/2024  3:02 PM ENDO SCOPE OUT TIME 5/28/2024  3:30 PM ENDO SCOPE IN TIME 5/28/2024  3:47 PM Specimens ID Type Source Tests Collected by Time 1 : FNA Pancreatic Head Mass Non-Gynecologic Cytology PANCREAS FINE NEEDLE ASPIRATION HEAD CYTOLOGY CONSULTATION (NON-GYNECOLOGIC) Devorah Yost 5/28/2024 1512 2 : FNB Pancreatic Head Mass Tissue PANCREAS BIOPSY SURGICAL PATHOLOGY EXAM Devorah Yost 5/28/2024 1513 3 :  Non-Gynecologic Cytology BILE DUCT BRUSH COMMON CYTOLOGY CONSULTATION (NON-GYNECOLOGIC) Devorah Yost 5/28/2024 1643  Procedure Location [facility] [address] [place] [address] [phone] Referring Provider Zack Isaacs MD [address] [place] [address] Procedure Provider Shanda Gong MD     Endoscopic Ultrasound (Upper)    Result Date: 5/28/2024  Table formatting from the original result was not included. Impression The cricopharynx, upper third of the esophagus, middle third of the esophagus, lower third of the esophagus and GE junction appeared normal. The body of the stomach and antrum appeared normal. Mild extrinsic compression was observed in the 2nd part of the duodenum The duodenal bulb appeared normal. The pancreas appeared atrophic. The parenchyma was visualized in the body of the pancreas and tail of the pancreas. The parenchyma was heterogeneous and had hyperechoic strands. Single round, homogeneous, hypoechoic and solid mass measuring 30 mm x 25 mm with well-defined margins was visualized in the head of the pancreas. Apparent abutment of the portal vein and superior mesenteric vein; 3 fine needle aspiration passes were taken. An adequate sample of aspirate was obtained. The sample was sent for cytology analysis; 3 fine needle biopsy passes were taken. An adequate sample was obtained. A sample was sent for histology analysis The proximal bile duct was dilated. The parenchyma was visualized in the left lobe of the liver. The parenchyma was homogeneous and hyperechoic. The common hepatic duct, left main hepatic duct and right main hepatic duct appeared dilated. The left kidney, spleen and left adrenal appeared normal. Findings The cricopharynx, upper third of the esophagus, middle third of the esophagus, lower third of the esophagus and GE junction appeared normal. The body of the stomach and antrum appeared normal. Mild, traversable extrinsic compression was observed in the 2nd part of the duodenum The duodenal bulb appeared normal. The  pancreas appeared atrophic. The parenchyma was visualized in the body of the pancreas and tail of the pancreas. The parenchyma was heterogeneous and had hyperechoic strands. The pancreatic duct measured 2 mm at the head, 3 mm at the body and 1 mm at the tail. Single round, homogeneous, hypoechoic and solid mass measuring 30 mm x 25 mm with well-defined margins was visualized in the head of the pancreas. Apparent abutment of the portal vein and superior mesenteric vein; 3 fine needle aspiration passes were taken with a 22 gauge Sharcore needle using a transduodenal approach guided by Doppler. An adequate sample of aspirate was obtained. The sample was sent for cytology analysis. Onsite cytologist was not present; 3 successful fine needle biopsy passes were taken with a 22 gauge Sharcore needle using a transduodenal approach. An adequate sample was obtained. A sample was sent for histology analysis. Onsite cytologist was not present The proximal bile duct was dilated. The bile duct measured 12 mm at the middle. The parenchyma was visualized in the left lobe of the liver. The parenchyma was homogeneous and hyperechoic. The common hepatic duct, left main hepatic duct and right main hepatic duct appeared dilated. The left kidney, spleen and left adrenal appeared normal. Recommendation  Await pathology results  Proceed to ERCP  Indication Jaundice, Generalized abdominal pain, Pancreatic mass ([facility]) Staff Staff Role Shanda Gong MD Proceduralist Medications See Anesthesia Record. Preprocedure A history and physical has been performed, and patient medication allergies have been reviewed. The patient's tolerance of previous anesthesia has been reviewed. The risks and benefits of the procedure and the sedation options and risks were discussed with the patient. All questions were answered and informed consent obtained. Details of the Procedure The patient underwent general anesthesia, which was administered by an  anesthesia professional. The patient's blood pressure, heart rate, level of consciousness, oxygen, respirations, ECG and ETCO2 were monitored throughout the procedure. The radial scope was introduced through the mouth and advanced to the second part of the duodenum. The patient's estimated blood loss was minimal (<5 mL). The procedure was not difficult. The patient tolerated the procedure well. There were no apparent adverse events. Events Procedure Events Event Event Time ENDO SCOPE IN TIME 5/28/2024  3:00 PM ENDO SCOPE OUT TIME 5/28/2024  3:00 PM ENDO SCOPE IN TIME 5/28/2024  3:02 PM ENDO SCOPE OUT TIME 5/28/2024  3:30 PM Specimens ID Type Source Tests Collected by Time 1 : FNA Pancreatic Head Mass Non-Gynecologic Cytology PANCREAS FINE NEEDLE ASPIRATION HEAD CYTOLOGY CONSULTATION (NON-GYNECOLOGIC) Devroah Ritika 5/28/2024 1512 2 : FNB Pancreatic Head Mass Tissue PANCREAS BIOPSY SURGICAL PATHOLOGY EXAM Devorah Ritika 5/28/2024 1513 Procedure Location [address] [address] [phone] Referring Provider Zack Whitney MD [address] [address] Procedure Provider Shanda Gong MD     ECG 12 lead    Result Date: 5/25/2024  Sinus rhythm with frequent Premature ventricular complexes Otherwise normal ECG When compared with ECG of 24-MAY-2024 16:43, (unconfirmed) Premature ventricular complexes are now Present See ED provider note for full interpretation and clinical correlation Confirmed by Deyanira Lantigua ([id number]) on 5/25/2024 5:28:37 PM    CT abdomen pelvis w IV contrast    Result Date: 5/24/2024  STUDY: CT Abdomen and Pelvis with IV Contrast; 05/24/2024 at 5:51 PM INDICATION: Right upper quadrant abdominal pain. Jaundice. COMPARISON: None available. ACCESSION NUMBER(S): [id number] ORDERING CLINICIAN: ZACK WHITNEY TECHNIQUE: CT of the abdomen and pelvis was performed.  Contiguous axial images were obtained at  3 mm slice thickness through the abdomen and pelvis. Coronal and sagittal reconstructions at 3 mm slice thickness were performed.  Omnipaque-350 75 mL was administered intravenously.  FINDINGS: LOWER CHEST: No cardiomegaly.  No pericardial effusion.  Lung bases are clear.  ABDOMEN:  LIVER: No hepatomegaly.  Smooth surface contour.  Normal attenuation.  BILE DUCTS: There is intrahepatic and common bile bile duct dilatation.  This can be followed into the head of the pancreas.  GALLBLADDER: The gallbladder is present.  There is intermediate attenuation the gallbladder most likely representing sludge. STOMACH: No abnormalities identified.  PANCREAS: There is an ill-defined 2.5 cm mass in the head of the pancreas.  This is best seen on series 201, slice 61 as well as series 202, slice 45. There is pancreatic duct dilatation.  SPLEEN: No splenomegaly or focal splenic lesion.  ADRENAL GLANDS: No thickening or nodules.  KIDNEYS AND URETERS: Kidneys are normal in size and location.  No renal or ureteral calculi.  PELVIS:  BLADDER: No abnormalities identified.  REPRODUCTIVE ORGANS: No abnormalities identified.  BOWEL: No abnormalities identified.  The appendix is identified and is normal.  VESSELS: No abnormalities identified.  Abdominal aorta is normal in caliber.  PERITONEUM/RETROPERITONEUM/LYMPH NODES: No free fluid.  No pneumoperitoneum. No lymphadenopathy.  ABDOMINAL WALL: No abnormalities identified. SOFT TISSUES: No abnormalities identified.  BONES: No acute fracture or aggressive osseous lesion.    2.5 cm mass in the head of the pancreas.  There is dilatation of the biliary ducts as well as the pancreatic duct and neoplasm cannot be excluded.  This is best evaluated with ERCP or MRCP.  There are findings consistent with sludge in the gallbladder. Signed by Bhanu Taveras MD       Results from last 7 days   Lab Units 06/02/24  0341 06/01/24  0346 05/31/24  2341   WBC AUTO x10*3/uL 16.7* 13.8* 14.3*   RBC AUTO x10*6/uL  "2.45* 2.60* 2.64*   HEMOGLOBIN g/dL 8.5* 9.2* 9.3*   HEMATOCRIT % 25.6* 26.7* 27.4*   MCV fL 105* 103* 104*   PLATELETS AUTO x10*3/uL 298 278 289           No lab exists for component: \"B12\"                  Results from last 7 days   Lab Units 06/02/24  0341 06/01/24  0346 05/31/24  0412 05/30/24  0427 05/29/24  0617 05/28/24  0525 05/27/24  0549   SODIUM mmol/L 130* 129* 131* 133* 129*   < > 131*   POTASSIUM mmol/L 3.4* 3.5 3.8 3.4* 3.6   < > 3.7   CHLORIDE mmol/L 100 100 100 100 99   < > 98   CO2 mmol/L 23 23 25 25 24   < > 25   BUN mg/dL 6 6 7 6 9   < > 10   CREATININE mg/dL 0.57 0.57 0.66 0.60 0.60   < > 0.73   CALCIUM mg/dL 7.9* 7.9* 8.1* 8.4* 8.6   < > 8.8   PHOSPHORUS mg/dL  --   --   --   --   --   --  2.9   MAGNESIUM mg/dL  --   --  2.22 1.62 1.75  --  2.01   BILIRUBIN TOTAL mg/dL 14.1* 14.6* 16.0* 17.6* 19.0*   < > 20.5*   ALT U/L 142* 134* 155* 184* 229*   < > 266*   AST U/L 132* 93* 98* 114* 150*   < > 187*    < > = values in this interval not displayed.         Results from last 7 days   Lab Units 05/30/24  1315 05/30/24  1010 05/30/24  0427 05/29/24  2224 05/29/24 2016   TROPHS ng/L 356* 341* 534* 656* 351*                    This patient is intubated   Reason for patient to remain intubated today? Patient is NOT intubated.          Kemal Layne, DO          "

## 2024-06-02 NOTE — PROGRESS NOTES
06/02/24 0941   Discharge Planning   Patient expects to be discharged to: [facility] after discharge.

## 2024-06-02 NOTE — PROGRESS NOTES
Department of Internal Medicine  Gastroenterology  Progress note      Subjective  GI is following for pancreatic mass requiring metal stent,   complicated by post ERCP pancreatitis    Today, he continues to endorse abdominal pain.  Currently a 7 out of 10.  States he was only able to tolerate 1 Jell-O and a few sips of liquids throughout the day.  He denies any nausea or vomiting.  He is passing gas but has not had a bowel movement yet.    Current Medication    Current Facility-Administered Medications:     acetaminophen (Tylenol) tablet 650 mg, 650 mg, oral, q4h PRN **OR** acetaminophen (Tylenol) oral liquid 650 mg, 650 mg, nasogastric tube, q4h PRN **OR** acetaminophen (Tylenol) suppository 650 mg, 650 mg, rectal, q4h PRN, Karin Zavaleta MD    acetaminophen (Tylenol) tablet 650 mg, 650 mg, oral, q4h PRN **OR** acetaminophen (Tylenol) oral liquid 650 mg, 650 mg, oral, q4h PRN **OR** acetaminophen (Tylenol) suppository 650 mg, 650 mg, rectal, q4h PRN, Karin Zavaleta MD    albuterol 2.5 mg /3 mL (0.083 %) nebulizer solution 2.5 mg, 2.5 mg, nebulization, q4h PRN, Karin Zavaleta MD    ALPRAZolam (Xanax) tablet 0.5 mg, 0.5 mg, oral, Daily PRN, Karin Zavaleta MD    [Held by provider] amLODIPine (Norvasc) tablet 10 mg, 10 mg, oral, Daily, Karin Zavaleta MD, 10 mg at 05/30/24 0919    aspirin chewable tablet 81 mg, 81 mg, oral, Daily, Karin Zavaleta MD, 81 mg at 06/02/24 0937    atorvastatin (Lipitor) tablet 40 mg, 40 mg, oral, Daily, Karin Zavaleta MD, 40 mg at 06/02/24 0937    clopidogrel (Plavix) tablet 75 mg, 75 mg, oral, Daily, Karin Zavaleta MD, 75 mg at 06/02/24 0937    FLUoxetine (PROzac) capsule 20 mg, 20 mg, oral, Daily, Karin Zavaleta MD, 20 mg at 06/02/24 0938    [Held by provider] hydroCHLOROthiazide (Microzide) capsule 12.5 mg, 12.5 mg, oral, q AM, Karin Zavaleta MD    [Held by  provider] HYDROcodone-acetaminophen (Norco) 5-325 mg per tablet 1 tablet, 1 tablet, oral, BID PRN, Karin Zavaleta MD    HYDROmorphone (Dilaudid) injection 0.5 mg, 0.5 mg, intravenous, q4h PRN, Karin Zavaleta MD, 0.5 mg at 06/02/24 0938    lactated Ringer's infusion, 100 mL/hr, intravenous, Continuous, Karin Zavaleta MD, Last Rate: 100 mL/hr at 06/02/24 1027, 100 mL/hr at 06/02/24 1027    melatonin tablet 3 mg, 3 mg, oral, Nightly PRN, Karin Zavaleta MD, 3 mg at 06/01/24 2024    metoclopramide (Reglan) tablet 10 mg, 10 mg, oral, q6h PRN, 10 mg at 06/02/24 1331 **OR** metoclopramide (Reglan) injection 10 mg, 10 mg, intravenous, q6h PRN, Karin Zavaleta MD, 10 mg at 06/02/24 0454    metoprolol tartrate (Lopressor) tablet 25 mg, 25 mg, oral, BID, Daina Peres MD, 25 mg at 06/02/24 0938    [Held by provider] morphine injection 2 mg, 2 mg, intravenous, q4h PRN, Karin Zavaleta MD, 2 mg at 05/31/24 1409    [Held by provider] morphine injection 4 mg, 4 mg, intravenous, q4h PRN, Karin Zavaleta MD, 4 mg at 06/01/24 1414    morphine injection 4 mg, 4 mg, intravenous, q4h, Kemal Layne DO, 4 mg at 06/02/24 1026    nicotine (Nicoderm CQ) 21 mg/24 hr patch 1 patch, 1 patch, transdermal, Daily, Karin Zavaleta MD, 1 patch at 06/02/24 0938    nitroglycerin (Nitrostat) SL tablet 0.4 mg, 0.4 mg, sublingual, q5 min PRN, Karin Zavaleta MD, 0.4 mg at 05/30/24 1250    ondansetron ODT (Zofran-ODT) disintegrating tablet 4 mg, 4 mg, oral, q8h PRN **OR** ondansetron (Zofran) injection 4 mg, 4 mg, intravenous, q8h PRN, Karin Zavaleta MD, 4 mg at 05/28/24 1653    oxygen (O2) therapy, , inhalation, Continuous PRN - O2/gases, Chintan Chowdhury MD    pantoprazole (ProtoNix) EC tablet 40 mg, 40 mg, oral, Daily before breakfast, Karin Zavaleta MD, 40 mg at 06/02/24 0632    polyethylene glycol (Glycolax, Miralax)  packet 17 g, 17 g, oral, Daily PRN, Karin Zavaleta MD, 17 g at 06/01/24 1213    potassium chloride CR (Klor-Con M20) ER tablet 40 mEq, 40 mEq, oral, Once, Karin Zavaleta MD    Past Medical History  Active Ambulatory Problems     Diagnosis Date Noted    Anxiety 03/23/2023    Acne rosacea 04/12/2023    Coronary stent patent 04/12/2023    Degeneration of cervical intervertebral disc with myelopathy 04/12/2023    Dyslipidemia 04/12/2023    Left lumbar radiculopathy 04/12/2023    Lumbar disc disease with radiculopathy 04/12/2023    Neck pain 04/12/2023    Osseous stenosis of neural canal of cervical region 04/12/2023    Osteoarthritis of right knee 04/12/2023    Sciatica of right side 04/12/2023    Tremor of right hand 04/12/2023    Medication management 05/29/2023    Asthma ([facility]) 01/31/2014    Ataxia 12/20/2021    Closed fracture of distal phalanx or phalanges of hand 05/10/2010    Coronary artery disease involving native coronary artery of native heart without angina pectoris 01/10/2013    Depression 06/14/2023    Essential hypertension 11/09/2016    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    Closed fracture of distal phalanx of digit of hand 05/10/2010    Mixed hyperlipidemia 08/28/2023    LVH (left ventricular hypertrophy) 10/11/2023    Aortic valve regurgitation 10/11/2023    BMI 23.0-23.9, adult 10/11/2023    Current every day smoker 10/11/2023    Simple chronic bronchitis (Multi) 10/17/2023    Need for influenza vaccination 10/17/2023    Encounter for monitoring opioid maintenance therapy 04/16/2024    Encounter for long term benzodiazepine therapy 04/16/2024    Atrophic gastritis without hemorrhage 05/16/2024     Resolved Ambulatory Problems     Diagnosis Date Noted    SOB (shortness of breath) 01/07/2020     Past Medical History:   Diagnosis Date    CAD (coronary artery disease)     Chronic sinusitis, unspecified 07/13/2021    Hyperlipidemia     Hypertension        PHYSICAL  "EXAM  VS: /71 (BP Location: Left arm, Patient Position: Lying)   Pulse 100   Temp 36.8 °C (98.2 °F) (Temporal)   Resp 22   Ht 1.702 m (5' 7\")   Wt 70.9 kg (156 lb 4.9 oz)   SpO2 93%   BMI 24.48 kg/m²  Body mass index is 24.48 kg/m².  Physical Exam  Constitutional:       General: He is not in acute distress.     Comments: Chronically ill-appearing   HENT:      Mouth/Throat:      Mouth: Mucous membranes are moist.      Pharynx: Oropharynx is clear.   Eyes:      General: Scleral icterus present.      Extraocular Movements: Extraocular movements intact.      Conjunctiva/sclera: Conjunctivae normal.      Pupils: Pupils are equal, round, and reactive to light.   Cardiovascular:      Rate and Rhythm: Normal rate and regular rhythm.      Heart sounds: No murmur heard.  Pulmonary:      Effort: Pulmonary effort is normal.      Breath sounds: Wheezing present. No rhonchi.   Abdominal:      General: Bowel sounds are normal. There is distension.      Palpations: Abdomen is soft. There is no mass.      Tenderness: There is abdominal tenderness (Moderate tenderness to epigastric region).      Hernia: No hernia is present.   Musculoskeletal:         General: No swelling or deformity.   Skin:     General: Skin is warm.      Coloration: Skin is jaundiced.   Neurological:      General: No focal deficit present.      Mental Status: He is alert and oriented to person, place, and time.   Psychiatric:         Mood and Affect: Mood normal.          DATA  Recent blood work was reviewed and discussed with the patient   Results from last 7 days   Lab Units 06/02/24  0341   WBC AUTO x10*3/uL 16.7*   RBC AUTO x10*6/uL 2.45*   HEMOGLOBIN g/dL 8.5*   HEMATOCRIT % 25.6*   MCV fL 105*   MCHC g/dL 33.2   RDW % 18.2*   PLATELETS AUTO x10*3/uL 298       Results from last 72 hours   Lab Units 06/02/24  0341   SODIUM mmol/L 130*   POTASSIUM mmol/L 3.4*   CHLORIDE mmol/L 100   CO2 mmol/L 23   BUN mg/dL 6   CREATININE mg/dL 0.57   CALCIUM " mg/dL 7.9*   PROTEIN TOTAL g/dL 4.6*   BILIRUBIN TOTAL mg/dL 14.1*   ALK PHOS U/L 156*   AST U/L 132*   ALT U/L 142*                   RADIOLOGY REVIEW  NA    ENDOSCOPIC REVIEW  NA    IMPRESSION/RECOMMENDATIONS  Emerson Chilel is a 64 y.o. male presenting with pancreatic mass and juandice.  PMH of HTN, HLD, CAD s/p PCI, tobacco abuse, alcohol abuse who presented to the ER with abdominal pain, N/V, jaundice.  Liver enzymes elevated with bilirubin peak at 21.     Patient underwent EUS/ERCP with Dr. Gong 5/28 with metal stent placement into distal CBD.  Liver enzymes downtrending with bilirubin 17.6 today. Suspect post ERCP pancreatitis. Lipase 795.  Patient also with CP overnight, likely NSTEMI.     # pancreatic mass- s/p ERCP with metal stent placement  # transaminitis/ hyperbilirubinemia-  # post ERCP pancreatitis - continues to fluctuate in improvements, may be developing effusions, now with O2 requirement   # leukocytosis-uptrending  # jaundice  # previous EtOH misuse  # Chronic antiplatelet Plavix and aspirin     Plan:  -Will check a lactate to eval for volume resuscitation  -Back down to clear liquid diet, continue for today with slow advancement  - continue analgesics and antiemetics as needed  - follow up pathology  - pt ok for anticoagulation/ antiplatelet from GI standpoint  - pt should establish care with oncology/ pancreaticobiliary surgery once pathology finalizes  -Currently on cardiac diet  - pt can follow up with Dr. Gong in outpatient clinic    Discussed with RANJAN Gaxiola to follow    (Electronically signed byAntonella Baptiste PA-C on 6/2/2024 at 1:44 PM)

## 2024-06-02 NOTE — CARE PLAN
Problem: Pain  Goal: My pain/discomfort is manageable  Outcome: Progressing     Problem: Safety  Goal: Patient will be injury free during hospitalization  Outcome: Progressing  Goal: I will remain free of falls  Outcome: Progressing     Problem: Daily Care  Goal: Daily care needs are met  Outcome: Progressing     Problem: Psychosocial Needs  Goal: Demonstrates ability to cope with hospitalization/illness  Outcome: Progressing  Goal: Collaborate with me, my family, and caregiver to identify my specific goals  Outcome: Progressing     Problem: Discharge Barriers  Goal: My discharge needs are met  Outcome: Progressing     Problem: Pain  Goal: Takes deep breaths with improved pain control throughout the shift  Outcome: Progressing  Goal: Turns in bed with improved pain control throughout the shift  Outcome: Progressing  Goal: Walks with improved pain control throughout the shift  Outcome: Progressing  Goal: Performs ADL's with improved pain control throughout shift  Outcome: Progressing  Goal: Participates in PT with improved pain control throughout the shift  Outcome: Progressing  Goal: Free from opioid side effects throughout the shift  Outcome: Progressing  Goal: Free from acute confusion related to pain meds throughout the shift  Outcome: Progressing     Problem: Fall/Injury  Goal: Not fall by end of shift  Outcome: Progressing  Goal: Be free from injury by end of the shift  Outcome: Progressing  Goal: Verbalize understanding of personal risk factors for fall in the hospital  Outcome: Progressing  Goal: Verbalize understanding of risk factor reduction measures to prevent injury from fall in the home  Outcome: Progressing  Goal: Use assistive devices by end of the shift  Outcome: Progressing  Goal: Pace activities to prevent fatigue by end of the shift  Outcome: Progressing     Problem: Skin  Goal: Participates in plan/prevention/treatment measures  Outcome: Progressing  Goal: Prevent/manage excess  moisture  Outcome: Progressing  Goal: Prevent/minimize sheer/friction injuries  Outcome: Progressing     Problem: Pain - Adult  Goal: Verbalizes/displays adequate comfort level or baseline comfort level  Outcome: Progressing     Problem: Safety - Adult  Goal: Free from fall injury  Outcome: Progressing     Problem: Discharge Planning  Goal: Discharge to home or other facility with appropriate resources  Outcome: Progressing     Problem: Chronic Conditions and Co-morbidities  Goal: Patient's chronic conditions and co-morbidity symptoms are monitored and maintained or improved  Outcome: Progressing   The patient's goals for the shift include      The clinical goals for the shift include The patient will have adequate pain management reporting his pain is less than 5/10 by 6/2 0700    Pt not tolerating food.  Pt pain remained 7-9 all day.  Pt seen by GI today and will likely have more imaging.  Pt at risk for decreased respiratory status due to increased pain meds.  Patient placed on 3L nc due to desaturation

## 2024-06-03 LAB
ALBUMIN SERPL BCP-MCNC: 3 G/DL (ref 3.4–5)
ALBUMIN SERPL BCP-MCNC: 3 G/DL (ref 3.4–5)
ALP SERPL-CCNC: 170 U/L (ref 33–136)
ALP SERPL-CCNC: 182 U/L (ref 33–136)
ALT SERPL W P-5'-P-CCNC: 159 U/L (ref 10–52)
ALT SERPL W P-5'-P-CCNC: 169 U/L (ref 10–52)
ANION GAP BLDA CALCULATED.4IONS-SCNC: 7 MMO/L (ref 10–25)
ANION GAP SERPL CALC-SCNC: 14 MMOL/L (ref 10–20)
ANION GAP SERPL CALC-SCNC: 15 MMOL/L (ref 10–20)
APPARATUS: ABNORMAL
APPARATUS: ABNORMAL
AST SERPL W P-5'-P-CCNC: 158 U/L (ref 9–39)
AST SERPL W P-5'-P-CCNC: 166 U/L (ref 9–39)
ATRIAL RATE: 103 BPM
ATRIAL RATE: 80 BPM
ATRIAL RATE: 94 BPM
BASE EXCESS BLDA CALC-SCNC: 0.5 MMOL/L (ref -2–3)
BASE EXCESS BLDA CALC-SCNC: 0.5 MMOL/L (ref -2–3)
BILIRUB SERPL-MCNC: 15.3 MG/DL (ref 0–1.2)
BILIRUB SERPL-MCNC: 16 MG/DL (ref 0–1.2)
BODY TEMPERATURE: ABNORMAL
BODY TEMPERATURE: ABNORMAL
BUN SERPL-MCNC: 6 MG/DL (ref 6–23)
BUN SERPL-MCNC: 6 MG/DL (ref 6–23)
CA-I BLDA-SCNC: 1.08 MMOL/L (ref 1.1–1.33)
CALCIUM SERPL-MCNC: 8.2 MG/DL (ref 8.6–10.3)
CALCIUM SERPL-MCNC: 8.2 MG/DL (ref 8.6–10.3)
CHLORIDE BLDA-SCNC: 100 MMOL/L (ref 98–107)
CHLORIDE SERPL-SCNC: 95 MMOL/L (ref 98–107)
CHLORIDE SERPL-SCNC: 96 MMOL/L (ref 98–107)
CO2 SERPL-SCNC: 21 MMOL/L (ref 21–32)
CO2 SERPL-SCNC: 24 MMOL/L (ref 21–32)
CREAT SERPL-MCNC: 0.65 MG/DL (ref 0.5–1.3)
CREAT SERPL-MCNC: 0.66 MG/DL (ref 0.5–1.3)
EGFRCR SERPLBLD CKD-EPI 2021: >90 ML/MIN/1.73M*2
EGFRCR SERPLBLD CKD-EPI 2021: >90 ML/MIN/1.73M*2
ERYTHROCYTE [DISTWIDTH] IN BLOOD BY AUTOMATED COUNT: 17.6 % (ref 11.5–14.5)
ERYTHROCYTE [DISTWIDTH] IN BLOOD BY AUTOMATED COUNT: 17.8 % (ref 11.5–14.5)
ERYTHROCYTE [DISTWIDTH] IN BLOOD BY AUTOMATED COUNT: 17.8 % (ref 11.5–14.5)
GLUCOSE BLDA-MCNC: 103 MG/DL (ref 74–99)
GLUCOSE SERPL-MCNC: 94 MG/DL (ref 74–99)
GLUCOSE SERPL-MCNC: 96 MG/DL (ref 74–99)
HCO3 BLDA-SCNC: 24.3 MMOL/L (ref 22–26)
HCO3 BLDA-SCNC: 24.3 MMOL/L (ref 22–26)
HCT VFR BLD AUTO: 27.5 % (ref 41–52)
HCT VFR BLD AUTO: 28.4 % (ref 41–52)
HCT VFR BLD AUTO: 28.5 % (ref 41–52)
HCT VFR BLD EST: 29 % (ref 41–52)
HGB BLD-MCNC: 9.2 G/DL (ref 13.5–17.5)
HGB BLD-MCNC: 9.6 G/DL (ref 13.5–17.5)
HGB BLD-MCNC: 9.7 G/DL (ref 13.5–17.5)
HGB BLDA-MCNC: 9.5 G/DL (ref 13.5–17.5)
INHALED O2 CONCENTRATION: 100 %
INHALED O2 CONCENTRATION: 100 %
LAB AP ASR DISCLAIMER: NORMAL
LABORATORY COMMENT REPORT: NORMAL
LACTATE BLDA-SCNC: 1.4 MMOL/L (ref 0.4–2)
LACTATE SERPL-SCNC: 1.1 MMOL/L (ref 0.4–2)
MAGNESIUM SERPL-MCNC: 1.91 MG/DL (ref 1.6–2.4)
MCH RBC QN AUTO: 34.6 PG (ref 26–34)
MCH RBC QN AUTO: 35.3 PG (ref 26–34)
MCH RBC QN AUTO: 35.7 PG (ref 26–34)
MCHC RBC AUTO-ENTMCNC: 33.5 G/DL (ref 32–36)
MCHC RBC AUTO-ENTMCNC: 33.7 G/DL (ref 32–36)
MCHC RBC AUTO-ENTMCNC: 34.2 G/DL (ref 32–36)
MCV RBC AUTO: 103 FL (ref 80–100)
MCV RBC AUTO: 104 FL (ref 80–100)
MCV RBC AUTO: 105 FL (ref 80–100)
MRSA DNA SPEC QL NAA+PROBE: NOT DETECTED
NRBC BLD-RTO: 0 /100 WBCS (ref 0–0)
OXYHGB MFR BLDA: 95.6 % (ref 94–98)
OXYHGB MFR BLDA: 95.6 % (ref 94–98)
P AXIS: 56 DEGREES
P AXIS: 65 DEGREES
P AXIS: 76 DEGREES
P OFFSET: 205 MS
P OFFSET: 207 MS
P OFFSET: 212 MS
P ONSET: 143 MS
P ONSET: 147 MS
P ONSET: 147 MS
PATH REPORT.ADDENDUM SPEC: NORMAL
PATH REPORT.FINAL DX SPEC: NORMAL
PATH REPORT.GROSS SPEC: NORMAL
PATH REPORT.RELEVANT HX SPEC: NORMAL
PATH REPORT.TOTAL CANCER: NORMAL
PCO2 BLDA: 35 MM HG (ref 38–42)
PCO2 BLDA: 35 MM HG (ref 38–42)
PH BLDA: 7.45 PH (ref 7.38–7.42)
PH BLDA: 7.45 PH (ref 7.38–7.42)
PLATELET # BLD AUTO: 348 X10*3/UL (ref 150–450)
PLATELET # BLD AUTO: 357 X10*3/UL (ref 150–450)
PLATELET # BLD AUTO: 362 X10*3/UL (ref 150–450)
PO2 BLDA: 94 MM HG (ref 85–95)
PO2 BLDA: 94 MM HG (ref 85–95)
POTASSIUM BLDA-SCNC: 3.3 MMOL/L (ref 3.5–5.3)
POTASSIUM SERPL-SCNC: 3.3 MMOL/L (ref 3.5–5.3)
POTASSIUM SERPL-SCNC: 3.5 MMOL/L (ref 3.5–5.3)
PR INTERVAL: 154 MS
PR INTERVAL: 154 MS
PR INTERVAL: 162 MS
PROCALCITONIN SERPL-MCNC: 0.66 NG/ML
PROT SERPL-MCNC: 5.5 G/DL (ref 6.4–8.2)
PROT SERPL-MCNC: 5.6 G/DL (ref 6.4–8.2)
Q ONSET: 224 MS
QRS COUNT: 13 BEATS
QRS COUNT: 15 BEATS
QRS COUNT: 17 BEATS
QRS DURATION: 74 MS
QRS DURATION: 74 MS
QRS DURATION: 80 MS
QT INTERVAL: 356 MS
QT INTERVAL: 418 MS
QT INTERVAL: 476 MS
QTC CALCULATION(BAZETT): 466 MS
QTC CALCULATION(BAZETT): 522 MS
QTC CALCULATION(BAZETT): 548 MS
QTC FREDERICIA: 426 MS
QTC FREDERICIA: 485 MS
QTC FREDERICIA: 524 MS
R AXIS: -19 DEGREES
R AXIS: -34 DEGREES
R AXIS: 7 DEGREES
RBC # BLD AUTO: 2.66 X10*6/UL (ref 4.5–5.9)
RBC # BLD AUTO: 2.72 X10*6/UL (ref 4.5–5.9)
RBC # BLD AUTO: 2.72 X10*6/UL (ref 4.5–5.9)
SAO2 % BLDA: 99 % (ref 94–100)
SAO2 % BLDA: 99 % (ref 94–100)
SODIUM BLDA-SCNC: 128 MMOL/L (ref 136–145)
SODIUM SERPL-SCNC: 128 MMOL/L (ref 136–145)
SODIUM SERPL-SCNC: 130 MMOL/L (ref 136–145)
T AXIS: -1 DEGREES
T AXIS: 11 DEGREES
T AXIS: 131 DEGREES
T OFFSET: 402 MS
T OFFSET: 433 MS
T OFFSET: 462 MS
VENTRICULAR RATE: 103 BPM
VENTRICULAR RATE: 80 BPM
VENTRICULAR RATE: 94 BPM
WBC # BLD AUTO: 20.3 X10*3/UL (ref 4.4–11.3)
WBC # BLD AUTO: 21.8 X10*3/UL (ref 4.4–11.3)
WBC # BLD AUTO: 22 X10*3/UL (ref 4.4–11.3)

## 2024-06-03 PROCEDURE — 36600 WITHDRAWAL OF ARTERIAL BLOOD: CPT

## 2024-06-03 PROCEDURE — 94640 AIRWAY INHALATION TREATMENT: CPT

## 2024-06-03 PROCEDURE — 94660 CPAP INITIATION&MGMT: CPT

## 2024-06-03 PROCEDURE — 82805 BLOOD GASES W/O2 SATURATION: CPT | Performed by: PHYSICIAN ASSISTANT

## 2024-06-03 PROCEDURE — 99222 1ST HOSP IP/OBS MODERATE 55: CPT | Performed by: INTERNAL MEDICINE

## 2024-06-03 PROCEDURE — 1200000002 HC GENERAL ROOM WITH TELEMETRY DAILY

## 2024-06-03 PROCEDURE — 2500000001 HC RX 250 WO HCPCS SELF ADMINISTERED DRUGS (ALT 637 FOR MEDICARE OP): Performed by: STUDENT IN AN ORGANIZED HEALTH CARE EDUCATION/TRAINING PROGRAM

## 2024-06-03 PROCEDURE — 85027 COMPLETE CBC AUTOMATED: CPT | Performed by: STUDENT IN AN ORGANIZED HEALTH CARE EDUCATION/TRAINING PROGRAM

## 2024-06-03 PROCEDURE — 87075 CULTR BACTERIA EXCEPT BLOOD: CPT | Mod: STJLAB | Performed by: STUDENT IN AN ORGANIZED HEALTH CARE EDUCATION/TRAINING PROGRAM

## 2024-06-03 PROCEDURE — S4991 NICOTINE PATCH NONLEGEND: HCPCS | Performed by: STUDENT IN AN ORGANIZED HEALTH CARE EDUCATION/TRAINING PROGRAM

## 2024-06-03 PROCEDURE — 86301 IMMUNOASSAY TUMOR CA 19-9: CPT | Mod: STJLAB | Performed by: INTERNAL MEDICINE

## 2024-06-03 PROCEDURE — 87040 BLOOD CULTURE FOR BACTERIA: CPT | Mod: STJLAB | Performed by: STUDENT IN AN ORGANIZED HEALTH CARE EDUCATION/TRAINING PROGRAM

## 2024-06-03 PROCEDURE — 80053 COMPREHEN METABOLIC PANEL: CPT | Performed by: STUDENT IN AN ORGANIZED HEALTH CARE EDUCATION/TRAINING PROGRAM

## 2024-06-03 PROCEDURE — 83605 ASSAY OF LACTIC ACID: CPT | Performed by: STUDENT IN AN ORGANIZED HEALTH CARE EDUCATION/TRAINING PROGRAM

## 2024-06-03 PROCEDURE — 84145 PROCALCITONIN (PCT): CPT | Mod: STJLAB | Performed by: STUDENT IN AN ORGANIZED HEALTH CARE EDUCATION/TRAINING PROGRAM

## 2024-06-03 PROCEDURE — 2500000004 HC RX 250 GENERAL PHARMACY W/ HCPCS (ALT 636 FOR OP/ED): Performed by: STUDENT IN AN ORGANIZED HEALTH CARE EDUCATION/TRAINING PROGRAM

## 2024-06-03 PROCEDURE — 99233 SBSQ HOSP IP/OBS HIGH 50: CPT | Performed by: STUDENT IN AN ORGANIZED HEALTH CARE EDUCATION/TRAINING PROGRAM

## 2024-06-03 PROCEDURE — 2500000005 HC RX 250 GENERAL PHARMACY W/O HCPCS: Performed by: STUDENT IN AN ORGANIZED HEALTH CARE EDUCATION/TRAINING PROGRAM

## 2024-06-03 PROCEDURE — 2500000002 HC RX 250 W HCPCS SELF ADMINISTERED DRUGS (ALT 637 FOR MEDICARE OP, ALT 636 FOR OP/ED): Performed by: STUDENT IN AN ORGANIZED HEALTH CARE EDUCATION/TRAINING PROGRAM

## 2024-06-03 PROCEDURE — 36415 COLL VENOUS BLD VENIPUNCTURE: CPT | Performed by: STUDENT IN AN ORGANIZED HEALTH CARE EDUCATION/TRAINING PROGRAM

## 2024-06-03 PROCEDURE — 82805 BLOOD GASES W/O2 SATURATION: CPT | Performed by: STUDENT IN AN ORGANIZED HEALTH CARE EDUCATION/TRAINING PROGRAM

## 2024-06-03 PROCEDURE — 5A0935A ASSISTANCE WITH RESPIRATORY VENTILATION, LESS THAN 24 CONSECUTIVE HOURS, HIGH NASAL FLOW/VELOCITY: ICD-10-PCS | Performed by: STUDENT IN AN ORGANIZED HEALTH CARE EDUCATION/TRAINING PROGRAM

## 2024-06-03 PROCEDURE — 2500000001 HC RX 250 WO HCPCS SELF ADMINISTERED DRUGS (ALT 637 FOR MEDICARE OP): Performed by: INTERNAL MEDICINE

## 2024-06-03 PROCEDURE — 83735 ASSAY OF MAGNESIUM: CPT | Performed by: STUDENT IN AN ORGANIZED HEALTH CARE EDUCATION/TRAINING PROGRAM

## 2024-06-03 PROCEDURE — 87641 MR-STAPH DNA AMP PROBE: CPT | Performed by: STUDENT IN AN ORGANIZED HEALTH CARE EDUCATION/TRAINING PROGRAM

## 2024-06-03 RX ORDER — POTASSIUM CHLORIDE 1.5 G/1.58G
40 POWDER, FOR SOLUTION ORAL ONCE
Status: COMPLETED | OUTPATIENT
Start: 2024-06-03 | End: 2024-06-03

## 2024-06-03 RX ORDER — VANCOMYCIN HYDROCHLORIDE 1 G/200ML
1000 INJECTION, SOLUTION INTRAVENOUS EVERY 12 HOURS
Status: DISCONTINUED | OUTPATIENT
Start: 2024-06-03 | End: 2024-06-04

## 2024-06-03 RX ORDER — HYDROMORPHONE HCL/0.9% NACL/PF 15 MG/30ML
PATIENT CONTROLLED ANALGESIA SYRINGE INTRAVENOUS CONTINUOUS
Status: DISCONTINUED | OUTPATIENT
Start: 2024-06-03 | End: 2024-06-09 | Stop reason: HOSPADM

## 2024-06-03 RX ORDER — KETOROLAC TROMETHAMINE 15 MG/ML
15 INJECTION, SOLUTION INTRAMUSCULAR; INTRAVENOUS ONCE
Status: COMPLETED | OUTPATIENT
Start: 2024-06-03 | End: 2024-06-03

## 2024-06-03 RX ORDER — NALOXONE HYDROCHLORIDE 0.4 MG/ML
0.2 INJECTION, SOLUTION INTRAMUSCULAR; INTRAVENOUS; SUBCUTANEOUS AS NEEDED
Status: DISCONTINUED | OUTPATIENT
Start: 2024-06-03 | End: 2024-06-09 | Stop reason: HOSPADM

## 2024-06-03 RX ORDER — VANCOMYCIN HYDROCHLORIDE 1 G/20ML
INJECTION, POWDER, LYOPHILIZED, FOR SOLUTION INTRAVENOUS DAILY PRN
Status: DISCONTINUED | OUTPATIENT
Start: 2024-06-03 | End: 2024-06-04

## 2024-06-03 RX ADMIN — METOPROLOL TARTRATE 25 MG: 25 TABLET, FILM COATED ORAL at 21:04

## 2024-06-03 RX ADMIN — VANCOMYCIN HYDROCHLORIDE 1000 MG: 1 INJECTION, SOLUTION INTRAVENOUS at 21:39

## 2024-06-03 RX ADMIN — PIPERACILLIN SODIUM AND TAZOBACTAM SODIUM 3.38 G: 3; .375 INJECTION, SOLUTION INTRAVENOUS at 14:12

## 2024-06-03 RX ADMIN — IPRATROPIUM BROMIDE AND ALBUTEROL SULFATE 3 ML: 2.5; .5 SOLUTION RESPIRATORY (INHALATION) at 20:32

## 2024-06-03 RX ADMIN — CLOPIDOGREL BISULFATE 75 MG: 75 TABLET ORAL at 08:58

## 2024-06-03 RX ADMIN — NICOTINE 1 PATCH: 21 PATCH, EXTENDED RELEASE TRANSDERMAL at 08:58

## 2024-06-03 RX ADMIN — ATORVASTATIN CALCIUM 40 MG: 40 TABLET, FILM COATED ORAL at 08:58

## 2024-06-03 RX ADMIN — HYDROMORPHONE HYDROCHLORIDE 0.5 MG: 1 INJECTION, SOLUTION INTRAMUSCULAR; INTRAVENOUS; SUBCUTANEOUS at 12:36

## 2024-06-03 RX ADMIN — VANCOMYCIN HYDROCHLORIDE 1500 MG: 1.5 INJECTION, POWDER, LYOPHILIZED, FOR SOLUTION INTRAVENOUS at 05:42

## 2024-06-03 RX ADMIN — MORPHINE SULFATE 4 MG: 4 INJECTION, SOLUTION INTRAMUSCULAR; INTRAVENOUS at 14:30

## 2024-06-03 RX ADMIN — FLUOXETINE HYDROCHLORIDE 20 MG: 20 CAPSULE ORAL at 08:58

## 2024-06-03 RX ADMIN — Medication 12 L/MIN: at 02:00

## 2024-06-03 RX ADMIN — FUROSEMIDE 20 MG: 10 INJECTION, SOLUTION INTRAMUSCULAR; INTRAVENOUS at 00:32

## 2024-06-03 RX ADMIN — Medication 60 PERCENT: at 22:10

## 2024-06-03 RX ADMIN — PANTOPRAZOLE SODIUM 40 MG: 40 TABLET, DELAYED RELEASE ORAL at 07:04

## 2024-06-03 RX ADMIN — METOPROLOL TARTRATE 25 MG: 25 TABLET, FILM COATED ORAL at 08:58

## 2024-06-03 RX ADMIN — PIPERACILLIN SODIUM AND TAZOBACTAM SODIUM 3.38 G: 3; .375 INJECTION, SOLUTION INTRAVENOUS at 05:00

## 2024-06-03 RX ADMIN — ASPIRIN 81 MG 81 MG: 81 TABLET ORAL at 08:58

## 2024-06-03 RX ADMIN — Medication 12 L/MIN: at 08:53

## 2024-06-03 RX ADMIN — IPRATROPIUM BROMIDE AND ALBUTEROL SULFATE 3 ML: 2.5; .5 SOLUTION RESPIRATORY (INHALATION) at 09:21

## 2024-06-03 RX ADMIN — KETOROLAC TROMETHAMINE 15 MG: 15 INJECTION, SOLUTION INTRAMUSCULAR; INTRAVENOUS at 04:57

## 2024-06-03 RX ADMIN — HYDROMORPHONE HYDROCHLORIDE: 10 INJECTION, SOLUTION INTRAMUSCULAR; INTRAVENOUS; SUBCUTANEOUS at 18:26

## 2024-06-03 RX ADMIN — POTASSIUM CHLORIDE 40 MEQ: 1.5 POWDER, FOR SOLUTION ORAL at 12:36

## 2024-06-03 RX ADMIN — PIPERACILLIN SODIUM AND TAZOBACTAM SODIUM 3.38 G: 3; .375 INJECTION, SOLUTION INTRAVENOUS at 21:03

## 2024-06-03 RX ADMIN — MORPHINE SULFATE 4 MG: 4 INJECTION, SOLUTION INTRAMUSCULAR; INTRAVENOUS at 10:23

## 2024-06-03 RX ADMIN — IPRATROPIUM BROMIDE AND ALBUTEROL SULFATE 3 ML: 2.5; .5 SOLUTION RESPIRATORY (INHALATION) at 01:40

## 2024-06-03 ASSESSMENT — COGNITIVE AND FUNCTIONAL STATUS - GENERAL
DAILY ACTIVITIY SCORE: 24
MOBILITY SCORE: 24

## 2024-06-03 ASSESSMENT — PAIN SCALES - GENERAL
PAINLEVEL_OUTOF10: 9
PAINLEVEL_OUTOF10: 0 - NO PAIN
PAINLEVEL_OUTOF10: 7
PAINLEVEL_OUTOF10: 9

## 2024-06-03 ASSESSMENT — PAIN DESCRIPTION - DESCRIPTORS
DESCRIPTORS: ACHING
DESCRIPTORS: ACHING
DESCRIPTORS: ACHING;SHOOTING

## 2024-06-03 ASSESSMENT — PAIN SCALES - WONG BAKER: WONGBAKER_NUMERICALRESPONSE: NO HURT

## 2024-06-03 ASSESSMENT — PAIN - FUNCTIONAL ASSESSMENT
PAIN_FUNCTIONAL_ASSESSMENT: 0-10

## 2024-06-03 NOTE — NURSING NOTE
The patient was unable to tolerate wearing CPAP. He says he is claustrophobic. He is compliant and comfortable wearing the

## 2024-06-03 NOTE — CONSULTS
Vancomycin Dosing by Pharmacy- INITIAL    Emerson Chilel is a 64 y.o. year old male who Pharmacy has been consulted for vancomycin dosing for pneumonia. Based on the patient's indication and renal status this patient will be dosed based on a goal AUC of 400-600.     Renal function is currently stable.    Visit Vitals  /72 (BP Location: Left arm, Patient Position: Lying)   Pulse 98   Temp 37 °C (98.6 °F) (Temporal)   Resp 20        Lab Results   Component Value Date    CREATININE 0.65 2024    CREATININE 0.57 2024    CREATININE 0.57 2024    CREATININE 0.66 2024        Patient weight is as follows:   Vitals:    24 0600   Weight: 70.9 kg (156 lb 4.9 oz)       Cultures:  No results found for the encounter in last 14 days.        I/O last 3 completed shifts:  In: 5700 (80.4 mL/kg) [P.O.:1000; I.V.:4700 (66.3 mL/kg)]  Out: - (0 mL/kg)   Weight: 70.9 kg   I/O during current shift:  I/O this shift:  In: 588.3 [I.V.:588.3]  Out: 1275 [Urine:1275]    Temp (24hrs), Av.7 °C (98.1 °F), Min:36.5 °C (97.7 °F), Max:37 °C (98.6 °F)         Assessment/Plan     Patient will be given a loading dose of 1500 mg.  Will initiate vancomycin maintenance,  1000 mg every 12 hours.    This dosing regimen is predicted by [facility] to result in the following pharmacokinetic parameters:    Loading dose: 1500 mg at 04:00 2024.  Regimen: 1000 mg IV every 12 hours.  Start time: 16:00 on 2024  Exposure target: AUC24 (range)400-600 mg/L.hr   AUC24,ss: 452 mg/L.hr  Probability of AUC24 > 400: 63 %  Ctrough,ss: 13.3 mg/L  Probability of Ctrough,ss > 20: 21 %  Probability of nephrotoxicity (Lodise PEDRO ): 8 %    Follow-up level will be ordered on  at 1000 unless clinically indicated sooner.  Will continue to monitor renal function daily while on vancomycin and order serum creatinine at least every 48 hours if not already ordered.  Follow for continued vancomycin needs, clinical response, and  signs/symptoms of toxicity.       Marcy Ridley, PharmD

## 2024-06-03 NOTE — PROGRESS NOTES
"Subjective  Patient sitting up in bed with continued abdominal pain secondary to ERCP pancreatitis. Liver enzymes and bilirubin continue to downtrend.  Patient with pleural effusions, possible pneumonia now on 12 L Oxymizer.  IV fluids discontinued.  Plan to continue pain regimen and supportive care.    Objective  Blood pressure 116/67, pulse 93, temperature 36.1 °C (97 °F), temperature source Temporal, resp. rate 22, height 1.702 m (5' 7\"), weight 70 kg (154 lb 5.2 oz), SpO2 96%.    Physical Exam  Constitutional: Alert and interactive, in NAD  Eyes: PERRL, scleral cterus, no conjunctival injection  Skin: Warm and dry, no rash or ecchymosis, jaundice   ENMT: Mucous membranes moist, no lesions noted  Resp: CTAB, even and unlabored  CV: RRR, normal S1, S2, no m,r,g  GI: +BS, soft, round, diffuse abd TTP, no rebound tenderness or guarding, no palpable masses or organomegaly  Extremities: Extremities warm, no edema, contusions, wounds or cyanosis  Neuro: Alert and oriented x3  Psych: Appropriate mood and behavior    Medications  Scheduled medications  [Held by provider] amLODIPine, 10 mg, oral, Daily  aspirin, 81 mg, oral, Daily  atorvastatin, 40 mg, oral, Daily  clopidogrel, 75 mg, oral, Daily  FLUoxetine, 20 mg, oral, Daily  [Held by provider] hydroCHLOROthiazide, 12.5 mg, oral, q AM  ipratropium-albuteroL, 3 mL, nebulization, TID  metoprolol tartrate, 25 mg, oral, BID  nicotine, 1 patch, transdermal, Daily  pantoprazole, 40 mg, oral, Daily before breakfast  piperacillin-tazobactam, 3.375 g, intravenous, q8h  vancomycin, 1,000 mg, intravenous, q12h      Continuous medications       PRN medications  PRN medications: acetaminophen **OR** acetaminophen **OR** acetaminophen, acetaminophen **OR** acetaminophen **OR** acetaminophen, ALPRAZolam, [Held by provider] HYDROcodone-acetaminophen, HYDROmorphone, ipratropium-albuteroL, melatonin, metoclopramide **OR** metoclopramide, morphine, morphine, nitroglycerin, ondansetron " "ODT **OR** ondansetron, oxygen, polyethylene glycol, sodium chloride, vancomycin     Labs  Lab Results   Component Value Date    WBC 20.3 (H) 06/03/2024    HGB 9.2 (L) 06/03/2024    HCT 27.5 (L) 06/03/2024     (H) 06/03/2024     06/03/2024     Lab Results   Component Value Date    GLUCOSE 96 06/03/2024    CALCIUM 8.2 (L) 06/03/2024     (L) 06/03/2024    K 3.3 (L) 06/03/2024    CO2 24 06/03/2024    CL 95 (L) 06/03/2024    BUN 6 06/03/2024    CREATININE 0.66 06/03/2024     Lab Results   Component Value Date     (H) 06/03/2024     (H) 06/03/2024    ALKPHOS 182 (H) 06/03/2024    BILITOT 15.3 (H) 06/03/2024     No results found for: \"IRON\", \"TIBC\", \"FERRITIN\"  Lab Results   Component Value Date    INR 1.2 (H) 05/26/2024    INR 1.2 (H) 05/25/2024    INR 1.1 05/24/2024    PROTIME 13.2 (H) 05/26/2024    PROTIME 13.5 (H) 05/25/2024    PROTIME 12.6 05/24/2024   Endoscopic reports:  EUS with Dr. Gong 5/28/2024 noting:  Impression  The cricopharynx, upper third of the esophagus, middle third of the esophagus, lower third of the esophagus and GE junction appeared normal.  The body of the stomach and antrum appeared normal.  Mild extrinsic compression was observed in the 2nd part of the duodenum  The duodenal bulb appeared normal.  The pancreas appeared atrophic. The parenchyma was visualized in the body of the pancreas and tail of the pancreas. The parenchyma was heterogeneous and had hyperechoic strands.  Single round, homogeneous, hypoechoic and solid mass measuring 30 mm x 25 mm with well-defined margins was visualized in the head of the pancreas. Apparent abutment of the portal vein and superior mesenteric vein; 3 fine needle aspiration passes were taken. An adequate sample of aspirate was obtained. The sample was sent for cytology analysis; 3 fine needle biopsy passes were taken. An adequate sample was obtained. A sample was sent for histology analysis  The proximal bile duct was " dilated.  The parenchyma was visualized in the left lobe of the liver. The parenchyma was homogeneous and hyperechoic. The common hepatic duct, left main hepatic duct and right main hepatic duct appeared dilated.  The left kidney, spleen and left adrenal appeared normal.  ERCP with Dr. Gong 5/28/2024 noting:  Impression  The common bile duct was deeply cannulated after 10 attempts. Cannulation was difficult due to inadvertent cannulation, small ampulla and Distal CBD stricture.  20 mm localized, intrinsic, smooth and severe stricture was visualized in the distal common bile duct  Complete major papilla sphincterotomy was performed.  Performed brushings with cytology brush in the distal common bile duct  One 8 mm x 8 cm fully covered stent was placed in the common bile duct    Radiology  CT A/P with IV contrast 5/24/2024 noting:  Impression:     2.5 cm mass in the head of the pancreas.  There is dilatation of the  biliary ducts as well as the pancreatic duct and neoplasm cannot be  excluded.  This is best evaluated with ERCP or MRCP.  There are  findings consistent with sludge in the gallbladder.  Signed by Bhanu Taveras MD       Assessment  Emerson Chilel is a 64 y.o. male presenting with pancreatic mass and juandice.  PMH of HTN, HLD, CAD s/p PCI, tobacco abuse, alcohol abuse who presented to the ER with abdominal pain, N/V, jaundice.  Liver enzymes elevated with bilirubin peak at 21.    Patient underwent EUS/ERCP with Dr. Gong 5/28 with metal stent placement into distal CBD secondary to pancreatic mass.  Liver enzymes downtrending with bilirubin 15.3 today.  IV fluids discontinued overnight due to increased O2 requirements.    # pancreatic mass- s/p ERCP with metal stent placement  # transaminitis/ hyperbilirubinemia- downtrending  # post ERCP pancreatitis   # leukocytosis  # jaundice  # previous EtOH misuse    Plan:  - continue supportive care  - keep NPO   - agree with discontinuing fluids due to increased  O2 requirement  - wean O2 as tolerated  - liberalize pain medications  - continue antibiotics per primary team/ID recs  - follow up pathology  - pt ok for anticoagulation/ antiplatelet from GI standpoint  - pt should establish care with oncology/ pancreaticobiliary surgery once pathology finalizes  - pt can follow up with Dr. Gong in outpatient clinic    Plan has been discussed with Dr. Matos. GI will continue to follow.     SOLOMON Gonzalez/CNP

## 2024-06-03 NOTE — CARE PLAN
Problem: Pain  Goal: My pain/discomfort is manageable  Outcome: Progressing     Problem: Safety  Goal: Patient will be injury free during hospitalization  Outcome: Met     Problem: Daily Care  Goal: Daily care needs are met  Outcome: Met     Problem: Discharge Barriers  Goal: My discharge needs are met  Outcome: Progressing     Problem: Chronic Conditions and Co-morbidities  Goal: Patient's chronic conditions and co-morbidity symptoms are monitored and maintained or improved  Outcome: Progressing

## 2024-06-03 NOTE — NURSING NOTE
Patient medicated with Lasix 20 mg IVP and given a uriinal. He was instructed to void in the urinal at the bedside instead of walking into the bathroom. He admits to feeling short of breath now. He continues to have a frequent, moist productive cough of thick white sputum with blood streaks. Lungs have scattered rhonchi and expiratory wheezes. Pulse ox 85% on 6 liters n/c. Placed patieint on % mask.

## 2024-06-03 NOTE — PROGRESS NOTES
HOSPITAL MEDICINE - PROGRESS NOTE    Emerson Chilel is a 64 y.o. male on day 9 of admission presenting with Pancreatic mass (HHS-HCC).    Principal Problem:    Pancreatic mass (HHS-HCC)  Active Problems:    Jaundice    Hypokalemia    Generalized abdominal pain    Post-ERCP acute pancreatitis (HHS-HCC)    NSTEMI (non-ST elevated myocardial infarction) (Multi)      Assessment/Plan   This patient currently has cardiac telemetry ordered; if you would like to modify or discontinue the telemetry order, click here to go to the orders activity to modify/discontinue the order.     Acute pancreatitis s/p ERCP: Pt originally transferred from OSH for EUS/ERCP having presented with jaundice and pancreatic mass. Study was completed 5/29 with subsequent worsening abdominal pain; procedure was c/b difficulty cannulating. Lipase uptrended.  significantly elevated. Repeat abdominal CT from 5/31 for further abdominal pain showing acute interstitial edematous pancreatitis, with 37mm of acute peripancreatic fluid along the distal greater curvature of the stomach. Attempted trial of scheduled morphine for acute pain control, however negligible difference.   - Will switch pain control to PCA dilaudid.  - Appreciate GI recs: Stop IVF given hypoxia, supportive care with pain control/antiemetics, follow up pathology. Follow outpatient with GI, and will need oncology/hepatobiliary follow up.  - Bilirubin was steadily improving from a peak of 21, however rising again 6/3. Liver enzymes had similarly been improving, however appears to have some stagnation/mild worsening.  - Leukocytosis noted, had been stable until 6/2 when noted to increase significantly.  - Pathology noted to result, showing invasive adenocarcinoma. Will consult oncology, appreciate further GI input. Given persistent symptoms and clinical worsening, [place] [facility] [place]  - AM CBC/CMP/Mg    Hemoptysis, acute hypoxic respiratory  failure: Sporadic appearance, streaked in sputum over 5/31-6/2. Suspect hemoptysis related to dry respiratory tract with trace bleeding rather than true hemoptysis, given appearance that follows placement of non-humidified NC. However, pt did develop rapidly worsening hypoxia overnight 6/2-3 with CXR at that time concerning for pneumonia, concurrent rebound worsening of leukocytosis--likely developed atelectasis secondary to shallow inspiration from persistent abdominal pain, followed by pneumonia. No h/o COPD, but does have tobacco use history.  - Sepsis protocol initiated.  - Continue vanc/zosyn. Follow up procal, bcx. MRSA screen negative.  - Supportive care with nebs/bronchial hygiene/IS (as tolerated), wean O2 as tolerated; currently on oxymizer 12L.  - Leukocytosis as above.    Chest pain s/p PCI: Known history of chronic angina/prior stent. On ASA/plavix. Developed worsening episodic chest pain while admitted, with concurrent elevation of troponins concerning for NSTEMI. Seen by cardiology on 5/30 with subsequent LHC and KITTY placed to proximal LAD.  - Appreciate cardiology recs: Continue metoprolol at 25mg BID, ASA/plavix/PPI. Follow up with Dr. Boston in 1-2 weeks post-discharge.  - Will continue holding home norvasc/hydrochlorothiazide and resume if evidence of derangement/reassess at least daily.  - Maintain K>4, Mg>2    Anxiety/depression, asthma, tobacco use disorder, HTN, HLD: Home meds resumed as indicated    Discharge pending appropriate pain control/PO tolerance, resolution of hypoxia.       DVT PPX: SCDs  Nutrition: CLD    Subjective   Pt seen with wife at bedside. Pt had significant hypoxia overnight, required 6L NC. Was given furosemide, placed on NRB and weaned to 12L oxymizer this morning. Pt unable to tolerate positive pressure. Discussed possibility of need for transfer to [facility] [place] for evaluation by hepatobiliary surgery if any continued worsening.       Objective     [Held by provider] amLODIPine,  10 mg, oral, Daily  aspirin, 81 mg, oral, Daily  atorvastatin, 40 mg, oral, Daily  clopidogrel, 75 mg, oral, Daily  FLUoxetine, 20 mg, oral, Daily  [Held by provider] hydroCHLOROthiazide, 12.5 mg, oral, q AM  ipratropium-albuteroL, 3 mL, nebulization, TID  metoprolol tartrate, 25 mg, oral, BID  nicotine, 1 patch, transdermal, Daily  pantoprazole, 40 mg, oral, Daily before breakfast  piperacillin-tazobactam, 3.375 g, intravenous, q8h  potassium chloride CR, 40 mEq, oral, Once  vancomycin, 1,000 mg, intravenous, q12h       PRN medications: acetaminophen **OR** acetaminophen **OR** acetaminophen, acetaminophen **OR** acetaminophen **OR** acetaminophen, ALPRAZolam, [Held by provider] HYDROcodone-acetaminophen, HYDROmorphone, ipratropium-albuteroL, melatonin, metoclopramide **OR** metoclopramide, morphine, morphine, nitroglycerin, ondansetron ODT **OR** ondansetron, oxygen, polyethylene glycol, sodium chloride, vancomycin           Last Recorded Vitals  /75 (BP Location: Left arm, Patient Position: Lying)   Pulse 91   Temp 36.2 °C (97.2 °F) (Temporal)   Resp 22   Wt 70 kg (154 lb 5.2 oz)   SpO2 98%   Intake/Output last 3 Shifts:    Intake/Output Summary (Last 24 hours) at 6/3/2024 1040  Last data filed at 6/3/2024 0705  Gross per 24 hour   Intake 3435 ml   Output 1775 ml   Net 1660 ml       Admission Weight  Weight: 65.8 kg (145 lb) (05/25/24 0514)    Daily Weight  06/03/24 : 70 kg (154 lb 5.2 oz)    Image Results  Electrocardiogram 12 Lead  Normal sinus rhythm  Septal infarct (cited on or before 28-MAY-2024)  T wave abnormality, consider anterolateral ischemia  Prolonged QT  Abnormal ECG  When compared with ECG of 30-MAY-2024 06:54, (unconfirmed)  Premature ventricular complexes are no longer Present  Confirmed by Emerson Cordova ([id number]) on 6/3/2024 8:54:45 AM  Electrocardiogram, 12-lead PRN ACS symptoms  Sinus rhythm with occasional Premature ventricular complexes  Septal infarct (cited on or before  28-MAY-2024)  T wave abnormality, consider anterolateral ischemia  Prolonged QT  Abnormal ECG  When compared with ECG of 29-MAY-2024 19:57, (unconfirmed)  T wave inversion now evident in Anterior leads  QT has lengthened  Confirmed by Emerson Cordova ([id number]) on 6/3/2024 8:54:31 AM  ECG 12 Lead  Sinus tachycardia with frequent Premature ventricular complexes  Left axis deviation  Abnormal ECG  When compared with ECG of 28-MAY-2024 17:18, (unconfirmed)  Premature ventricular complexes are now Present  Nonspecific T wave abnormality now evident in Anterolateral leads  Confirmed by Emerson Cordova ([id number]) on 6/3/2024 8:53:59 AM      Physical Exam  Vitals and nursing note reviewed.   Constitutional:       General: He is awake. He is not in acute distress.     Appearance: Normal appearance. He is ill-appearing. He is not toxic-appearing.   HENT:      Head: Normocephalic and atraumatic.      Right Ear: External ear normal.      Left Ear: External ear normal.      Nose: Nose normal.   Eyes:      Extraocular Movements: Extraocular movements intact.   Cardiovascular:      Rate and Rhythm: Normal rate and regular rhythm.   Pulmonary:      Effort: Pulmonary effort is normal. No respiratory distress.      Breath sounds: Wheezing and rhonchi present.   Abdominal:      General: There is no distension.      Palpations: Abdomen is soft.      Tenderness: There is abdominal tenderness. There is guarding.   Musculoskeletal:         General: No signs of injury.      Cervical back: Normal range of motion.   Skin:     General: Skin is warm and dry.      Coloration: Skin is jaundiced. Skin is not pale.   Neurological:      General: No focal deficit present.      Mental Status: He is alert and oriented to person, place, and time. Mental status is at baseline.   Psychiatric:         Mood and Affect: Mood normal.         Behavior: Behavior normal. Behavior is cooperative.         Relevant Results    [Held by provider] amLODIPine, 10 mg,  oral, Daily  aspirin, 81 mg, oral, Daily  atorvastatin, 40 mg, oral, Daily  clopidogrel, 75 mg, oral, Daily  FLUoxetine, 20 mg, oral, Daily  [Held by provider] hydroCHLOROthiazide, 12.5 mg, oral, q AM  ipratropium-albuteroL, 3 mL, nebulization, TID  metoprolol tartrate, 25 mg, oral, BID  nicotine, 1 patch, transdermal, Daily  pantoprazole, 40 mg, oral, Daily before breakfast  piperacillin-tazobactam, 3.375 g, intravenous, q8h  potassium chloride CR, 40 mEq, oral, Once  vancomycin, 1,000 mg, intravenous, q12h      PRN medications: acetaminophen **OR** acetaminophen **OR** acetaminophen, acetaminophen **OR** acetaminophen **OR** acetaminophen, ALPRAZolam, [Held by provider] HYDROcodone-acetaminophen, HYDROmorphone, ipratropium-albuteroL, melatonin, metoclopramide **OR** metoclopramide, morphine, morphine, nitroglycerin, ondansetron ODT **OR** ondansetron, oxygen, polyethylene glycol, sodium chloride, vancomycin           Electrocardiogram, 12-lead PRN ACS symptoms    Result Date: 5/30/2024  Sinus rhythm with Premature atrial complexes with Aberrant conduction Septal infarct (cited on or before 28-MAY-2024) T wave abnormality, consider anterolateral ischemia Prolonged QT Abnormal ECG When compared with ECG of 29-MAY-2024 19:57, (unconfirmed) Premature ventricular complexes are no longer Present Aberrant conduction is now Present T wave inversion now evident in Anterior leads QT has lengthened    Electrocardiogram, 12-lead PRN ACS symptoms    Result Date: 5/30/2024  Sinus rhythm with occasional Premature ventricular complexes Possible Left atrial enlargement Septal infarct (cited on or before 28-MAY-2024) T wave abnormality, consider anterolateral ischemia Prolonged QT Abnormal ECG When compared with ECG of 30-MAY-2024 03:50, (unconfirmed) Premature ventricular complexes are now Present Aberrant conduction is no longer Present Nonspecific T wave abnormality now evident in Inferior leads    Electrocardiogram 12  Lead    Result Date: 5/30/2024  Normal sinus rhythm Septal infarct (cited on or before 28-MAY-2024) T wave abnormality, consider anterolateral ischemia Prolonged QT Abnormal ECG When compared with ECG of 30-MAY-2024 06:54, (unconfirmed) Premature ventricular complexes are no longer Present    Transthoracic Echo (TTE) Complete    Result Date: 5/30/2024            [facility] [place] [address] [address], [facility] [address]    Tel [phone] Fax [phone] TRANSTHORACIC ECHOCARDIOGRAM REPORT  Patient Name:      SOLOMON Lyons Physician:    [id number] Daina Peres MD Study Date:        5/30/2024             Ordering Provider:    [id number] MELINA WATKINS MRN/PID:           [id number]              Fellow: Accession#:        [id number]          Nurse: Date of Birth/Age: 1960 / 64 years  Sonographer:          Ivory Winslow RDCS Gender:            M                     Additional Staff: Height:            170.18 cm             Admit Date:           5/25/2024 Weight:            67.59 kg              Admission Status:     Inpatient - STAT BSA / BMI:         1.78 m2 / 23.34 kg/m2 Department Location:  [facility] CCU SD Blood Pressure: 110 /62 mmHg Study Type:    TRANSTHORACIC ECHO (TTE) COMPLETE Diagnosis/ICD: Non ST elevation (NSTEMI) myocardial infarction-I21.4 Indication:    NSTEMI CPT Codes:     Echo Complete w Full [id number] Patient History: Pertinent History: CAD and Dyslipidemia. No previous echocardiogram. Study Detail: The following Echo studies were performed: 2D, M-Mode, Doppler and               color flow.  PHYSICIAN INTERPRETATION: Left Ventricle: Left ventricular systolic function is normal, with an estimated ejection fraction of 60%. Wall motion is abnormal. The left ventricular cavity size is normal. There is no evidence of left ventricular hypertrophy. Spectral Doppler shows a pseudonormal pattern of left ventricular diastolic filling.  There is no definite left ventricular thrombus visualized. The intraventricular septum appears intact without evidence of shunting or a ventricular septal defect. LV Wall Scoring: The entire apex, mid and apical inferior septum, and mid and apical inferior wall are hypokinetic. All remaining scored segments are normal. Left Atrium: The left atrium is normal in size. Right Ventricle: The right ventricle is normal in size. There is normal right ventricular global systolic function. Right Atrium: The right atrium is normal in size. Aortic Valve: The aortic valve is trileaflet. There is mild aortic valve cusp calcification. There is mild to moderate aortic valve thickening. There is aortic valve annular calcification. There is evidence of mildly elevated transaortic gradients consistent with sclerosis of the aortic valve. There is moderate aortic valve regurgitation. The peak instantaneous gradient of the aortic valve is 9.7 mmHg. Mitral Valve: The mitral valve is moderately thickened. There is no evidence of mitral valve prolapse. There is no evidence of mitral valve stenosis. The doppler estimated mean and peak diastolic pressure gradients are 3.1 mmHg and 5.0 mmHg respectively. There is mild mitral annular calcification. There is mild mitral valve regurgitation. Tricuspid Valve: The tricuspid valve is structurally normal. There is no evidence of tricuspid valve stenosis. There is mild tricuspid regurgitation. The Doppler estimated RVSP is moderate to severely elevated at 51.1 mmHg. Pulmonic Valve: The pulmonic valve is abnormal. There is trace to mild pulmonic valve regurgitation. Pericardium: There is no pericardial effusion noted. Aorta: The aortic root is normal. Systemic Veins: The inferior vena cava appears to be of normal size. There is IVC inspiratory collapse greater than 50%. In comparison to the previous echocardiogram(s): Prior examinations are available and were reviewed for comparison purposes. Compared  with echo 7/17/23 LVEF is unchanged. Grade I is now grade II diastolic dysfunction. Apical wall motion abnormality is new.  CONCLUSIONS:  1. Left ventricular systolic function is normal with a 60% estimated ejection fraction.  2. Entire apex, mid and apical inferior septum, and mid and apical inferior wall are abnormal.  3. Intact intraventricular septum without shunting or a ventricular septal defect.  4. No left ventricular thrombus visualized.  5. Spectral Doppler shows a pseudonormal pattern of left ventricular diastolic filling.  6. There is no evidence of left ventricular hypertrophy.  7. The mitral valve is moderately thickened.  8. No evidence of mitral valve prolapse.  9. There is No tricuspid stenosis. 10. Moderate to severely elevated right ventricular systolic pressure. 11. Aortic valve sclerosis. 12. There is aortic valve annular calcification. 13. Moderate aortic valve regurgitation. 14. Compared with echo 7/17/23 LVEF is unchanged. Grade I is now grade II diastolic dysfunction. Apical wall motion abnormality is new. QUANTITATIVE DATA SUMMARY: 2D MEASUREMENTS:                          Normal Ranges: LAs:           3.66 cm   (2.7-4.0cm) IVSd:          0.87 cm   (0.6-1.1cm) LVPWd:         0.64 cm   (0.6-1.1cm) LVIDd:         4.81 cm   (3.9-5.9cm) LVIDs:         2.99 cm LV Mass Index: 66.0 g/m2 LV % FS        37.9 % LA VOLUME:                               Normal Ranges: LA Vol A4C:        41.0 ml    (22+/-6mL/m2) LA Vol A2C:        43.1 ml LA Vol BP:         44.0 ml LA Vol Index A4C:  23.0 ml/m2 LA Vol Index A2C:  24.2 ml/m2 LA Vol Index BP:   24.7 ml/m2 LA Area A4C:       14.9 cm2 LA Area A2C:       14.6 cm2 LA Major Axis A4C: 4.6 cm LA Major Axis A2C: 4.2 cm LA Volume Index:   24.5 ml/m2 LA Vol A4C:        35.2 ml LA Vol A2C:        40.5 ml AORTA MEASUREMENTS:                      Normal Ranges: Ao Sinus, d: 3.10 cm (2.1-3.5cm) Ao STJ, d:   3.20 cm (1.7-3.4cm) Asc Ao, d:   3.50 cm (2.1-3.4cm) LV  SYSTOLIC FUNCTION BY 2D PLANIMETRY (MOD):                     Normal Ranges: EF-A4C View: 61.0 % (>=55%) EF-A2C View: 61.6 % EF-Biplane:  61.3 % LV DIASTOLIC FUNCTION:                              Normal Ranges: MV Peak E:        0.85 m/s   (0.7-1.2 m/s) MV Peak A:        0.95 m/s   (0.42-0.7 m/s) E/A Ratio:        0.90       (1.0-2.2) MV lateral e'     0.09 m/s MV medial e'      0.07 m/s PulmV Sys Keshawn:    59.28 cm/s PulmV Hess Keshawn:   43.11 cm/s PulmV S/D Keshawn:    1.38 PulmV A Revs Keshawn: 26.87 cm/s MITRAL VALVE:                      Normal Ranges: MV Vmax:    1.12 m/s (<=1.3m/s) MV peak P.0 mmHg (<5mmHg) MV mean PG: 3.1 mmHg (<48mmHg) MV VTI:     35.83 cm (10-13cm) MV DT:      160 msec (150-240msec) AORTIC VALVE:                         Normal Ranges: AoV Vmax:      1.56 m/s (<=1.7m/s) AoV Peak P.7 mmHg (<20mmHg) LVOT Max Keshawn:  1.18 m/s (<=1.1m/s) LVOT VTI:      28.06 cm LVOT Diameter: 1.70 cm  (1.8-2.4cm) AoV Area,Vmax: 1.71 cm2 (2.5-4.5cm2) AORTIC INSUFFICIENCY: AI Vmax:       4.30 m/s AI Half-time:  396 msec AI Decel Time: 1364 msec AI Decel Rate: 315.28 cm/s2  RIGHT VENTRICLE: RV Basal 3.20 cm RV Mid   7.20 cm RV Major 7.2 cm TAPSE:   21.0 mm RV s'    0.16 m/s TRICUSPID VALVE/RVSP:                             Normal Ranges: Peak TR Velocity: 3.47 m/s RV Syst Pressure: 51.1 mmHg (< 30mmHg) IVC Diam:         1.31 cm PULMONIC VALVE:                         Normal Ranges: PV Accel Time: 66 msec  (>120ms) PV Max Keshawn:    0.7 m/s  (0.6-0.9m/s) PV Max P.2 mmHg Pulmonary Veins: PulmV A Revs Keshawn: 26.87 cm/s PulmV Hess Keshawn:   43.11 cm/s PulmV S/D Keshawn:    1.38 PulmV Sys Keshawn:    59.28 cm/s AORTA: Asc Ao Diam 3.49 cm  [id number] Daina Peres MD Electronically signed on 2024 at 12:26:19 PM  Wall Scoring  ** Final **     ECG 12 Lead    Result Date: 2024  Sinus tachycardia with frequent Premature ventricular complexes Left axis deviation Septal infarct (cited on or before 28-MAY-2024) Abnormal ECG When  compared with ECG of 28-MAY-2024 17:18, (unconfirmed) Premature ventricular complexes are now Present Nonspecific T wave abnormality now evident in Anterolateral leads    Electrocardiogram, 12-lead PRN ACS symptoms    Result Date: 5/29/2024  Normal sinus rhythm Possible Left atrial enlargement Low voltage QRS Septal infarct , age undetermined Abnormal ECG When compared with ECG of 24-MAY-2024 16:45, Premature ventricular complexes are no longer Present    FL fluoro images no charge    Result Date: 5/28/2024  These images are not reportable by radiology and will not be interpreted by  Radiologists.    Endoscopic Retrograde Cholangiopancreatography (ERCP)    Result Date: 5/28/2024  Table formatting from the original result was not included. Impression The common bile duct was deeply cannulated after 10 attempts. Cannulation was difficult due to inadvertent cannulation, small ampulla and Distal CBD stricture. 20 mm localized, intrinsic, smooth and severe stricture was visualized in the distal common bile duct Complete major papilla sphincterotomy was performed. Performed brushings with cytology brush in the distal common bile duct One 8 mm x 8 cm fully covered stent was placed in the common bile duct Findings The major papilla was native. Ampullectomy was not performed. Small ampulla, mildly deformed due to external compression. The common bile duct was deeply cannulated after 10 attempts using a traction sphincterotome with 260 cm straight guidewire. Cannulation was difficult due to inadvertent cannulation, small ampulla and Distal CBD stricture. No bleeding was observed. . Unintentional cannulation PD was performed. 20 mm localized, intrinsic, smooth and severe stricture was visualized in the distal common bile duct. Complete 5 mm major papilla sphincterotomy was performed using a sphincterotome. No bleeding was noted at the procedure site. Performed 3 brushings with cytology brush in the distal common bile duct.  One 8 mm x 8 cm fully covered metal stent was placed successfully in the common bile duct. Recommendation  Await pathology results  Follow up with oncology and pancreato biliary surgeon as outpatient Watch for complication  Indication Jaundice, Generalized abdominal pain, Pancreatic mass ([facility]) Staff Staff Role Shanda Gong MD Proceduralist Medications See Anesthesia Record. Preprocedure A history and physical has been performed, and patient medication allergies have been reviewed. The patient's tolerance of previous anesthesia has been reviewed. The risks and benefits of the procedure and the sedation options and risks were discussed with the patient. All questions were answered and informed consent obtained. Rectal Indomethacin was not administered. Details of the Procedure The patient underwent general anesthesia, which was administered by an anesthesia professional. The patient's blood pressure, heart rate, level of consciousness, oxygen, respirations, ECG and ETCO2 were monitored throughout the procedure. The scope was introduced through the mouth and advanced to the second part of the duodenum. The patient experienced no blood loss. The procedure was not difficult. The patient tolerated the procedure well. There were no apparent adverse events. Events Procedure Events Event Event Time ENDO SCOPE IN TIME 5/28/2024  3:00 PM ENDO SCOPE OUT TIME 5/28/2024  3:00 PM ENDO SCOPE IN TIME 5/28/2024  3:02 PM ENDO SCOPE OUT TIME 5/28/2024  3:30 PM ENDO SCOPE IN TIME 5/28/2024  3:47 PM Specimens ID Type Source Tests Collected by Time 1 : FNA Pancreatic Head Mass Non-Gynecologic Cytology PANCREAS FINE NEEDLE ASPIRATION HEAD CYTOLOGY CONSULTATION (NON-GYNECOLOGIC) Devorah Yost 5/28/2024 1512 2 : FNB Pancreatic Head Mass Tissue PANCREAS BIOPSY SURGICAL PATHOLOGY EXAM Devorah Yost 5/28/2024 1513 3 :  Non-Gynecologic Cytology BILE DUCT BRUSH COMMON CYTOLOGY CONSULTATION (NON-GYNECOLOGIC) Devorah Yost 5/28/2024 1643  Procedure Location [facility] [address] [place] [address] [phone] Referring Provider Zack Isaacs MD [id number] [place] [address] Procedure Provider Shanda Gong MD     Endoscopic Ultrasound (Upper)    Result Date: 5/28/2024  Table formatting from the original result was not included. Impression The cricopharynx, upper third of the esophagus, middle third of the esophagus, lower third of the esophagus and GE junction appeared normal. The body of the stomach and antrum appeared normal. Mild extrinsic compression was observed in the 2nd part of the duodenum The duodenal bulb appeared normal. The pancreas appeared atrophic. The parenchyma was visualized in the body of the pancreas and tail of the pancreas. The parenchyma was heterogeneous and had hyperechoic strands. Single round, homogeneous, hypoechoic and solid mass measuring 30 mm x 25 mm with well-defined margins was visualized in the head of the pancreas. Apparent abutment of the portal vein and superior mesenteric vein; 3 fine needle aspiration passes were taken. An adequate sample of aspirate was obtained. The sample was sent for cytology analysis; 3 fine needle biopsy passes were taken. An adequate sample was obtained. A sample was sent for histology analysis The proximal bile duct was dilated. The parenchyma was visualized in the left lobe of the liver. The parenchyma was homogeneous and hyperechoic. The common hepatic duct, left main hepatic duct and right main hepatic duct appeared dilated. The left kidney, spleen and left adrenal appeared normal. Findings The cricopharynx, upper third of the esophagus, middle third of the esophagus, lower third of the esophagus and GE junction appeared normal. The body of the stomach and antrum appeared normal. Mild, traversable extrinsic compression was observed in the 2nd part of the duodenum The duodenal bulb appeared normal. The  pancreas appeared atrophic. The parenchyma was visualized in the body of the pancreas and tail of the pancreas. The parenchyma was heterogeneous and had hyperechoic strands. The pancreatic duct measured 2 mm at the head, 3 mm at the body and 1 mm at the tail. Single round, homogeneous, hypoechoic and solid mass measuring 30 mm x 25 mm with well-defined margins was visualized in the head of the pancreas. Apparent abutment of the portal vein and superior mesenteric vein; 3 fine needle aspiration passes were taken with a 22 gauge Sh[place]ore needle using a transduodenal approach guided by Doppler. An adequate sample of aspirate was obtained. The sample was sent for cytology analysis. Onsite cytologist was not present; 3 successful fine needle biopsy passes were taken with a 22 gauge Sharcore needle using a transduodenal approach. An adequate sample was obtained. A sample was sent for histology analysis. Onsite cytologist was not present The proximal bile duct was dilated. The bile duct measured 12 mm at the middle. The parenchyma was visualized in the left lobe of the liver. The parenchyma was homogeneous and hyperechoic. The common hepatic duct, left main hepatic duct and right main hepatic duct appeared dilated. The left kidney, spleen and left adrenal appeared normal. Recommendation  Await pathology results  Proceed to ERCP  Indication Jaundice, Generalized abdominal pain, Pancreatic mass ([facility]) Staff Staff Role Shanda Gong MD Proceduralist Medications See Anesthesia Record. Preprocedure A history and physical has been performed, and patient medication allergies have been reviewed. The patient's tolerance of previous anesthesia has been reviewed. The risks and benefits of the procedure and the sedation options and risks were discussed with the patient. All questions were answered and informed consent obtained. Details of the Procedure The patient underwent general anesthesia, which was administered by an  anesthesia professional. The patient's blood pressure, heart rate, level of consciousness, oxygen, respirations, ECG and ETCO2 were monitored throughout the procedure. The radial scope was introduced through the mouth and advanced to the second part of the duodenum. The patient's estimated blood loss was minimal (<5 mL). The procedure was not difficult. The patient tolerated the procedure well. There were no apparent adverse events. Events Procedure Events Event Event Time ENDO SCOPE IN TIME 5/28/2024  3:00 PM ENDO SCOPE OUT TIME 5/28/2024  3:00 PM ENDO SCOPE IN TIME 5/28/2024  3:02 PM ENDO SCOPE OUT TIME 5/28/2024  3:30 PM Specimens ID Type Source Tests Collected by Time 1 : FNA Pancreatic Head Mass Non-Gynecologic Cytology PANCREAS FINE NEEDLE ASPIRATION HEAD CYTOLOGY CONSULTATION (NON-GYNECOLOGIC) Devorah Ritika 5/28/2024 1512 2 : FNB Pancreatic Head Mass Tissue PANCREAS BIOPSY SURGICAL PATHOLOGY EXAM Devorah Ritika 5/28/2024 1513 Procedure Location [address] [address] [phone] Referring Provider Zack Whitney MD [id number] [address] Procedure Provider Shanda Gong MD     ECG 12 lead    Result Date: 5/25/2024  Sinus rhythm with frequent Premature ventricular complexes Otherwise normal ECG When compared with ECG of 24-MAY-2024 16:43, (unconfirmed) Premature ventricular complexes are now Present See ED provider note for full interpretation and clinical correlation Confirmed by Deyanira Lantigua ([id number]) on 5/25/2024 5:28:37 PM    CT abdomen pelvis w IV contrast    Result Date: 5/24/2024  STUDY: CT Abdomen and Pelvis with IV Contrast; 05/24/2024 at 5:51 PM INDICATION: Right upper quadrant abdominal pain. Jaundice. COMPARISON: None available. ACCESSION NUMBER(S): [id number] ORDERING CLINICIAN: ZACK WHITNEY TECHNIQUE: CT of the abdomen and pelvis was performed.  Contiguous axial images were obtained at  3 mm slice thickness through the abdomen and pelvis. Coronal and sagittal reconstructions at 3 mm slice thickness were performed.  Omnipaque-350 75 mL was administered intravenously.  FINDINGS: LOWER CHEST: No cardiomegaly.  No pericardial effusion.  Lung bases are clear.  ABDOMEN:  LIVER: No hepatomegaly.  Smooth surface contour.  Normal attenuation.  BILE DUCTS: There is intrahepatic and common bile bile duct dilatation.  This can be followed into the head of the pancreas.  GALLBLADDER: The gallbladder is present.  There is intermediate attenuation the gallbladder most likely representing sludge. STOMACH: No abnormalities identified.  PANCREAS: There is an ill-defined 2.5 cm mass in the head of the pancreas.  This is best seen on series 201, slice 61 as well as series 202, slice 45. There is pancreatic duct dilatation.  SPLEEN: No splenomegaly or focal splenic lesion.  ADRENAL GLANDS: No thickening or nodules.  KIDNEYS AND URETERS: Kidneys are normal in size and location.  No renal or ureteral calculi.  PELVIS:  BLADDER: No abnormalities identified.  REPRODUCTIVE ORGANS: No abnormalities identified.  BOWEL: No abnormalities identified.  The appendix is identified and is normal.  VESSELS: No abnormalities identified.  Abdominal aorta is normal in caliber.  PERITONEUM/RETROPERITONEUM/LYMPH NODES: No free fluid.  No pneumoperitoneum. No lymphadenopathy.  ABDOMINAL WALL: No abnormalities identified. SOFT TISSUES: No abnormalities identified.  BONES: No acute fracture or aggressive osseous lesion.    2.5 cm mass in the head of the pancreas.  There is dilatation of the biliary ducts as well as the pancreatic duct and neoplasm cannot be excluded.  This is best evaluated with ERCP or MRCP.  There are findings consistent with sludge in the gallbladder. Signed by Bhanu Taveras MD       Results from last 7 days   Lab Units 06/03/24  0230 06/03/24  0132 06/03/24  0006   WBC AUTO x10*3/uL 20.3* 22.0* 21.8*   RBC AUTO x10*6/uL  "2.66* 2.72* 2.72*   HEMOGLOBIN g/dL 9.2* 9.7* 9.6*   HEMATOCRIT % 27.5* 28.4* 28.5*   MCV fL 103* 104* 105*   PLATELETS AUTO x10*3/uL 357 362 348           No lab exists for component: \"B12\"                  Results from last 7 days   Lab Units 06/03/24  0240 06/03/24  0132 06/02/24  0341 06/01/24  0346 05/31/24  0412 05/30/24  0427   SODIUM mmol/L 130* 128* 130*   < > 131* 133*   POTASSIUM mmol/L 3.3* 3.5 3.4*   < > 3.8 3.4*   CHLORIDE mmol/L 95* 96* 100   < > 100 100   CO2 mmol/L 24 21 23   < > 25 25   BUN mg/dL 6 6 6   < > 7 6   CREATININE mg/dL 0.66 0.65 0.57   < > 0.66 0.60   CALCIUM mg/dL 8.2* 8.2* 7.9*   < > 8.1* 8.4*   MAGNESIUM mg/dL  --  1.91  --   --  2.22 1.62   BILIRUBIN TOTAL mg/dL 15.3* 16.0* 14.1*   < > 16.0* 17.6*   ALT U/L 159* 169* 142*   < > 155* 184*   AST U/L 158* 166* 132*   < > 98* 114*    < > = values in this interval not displayed.         Results from last 7 days   Lab Units 05/30/24  1315 05/30/24  1010 05/30/24  0427 05/29/24  2224 05/29/24 2016   TROPHS ng/L 356* 341* 534* 656* 351*                    This patient is intubated   Reason for patient to remain intubated today? Patient is NOT intubated.          Kemal Layne, DO          "

## 2024-06-03 NOTE — NURSING NOTE
Patient reports abdominal pain described an ache 8-9/10. No complaints of nausea at this time. The patient has a frequent moist cough with production of clear sputum flecked with blood. His pulse ox is 85% on 2 liters n/c. Patient denies feeling short of breath.. He is requesting his pain medication. Oxygen increased to 5 liters n/c Pulse ox increased to 93%. Patient medicated with Dilaudid 0.5 mg IVP. Patient's abdomen is more distended and firm today. Hospitalist notified of increased need for oxygen.

## 2024-06-03 NOTE — CONSULTS
Reason For Consult  Pancreatic cancer    History Of Present Illness  Emerson Chilel is a 64 y.o. male presenting with new jaundice and generalized abdominal pain and found to have pancreatitis.    Initially presented to the emergency room on 5/25/2024.  Had a approximately 2 weeks of right upper quadrant abdominal pain and scleral icterus first noted 48 hours prior.  Has a history of drinking approximately 6 beers a day.  At outside hospital was noted to have a 2.5 cm mass at the head of the pancreas concerning for underlying neoplasm.  He was transferred for GI consultation and possible ERCP. patient went underwent ERCP 5/28/2024 and was noted to have a common bile duct was deeply cannulated and was difficult due to distal CBD stricture.  There was a 20 mm localized intrinsic smooth and severe stricture in the distal common bile duct.  Common major papilla sphincterectomy was performed.  Performed brushings with cytology brush and one 8 x 8 mm fully covered stent was placed in the common bile duct.  Final pathology is still pending.  CT abdomen pelvis on 5/31/2024 showed 25 mm pancreatic head mass unchanged.  Most recent labs show total bilirubin 15.3 today  .  Albumin 3.0.  Sodium was 130.  Potassium 3.3.    Pt reports overall weakness. No new worsening abdominal pain, nausea, vomiting, diarrhea. No new fevers, chills. No new bone pain.      .     Past Medical History  He has a past medical history of CAD (coronary artery disease), Chronic sinusitis, unspecified (07/13/2021), Hyperlipidemia, and Hypertension.    Surgical History  He has a past surgical history that includes MR angio head wo IV contrast (12/21/2021); Cardiac catheterization; Hernia repair; Knee cartilage surgery (Right); Cardiac catheterization (N/A, 5/30/2024); Cardiac catheterization (5/30/2024); and Cardiac catheterization (N/A, 5/30/2024).     Social History  He reports that he has been smoking cigarettes. He has a 50 pack-year  "smoking history. He has never used smokeless tobacco. He reports current alcohol use of about 2.0 standard drinks of alcohol per week. He reports that he does not currently use drugs.    Family History  Family History   Problem Relation Name Age of Onset    Other (cardiac disorder) Mother      Stroke Mother      Pancreatic cancer Mother      Suicide Attempts Father      Other (cabg) Brother      Coronary artery disease Brother      No Known Problems Other          Allergies  Ramipril    Review of Systems  Negative outside of HPI     Physical Exam  Gen: appears NAD  HEENT: atraumatic head, normocephalic, EOMI, conjuctiva normal  LUNG: no increased WOB  CV: No JVD. RRR  GI: soft, NT, ND  LE: no LE edema  Skin: no obvious rashes or lesions  Neuro: interactive, no focal deficits, limited in setting of acute illness  Psych: difficult to participate, normal affect      Last Recorded Vitals  Blood pressure 116/67, pulse 93, temperature 36.1 °C (97 °F), temperature source Temporal, resp. rate 22, height 1.702 m (5' 7\"), weight 70 kg (154 lb 5.2 oz), SpO2 96%.    Relevant Results  Results for orders placed or performed during the hospital encounter of 05/25/24 (from the past 24 hour(s))   CBC   Result Value Ref Range    WBC 21.8 (H) 4.4 - 11.3 x10*3/uL    nRBC 0.0 0.0 - 0.0 /100 WBCs    RBC 2.72 (L) 4.50 - 5.90 x10*6/uL    Hemoglobin 9.6 (L) 13.5 - 17.5 g/dL    Hematocrit 28.5 (L) 41.0 - 52.0 %     (H) 80 - 100 fL    MCH 35.3 (H) 26.0 - 34.0 pg    MCHC 33.7 32.0 - 36.0 g/dL    RDW 17.8 (H) 11.5 - 14.5 %    Platelets 348 150 - 450 x10*3/uL   CBC   Result Value Ref Range    WBC 22.0 (H) 4.4 - 11.3 x10*3/uL    nRBC 0.0 0.0 - 0.0 /100 WBCs    RBC 2.72 (L) 4.50 - 5.90 x10*6/uL    Hemoglobin 9.7 (L) 13.5 - 17.5 g/dL    Hematocrit 28.4 (L) 41.0 - 52.0 %     (H) 80 - 100 fL    MCH 35.7 (H) 26.0 - 34.0 pg    MCHC 34.2 32.0 - 36.0 g/dL    RDW 17.6 (H) 11.5 - 14.5 %    Platelets 362 150 - 450 x10*3/uL   Comprehensive " metabolic panel   Result Value Ref Range    Glucose 94 74 - 99 mg/dL    Sodium 128 (L) 136 - 145 mmol/L    Potassium 3.5 3.5 - 5.3 mmol/L    Chloride 96 (L) 98 - 107 mmol/L    Bicarbonate 21 21 - 32 mmol/L    Anion Gap 15 10 - 20 mmol/L    Urea Nitrogen 6 6 - 23 mg/dL    Creatinine 0.65 0.50 - 1.30 mg/dL    eGFR >90 >60 mL/min/1.73m*2    Calcium 8.2 (L) 8.6 - 10.3 mg/dL    Albumin 3.0 (L) 3.4 - 5.0 g/dL    Alkaline Phosphatase 170 (H) 33 - 136 U/L    Total Protein 5.6 (L) 6.4 - 8.2 g/dL     (H) 9 - 39 U/L    Bilirubin, Total 16.0 (H) 0.0 - 1.2 mg/dL     (H) 10 - 52 U/L   Magnesium   Result Value Ref Range    Magnesium 1.91 1.60 - 2.40 mg/dL   Procalcitonin   Result Value Ref Range    Procalcitonin 0.66 (H) <=0.07 ng/mL   CBC   Result Value Ref Range    WBC 20.3 (H) 4.4 - 11.3 x10*3/uL    nRBC 0.0 0.0 - 0.0 /100 WBCs    RBC 2.66 (L) 4.50 - 5.90 x10*6/uL    Hemoglobin 9.2 (L) 13.5 - 17.5 g/dL    Hematocrit 27.5 (L) 41.0 - 52.0 %     (H) 80 - 100 fL    MCH 34.6 (H) 26.0 - 34.0 pg    MCHC 33.5 32.0 - 36.0 g/dL    RDW 17.8 (H) 11.5 - 14.5 %    Platelets 357 150 - 450 x10*3/uL   Blood Culture    Specimen: Peripheral Venipuncture; Blood culture   Result Value Ref Range    Blood Culture Loaded on Instrument - Culture in progress    Blood Culture    Specimen: Peripheral Venipuncture; Blood culture   Result Value Ref Range    Blood Culture Loaded on Instrument - Culture in progress    Comprehensive Metabolic Panel   Result Value Ref Range    Glucose 96 74 - 99 mg/dL    Sodium 130 (L) 136 - 145 mmol/L    Potassium 3.3 (L) 3.5 - 5.3 mmol/L    Chloride 95 (L) 98 - 107 mmol/L    Bicarbonate 24 21 - 32 mmol/L    Anion Gap 14 10 - 20 mmol/L    Urea Nitrogen 6 6 - 23 mg/dL    Creatinine 0.66 0.50 - 1.30 mg/dL    eGFR >90 >60 mL/min/1.73m*2    Calcium 8.2 (L) 8.6 - 10.3 mg/dL    Albumin 3.0 (L) 3.4 - 5.0 g/dL    Alkaline Phosphatase 182 (H) 33 - 136 U/L    Total Protein 5.5 (L) 6.4 - 8.2 g/dL     (H) 9 -  39 U/L    Bilirubin, Total 15.3 (H) 0.0 - 1.2 mg/dL     (H) 10 - 52 U/L   BLOOD GAS ARTERIAL FULL PANEL   Result Value Ref Range    POCT pH, Arterial 7.45 (H) 7.38 - 7.42 pH    POCT pCO2, Arterial 35 (L) 38 - 42 mm Hg    POCT pO2, Arterial 94 85 - 95 mm Hg    POCT SO2, Arterial 99 94 - 100 %    POCT Oxy Hemoglobin, Arterial 95.6 94.0 - 98.0 %    POCT Hematocrit Calculated, Arterial 29.0 (L) 41.0 - 52.0 %    POCT Sodium, Arterial 128 (L) 136 - 145 mmol/L    POCT Potassium, Arterial 3.3 (L) 3.5 - 5.3 mmol/L    POCT Chloride, Arterial 100 98 - 107 mmol/L    POCT Ionized Calcium, Arterial 1.08 (L) 1.10 - 1.33 mmol/L    POCT Glucose, Arterial 103 (H) 74 - 99 mg/dL    POCT Lactate, Arterial 1.4 0.4 - 2.0 mmol/L    POCT Base Excess, Arterial 0.5 -2.0 - 3.0 mmol/L    POCT HCO3 Calculated, Arterial 24.3 22.0 - 26.0 mmol/L    POCT Hemoglobin, Arterial 9.5 (L) 13.5 - 17.5 g/dL    POCT Anion Gap, Arterial 7 (L) 10 - 25 mmo/L    Patient Temperature      FiO2 100 %    Apparatus NON REBREATHER    Blood Gas Arterial   Result Value Ref Range    POCT pH, Arterial 7.45 (H) 7.38 - 7.42 pH    POCT pCO2, Arterial 35 (L) 38 - 42 mm Hg    POCT pO2, Arterial 94 85 - 95 mm Hg    POCT SO2, Arterial 99 94 - 100 %    POCT Oxy Hemoglobin, Arterial 95.6 94.0 - 98.0 %    POCT Base Excess, Arterial 0.5 -2.0 - 3.0 mmol/L    POCT HCO3 Calculated, Arterial 24.3 22.0 - 26.0 mmol/L    Patient Temperature      FiO2 100 %    Apparatus NON REBREATHER    MRSA Surveillance for Vancomycin De-escalation, PCR    Specimen: Anterior Nares; Swab   Result Value Ref Range    MRSA PCR Not Detected Not Detected   Lactate   Result Value Ref Range    Lactate 1.1 0.4 - 2.0 mmol/L          Assessment/Plan     Pancreatic head mass, [id number]  Hyperbilirubinemia    Patient presented with a 2-week prior history of jaundice  and abdominal pain.  Was noted to have a bilirubin above 14 and transferred from outside hospital.  Status post ERCP 5/28/2024 and stent has been  placed.  Pathology is pending. Discussed with patient most concerning for primary pancreatic cancer however will need tissue for confirmation.    Recommend CA 19-9 for baseline along with CT of the chest to complete staging.    Will await final pathology and discuss treatment options as an outpatient.  He is aware we would start with systemic therapy with combination chemotherapy. Will discuss with surgical oncology regarding the role of surgery and coordinate visit. Pt aware he will have follow up upon discharge.    - CA 19-9 and CT chest to complete staging    I spent 60 minutes in the professional and overall care of this patient.      Evelyn Salter MD

## 2024-06-04 LAB
ALBUMIN SERPL BCP-MCNC: 2.8 G/DL (ref 3.4–5)
ALP SERPL-CCNC: 150 U/L (ref 33–136)
ALT SERPL W P-5'-P-CCNC: 151 U/L (ref 10–52)
ANION GAP SERPL CALC-SCNC: 10 MMOL/L (ref 10–20)
AST SERPL W P-5'-P-CCNC: 165 U/L (ref 9–39)
BILIRUB SERPL-MCNC: 12.1 MG/DL (ref 0–1.2)
BUN SERPL-MCNC: 9 MG/DL (ref 6–23)
CALCIUM SERPL-MCNC: 8 MG/DL (ref 8.6–10.3)
CANCER AG19-9 SERPL-ACNC: 3210.89 U/ML
CHLORIDE SERPL-SCNC: 96 MMOL/L (ref 98–107)
CO2 SERPL-SCNC: 25 MMOL/L (ref 21–32)
CREAT SERPL-MCNC: 0.55 MG/DL (ref 0.5–1.3)
EGFRCR SERPLBLD CKD-EPI 2021: >90 ML/MIN/1.73M*2
ERYTHROCYTE [DISTWIDTH] IN BLOOD BY AUTOMATED COUNT: 17.2 % (ref 11.5–14.5)
GLUCOSE SERPL-MCNC: 156 MG/DL (ref 74–99)
HCT VFR BLD AUTO: 25.8 % (ref 41–52)
HGB BLD-MCNC: 8.5 G/DL (ref 13.5–17.5)
MAGNESIUM SERPL-MCNC: 2.17 MG/DL (ref 1.6–2.4)
MCH RBC QN AUTO: 34.8 PG (ref 26–34)
MCHC RBC AUTO-ENTMCNC: 32.9 G/DL (ref 32–36)
MCV RBC AUTO: 106 FL (ref 80–100)
NRBC BLD-RTO: 0 /100 WBCS (ref 0–0)
PLATELET # BLD AUTO: 333 X10*3/UL (ref 150–450)
POTASSIUM SERPL-SCNC: 3.4 MMOL/L (ref 3.5–5.3)
PROT SERPL-MCNC: 5.4 G/DL (ref 6.4–8.2)
RBC # BLD AUTO: 2.44 X10*6/UL (ref 4.5–5.9)
SODIUM SERPL-SCNC: 128 MMOL/L (ref 136–145)
VANCOMYCIN SERPL-MCNC: 6.4 UG/ML (ref 5–20)
WBC # BLD AUTO: 17 X10*3/UL (ref 4.4–11.3)

## 2024-06-04 PROCEDURE — 2500000001 HC RX 250 WO HCPCS SELF ADMINISTERED DRUGS (ALT 637 FOR MEDICARE OP): Performed by: STUDENT IN AN ORGANIZED HEALTH CARE EDUCATION/TRAINING PROGRAM

## 2024-06-04 PROCEDURE — 99233 SBSQ HOSP IP/OBS HIGH 50: CPT | Performed by: STUDENT IN AN ORGANIZED HEALTH CARE EDUCATION/TRAINING PROGRAM

## 2024-06-04 PROCEDURE — 2500000005 HC RX 250 GENERAL PHARMACY W/O HCPCS: Performed by: STUDENT IN AN ORGANIZED HEALTH CARE EDUCATION/TRAINING PROGRAM

## 2024-06-04 PROCEDURE — 36415 COLL VENOUS BLD VENIPUNCTURE: CPT | Performed by: STUDENT IN AN ORGANIZED HEALTH CARE EDUCATION/TRAINING PROGRAM

## 2024-06-04 PROCEDURE — 2500000002 HC RX 250 W HCPCS SELF ADMINISTERED DRUGS (ALT 637 FOR MEDICARE OP, ALT 636 FOR OP/ED): Performed by: STUDENT IN AN ORGANIZED HEALTH CARE EDUCATION/TRAINING PROGRAM

## 2024-06-04 PROCEDURE — 94640 AIRWAY INHALATION TREATMENT: CPT

## 2024-06-04 PROCEDURE — 80053 COMPREHEN METABOLIC PANEL: CPT | Performed by: STUDENT IN AN ORGANIZED HEALTH CARE EDUCATION/TRAINING PROGRAM

## 2024-06-04 PROCEDURE — 5A09457 ASSISTANCE WITH RESPIRATORY VENTILATION, 24-96 CONSECUTIVE HOURS, CONTINUOUS POSITIVE AIRWAY PRESSURE: ICD-10-PCS | Performed by: STUDENT IN AN ORGANIZED HEALTH CARE EDUCATION/TRAINING PROGRAM

## 2024-06-04 PROCEDURE — 2500000004 HC RX 250 GENERAL PHARMACY W/ HCPCS (ALT 636 FOR OP/ED): Performed by: STUDENT IN AN ORGANIZED HEALTH CARE EDUCATION/TRAINING PROGRAM

## 2024-06-04 PROCEDURE — 94660 CPAP INITIATION&MGMT: CPT

## 2024-06-04 PROCEDURE — S4991 NICOTINE PATCH NONLEGEND: HCPCS | Performed by: STUDENT IN AN ORGANIZED HEALTH CARE EDUCATION/TRAINING PROGRAM

## 2024-06-04 PROCEDURE — 2500000001 HC RX 250 WO HCPCS SELF ADMINISTERED DRUGS (ALT 637 FOR MEDICARE OP): Performed by: INTERNAL MEDICINE

## 2024-06-04 PROCEDURE — 85027 COMPLETE CBC AUTOMATED: CPT | Performed by: STUDENT IN AN ORGANIZED HEALTH CARE EDUCATION/TRAINING PROGRAM

## 2024-06-04 PROCEDURE — 1200000002 HC GENERAL ROOM WITH TELEMETRY DAILY

## 2024-06-04 PROCEDURE — 83735 ASSAY OF MAGNESIUM: CPT | Performed by: STUDENT IN AN ORGANIZED HEALTH CARE EDUCATION/TRAINING PROGRAM

## 2024-06-04 PROCEDURE — 80202 ASSAY OF VANCOMYCIN: CPT | Performed by: STUDENT IN AN ORGANIZED HEALTH CARE EDUCATION/TRAINING PROGRAM

## 2024-06-04 RX ORDER — METOCLOPRAMIDE HYDROCHLORIDE 5 MG/ML
10 INJECTION INTRAMUSCULAR; INTRAVENOUS EVERY 6 HOURS PRN
Status: DISCONTINUED | OUTPATIENT
Start: 2024-06-04 | End: 2024-06-09 | Stop reason: HOSPADM

## 2024-06-04 RX ORDER — METOCLOPRAMIDE 10 MG/1
10 TABLET ORAL EVERY 6 HOURS PRN
Status: DISCONTINUED | OUTPATIENT
Start: 2024-06-04 | End: 2024-06-09 | Stop reason: HOSPADM

## 2024-06-04 RX ORDER — ONDANSETRON HYDROCHLORIDE 2 MG/ML
4 INJECTION, SOLUTION INTRAVENOUS EVERY 8 HOURS PRN
Status: DISCONTINUED | OUTPATIENT
Start: 2024-06-04 | End: 2024-06-09 | Stop reason: HOSPADM

## 2024-06-04 RX ORDER — BACLOFEN 5 MG/1
5 TABLET ORAL 3 TIMES DAILY PRN
Status: DISCONTINUED | OUTPATIENT
Start: 2024-06-04 | End: 2024-06-09 | Stop reason: HOSPADM

## 2024-06-04 RX ORDER — POTASSIUM CHLORIDE 1.5 G/1.58G
40 POWDER, FOR SOLUTION ORAL ONCE
Status: COMPLETED | OUTPATIENT
Start: 2024-06-04 | End: 2024-06-04

## 2024-06-04 RX ORDER — ONDANSETRON 4 MG/1
4 TABLET, ORALLY DISINTEGRATING ORAL EVERY 8 HOURS PRN
Status: DISCONTINUED | OUTPATIENT
Start: 2024-06-04 | End: 2024-06-09 | Stop reason: HOSPADM

## 2024-06-04 RX ADMIN — METOCLOPRAMIDE 10 MG: 10 TABLET ORAL at 15:14

## 2024-06-04 RX ADMIN — METOPROLOL TARTRATE 25 MG: 25 TABLET, FILM COATED ORAL at 21:04

## 2024-06-04 RX ADMIN — IPRATROPIUM BROMIDE AND ALBUTEROL SULFATE 3 ML: 2.5; .5 SOLUTION RESPIRATORY (INHALATION) at 20:35

## 2024-06-04 RX ADMIN — Medication 60 PERCENT: at 05:47

## 2024-06-04 RX ADMIN — CLOPIDOGREL BISULFATE 75 MG: 75 TABLET ORAL at 11:00

## 2024-06-04 RX ADMIN — IPRATROPIUM BROMIDE AND ALBUTEROL SULFATE 3 ML: 2.5; .5 SOLUTION RESPIRATORY (INHALATION) at 15:34

## 2024-06-04 RX ADMIN — PIPERACILLIN SODIUM AND TAZOBACTAM SODIUM 3.38 G: 3; .375 INJECTION, SOLUTION INTRAVENOUS at 13:49

## 2024-06-04 RX ADMIN — ALPRAZOLAM 0.5 MG: 0.5 TABLET ORAL at 15:49

## 2024-06-04 RX ADMIN — ATORVASTATIN CALCIUM 40 MG: 40 TABLET, FILM COATED ORAL at 11:00

## 2024-06-04 RX ADMIN — METOPROLOL TARTRATE 25 MG: 25 TABLET, FILM COATED ORAL at 11:00

## 2024-06-04 RX ADMIN — Medication 60 PERCENT: at 23:54

## 2024-06-04 RX ADMIN — IPRATROPIUM BROMIDE AND ALBUTEROL SULFATE 3 ML: 2.5; .5 SOLUTION RESPIRATORY (INHALATION) at 09:48

## 2024-06-04 RX ADMIN — Medication 60 PERCENT: at 01:28

## 2024-06-04 RX ADMIN — FLUOXETINE HYDROCHLORIDE 20 MG: 20 CAPSULE ORAL at 11:00

## 2024-06-04 RX ADMIN — ASPIRIN 81 MG 81 MG: 81 TABLET ORAL at 11:00

## 2024-06-04 RX ADMIN — PIPERACILLIN SODIUM AND TAZOBACTAM SODIUM 3.38 G: 3; .375 INJECTION, SOLUTION INTRAVENOUS at 21:04

## 2024-06-04 RX ADMIN — POTASSIUM CHLORIDE 40 MEQ: 1.5 POWDER, FOR SOLUTION ORAL at 14:05

## 2024-06-04 RX ADMIN — NICOTINE 1 PATCH: 21 PATCH, EXTENDED RELEASE TRANSDERMAL at 11:00

## 2024-06-04 RX ADMIN — PIPERACILLIN SODIUM AND TAZOBACTAM SODIUM 3.38 G: 3; .375 INJECTION, SOLUTION INTRAVENOUS at 04:17

## 2024-06-04 RX ADMIN — PANTOPRAZOLE SODIUM 40 MG: 40 TABLET, DELAYED RELEASE ORAL at 11:00

## 2024-06-04 ASSESSMENT — PAIN SCALES - GENERAL
PAINLEVEL_OUTOF10: 5 - MODERATE PAIN
PAINLEVEL_OUTOF10: 5 - MODERATE PAIN

## 2024-06-04 ASSESSMENT — PAIN - FUNCTIONAL ASSESSMENT
PAIN_FUNCTIONAL_ASSESSMENT: 0-10
PAIN_FUNCTIONAL_ASSESSMENT: 0-10

## 2024-06-04 NOTE — PROGRESS NOTES
"Nutrition Initial Assessment:   Nutrition Assessment    Reason for Assessment: Dietitian discretion, Length of stay    Nutrition Note:  Emerson Chilel is a 64 y.o. male presenting 5/25 with RUQ pain with N/V and lite colored stools x 2weeks; work up revealing acute interstitial edematous pancreatitis and 2.5 cm mass at the head of the pancreas. Pt s/p ERCP with stent 5/28 and path pending. Length of stay complicated by chest pain and + NSTEMI 5/30; pt taken for heart cath 5/30 and KITTY to LAD placed. Stay further complicated by persistent abd pain and new hiccups today and pt only able to tolerate clear liquids; + PCA pump. Pt too dependent on HFNC with possible PNA. GI, Cardiology, and Oncology involved in pt care. Note per GI, 6/4, pt declining NJT for tube feeds.     Past Medical History   has a past medical history of CAD (coronary artery disease), Chronic sinusitis, unspecified (07/13/2021), Hyperlipidemia, and Hypertension. Note pt reported to NP, 5/25, that he drinks 6 beers per day.   Surgical History   has a past surgical history that includes MR angio head wo IV contrast (12/21/2021); Cardiac catheterization; Hernia repair; Knee cartilage surgery (Right); Cardiac catheterization (N/A, 5/30/2024); Cardiac catheterization (5/30/2024); and Cardiac catheterization (N/A, 5/30/2024).       Nutrition History:  Energy Intake: Poor < 50 %  Food and Nutrient History: Pt from home with SO. Pt in pain and not wanting to interview at present time. States too painful to eat; tolerating some liquids. Agreeable to try Ensure Clear.  Vitamin/Herbal Supplement Use: hme meds include protonix  Food Allergies/Intolerances:  None  GI Symptoms: Abdominal pain and hiccups.   Oral Problems: None       Anthropometrics:  Height: 170.2 cm (5' 7\")   Weight: 70 kg (154 lb 5.2 oz)   BMI (Calculated): 24.16  Admit weight (measured) 68kg             Weight History:   1/2024: 67.1kg  10/2023: 65.2kg  8/2023: 67.7kg  2/2023: 66.4kg  Weight " Change %:relatively stable       Nutrition Focused Physical Exam Findings:  Deferred per pt request, 6/4.  Subcutaneous Fat Loss:   Orbital Fat Pads: Mild-Moderate (slight dark circles and slight hollowing) (visually noting prominent orbital bone)  Muscle Wasting:  Temporalis: Mild-Moderate (slight depression) (visually noting temporalis wasting.)  Edema:  Edema: none  Physical Findings:  Skin: Positive (jaundice)    Nutrition Significant Labs:  BG POCT trend:    , Renal Lab Trend:   Results from last 7 days   Lab Units 06/04/24  1112 06/03/24  0240 06/03/24  0132 06/02/24  0341   POTASSIUM mmol/L 3.4* 3.3* 3.5 3.4*   SODIUM mmol/L 128* 130* 128* 130*   MAGNESIUM mg/dL 2.17  --  1.91  --    EGFR mL/min/1.73m*2 >90 >90 >90 >90   BUN mg/dL 9 6 6 6   CREATININE mg/dL 0.55 0.66 0.65 0.57        Nutrition Specific Medications:  Scheduled medications  [Held by provider] amLODIPine, 10 mg, oral, Daily  aspirin, 81 mg, oral, Daily  atorvastatin, 40 mg, oral, Daily  clopidogrel, 75 mg, oral, Daily  FLUoxetine, 20 mg, oral, Daily  [Held by provider] hydroCHLOROthiazide, 12.5 mg, oral, q AM  ipratropium-albuteroL, 3 mL, nebulization, TID  metoprolol tartrate, 25 mg, oral, BID  nicotine, 1 patch, transdermal, Daily  pantoprazole, 40 mg, oral, Daily before breakfast  piperacillin-tazobactam, 3.375 g, intravenous, q8h      Continuous medications  HYDROmorphone,       PRN medications  PRN medications: acetaminophen **OR** acetaminophen **OR** acetaminophen, acetaminophen **OR** acetaminophen **OR** acetaminophen, ALPRAZolam, baclofen, [Held by provider] HYDROcodone-acetaminophen, [Held by provider] HYDROmorphone, ipratropium-albuteroL, melatonin, metoclopramide **OR** metoclopramide, [Held by provider] morphine, [Held by provider] morphine, naloxone, nitroglycerin, ondansetron ODT **OR** ondansetron, oxygen, polyethylene glycol, sodium chloride     I/O:   Last BM Date: 06/03/24; Stool Appearance: Formed, Soft (06/03/24  0200)    Dietary Orders (From admission, onward)       Start     Ordered    06/04/24 1038  Oral nutritional supplements  Until discontinued        Comments: And PRN please   Question Answer Comment   Deliver with Dinner    Select supplement: Ensure Clear        06/04/24 1038    06/03/24 1207  Adult diet Clear Liquid  Diet effective now        Question:  Diet type  Answer:  Clear Liquid    06/03/24 1206                     Estimated Needs:   Total Energy Estimated Needs (kCal):  (2000-2400kcal (30-35kcal/kg of 68kg))     Total Protein Estimated Needs (g):  (82-102g (1.2-1.5g/kg of 68kg))     Total Fluid Estimated Needs (mL):  (1mL/kcal/d or as per physician)           Nutrition Diagnosis        Nutrition Diagnosis  Patient has Nutrition Diagnosis: Yes  Diagnosis Status (1): New  Nutrition Diagnosis 1: Increased nutrient needs  Related to (1): acute metabolic stress  As Evidenced by (1): acute pancreatitis with new pancreatic mass complicated by oral intakes <50% since 5/25 admit and likely inadequate oral intakes of nutrient dense foods PTA with daily ETOH use. (Pt likely with degree of malnutrition however pt with + pain therefore full interview including NFPE deferred)  Additional Assessment Information (1): 6/4: Case discussed with nurse; pt tolerating only clear liquids.       Nutrition Interventions/Recommendations         Nutrition Prescription:  Individualized Nutrition Prescription Provided for : liberalized regular diet as tolerated.        Nutrition Interventions:   Interventions: Meals and snacks, Medical food supplement  Meals and Snacks: General healthful diet  Goal: regular diet as tolerated  Medical Food Supplement: Commercial beverage  Goal: While on clear liquids, Ensure Clear 3x/d and PRN for additional 720kcal and 24g pro/day.  REFERRAL: Consider Palliative Care referral once pathology results available.     Collaboration and Referral of Nutrition Care: Collaboration by nutrition professional with  other providers  Goal: [facility] [place]    Nutrition Education:   6/4: Met with pt at bedside; intervention short due to pt with pain. A[facility] services reviewed briefly--agreeable to try Ensure Clear--will send 3x/day. Pt denies further needs at present time.        Nutrition Monitoring and Evaluation   Food/Nutrient Related History Monitoring  Monitoring and Evaluation Plan: Energy intake, Mealtime behavior  Energy Intake: Estimated energy intake  Criteria: >75% of estimated nutrient needs  Mealtime Behavior: Preference to drink rather than eat  Criteria: pt will tolerate regular diet without abd pain or hiccups    Body Composition/Growth/Weight History  Monitoring and Evaluation Plan: Weight  Weight: Measured weight  Criteria: daily weight    Biochemical Data, Medical Tests and Procedures  Monitoring and Evaluation Plan: Electrolyte/renal panel, GI profile  Electrolyte and Renal Panel: BUN, Creatinine, Magnesium, Phosphorus, Potassium, Sodium  Criteria: labs WNR  Gastrointestinal Profile: Bilirubin, total  Criteria: lab will trend down to WNR    Nutrition Focused Physical Findings  Monitoring and Evaluation Plan: Digestive System  Digestive System: Decrease in appetite, Other (Comment) (abd pain/ hiccups)  Criteria: pt will tolerate oral diet without abd pain or hiccups.       Time Spent/Follow-up Reminder:   Time Spent (min): 45 minutes  Last Date of Nutrition Visit: 06/04/24  Nutrition Follow-Up Needed?: 3-5 days  Follow up Comment: ks, watch

## 2024-06-04 NOTE — PROGRESS NOTES
"Subjective  Patient sitting up in bed with continued abdominal pain.  He is now having hiccups.  Hiccups with every inspiration and abdominal spasms with expiration.  He is on a PCA pump which is somewhat helpful.  No nausea or vomiting.  Tolerating some clear liquids.  Liver enzymes and bilirubin continue to downtrend.  Patient with pleural effusions, possible pneumonia now on 60% HFNC.  IV fluids discontinued.  Plan to continue pain regimen and supportive care.    Objective  Blood pressure 132/66, pulse 106, temperature 36.4 °C (97.5 °F), temperature source Temporal, resp. rate 20, height 1.702 m (5' 7\"), weight 70 kg (154 lb 5.2 oz), SpO2 97%.    Physical Exam  Constitutional: Alert and interactive, in NAD  Eyes: PERRL, scleral cterus, no conjunctival injection  Skin: Warm and dry, no rash or ecchymosis, jaundice   ENMT: Mucous membranes moist, no lesions noted  Resp: CTAB, even and unlabored  CV: RRR, normal S1, S2, no m,r,g  GI: +BS, soft, round, diffuse abd TTP, no rebound tenderness or guarding, no palpable masses or organomegaly  Extremities: Extremities warm, no edema, contusions, wounds or cyanosis  Neuro: Alert and oriented x3  Psych: Appropriate mood and behavior    Medications  Scheduled medications  [Held by provider] amLODIPine, 10 mg, oral, Daily  aspirin, 81 mg, oral, Daily  atorvastatin, 40 mg, oral, Daily  clopidogrel, 75 mg, oral, Daily  FLUoxetine, 20 mg, oral, Daily  [Held by provider] hydroCHLOROthiazide, 12.5 mg, oral, q AM  ipratropium-albuteroL, 3 mL, nebulization, TID  metoprolol tartrate, 25 mg, oral, BID  nicotine, 1 patch, transdermal, Daily  pantoprazole, 40 mg, oral, Daily before breakfast  piperacillin-tazobactam, 3.375 g, intravenous, q8h  potassium chloride, 40 mEq, oral, Once      Continuous medications  HYDROmorphone,         PRN medications  PRN medications: acetaminophen **OR** acetaminophen **OR** acetaminophen, acetaminophen **OR** acetaminophen **OR** acetaminophen, " "ALPRAZolam, baclofen, [Held by provider] HYDROcodone-acetaminophen, [Held by provider] HYDROmorphone, ipratropium-albuteroL, melatonin, metoclopramide **OR** metoclopramide, [Held by provider] morphine, [Held by provider] morphine, naloxone, nitroglycerin, ondansetron ODT **OR** ondansetron, oxygen, polyethylene glycol, sodium chloride     Labs  Lab Results   Component Value Date    WBC 17.0 (H) 06/04/2024    HGB 8.5 (L) 06/04/2024    HCT 25.8 (L) 06/04/2024     (H) 06/04/2024     06/04/2024     Lab Results   Component Value Date    GLUCOSE 156 (H) 06/04/2024    CALCIUM 8.0 (L) 06/04/2024     (L) 06/04/2024    K 3.4 (L) 06/04/2024    CO2 25 06/04/2024    CL 96 (L) 06/04/2024    BUN 9 06/04/2024    CREATININE 0.55 06/04/2024     Lab Results   Component Value Date     (H) 06/04/2024     (H) 06/04/2024    ALKPHOS 150 (H) 06/04/2024    BILITOT 12.1 (H) 06/04/2024     No results found for: \"IRON\", \"TIBC\", \"FERRITIN\"  Lab Results   Component Value Date    INR 1.2 (H) 05/26/2024    INR 1.2 (H) 05/25/2024    INR 1.1 05/24/2024    PROTIME 13.2 (H) 05/26/2024    PROTIME 13.5 (H) 05/25/2024    PROTIME 12.6 05/24/2024   Endoscopic reports:  EUS with Dr. Gong 5/28/2024 noting:  Impression  The cricopharynx, upper third of the esophagus, middle third of the esophagus, lower third of the esophagus and GE junction appeared normal.  The body of the stomach and antrum appeared normal.  Mild extrinsic compression was observed in the 2nd part of the duodenum  The duodenal bulb appeared normal.  The pancreas appeared atrophic. The parenchyma was visualized in the body of the pancreas and tail of the pancreas. The parenchyma was heterogeneous and had hyperechoic strands.  Single round, homogeneous, hypoechoic and solid mass measuring 30 mm x 25 mm with well-defined margins was visualized in the head of the pancreas. Apparent abutment of the portal vein and superior mesenteric vein; 3 fine needle " aspiration passes were taken. An adequate sample of aspirate was obtained. The sample was sent for cytology analysis; 3 fine needle biopsy passes were taken. An adequate sample was obtained. A sample was sent for histology analysis  The proximal bile duct was dilated.  The parenchyma was visualized in the left lobe of the liver. The parenchyma was homogeneous and hyperechoic. The common hepatic duct, left main hepatic duct and right main hepatic duct appeared dilated.  The left kidney, spleen and left adrenal appeared normal.  ERCP with Dr. Gong 5/28/2024 noting:  Impression  The common bile duct was deeply cannulated after 10 attempts. Cannulation was difficult due to inadvertent cannulation, small ampulla and Distal CBD stricture.  20 mm localized, intrinsic, smooth and severe stricture was visualized in the distal common bile duct  Complete major papilla sphincterotomy was performed.  Performed brushings with cytology brush in the distal common bile duct  One 8 mm x 8 cm fully covered stent was placed in the common bile duct    Radiology  CXR 6/2/2024 noting:  Impression:     1.  Mild cardiomegaly. Interstitial thickening with diffuse bilateral  airspace opacities, may be secondary to pulmonary edema and/or  multifocal pneumonia.      Signed by: Lucia Gama 6/2/2024 11:36 PM  Dictation workstation:   [id number]     CT A/P with IV contrast 5/24/2024 noting:  Impression:     2.5 cm mass in the head of the pancreas.  There is dilatation of the  biliary ducts as well as the pancreatic duct and neoplasm cannot be  excluded.  This is best evaluated with ERCP or MRCP.  There are  findings consistent with sludge in the gallbladder.  Signed by Bhanu Taveras MD       Assessment  Emerson Chilel is a 64 y.o. male presenting with pancreatic mass and jaundice.  PMH of HTN, HLD, CAD s/p PCI, tobacco abuse, alcohol abuse who presented to the ER with abdominal pain, N/V, jaundice.  Liver enzymes elevated with bilirubin  peak at 21.  CA 19-9- 6453.22.     Patient underwent EUS/ERCP with Dr. Gong 5/28 with metal stent placement into distal CBD.  Patient with increased abdominal pain postprocedure.  Lipase 795.  Liver enzymes downtrending with bilirubin 12 today.  Pathology noting invasive adenocarcinoma.  Oncology following. Patient now with NSTEMI s/p PCI.  Possible multifocal pneumonia with increased O2 requirements on 60% HFNC.  IV fluids discontinued due to increased O2 requirements.      Patient with ongoing abdominal discomfort today.  Now on PCA pump.  Patient with hiccups with every inspiration and abdominal spasms with expiration.    # pancreatic adenocarcinoma-s/p ERCP with metal stent placement  # transaminitis/ hyperbilirubinemia- downtrending  # post ERCP pancreatitis   # NSTEMI s/p PCI  # pulmonary edema and/or multifocal pneumonia  # leukocytosis  # jaundice  # previous EtOH misuse    Plan:  - continue supportive care  - ok for clear liquids as tolerated  - discussed NJ tube placement for enteral nutrition which pt is currently declining   - agree with discontinuing fluids due to increased O2 requirement  - wean O2 as tolerated  - agree with PCA for pain control  - can give baclofen 5 mg tid prn hiccups  - continue antibiotics per primary team/ID recs  - pt ok for anticoagulation/ antiplatelet from GI standpoint  - pt should establish care with pancreaticobiliary surgery  - follow up oncology recs    Plan has been discussed with Dr. Matos. GI will continue to follow.     SOLOMON Gonzalez/CNP

## 2024-06-04 NOTE — CARE PLAN
The patient's goals for the shift include      The clinical goals for the shift include pt will remain hemodynamically stable      Problem: Pain  Goal: My pain/discomfort is manageable  Outcome: Progressing     Problem: Safety  Goal: I will remain free of falls  Outcome: Progressing     Problem: Psychosocial Needs  Goal: Demonstrates ability to cope with hospitalization/illness  Outcome: Progressing  Goal: Collaborate with me, my family, and caregiver to identify my specific goals  Outcome: Progressing     Problem: Discharge Barriers  Goal: My discharge needs are met  Outcome: Progressing     Problem: Pain  Goal: Takes deep breaths with improved pain control throughout the shift  Outcome: Progressing  Goal: Turns in bed with improved pain control throughout the shift  Outcome: Progressing  Goal: Walks with improved pain control throughout the shift  Outcome: Progressing  Goal: Performs ADL's with improved pain control throughout shift  Outcome: Progressing  Goal: Participates in PT with improved pain control throughout the shift  Outcome: Progressing  Goal: Free from opioid side effects throughout the shift  Outcome: Progressing  Goal: Free from acute confusion related to pain meds throughout the shift  Outcome: Progressing

## 2024-06-04 NOTE — PROGRESS NOTES
Vancomycin Dosing by Pharmacy- Cessation of Therapy    Consult to pharmacy for vancomycin dosing has been discontinued by the prescriber, pharmacy will sign off at this time.    Please call pharmacy if there are further questions or re-enter a consult if vancomycin is resumed.     Cleve Owens, OniD

## 2024-06-04 NOTE — NURSING NOTE
Pt switched to HFNC due to flow rate on Oxymizer NC exceeding limitations of bubble humidifier (max 6lpm; pt requiring 8-10 lpm) Pt needs humidified O2 system due to recent hemoptysis thought to be due to dry resp tract, therefor HFNC needed for heated humidity. Placed on 25lpm, FIO2 60 with P ox 95%.

## 2024-06-04 NOTE — PROGRESS NOTES
HOSPITAL MEDICINE - PROGRESS NOTE    Emerson Chilel is a 64 y.o. male on day 10 of admission presenting with Pancreatic mass (HHS-HCC).    Principal Problem:    Pancreatic mass (HHS-HCC)  Active Problems:    Jaundice    Hypokalemia    Generalized abdominal pain    Post-ERCP acute pancreatitis (HHS-HCC)    NSTEMI (non-ST elevated myocardial infarction) (Multi)      Assessment/Plan   This patient currently has cardiac telemetry ordered; if you would like to modify or discontinue the telemetry order, click here to go to the orders activity to modify/discontinue the order.     Acute pancreatitis s/p ERCP, invasive adenocarcinoma: Pt originally transferred from OSH for EUS/ERCP having presented with jaundice and pancreatic mass. Study was completed 5/29 with subsequent worsening abdominal pain; procedure was c/b difficulty cannulating. Lipase uptrended.  significantly elevated. Repeat abdominal CT from 5/31 for further abdominal pain showing acute interstitial edematous pancreatitis, with 37mm of acute peripancreatic fluid along the distal greater curvature of the stomach. Attempted trial of scheduled morphine for acute pain control, however negligible difference.  Placed on dilaudid PCA 6/3 with improvement. Pathology noted to result, showing invasive adenocarcinoma.  - Trial of CLD; tolerated Ensure Clear today  - Appreciate GI recs: Stop IVF given hypoxia, supportive care with pain control/antiemetics, follow up pathology. Follow outpatient with GI, and will need oncology/hepatobiliary follow up. May trial baclofen for hiccups.  - Re: hiccups, pt responded well to reglan and was asking about it; switched orders so that reglan is first line over zofran. Otherwise agree with GI recs for trial of baclofen PRN.  - Appreciate oncology recs: Pt interested in chemotherapy and discussion with surgical oncology. Pt to complete repeat  and CT chest for staging purposes (will order when pneumonia improves)  -  Bilirubin (peak of 21)/LFTs steadily improving through 6/2, then began to rise with persistent abdominal pain before starting to fall again on 6/4.  - Leukocytosis noted, had been stable until 6/2 when noted to increase significantly--now improving.  - Given persistent symptoms, low threshold to [place] [facility] [place] especially if any further worsening.  - AM CBC/CMP/Mg    Hemoptysis, acute hypoxic respiratory failure: Sporadic appearance, streaked in sputum over 5/31-6/2. Suspect hemoptysis related to dry respiratory tract with trace bleeding rather than true hemoptysis, given appearance that follows placement of non-humidified NC. However, pt did develop rapidly worsening hypoxia overnight 6/2-3 with CXR at that time concerning for pneumonia, concurrent rebound worsening of leukocytosis--likely developed atelectasis secondary to shallow inspiration from persistent abdominal pain, followed by pneumonia. No h/o COPD, but does have tobacco use history.  - Sepsis protocol initiated. Procal minimally elevated, however given clinical severity will continue abx at this time. Start CTX.  - Dc vancomycin, continue CTX/zosyn. Follow up bcx. MRSA screen negative.  - Supportive care with nebs/bronchial hygiene/IS (as tolerated), wean O2 as tolerated; required upgrade to Airvo due to increasing requirement.  - Leukocytosis as above.    Chest pain s/p PCI: Known history of chronic angina/prior stent. On ASA/plavix. Developed worsening episodic chest pain while admitted, with concurrent elevation of troponins concerning for NSTEMI. Seen by cardiology on 5/30 with subsequent LHC and KITTY placed to proximal LAD.  - Appreciate cardiology recs: Continue metoprolol at 25mg BID, ASA/plavix/PPI. Follow up with Dr. Boston in 1-2 weeks post-discharge.  - Will continue holding home norvasc/hydrochlorothiazide and resume if evidence of derangement/reassess at least daily.  - Maintain K>4, Mg>2    Anxiety/depression,  asthma, tobacco use disorder, HTN, HLD: Home meds resumed as indicated    Discharge pending appropriate pain control/PO tolerance, resolution of hypoxia.       DVT PPX: SCDs  Nutrition: CLD with supplements per nutrition    Subjective   Pt seen with wife at bedside. Further hypoxia overnight, required upgrade to Airvo. However, symptoms significantly better controlled on PCA. Discussed improving lab findings and need for assisted secretion clearance with RT. Tolerated Ensure Clear supplements. Noted recurrent hiccups, stated the reglan worked for him but hasn't gotten it recently.       Objective     [Held by provider] amLODIPine, 10 mg, oral, Daily  aspirin, 81 mg, oral, Daily  atorvastatin, 40 mg, oral, Daily  clopidogrel, 75 mg, oral, Daily  FLUoxetine, 20 mg, oral, Daily  [Held by provider] hydroCHLOROthiazide, 12.5 mg, oral, q AM  ipratropium-albuteroL, 3 mL, nebulization, TID  metoprolol tartrate, 25 mg, oral, BID  nicotine, 1 patch, transdermal, Daily  pantoprazole, 40 mg, oral, Daily before breakfast  piperacillin-tazobactam, 3.375 g, intravenous, q8h       PRN medications: acetaminophen **OR** acetaminophen **OR** acetaminophen, acetaminophen **OR** acetaminophen **OR** acetaminophen, ALPRAZolam, [Held by provider] HYDROcodone-acetaminophen, [Held by provider] HYDROmorphone, ipratropium-albuteroL, melatonin, metoclopramide **OR** metoclopramide, [Held by provider] morphine, [Held by provider] morphine, naloxone, nitroglycerin, ondansetron ODT **OR** ondansetron, oxygen, polyethylene glycol, sodium chloride   HYDROmorphone,            Last Recorded Vitals  /66 (BP Location: Left arm, Patient Position: Sitting)   Pulse 106   Temp 36.4 °C (97.5 °F) (Temporal)   Resp 20   Wt 70 kg (154 lb 5.2 oz)   SpO2 97%   Intake/Output last 3 Shifts:    Intake/Output Summary (Last 24 hours) at 6/4/2024 1236  Last data filed at 6/4/2024 0614  Gross per 24 hour   Intake 10.4 ml   Output 250 ml   Net -239.6 ml        Admission Weight  Weight: 65.8 kg (145 lb) (05/25/24 0514)    Daily Weight  06/03/24 : 70 kg (154 lb 5.2 oz)    Image Results  Electrocardiogram 12 Lead  Normal sinus rhythm  Septal infarct (cited on or before 28-MAY-2024)  T wave abnormality, consider anterolateral ischemia  Prolonged QT  Abnormal ECG  When compared with ECG of 30-MAY-2024 06:54, (unconfirmed)  Premature ventricular complexes are no longer Present  Confirmed by Emerson Cordova ([id number]) on 6/3/2024 8:54:45 AM  Electrocardiogram, 12-lead PRN ACS symptoms  Sinus rhythm with occasional Premature ventricular complexes  Septal infarct (cited on or before 28-MAY-2024)  T wave abnormality, consider anterolateral ischemia  Prolonged QT  Abnormal ECG  When compared with ECG of 29-MAY-2024 19:57, (unconfirmed)  T wave inversion now evident in Anterior leads  QT has lengthened  Confirmed by Emerson Cordova ([id number]) on 6/3/2024 8:54:31 AM  ECG 12 Lead  Sinus tachycardia with frequent Premature ventricular complexes  Left axis deviation  Abnormal ECG  When compared with ECG of 28-MAY-2024 17:18, (unconfirmed)  Premature ventricular complexes are now Present  Nonspecific T wave abnormality now evident in Anterolateral leads  Confirmed by Emerson Cordova ([id number]) on 6/3/2024 8:53:59 AM      Physical Exam  Vitals and nursing note reviewed.   Constitutional:       General: He is awake. He is not in acute distress.     Appearance: Normal appearance. He is ill-appearing. He is not toxic-appearing.   HENT:      Head: Normocephalic and atraumatic.      Right Ear: External ear normal.      Left Ear: External ear normal.      Nose: Nose normal.   Eyes:      Extraocular Movements: Extraocular movements intact.   Cardiovascular:      Rate and Rhythm: Normal rate and regular rhythm.   Pulmonary:      Effort: Pulmonary effort is normal. No respiratory distress.      Breath sounds: Wheezing and rhonchi present.   Abdominal:      General: There is no distension.       Palpations: Abdomen is soft.      Tenderness: There is abdominal tenderness. There is guarding.   Musculoskeletal:         General: No signs of injury.      Cervical back: Normal range of motion.   Skin:     General: Skin is warm and dry.      Coloration: Skin is jaundiced. Skin is not pale.   Neurological:      General: No focal deficit present.      Mental Status: He is alert and oriented to person, place, and time. Mental status is at baseline.   Psychiatric:         Mood and Affect: Mood normal.         Behavior: Behavior normal. Behavior is cooperative.         Relevant Results    [Held by provider] amLODIPine, 10 mg, oral, Daily  aspirin, 81 mg, oral, Daily  atorvastatin, 40 mg, oral, Daily  clopidogrel, 75 mg, oral, Daily  FLUoxetine, 20 mg, oral, Daily  [Held by provider] hydroCHLOROthiazide, 12.5 mg, oral, q AM  ipratropium-albuteroL, 3 mL, nebulization, TID  metoprolol tartrate, 25 mg, oral, BID  nicotine, 1 patch, transdermal, Daily  pantoprazole, 40 mg, oral, Daily before breakfast  piperacillin-tazobactam, 3.375 g, intravenous, q8h      PRN medications: acetaminophen **OR** acetaminophen **OR** acetaminophen, acetaminophen **OR** acetaminophen **OR** acetaminophen, ALPRAZolam, [Held by provider] HYDROcodone-acetaminophen, [Held by provider] HYDROmorphone, ipratropium-albuteroL, melatonin, metoclopramide **OR** metoclopramide, [Held by provider] morphine, [Held by provider] morphine, naloxone, nitroglycerin, ondansetron ODT **OR** ondansetron, oxygen, polyethylene glycol, sodium chloride  HYDROmorphone,             Electrocardiogram, 12-lead PRN ACS symptoms    Result Date: 5/30/2024  Sinus rhythm with Premature atrial complexes with Aberrant conduction Septal infarct (cited on or before 28-MAY-2024) T wave abnormality, consider anterolateral ischemia Prolonged QT Abnormal ECG When compared with ECG of 29-MAY-2024 19:57, (unconfirmed) Premature ventricular complexes are no longer Present Aberrant  conduction is now Present T wave inversion now evident in Anterior leads QT has lengthened    Electrocardiogram, 12-lead PRN ACS symptoms    Result Date: 5/30/2024  Sinus rhythm with occasional Premature ventricular complexes Possible Left atrial enlargement Septal infarct (cited on or before 28-MAY-2024) T wave abnormality, consider anterolateral ischemia Prolonged QT Abnormal ECG When compared with ECG of 30-MAY-2024 03:50, (unconfirmed) Premature ventricular complexes are now Present Aberrant conduction is no longer Present Nonspecific T wave abnormality now evident in Inferior leads    Electrocardiogram 12 Lead    Result Date: 5/30/2024  Normal sinus rhythm Septal infarct (cited on or before 28-MAY-2024) T wave abnormality, consider anterolateral ischemia Prolonged QT Abnormal ECG When compared with ECG of 30-MAY-2024 06:54, (unconfirmed) Premature ventricular complexes are no longer Present    Transthoracic Echo (TTE) Complete    Result Date: 5/30/2024            [facility] [place] [address] [facility] [address]    Tel [phone] Fax [phone] TRANSTHORACIC ECHOCARDIOGRAM REPORT  Patient Name:      SOLOMON Lyons Physician:    [id number] Daina Peres MD Study Date:        5/30/2024             Ordering Provider:    [id number] MELINA WATKINS MRN/PID:           [id number]              Fellow: Accession#:        [id number]          Nurse: Date of Birth/Age: 1960 / 64 years  Sonographer:          Ivory Winslow RDCS Gender:            M                     Additional Staff: Height:            170.18 cm             Admit Date:           5/25/2024 Weight:            67.59 kg              Admission Status:     Inpatient - STAT BSA / BMI:         1.78 m2 / 23.34 kg/m2 Department Location:  [facility] SD Blood Pressure: 110 /62 mmHg Study Type:    TRANSTHORACIC ECHO (TTE) COMPLETE Diagnosis/ICD: Non ST elevation (NSTEMI) myocardial  infarction-I21.4 Indication:    NSTEMI CPT Codes:     Echo Complete w Full [id number] Patient History: Pertinent History: CAD and Dyslipidemia. No previous echocardiogram. Study Detail: The following Echo studies were performed: 2D, M-Mode, Doppler and               color flow.  PHYSICIAN INTERPRETATION: Left Ventricle: Left ventricular systolic function is normal, with an estimated ejection fraction of 60%. Wall motion is abnormal. The left ventricular cavity size is normal. There is no evidence of left ventricular hypertrophy. Spectral Doppler shows a pseudonormal pattern of left ventricular diastolic filling. There is no definite left ventricular thrombus visualized. The intraventricular septum appears intact without evidence of shunting or a ventricular septal defect. LV Wall Scoring: The entire apex, mid and apical inferior septum, and mid and apical inferior wall are hypokinetic. All remaining scored segments are normal. Left Atrium: The left atrium is normal in size. Right Ventricle: The right ventricle is normal in size. There is normal right ventricular global systolic function. Right Atrium: The right atrium is normal in size. Aortic Valve: The aortic valve is trileaflet. There is mild aortic valve cusp calcification. There is mild to moderate aortic valve thickening. There is aortic valve annular calcification. There is evidence of mildly elevated transaortic gradients consistent with sclerosis of the aortic valve. There is moderate aortic valve regurgitation. The peak instantaneous gradient of the aortic valve is 9.7 mmHg. Mitral Valve: The mitral valve is moderately thickened. There is no evidence of mitral valve prolapse. There is no evidence of mitral valve stenosis. The doppler estimated mean and peak diastolic pressure gradients are 3.1 mmHg and 5.0 mmHg respectively. There is mild mitral annular calcification. There is mild mitral valve regurgitation. Tricuspid Valve: The tricuspid valve is  structurally normal. There is no evidence of tricuspid valve stenosis. There is mild tricuspid regurgitation. The Doppler estimated RVSP is moderate to severely elevated at 51.1 mmHg. Pulmonic Valve: The pulmonic valve is abnormal. There is trace to mild pulmonic valve regurgitation. Pericardium: There is no pericardial effusion noted. Aorta: The aortic root is normal. Systemic Veins: The inferior vena cava appears to be of normal size. There is IVC inspiratory collapse greater than 50%. In comparison to the previous echocardiogram(s): Prior examinations are available and were reviewed for comparison purposes. Compared with echo 7/17/23 LVEF is unchanged. Grade I is now grade II diastolic dysfunction. Apical wall motion abnormality is new.  CONCLUSIONS:  1. Left ventricular systolic function is normal with a 60% estimated ejection fraction.  2. Entire apex, mid and apical inferior septum, and mid and apical inferior wall are abnormal.  3. Intact intraventricular septum without shunting or a ventricular septal defect.  4. No left ventricular thrombus visualized.  5. Spectral Doppler shows a pseudonormal pattern of left ventricular diastolic filling.  6. There is no evidence of left ventricular hypertrophy.  7. The mitral valve is moderately thickened.  8. No evidence of mitral valve prolapse.  9. There is No tricuspid stenosis. 10. Moderate to severely elevated right ventricular systolic pressure. 11. Aortic valve sclerosis. 12. There is aortic valve annular calcification. 13. Moderate aortic valve regurgitation. 14. Compared with echo 7/17/23 LVEF is unchanged. Grade I is now grade II diastolic dysfunction. Apical wall motion abnormality is new. QUANTITATIVE DATA SUMMARY: 2D MEASUREMENTS:                          Normal Ranges: LAs:           3.66 cm   (2.7-4.0cm) IVSd:          0.87 cm   (0.6-1.1cm) LVPWd:         0.64 cm   (0.6-1.1cm) LVIDd:         4.81 cm   (3.9-5.9cm) LVIDs:         2.99 cm LV Mass Index: 66.0  g/m2 LV % FS        37.9 % LA VOLUME:                               Normal Ranges: LA Vol A4C:        41.0 ml    (22+/-6mL/m2) LA Vol A2C:        43.1 ml LA Vol BP:         44.0 ml LA Vol Index A4C:  23.0 ml/m2 LA Vol Index A2C:  24.2 ml/m2 LA Vol Index BP:   24.7 ml/m2 LA Area A4C:       14.9 cm2 LA Area A2C:       14.6 cm2 LA Major Axis A4C: 4.6 cm LA Major Axis A2C: 4.2 cm LA Volume Index:   24.5 ml/m2 LA Vol A4C:        35.2 ml LA Vol A2C:        40.5 ml AORTA MEASUREMENTS:                      Normal Ranges: Ao Sinus, d: 3.10 cm (2.1-3.5cm) Ao STJ, d:   3.20 cm (1.7-3.4cm) Asc Ao, d:   3.50 cm (2.1-3.4cm) LV SYSTOLIC FUNCTION BY 2D PLANIMETRY (MOD):                     Normal Ranges: EF-A4C View: 61.0 % (>=55%) EF-A2C View: 61.6 % EF-Biplane:  61.3 % LV DIASTOLIC FUNCTION:                              Normal Ranges: MV Peak E:        0.85 m/s   (0.7-1.2 m/s) MV Peak A:        0.95 m/s   (0.42-0.7 m/s) E/A Ratio:        0.90       (1.0-2.2) MV lateral e'     0.09 m/s MV medial e'      0.07 m/s PulmV Sys Keshawn:    59.28 cm/s PulmV Hess Keshawn:   43.11 cm/s PulmV S/D Keshawn:    1.38 PulmV A Revs Keshawn: 26.87 cm/s MITRAL VALVE:                      Normal Ranges: MV Vmax:    1.12 m/s (<=1.3m/s) MV peak P.0 mmHg (<5mmHg) MV mean PG: 3.1 mmHg (<48mmHg) MV VTI:     35.83 cm (10-13cm) MV DT:      160 msec (150-240msec) AORTIC VALVE:                         Normal Ranges: AoV Vmax:      1.56 m/s (<=1.7m/s) AoV Peak P.7 mmHg (<20mmHg) LVOT Max Keshawn:  1.18 m/s (<=1.1m/s) LVOT VTI:      28.06 cm LVOT Diameter: 1.70 cm  (1.8-2.4cm) AoV Area,Vmax: 1.71 cm2 (2.5-4.5cm2) AORTIC INSUFFICIENCY: AI Vmax:       4.30 m/s AI Half-time:  396 msec AI Decel Time: 1364 msec AI Decel Rate: 315.28 cm/s2  RIGHT VENTRICLE: RV Basal 3.20 cm RV Mid   7.20 cm RV Major 7.2 cm TAPSE:   21.0 mm RV s'    0.16 m/s TRICUSPID VALVE/RVSP:                             Normal Ranges: Peak TR Velocity: 3.47 m/s RV Syst Pressure: 51.1 mmHg (< 30mmHg) IVC  Diam:         1.31 cm PULMONIC VALVE:                         Normal Ranges: PV Accel Time: 66 msec  (>120ms) PV Max Keshawn:    0.7 m/s  (0.6-0.9m/s) PV Max P.2 mmHg Pulmonary Veins: PulmV A Revs Keshawn: 26.87 cm/s PulmV Hess Keshawn:   43.11 cm/s PulmV S/D Keshawn:    1.38 PulmV Sys Keshawn:    59.28 cm/s AORTA: Asc Ao Diam 3.49 cm  [id number] Daina Peres MD Electronically signed on 2024 at 12:26:19 PM  Wall Scoring  ** Final **     ECG 12 Lead    Result Date: 2024  Sinus tachycardia with frequent Premature ventricular complexes Left axis deviation Septal infarct (cited on or before 28-MAY-2024) Abnormal ECG When compared with ECG of 28-MAY-2024 17:18, (unconfirmed) Premature ventricular complexes are now Present Nonspecific T wave abnormality now evident in Anterolateral leads    Electrocardiogram, 12-lead PRN ACS symptoms    Result Date: 2024  Normal sinus rhythm Possible Left atrial enlargement Low voltage QRS Septal infarct , age undetermined Abnormal ECG When compared with ECG of 24-MAY-2024 16:45, Premature ventricular complexes are no longer Present    FL fluoro images no charge    Result Date: 2024  These images are not reportable by radiology and will not be interpreted by  Radiologists.    Endoscopic Retrograde Cholangiopancreatography (ERCP)    Result Date: 2024  Table formatting from the original result was not included. Impression The common bile duct was deeply cannulated after 10 attempts. Cannulation was difficult due to inadvertent cannulation, small ampulla and Distal CBD stricture. 20 mm localized, intrinsic, smooth and severe stricture was visualized in the distal common bile duct Complete major papilla sphincterotomy was performed. Performed brushings with cytology brush in the distal common bile duct One 8 mm x 8 cm fully covered stent was placed in the common bile duct Findings The major papilla was native. Ampullectomy was not performed. Small ampulla, mildly deformed due to  external compression. The common bile duct was deeply cannulated after 10 attempts using a traction sphincterotome with 260 cm straight guidewire. Cannulation was difficult due to inadvertent cannulation, small ampulla and Distal CBD stricture. No bleeding was observed. . Unintentional cannulation PD was performed. 20 mm localized, intrinsic, smooth and severe stricture was visualized in the distal common bile duct. Complete 5 mm major papilla sphincterotomy was performed using a sphincterotome. No bleeding was noted at the procedure site. Performed 3 brushings with cytology brush in the distal common bile duct. One 8 mm x 8 cm fully covered metal stent was placed successfully in the common bile duct. Recommendation  Await pathology results  Follow up with oncology and pancreato biliary surgeon as outpatient Watch for complication  Indication Jaundice, Generalized abdominal pain, Pancreatic mass ([facility]-HCC) Staff Staff Role Shanda Gong MD Proceduralist Medications See Anesthesia Record. Preprocedure A history and physical has been performed, and patient medication allergies have been reviewed. The patient's tolerance of previous anesthesia has been reviewed. The risks and benefits of the procedure and the sedation options and risks were discussed with the patient. All questions were answered and informed consent obtained. Rectal Indomethacin was not administered. Details of the Procedure The patient underwent general anesthesia, which was administered by an anesthesia professional. The patient's blood pressure, heart rate, level of consciousness, oxygen, respirations, ECG and ETCO2 were monitored throughout the procedure. The scope was introduced through the mouth and advanced to the second part of the duodenum. The patient experienced no blood loss. The procedure was not difficult. The patient tolerated the procedure well. There were no apparent adverse events. Events Procedure Events Event Event Time ENDO SCOPE IN  TIME 5/28/2024  3:00 PM ENDO SCOPE OUT TIME 5/28/2024  3:00 PM ENDO SCOPE IN TIME 5/28/2024  3:02 PM ENDO SCOPE OUT TIME 5/28/2024  3:30 PM ENDO SCOPE IN TIME 5/28/2024  3:47 PM Specimens ID Type Source Tests Collected by Time 1 : [facility] Pancreatic Head Mass Non-Gynecologic Cytology PANCREAS FINE NEEDLE ASPIRATION HEAD CYTOLOGY CONSULTATION (NON-GYNECOLOGIC) Devorah Yost 5/28/2024 1512 2 : [facility] Pancreatic Head Mass Tissue PANCREAS BIOPSY SURGICAL PATHOLOGY EXAM Devorah Yost 5/28/2024 1513 3 :  Non-Gynecologic Cytology BILE DUCT BRUSH COMMON CYTOLOGY CONSULTATION (NON-GYNECOLOGIC) Devorah Yost 5/28/2024 1643 Procedure Location [facility] [id number] [place] [id number] [phone] Referring Provider Zack Isaacs MD [id number] [place] [address] Procedure Provider Shanda Gong MD     Endoscopic Ultrasound (Upper)    Result Date: 5/28/2024  Table formatting from the original result was not included. Impression The cricopharynx, upper third of the esophagus, middle third of the esophagus, lower third of the esophagus and GE junction appeared normal. The body of the stomach and antrum appeared normal. Mild extrinsic compression was observed in the 2nd part of the duodenum The duodenal bulb appeared normal. The pancreas appeared atrophic. The parenchyma was visualized in the body of the pancreas and tail of the pancreas. The parenchyma was heterogeneous and had hyperechoic strands. Single round, homogeneous, hypoechoic and solid mass measuring 30 mm x 25 mm with well-defined margins was visualized in the head of the pancreas. Apparent abutment of the portal vein and superior mesenteric vein; 3 fine needle aspiration passes were taken. An adequate sample of aspirate was obtained. The sample was sent for cytology analysis; 3 fine needle biopsy passes were taken. An adequate sample was obtained. A sample was sent for histology analysis The proximal  bile duct was dilated. The parenchyma was visualized in the left lobe of the liver. The parenchyma was homogeneous and hyperechoic. The common hepatic duct, left main hepatic duct and right main hepatic duct appeared dilated. The left kidney, spleen and left adrenal appeared normal. Findings The cricopharynx, upper third of the esophagus, middle third of the esophagus, lower third of the esophagus and GE junction appeared normal. The body of the stomach and antrum appeared normal. Mild, traversable extrinsic compression was observed in the 2nd part of the duodenum The duodenal bulb appeared normal. The pancreas appeared atrophic. The parenchyma was visualized in the body of the pancreas and tail of the pancreas. The parenchyma was heterogeneous and had hyperechoic strands. The pancreatic duct measured 2 mm at the head, 3 mm at the body and 1 mm at the tail. Single round, homogeneous, hypoechoic and solid mass measuring 30 mm x 25 mm with well-defined margins was visualized in the head of the pancreas. Apparent abutment of the portal vein and superior mesenteric vein; 3 fine needle aspiration passes were taken with a 22 gauge Sharcore needle using a transduodenal approach guided by Doppler. An adequate sample of aspirate was obtained. The sample was sent for cytology analysis. Onsite cytologist was not present; 3 successful fine needle biopsy passes were taken with a 22 gauge Sharcore needle using a transduodenal approach. An adequate sample was obtained. A sample was sent for histology analysis. Onsite cytologist was not present The proximal bile duct was dilated. The bile duct measured 12 mm at the middle. The parenchyma was visualized in the left lobe of the liver. The parenchyma was homogeneous and hyperechoic. The common hepatic duct, left main hepatic duct and right main hepatic duct appeared dilated. The left kidney, spleen and left adrenal appeared normal. Recommendation  Await pathology results  Proceed to  ERCP  Indication Jaundice, Generalized abdominal pain, Pancreatic mass ([facility]) Staff Staff Role Shanda Gong MD Proceduralist Medications See Anesthesia Record. Preprocedure A history and physical has been performed, and patient medication allergies have been reviewed. The patient's tolerance of previous anesthesia has been reviewed. The risks and benefits of the procedure and the sedation options and risks were discussed with the patient. All questions were answered and informed consent obtained. Details of the Procedure The patient underwent general anesthesia, which was administered by an anesthesia professional. The patient's blood pressure, heart rate, level of consciousness, oxygen, respirations, ECG and ETCO2 were monitored throughout the procedure. The radial scope was introduced through the mouth and advanced to the second part of the duodenum. The patient's estimated blood loss was minimal (<5 mL). The procedure was not difficult. The patient tolerated the procedure well. There were no apparent adverse events. Events Procedure Events Event Event Time ENDO SCOPE IN TIME 5/28/2024  3:00 PM ENDO SCOPE OUT TIME 5/28/2024  3:00 PM ENDO SCOPE IN TIME 5/28/2024  3:02 PM ENDO SCOPE OUT TIME 5/28/2024  3:30 PM Specimens ID Type Source Tests Collected by Time 1 : [facility]NA Pancreatic Head Mass Non-Gynecologic Cytology PANCREAS FINE NEEDLE ASPIRATION HEAD CYTOLOGY CONSULTATION (NON-GYNECOLOGIC) Devorah Yost 5/28/2024 1512 2 : FNB Pancreatic Head Mass Tissue PANCREAS BIOPSY SURGICAL PATHOLOGY EXAM Devorah Yost 5/28/2024 1513 Procedure Location [facility] [id number] [place] [address] [id number] [phone] Referring Provider Zack Isaacs MD [address] [address] Procedure Provider Shanda Gong MD     ECG 12 lead    Result Date: 5/25/2024  Sinus rhythm with frequent Premature ventricular complexes Otherwise normal ECG When compared with ECG of  24-MAY-2024 16:43, (unconfirmed) Premature ventricular complexes are now Present See ED provider note for full interpretation and clinical correlation Confirmed by Deyanira Lantigua ([id number]) on 5/25/2024 5:28:37 PM    CT abdomen pelvis w IV contrast    Result Date: 5/24/2024  STUDY: CT Abdomen and Pelvis with IV Contrast; 05/24/2024 at 5:51 PM INDICATION: Right upper quadrant abdominal pain. Jaundice. COMPARISON: None available. ACCESSION NUMBER(S): [id number] ORDERING CLINICIAN: TOBI WHITNEY TECHNIQUE: CT of the abdomen and pelvis was performed.  Contiguous axial images were obtained at 3 mm slice thickness through the abdomen and pelvis. Coronal and sagittal reconstructions at 3 mm slice thickness were performed.  Omnipaque-350 75 mL was administered intravenously.  FINDINGS: LOWER CHEST: No cardiomegaly.  No pericardial effusion.  Lung bases are clear.  ABDOMEN:  LIVER: No hepatomegaly.  Smooth surface contour.  Normal attenuation.  BILE DUCTS: There is intrahepatic and common bile bile duct dilatation.  This can be followed into the head of the pancreas.  GALLBLADDER: The gallbladder is present.  There is intermediate attenuation the gallbladder most likely representing sludge. STOMACH: No abnormalities identified.  PANCREAS: There is an ill-defined 2.5 cm mass in the head of the pancreas.  This is best seen on series 201, slice 61 as well as series 202, slice 45. There is pancreatic duct dilatation.  SPLEEN: No splenomegaly or focal splenic lesion.  ADRENAL GLANDS: No thickening or nodules.  KIDNEYS AND URETERS: Kidneys are normal in size and location.  No renal or ureteral calculi.  PELVIS:  BLADDER: No abnormalities identified.  REPRODUCTIVE ORGANS: No abnormalities identified.  BOWEL: No abnormalities identified.  The appendix is identified and is normal.  VESSELS: No abnormalities identified.  Abdominal aorta is normal in caliber.  PERITONEUM/RETROPERITONEUM/LYMPH NODES: No free fluid.  No  "pneumoperitoneum. No lymphadenopathy.  ABDOMINAL WALL: No abnormalities identified. SOFT TISSUES: No abnormalities identified.  BONES: No acute fracture or aggressive osseous lesion.    2.5 cm mass in the head of the pancreas.  There is dilatation of the biliary ducts as well as the pancreatic duct and neoplasm cannot be excluded.  This is best evaluated with ERCP or MRCP.  There are findings consistent with sludge in the gallbladder. Signed by Bhanu Taveras MD       Results from last 7 days   Lab Units 06/04/24  1112 06/03/24  0230 06/03/24  0132   WBC AUTO x10*3/uL 17.0* 20.3* 22.0*   RBC AUTO x10*6/uL 2.44* 2.66* 2.72*   HEMOGLOBIN g/dL 8.5* 9.2* 9.7*   HEMATOCRIT % 25.8* 27.5* 28.4*   MCV fL 106* 103* 104*   PLATELETS AUTO x10*3/uL 333 357 362           No lab exists for component: \"B12\"                  Results from last 7 days   Lab Units 06/04/24  1112 06/03/24  0240 06/03/24  0132 06/01/24  0346 05/31/24  0412   SODIUM mmol/L 128* 130* 128*   < > 131*   POTASSIUM mmol/L 3.4* 3.3* 3.5   < > 3.8   CHLORIDE mmol/L 96* 95* 96*   < > 100   CO2 mmol/L 25 24 21   < > 25   BUN mg/dL 9 6 6   < > 7   CREATININE mg/dL 0.55 0.66 0.65   < > 0.66   CALCIUM mg/dL 8.0* 8.2* 8.2*   < > 8.1*   MAGNESIUM mg/dL 2.17  --  1.91  --  2.22   BILIRUBIN TOTAL mg/dL 12.1* 15.3* 16.0*   < > 16.0*   ALT U/L 151* 159* 169*   < > 155*   AST U/L 165* 158* 166*   < > 98*    < > = values in this interval not displayed.         Results from last 7 days   Lab Units 05/30/24  1315 05/30/24  1010 05/30/24  0427 05/29/24  2224 05/29/24 2016   TROPHS ng/L 356* 341* 534* 656* 351*              Kemal Layne,           "

## 2024-06-05 LAB
ALBUMIN SERPL BCP-MCNC: 2.7 G/DL (ref 3.4–5)
ALP SERPL-CCNC: 137 U/L (ref 33–136)
ALT SERPL W P-5'-P-CCNC: 135 U/L (ref 10–52)
ANION GAP SERPL CALC-SCNC: 12 MMOL/L (ref 10–20)
AST SERPL W P-5'-P-CCNC: 154 U/L (ref 9–39)
BILIRUB SERPL-MCNC: 9.7 MG/DL (ref 0–1.2)
BUN SERPL-MCNC: 10 MG/DL (ref 6–23)
CALCIUM SERPL-MCNC: 7.9 MG/DL (ref 8.6–10.3)
CHLORIDE SERPL-SCNC: 97 MMOL/L (ref 98–107)
CO2 SERPL-SCNC: 26 MMOL/L (ref 21–32)
CREAT SERPL-MCNC: 0.56 MG/DL (ref 0.5–1.3)
EGFRCR SERPLBLD CKD-EPI 2021: >90 ML/MIN/1.73M*2
ERYTHROCYTE [DISTWIDTH] IN BLOOD BY AUTOMATED COUNT: 17.1 % (ref 11.5–14.5)
ERYTHROCYTE [DISTWIDTH] IN BLOOD BY AUTOMATED COUNT: 17.1 % (ref 11.5–14.5)
GLUCOSE SERPL-MCNC: 99 MG/DL (ref 74–99)
HCT VFR BLD AUTO: 25.1 % (ref 41–52)
HCT VFR BLD AUTO: 25.7 % (ref 41–52)
HGB BLD-MCNC: 8.3 G/DL (ref 13.5–17.5)
HGB BLD-MCNC: 8.5 G/DL (ref 13.5–17.5)
MAGNESIUM SERPL-MCNC: 2.31 MG/DL (ref 1.6–2.4)
MCH RBC QN AUTO: 34.8 PG (ref 26–34)
MCH RBC QN AUTO: 34.9 PG (ref 26–34)
MCHC RBC AUTO-ENTMCNC: 33.1 G/DL (ref 32–36)
MCHC RBC AUTO-ENTMCNC: 33.1 G/DL (ref 32–36)
MCV RBC AUTO: 105 FL (ref 80–100)
MCV RBC AUTO: 106 FL (ref 80–100)
NRBC BLD-RTO: 0 /100 WBCS (ref 0–0)
NRBC BLD-RTO: 0 /100 WBCS (ref 0–0)
PLATELET # BLD AUTO: 333 X10*3/UL (ref 150–450)
PLATELET # BLD AUTO: 344 X10*3/UL (ref 150–450)
POTASSIUM SERPL-SCNC: 3.8 MMOL/L (ref 3.5–5.3)
PROT SERPL-MCNC: 5.2 G/DL (ref 6.4–8.2)
RBC # BLD AUTO: 2.38 X10*6/UL (ref 4.5–5.9)
RBC # BLD AUTO: 2.44 X10*6/UL (ref 4.5–5.9)
SODIUM SERPL-SCNC: 131 MMOL/L (ref 136–145)
WBC # BLD AUTO: 15.5 X10*3/UL (ref 4.4–11.3)
WBC # BLD AUTO: 17.5 X10*3/UL (ref 4.4–11.3)

## 2024-06-05 PROCEDURE — 2500000002 HC RX 250 W HCPCS SELF ADMINISTERED DRUGS (ALT 637 FOR MEDICARE OP, ALT 636 FOR OP/ED): Performed by: STUDENT IN AN ORGANIZED HEALTH CARE EDUCATION/TRAINING PROGRAM

## 2024-06-05 PROCEDURE — 36415 COLL VENOUS BLD VENIPUNCTURE: CPT | Performed by: STUDENT IN AN ORGANIZED HEALTH CARE EDUCATION/TRAINING PROGRAM

## 2024-06-05 PROCEDURE — 2060000001 HC INTERMEDIATE ICU ROOM DAILY

## 2024-06-05 PROCEDURE — 2500000005 HC RX 250 GENERAL PHARMACY W/O HCPCS: Performed by: STUDENT IN AN ORGANIZED HEALTH CARE EDUCATION/TRAINING PROGRAM

## 2024-06-05 PROCEDURE — 99233 SBSQ HOSP IP/OBS HIGH 50: CPT | Performed by: STUDENT IN AN ORGANIZED HEALTH CARE EDUCATION/TRAINING PROGRAM

## 2024-06-05 PROCEDURE — 94640 AIRWAY INHALATION TREATMENT: CPT

## 2024-06-05 PROCEDURE — 2500000001 HC RX 250 WO HCPCS SELF ADMINISTERED DRUGS (ALT 637 FOR MEDICARE OP): Performed by: INTERNAL MEDICINE

## 2024-06-05 PROCEDURE — 2500000001 HC RX 250 WO HCPCS SELF ADMINISTERED DRUGS (ALT 637 FOR MEDICARE OP): Performed by: STUDENT IN AN ORGANIZED HEALTH CARE EDUCATION/TRAINING PROGRAM

## 2024-06-05 PROCEDURE — 2500000004 HC RX 250 GENERAL PHARMACY W/ HCPCS (ALT 636 FOR OP/ED): Performed by: STUDENT IN AN ORGANIZED HEALTH CARE EDUCATION/TRAINING PROGRAM

## 2024-06-05 PROCEDURE — 94660 CPAP INITIATION&MGMT: CPT

## 2024-06-05 PROCEDURE — S4991 NICOTINE PATCH NONLEGEND: HCPCS | Performed by: STUDENT IN AN ORGANIZED HEALTH CARE EDUCATION/TRAINING PROGRAM

## 2024-06-05 PROCEDURE — 85027 COMPLETE CBC AUTOMATED: CPT | Performed by: STUDENT IN AN ORGANIZED HEALTH CARE EDUCATION/TRAINING PROGRAM

## 2024-06-05 PROCEDURE — 80053 COMPREHEN METABOLIC PANEL: CPT | Performed by: STUDENT IN AN ORGANIZED HEALTH CARE EDUCATION/TRAINING PROGRAM

## 2024-06-05 PROCEDURE — 2500000001 HC RX 250 WO HCPCS SELF ADMINISTERED DRUGS (ALT 637 FOR MEDICARE OP): Performed by: NURSE PRACTITIONER

## 2024-06-05 PROCEDURE — 83735 ASSAY OF MAGNESIUM: CPT | Performed by: STUDENT IN AN ORGANIZED HEALTH CARE EDUCATION/TRAINING PROGRAM

## 2024-06-05 RX ORDER — SODIUM CHLORIDE 9 MG/ML
30 INJECTION, SOLUTION INTRAVENOUS CONTINUOUS
Status: DISCONTINUED | OUTPATIENT
Start: 2024-06-05 | End: 2024-06-09 | Stop reason: HOSPADM

## 2024-06-05 RX ADMIN — FLUOXETINE HYDROCHLORIDE 20 MG: 20 CAPSULE ORAL at 09:52

## 2024-06-05 RX ADMIN — IPRATROPIUM BROMIDE AND ALBUTEROL SULFATE 3 ML: 2.5; .5 SOLUTION RESPIRATORY (INHALATION) at 08:29

## 2024-06-05 RX ADMIN — PIPERACILLIN SODIUM AND TAZOBACTAM SODIUM 3.38 G: 3; .375 INJECTION, SOLUTION INTRAVENOUS at 20:05

## 2024-06-05 RX ADMIN — PANTOPRAZOLE SODIUM 40 MG: 40 TABLET, DELAYED RELEASE ORAL at 06:25

## 2024-06-05 RX ADMIN — IPRATROPIUM BROMIDE AND ALBUTEROL SULFATE 3 ML: 2.5; .5 SOLUTION RESPIRATORY (INHALATION) at 13:35

## 2024-06-05 RX ADMIN — SODIUM CHLORIDE 30 ML/HR: 9 INJECTION, SOLUTION INTRAVENOUS at 14:42

## 2024-06-05 RX ADMIN — ASPIRIN 81 MG 81 MG: 81 TABLET ORAL at 09:51

## 2024-06-05 RX ADMIN — IPRATROPIUM BROMIDE AND ALBUTEROL SULFATE 3 ML: 2.5; .5 SOLUTION RESPIRATORY (INHALATION) at 20:50

## 2024-06-05 RX ADMIN — Medication 60 PERCENT: at 20:53

## 2024-06-05 RX ADMIN — METOPROLOL TARTRATE 25 MG: 25 TABLET, FILM COATED ORAL at 09:52

## 2024-06-05 RX ADMIN — NICOTINE 1 PATCH: 21 PATCH, EXTENDED RELEASE TRANSDERMAL at 09:52

## 2024-06-05 RX ADMIN — BACLOFEN 5 MG: 5 TABLET ORAL at 10:19

## 2024-06-05 RX ADMIN — Medication 45 PERCENT: at 08:33

## 2024-06-05 RX ADMIN — Medication 30 L/MIN: at 19:52

## 2024-06-05 RX ADMIN — Medication 60 PERCENT: at 05:32

## 2024-06-05 RX ADMIN — PIPERACILLIN SODIUM AND TAZOBACTAM SODIUM 3.38 G: 3; .375 INJECTION, SOLUTION INTRAVENOUS at 12:27

## 2024-06-05 RX ADMIN — METOPROLOL TARTRATE 25 MG: 25 TABLET, FILM COATED ORAL at 22:29

## 2024-06-05 RX ADMIN — ATORVASTATIN CALCIUM 40 MG: 40 TABLET, FILM COATED ORAL at 09:52

## 2024-06-05 RX ADMIN — ALPRAZOLAM 0.5 MG: 0.5 TABLET ORAL at 23:37

## 2024-06-05 RX ADMIN — CLOPIDOGREL BISULFATE 75 MG: 75 TABLET ORAL at 09:51

## 2024-06-05 RX ADMIN — PIPERACILLIN SODIUM AND TAZOBACTAM SODIUM 3.38 G: 3; .375 INJECTION, SOLUTION INTRAVENOUS at 03:40

## 2024-06-05 RX ADMIN — Medication 60 PERCENT: at 02:36

## 2024-06-05 ASSESSMENT — COGNITIVE AND FUNCTIONAL STATUS - GENERAL
MOBILITY SCORE: 24
DAILY ACTIVITIY SCORE: 24
DAILY ACTIVITIY SCORE: 24
MOBILITY SCORE: 24

## 2024-06-05 ASSESSMENT — PAIN DESCRIPTION - DESCRIPTORS: DESCRIPTORS: ACHING

## 2024-06-05 ASSESSMENT — PAIN - FUNCTIONAL ASSESSMENT
PAIN_FUNCTIONAL_ASSESSMENT: 0-10
PAIN_FUNCTIONAL_ASSESSMENT: 0-10

## 2024-06-05 ASSESSMENT — PAIN SCALES - GENERAL
PAINLEVEL_OUTOF10: 5 - MODERATE PAIN
PAINLEVEL_OUTOF10: 3

## 2024-06-05 NOTE — CARE PLAN
Pt alert and oriented. Denies complaints of chest pain, does experience SOB with exertion. On high flow O2 30L 60%. Remains on PCA pump. Received iv antbx as ordered. Safety maintained, will continue to monitor     Problem: Pain  Goal: My pain/discomfort is manageable  Outcome: Progressing     Problem: Safety  Goal: I will remain free of falls  Outcome: Progressing     Problem: Psychosocial Needs  Goal: Demonstrates ability to cope with hospitalization/illness  Outcome: Progressing  Goal: Collaborate with me, my family, and caregiver to identify my specific goals  Outcome: Progressing     Problem: Discharge Barriers  Goal: My discharge needs are met  Outcome: Progressing     Problem: Pain  Goal: Takes deep breaths with improved pain control throughout the shift  Outcome: Progressing  Goal: Turns in bed with improved pain control throughout the shift  Outcome: Progressing  Goal: Walks with improved pain control throughout the shift  Outcome: Progressing  Goal: Performs ADL's with improved pain control throughout shift  Outcome: Progressing     Problem: Fall/Injury  Goal: Not fall by end of shift  Outcome: Progressing  Goal: Be free from injury by end of the shift  Outcome: Progressing

## 2024-06-05 NOTE — PROGRESS NOTES
HOSPITAL MEDICINE - PROGRESS NOTE    Emerson Chilel is a 64 y.o. male on day 11 of admission presenting with Pancreatic mass (HHS-HCC).    Principal Problem:    Pancreatic mass (HHS-HCC)  Active Problems:    Jaundice    Hypokalemia    Generalized abdominal pain    Post-ERCP acute pancreatitis (HHS-HCC)    NSTEMI (non-ST elevated myocardial infarction) (Multi)      Assessment/Plan   This patient currently has cardiac telemetry ordered; if you would like to modify or discontinue the telemetry order, click here to go to the orders activity to modify/discontinue the order.     Acute pancreatitis s/p ERCP, invasive adenocarcinoma: Pt originally transferred from OSH for EUS/ERCP having presented with jaundice and pancreatic mass. Study was completed 5/29 with subsequent worsening abdominal pain; procedure was c/b difficulty cannulating. Lipase uptrended.  significantly elevated. Repeat abdominal CT from 5/31 for further abdominal pain showing acute interstitial edematous pancreatitis, with 37mm of acute peripancreatic fluid along the distal greater curvature of the stomach. Attempted trial of scheduled morphine for acute pain control, however negligible difference.  Placed on dilaudid PCA 6/3 with improvement. Pathology noted to result, showing invasive adenocarcinoma.  - Tolerated CLD with Ensure yesterday  - Appreciate GI recs: Stop IVF given hypoxia, supportive care with pain control/antiemetics, follow up pathology. Follow outpatient with GI, and will need oncology/hepatobiliary follow up. May trial baclofen for hiccups. NJ tube was discussed; pt declining.  - Re: hiccups, pt responded well to reglan and was asking about it; switched orders so that reglan is first line over zofran. Otherwise agree with GI recs for trial of baclofen PRN.  - Appreciate oncology recs: Pt interested in chemotherapy and discussion with surgical oncology. Pt to complete repeat  and CT chest for staging purposes (will order  when pneumonia improves)  - Bilirubin (peak of 21)/LFTs steadily improving through 6/2, then began to rise with persistent abdominal pain before starting to fall again on 6/4.  - Leukocytosis noted, had been stable until 6/2 when noted to increase significantly--now improving.  - Given persistent symptoms, low threshold to transfer to [facility] for hepatobiliary evaluation especially if any further worsening.  - AM CBC/CMP/Mg    Hemoptysis, acute hypoxic respiratory failure: Sporadic appearance, streaked in sputum over 5/31-6/2. Suspect hemoptysis related to dry respiratory tract with trace bleeding rather than true hemoptysis, given appearance that follows placement of non-humidified NC. However, pt did develop rapidly worsening hypoxia overnight 6/2-3 with CXR at that time concerning for pneumonia, concurrent rebound worsening of leukocytosis--likely developed atelectasis secondary to shallow inspiration from persistent abdominal pain, followed by pneumonia. No h/o COPD, but does have tobacco use history.  - Sepsis protocol initiated. Procal minimally elevated, however given clinical severity will continue abx at this time. Off vancomycin (6/3-6/4), continue CTX/zosyn. Follow up bcx. MRSA screen negative.  - Supportive care with nebs/bronchial hygiene/IS (as tolerated), wean O2 as tolerated; required upgrade to Airvo due to increasing requirement.  - Leukocytosis as above.    Chest pain s/p PCI: Known history of chronic angina/prior stent. On ASA/plavix. Developed worsening episodic chest pain while admitted, with concurrent elevation of troponins concerning for NSTEMI. Seen by cardiology on 5/30 with subsequent LHC and KITTY placed to proximal LAD.  - Appreciate cardiology recs: Continue metoprolol at 25mg BID, ASA/plavix/PPI. Follow up with Dr. Boston in 1-2 weeks post-discharge.  - Will continue holding home norvasc/hydrochlorothiazide and resume if evidence of derangement/reassess at least daily.  - Maintain K>4,  Mg>2    Anxiety/depression, asthma, tobacco use disorder, HTN, HLD: Home meds resumed as indicated    Discharge pending appropriate pain control/PO tolerance, resolution of hypoxia.       DVT PPX: SCDs  Nutrition: CLD with supplements per nutrition    Subjective   Pt seen with wife at bedside. Symptoms continue to be better controlled, some attempts made to wean from Airvo. Discussed gross improvement of LFTs/bilirubin and tissue findings (though pt was already aware having seen it on MyChart).       Objective     [Held by provider] amLODIPine, 10 mg, oral, Daily  aspirin, 81 mg, oral, Daily  atorvastatin, 40 mg, oral, Daily  clopidogrel, 75 mg, oral, Daily  FLUoxetine, 20 mg, oral, Daily  [Held by provider] hydroCHLOROthiazide, 12.5 mg, oral, q AM  ipratropium-albuteroL, 3 mL, nebulization, TID  metoprolol tartrate, 25 mg, oral, BID  nicotine, 1 patch, transdermal, Daily  pantoprazole, 40 mg, oral, Daily before breakfast  piperacillin-tazobactam, 3.375 g, intravenous, q8h       PRN medications: acetaminophen **OR** acetaminophen **OR** acetaminophen, acetaminophen **OR** acetaminophen **OR** acetaminophen, ALPRAZolam, baclofen, [Held by provider] HYDROcodone-acetaminophen, [Held by provider] HYDROmorphone, ipratropium-albuteroL, melatonin, metoclopramide **OR** metoclopramide, [Held by provider] morphine, [Held by provider] morphine, naloxone, nitroglycerin, ondansetron ODT **OR** ondansetron, oxygen, polyethylene glycol, sodium chloride   HYDROmorphone,            Last Recorded Vitals  /64 (BP Location: Left arm, Patient Position: Lying)   Pulse 96   Temp 36.1 °C (97 °F) (Temporal)   Resp 18   Wt 72 kg (158 lb 11.7 oz)   SpO2 98%   Intake/Output last 3 Shifts:    Intake/Output Summary (Last 24 hours) at 6/5/2024 1056  Last data filed at 6/5/2024 0700  Gross per 24 hour   Intake 116.5 ml   Output --   Net 116.5 ml       Admission Weight  Weight: 65.8 kg (145 lb) (05/25/24 0514)    Daily Weight  06/05/24  : 72 kg (158 lb 11.7 oz)    Image Results  Electrocardiogram 12 Lead  Normal sinus rhythm  Septal infarct (cited on or before 28-MAY-2024)  T wave abnormality, consider anterolateral ischemia  Prolonged QT  Abnormal ECG  When compared with ECG of 30-MAY-2024 06:54, (unconfirmed)  Premature ventricular complexes are no longer Present  Confirmed by Emerson Cordova ([id number]) on 6/3/2024 8:54:45 AM  Electrocardiogram, 12-lead PRN ACS symptoms  Sinus rhythm with occasional Premature ventricular complexes  Septal infarct (cited on or before 28-MAY-2024)  T wave abnormality, consider anterolateral ischemia  Prolonged QT  Abnormal ECG  When compared with ECG of 29-MAY-2024 19:57, (unconfirmed)  T wave inversion now evident in Anterior leads  QT has lengthened  Confirmed by Emerson Cordova ([id number]) on 6/3/2024 8:54:31 AM  ECG 12 Lead  Sinus tachycardia with frequent Premature ventricular complexes  Left axis deviation  Abnormal ECG  When compared with ECG of 28-MAY-2024 17:18, (unconfirmed)  Premature ventricular complexes are now Present  Nonspecific T wave abnormality now evident in Anterolateral leads  Confirmed by Emerson Cordova ([id number]) on 6/3/2024 8:53:59 AM      Physical Exam  Vitals and nursing note reviewed.   Constitutional:       General: He is awake. He is not in acute distress.     Appearance: Normal appearance. He is ill-appearing. He is not toxic-appearing.   HENT:      Head: Normocephalic and atraumatic.      Right Ear: External ear normal.      Left Ear: External ear normal.      Nose: Nose normal.   Eyes:      General: Scleral icterus present.         Right eye: No discharge.         Left eye: No discharge.      Extraocular Movements: Extraocular movements intact.   Cardiovascular:      Rate and Rhythm: Normal rate and regular rhythm.   Pulmonary:      Effort: Pulmonary effort is normal. No respiratory distress.      Breath sounds: Wheezing and rhonchi present.   Abdominal:      General: There is no  distension.      Palpations: Abdomen is soft.      Tenderness: There is abdominal tenderness. There is guarding.   Musculoskeletal:         General: No signs of injury.      Cervical back: Normal range of motion.   Skin:     General: Skin is warm and dry.      Coloration: Skin is jaundiced. Skin is not pale.   Neurological:      General: No focal deficit present.      Mental Status: He is alert and oriented to person, place, and time. Mental status is at baseline.   Psychiatric:         Mood and Affect: Mood normal.         Behavior: Behavior normal. Behavior is cooperative.         Relevant Results    [Held by provider] amLODIPine, 10 mg, oral, Daily  aspirin, 81 mg, oral, Daily  atorvastatin, 40 mg, oral, Daily  clopidogrel, 75 mg, oral, Daily  FLUoxetine, 20 mg, oral, Daily  [Held by provider] hydroCHLOROthiazide, 12.5 mg, oral, q AM  ipratropium-albuteroL, 3 mL, nebulization, TID  metoprolol tartrate, 25 mg, oral, BID  nicotine, 1 patch, transdermal, Daily  pantoprazole, 40 mg, oral, Daily before breakfast  piperacillin-tazobactam, 3.375 g, intravenous, q8h      PRN medications: acetaminophen **OR** acetaminophen **OR** acetaminophen, acetaminophen **OR** acetaminophen **OR** acetaminophen, ALPRAZolam, baclofen, [Held by provider] HYDROcodone-acetaminophen, [Held by provider] HYDROmorphone, ipratropium-albuteroL, melatonin, metoclopramide **OR** metoclopramide, [Held by provider] morphine, [Held by provider] morphine, naloxone, nitroglycerin, ondansetron ODT **OR** ondansetron, oxygen, polyethylene glycol, sodium chloride  HYDROmorphone,             Electrocardiogram, 12-lead PRN ACS symptoms    Result Date: 5/30/2024  Sinus rhythm with Premature atrial complexes with Aberrant conduction Septal infarct (cited on or before 28-MAY-2024) T wave abnormality, consider anterolateral ischemia Prolonged QT Abnormal ECG When compared with ECG of 29-MAY-2024 19:57, (unconfirmed) Premature ventricular complexes are no  longer Present Aberrant conduction is now Present T wave inversion now evident in Anterior leads QT has lengthened    Electrocardiogram, 12-lead PRN ACS symptoms    Result Date: 5/30/2024  Sinus rhythm with occasional Premature ventricular complexes Possible Left atrial enlargement Septal infarct (cited on or before 28-MAY-2024) T wave abnormality, consider anterolateral ischemia Prolonged QT Abnormal ECG When compared with ECG of 30-MAY-2024 03:50, (unconfirmed) Premature ventricular complexes are now Present Aberrant conduction is no longer Present Nonspecific T wave abnormality now evident in Inferior leads    Electrocardiogram 12 Lead    Result Date: 5/30/2024  Normal sinus rhythm Septal infarct (cited on or before 28-MAY-2024) T wave abnormality, consider anterolateral ischemia Prolonged QT Abnormal ECG When compared with ECG of 30-MAY-2024 06:54, (unconfirmed) Premature ventricular complexes are no longer Present    Transthoracic Echo (TTE) Complete    Result Date: 5/30/2024            [facility] [address] [facility] [address]    Tel [phone] Fax [phone] TRANSTHORACIC ECHOCARDIOGRAM REPORT  Patient Name:      SOLOMON Lyons Physician:    [id number] Daina Peres MD Study Date:        5/30/2024             Ordering Provider:    [id number] MELINA WATKINS MRN/PID:           [id number]              Fellow: Accession#:        [id number]          Nurse: Date of Birth/Age: 1960 / 64 years  Sonographer:          Ivory Winslow RDCS Gender:            M                     Additional Staff: Height:            170.18 cm             Admit Date:           5/25/2024 Weight:            67.59 kg              Admission Status:     Inpatient - STAT BSA / BMI:         1.78 m2 / 23.34 kg/m2 Department Location:  [facility] [place] Blood Pressure: 110 /62 mmHg Study Type:    TRANSTHORACIC ECHO (TTE) COMPLETE Diagnosis/ICD: Non ST elevation  (NSTEMI) myocardial infarction-I21.4 Indication:    NSTEMI CPT Codes:     Echo Complete w Full [id number] Patient History: Pertinent History: CAD and Dyslipidemia. No previous echocardiogram. Study Detail: The following Echo studies were performed: 2D, M-Mode, Doppler and               color flow.  PHYSICIAN INTERPRETATION: Left Ventricle: Left ventricular systolic function is normal, with an estimated ejection fraction of 60%. Wall motion is abnormal. The left ventricular cavity size is normal. There is no evidence of left ventricular hypertrophy. Spectral Doppler shows a pseudonormal pattern of left ventricular diastolic filling. There is no definite left ventricular thrombus visualized. The intraventricular septum appears intact without evidence of shunting or a ventricular septal defect. LV Wall Scoring: The entire apex, mid and apical inferior septum, and mid and apical inferior wall are hypokinetic. All remaining scored segments are normal. Left Atrium: The left atrium is normal in size. Right Ventricle: The right ventricle is normal in size. There is normal right ventricular global systolic function. Right Atrium: The right atrium is normal in size. Aortic Valve: The aortic valve is trileaflet. There is mild aortic valve cusp calcification. There is mild to moderate aortic valve thickening. There is aortic valve annular calcification. There is evidence of mildly elevated transaortic gradients consistent with sclerosis of the aortic valve. There is moderate aortic valve regurgitation. The peak instantaneous gradient of the aortic valve is 9.7 mmHg. Mitral Valve: The mitral valve is moderately thickened. There is no evidence of mitral valve prolapse. There is no evidence of mitral valve stenosis. The doppler estimated mean and peak diastolic pressure gradients are 3.1 mmHg and 5.0 mmHg respectively. There is mild mitral annular calcification. There is mild mitral valve regurgitation. Tricuspid Valve: The  tricuspid valve is structurally normal. There is no evidence of tricuspid valve stenosis. There is mild tricuspid regurgitation. The Doppler estimated RVSP is moderate to severely elevated at 51.1 mmHg. Pulmonic Valve: The pulmonic valve is abnormal. There is trace to mild pulmonic valve regurgitation. Pericardium: There is no pericardial effusion noted. Aorta: The aortic root is normal. Systemic Veins: The inferior vena cava appears to be of normal size. There is IVC inspiratory collapse greater than 50%. In comparison to the previous echocardiogram(s): Prior examinations are available and were reviewed for comparison purposes. Compared with echo 7/17/23 LVEF is unchanged. Grade I is now grade II diastolic dysfunction. Apical wall motion abnormality is new.  CONCLUSIONS:  1. Left ventricular systolic function is normal with a 60% estimated ejection fraction.  2. Entire apex, mid and apical inferior septum, and mid and apical inferior wall are abnormal.  3. Intact intraventricular septum without shunting or a ventricular septal defect.  4. No left ventricular thrombus visualized.  5. Spectral Doppler shows a pseudonormal pattern of left ventricular diastolic filling.  6. There is no evidence of left ventricular hypertrophy.  7. The mitral valve is moderately thickened.  8. No evidence of mitral valve prolapse.  9. There is No tricuspid stenosis. 10. Moderate to severely elevated right ventricular systolic pressure. 11. Aortic valve sclerosis. 12. There is aortic valve annular calcification. 13. Moderate aortic valve regurgitation. 14. Compared with echo 7/17/23 LVEF is unchanged. Grade I is now grade II diastolic dysfunction. Apical wall motion abnormality is new. QUANTITATIVE DATA SUMMARY: 2D MEASUREMENTS:                          Normal Ranges: LAs:           3.66 cm   (2.7-4.0cm) IVSd:          0.87 cm   (0.6-1.1cm) LVPWd:         0.64 cm   (0.6-1.1cm) LVIDd:         4.81 cm   (3.9-5.9cm) LVIDs:         2.99 cm  LV Mass Index: 66.0 g/m2 LV % FS        37.9 % LA VOLUME:                               Normal Ranges: LA Vol A4C:        41.0 ml    (22+/-6mL/m2) LA Vol A2C:        43.1 ml LA Vol BP:         44.0 ml LA Vol Index A4C:  23.0 ml/m2 LA Vol Index A2C:  24.2 ml/m2 LA Vol Index BP:   24.7 ml/m2 LA Area A4C:       14.9 cm2 LA Area A2C:       14.6 cm2 LA Major Axis A4C: 4.6 cm LA Major Axis A2C: 4.2 cm LA Volume Index:   24.5 ml/m2 LA Vol A4C:        35.2 ml LA Vol A2C:        40.5 ml AORTA MEASUREMENTS:                      Normal Ranges: Ao Sinus, d: 3.10 cm (2.1-3.5cm) Ao STJ, d:   3.20 cm (1.7-3.4cm) Asc Ao, d:   3.50 cm (2.1-3.4cm) LV SYSTOLIC FUNCTION BY 2D PLANIMETRY (MOD):                     Normal Ranges: EF-A4C View: 61.0 % (>=55%) EF-A2C View: 61.6 % EF-Biplane:  61.3 % LV DIASTOLIC FUNCTION:                              Normal Ranges: MV Peak E:        0.85 m/s   (0.7-1.2 m/s) MV Peak A:        0.95 m/s   (0.42-0.7 m/s) E/A Ratio:        0.90       (1.0-2.2) MV lateral e'     0.09 m/s MV medial e'      0.07 m/s PulmV Sys Keshawn:    59.28 cm/s PulmV Hess Keshawn:   43.11 cm/s PulmV S/D Keshawn:    1.38 PulmV A Revs Keshawn: 26.87 cm/s MITRAL VALVE:                      Normal Ranges: MV Vmax:    1.12 m/s (<=1.3m/s) MV peak P.0 mmHg (<5mmHg) MV mean PG: 3.1 mmHg (<48mmHg) MV VTI:     35.83 cm (10-13cm) MV DT:      160 msec (150-240msec) AORTIC VALVE:                         Normal Ranges: AoV Vmax:      1.56 m/s (<=1.7m/s) AoV Peak P.7 mmHg (<20mmHg) LVOT Max Keshawn:  1.18 m/s (<=1.1m/s) LVOT VTI:      28.06 cm LVOT Diameter: 1.70 cm  (1.8-2.4cm) AoV Area,Vmax: 1.71 cm2 (2.5-4.5cm2) AORTIC INSUFFICIENCY: AI Vmax:       4.30 m/s AI Half-time:  396 msec AI Decel Time: 1364 msec AI Decel Rate: 315.28 cm/s2  RIGHT VENTRICLE: RV Basal 3.20 cm RV Mid   7.20 cm RV Major 7.2 cm TAPSE:   21.0 mm RV s'    0.16 m/s TRICUSPID VALVE/RVSP:                             Normal Ranges: Peak TR Velocity: 3.47 m/s RV Syst Pressure: 51.1  mmHg (< 30mmHg) IVC Diam:         1.31 cm PULMONIC VALVE:                         Normal Ranges: PV Accel Time: 66 msec  (>120ms) PV Max Keshawn:    0.7 m/s  (0.6-0.9m/s) PV Max P.2 mmHg Pulmonary Veins: PulmV A Revs Keshawn: 26.87 cm/s PulmV Hess Keshawn:   43.11 cm/s PulmV S/D Keshawn:    1.38 PulmV Sys Keshawn:    59.28 cm/s AORTA: Asc Ao Diam 3.49 cm  [id number] Daina Peres MD Electronically signed on 2024 at 12:26:19 PM  Wall Scoring  ** Final **     ECG 12 Lead    Result Date: 2024  Sinus tachycardia with frequent Premature ventricular complexes Left axis deviation Septal infarct (cited on or before 28-MAY-2024) Abnormal ECG When compared with ECG of 28-MAY-2024 17:18, (unconfirmed) Premature ventricular complexes are now Present Nonspecific T wave abnormality now evident in Anterolateral leads    Electrocardiogram, 12-lead PRN ACS symptoms    Result Date: 2024  Normal sinus rhythm Possible Left atrial enlargement Low voltage QRS Septal infarct , age undetermined Abnormal ECG When compared with ECG of 24-MAY-2024 16:45, Premature ventricular complexes are no longer Present    FL fluoro images no charge    Result Date: 2024  These images are not reportable by radiology and will not be interpreted by  Radiologists.    Endoscopic Retrograde Cholangiopancreatography (ERCP)    Result Date: 2024  Table formatting from the original result was not included. Impression The common bile duct was deeply cannulated after 10 attempts. Cannulation was difficult due to inadvertent cannulation, small ampulla and Distal CBD stricture. 20 mm localized, intrinsic, smooth and severe stricture was visualized in the distal common bile duct Complete major papilla sphincterotomy was performed. Performed brushings with cytology brush in the distal common bile duct One 8 mm x 8 cm fully covered stent was placed in the common bile duct Findings The major papilla was native. Ampullectomy was not performed. Small ampulla,  mildly deformed due to external compression. The common bile duct was deeply cannulated after 10 attempts using a traction sphincterotome with 260 cm straight guidewire. Cannulation was difficult due to inadvertent cannulation, small ampulla and Distal CBD stricture. No bleeding was observed. . Unintentional cannulation PD was performed. 20 mm localized, intrinsic, smooth and severe stricture was visualized in the distal common bile duct. Complete 5 mm major papilla sphincterotomy was performed using a sphincterotome. No bleeding was noted at the procedure site. Performed 3 brushings with cytology brush in the distal common bile duct. One 8 mm x 8 cm fully covered metal stent was placed successfully in the common bile duct. Recommendation  Await pathology results  Follow up with oncology and pancreato biliary surgeon as outpatient Watch for complication  Indication Jaundice, Generalized abdominal pain, Pancreatic mass ([facility]) Staff Staff Role Shanda Gong MD Proceduralist Medications See Anesthesia Record. Preprocedure A history and physical has been performed, and patient medication allergies have been reviewed. The patient's tolerance of previous anesthesia has been reviewed. The risks and benefits of the procedure and the sedation options and risks were discussed with the patient. All questions were answered and informed consent obtained. Rectal Indomethacin was not administered. Details of the Procedure The patient underwent general anesthesia, which was administered by an anesthesia professional. The patient's blood pressure, heart rate, level of consciousness, oxygen, respirations, ECG and ETCO2 were monitored throughout the procedure. The scope was introduced through the mouth and advanced to the second part of the duodenum. The patient experienced no blood loss. The procedure was not difficult. The patient tolerated the procedure well. There were no apparent adverse events. Events Procedure Events Event  Event Time ENDO SCOPE IN TIME 5/28/2024  3:00 PM ENDO SCOPE OUT TIME 5/28/2024  3:00 PM ENDO SCOPE IN TIME 5/28/2024  3:02 PM ENDO SCOPE OUT TIME 5/28/2024  3:30 PM ENDO SCOPE IN TIME 5/28/2024  3:47 PM Specimens ID Type Source Tests Collected by Time 1 : [facility]NA Pancreatic Head Mass Non-Gynecologic Cytology PANCREAS FINE NEEDLE ASPIRATION HEAD CYTOLOGY CONSULTATION (NON-GYNECOLOGIC) Devorah Yost 5/28/2024 1512 2 : FNB Pancreatic Head Mass Tissue PANCREAS BIOPSY SURGICAL PATHOLOGY EXAM Devorah Yost 5/28/2024 1513 3 :  Non-Gynecologic Cytology BILE DUCT BRUSH COMMON CYTOLOGY CONSULTATION (NON-GYNECOLOGIC) Devorah Yost 5/28/2024 1643 Procedure Location [facility] [address] [address] [facility] [id number] [phone] Referring Provider Zack Isaacs MD [address] [facility] [address] [place] [address] Procedure Provider Shanda Gong MD     Endoscopic Ultrasound (Upper)    Result Date: 5/28/2024  Table formatting from the original result was not included. Impression The cricopharynx, upper third of the esophagus, middle third of the esophagus, lower third of the esophagus and GE junction appeared normal. The body of the stomach and antrum appeared normal. Mild extrinsic compression was observed in the 2nd part of the duodenum The duodenal bulb appeared normal. The pancreas appeared atrophic. The parenchyma was visualized in the body of the pancreas and tail of the pancreas. The parenchyma was heterogeneous and had hyperechoic strands. Single round, homogeneous, hypoechoic and solid mass measuring 30 mm x 25 mm with well-defined margins was visualized in the head of the pancreas. Apparent abutment of the portal vein and superior mesenteric vein; 3 fine needle aspiration passes were taken. An adequate sample of aspirate was obtained. The sample was sent for cytology analysis; 3 fine needle biopsy passes were taken. An adequate sample was obtained. A sample was sent for histology  analysis The proximal bile duct was dilated. The parenchyma was visualized in the left lobe of the liver. The parenchyma was homogeneous and hyperechoic. The common hepatic duct, left main hepatic duct and right main hepatic duct appeared dilated. The left kidney, spleen and left adrenal appeared normal. Findings The cricopharynx, upper third of the esophagus, middle third of the esophagus, lower third of the esophagus and GE junction appeared normal. The body of the stomach and antrum appeared normal. Mild, traversable extrinsic compression was observed in the 2nd part of the duodenum The duodenal bulb appeared normal. The pancreas appeared atrophic. The parenchyma was visualized in the body of the pancreas and tail of the pancreas. The parenchyma was heterogeneous and had hyperechoic strands. The pancreatic duct measured 2 mm at the head, 3 mm at the body and 1 mm at the tail. Single round, homogeneous, hypoechoic and solid mass measuring 30 mm x 25 mm with well-defined margins was visualized in the head of the pancreas. Apparent abutment of the portal vein and superior mesenteric vein; 3 fine needle aspiration passes were taken with a 22 gauge Sharcore needle using a transduodenal approach guided by Doppler. An adequate sample of aspirate was obtained. The sample was sent for cytology analysis. Onsite cytologist was not present; 3 successful fine needle biopsy passes were taken with a 22 gauge Sharcore needle using a transduodenal approach. An adequate sample was obtained. A sample was sent for histology analysis. Onsite cytologist was not present The proximal bile duct was dilated. The bile duct measured 12 mm at the middle. The parenchyma was visualized in the left lobe of the liver. The parenchyma was homogeneous and hyperechoic. The common hepatic duct, left main hepatic duct and right main hepatic duct appeared dilated. The left kidney, spleen and left adrenal appeared normal. Recommendation  Await pathology  results  Proceed to ERCP  Indication Jaundice, Generalized abdominal pain, Pancreatic mass ([facility]) Staff Staff Role Shanda Gong MD Proceduralist Medications See Anesthesia Record. Preprocedure A history and physical has been performed, and patient medication allergies have been reviewed. The patient's tolerance of previous anesthesia has been reviewed. The risks and benefits of the procedure and the sedation options and risks were discussed with the patient. All questions were answered and informed consent obtained. Details of the Procedure The patient underwent general anesthesia, which was administered by an anesthesia professional. The patient's blood pressure, heart rate, level of consciousness, oxygen, respirations, ECG and ETCO2 were monitored throughout the procedure. The radial scope was introduced through the mouth and advanced to the second part of the duodenum. The patient's estimated blood loss was minimal (<5 mL). The procedure was not difficult. The patient tolerated the procedure well. There were no apparent adverse events. Events Procedure Events Event Event Time ENDO SCOPE IN TIME 5/28/2024  3:00 PM ENDO SCOPE OUT TIME 5/28/2024  3:00 PM ENDO SCOPE IN TIME 5/28/2024  3:02 PM ENDO SCOPE OUT TIME 5/28/2024  3:30 PM Specimens ID Type Source Tests Collected by Time 1 : FNA Pancreatic Head Mass Non-Gynecologic Cytology PANCREAS FINE NEEDLE ASPIRATION HEAD CYTOLOGY CONSULTATION (NON-GYNECOLOGIC) Devorah Yost 5/28/2024 1512 2 : FNB Pancreatic Head Mass Tissue PANCREAS BIOPSY SURGICAL PATHOLOGY EXAM Devorah Yost 5/28/2024 1513 Procedure Location [address] [address] [phone] Referring Provider Zack Isaacs MD [id number] [address] Procedure Provider Shanda Gong MD     ECG 12 lead    Result Date: 5/25/2024  Sinus rhythm with frequent Premature ventricular complexes Otherwise normal ECG When  compared with ECG of 24-MAY-2024 16:43, (unconfirmed) Premature ventricular complexes are now Present See ED provider note for full interpretation and clinical correlation Confirmed by Deyanira Lantigua ([id number]) on 5/25/2024 5:28:37 PM    CT abdomen pelvis w IV contrast    Result Date: 5/24/2024  STUDY: CT Abdomen and Pelvis with IV Contrast; 05/24/2024 at 5:51 PM INDICATION: Right upper quadrant abdominal pain. Jaundice. COMPARISON: None available. ACCESSION NUMBER(S): [id number] ORDERING CLINICIAN: TOBI WHITNEY TECHNIQUE: CT of the abdomen and pelvis was performed.  Contiguous axial images were obtained at 3 mm slice thickness through the abdomen and pelvis. Coronal and sagittal reconstructions at 3 mm slice thickness were performed.  Omnipaque-350 75 mL was administered intravenously.  FINDINGS: LOWER CHEST: No cardiomegaly.  No pericardial effusion.  Lung bases are clear.  ABDOMEN:  LIVER: No hepatomegaly.  Smooth surface contour.  Normal attenuation.  BILE DUCTS: There is intrahepatic and common bile bile duct dilatation.  This can be followed into the head of the pancreas.  GALLBLADDER: The gallbladder is present.  There is intermediate attenuation the gallbladder most likely representing sludge. STOMACH: No abnormalities identified.  PANCREAS: There is an ill-defined 2.5 cm mass in the head of the pancreas.  This is best seen on series 201, slice 61 as well as series 202, slice 45. There is pancreatic duct dilatation.  SPLEEN: No splenomegaly or focal splenic lesion.  ADRENAL GLANDS: No thickening or nodules.  KIDNEYS AND URETERS: Kidneys are normal in size and location.  No renal or ureteral calculi.  PELVIS:  BLADDER: No abnormalities identified.  REPRODUCTIVE ORGANS: No abnormalities identified.  BOWEL: No abnormalities identified.  The appendix is identified and is normal.  VESSELS: No abnormalities identified.  Abdominal aorta is normal in caliber.  PERITONEUM/RETROPERITONEUM/LYMPH NODES: No  "free fluid.  No pneumoperitoneum. No lymphadenopathy.  ABDOMINAL WALL: No abnormalities identified. SOFT TISSUES: No abnormalities identified.  BONES: No acute fracture or aggressive osseous lesion.    2.5 cm mass in the head of the pancreas.  There is dilatation of the biliary ducts as well as the pancreatic duct and neoplasm cannot be excluded.  This is best evaluated with ERCP or MRCP.  There are findings consistent with sludge in the gallbladder. Signed by Bhanu Taveras MD       Results from last 7 days   Lab Units 06/05/24  0446 06/05/24  0008 06/04/24  1112   WBC AUTO x10*3/uL 15.5* 17.5* 17.0*   RBC AUTO x10*6/uL 2.38* 2.44* 2.44*   HEMOGLOBIN g/dL 8.3* 8.5* 8.5*   HEMATOCRIT % 25.1* 25.7* 25.8*   MCV fL 106* 105* 106*   PLATELETS AUTO x10*3/uL 333 344 333           No lab exists for component: \"B12\"                  Results from last 7 days   Lab Units 06/05/24  0446 06/04/24  1112 06/03/24  0240 06/03/24  0132   SODIUM mmol/L 131* 128* 130* 128*   POTASSIUM mmol/L 3.8 3.4* 3.3* 3.5   CHLORIDE mmol/L 97* 96* 95* 96*   CO2 mmol/L 26 25 24 21   BUN mg/dL 10 9 6 6   CREATININE mg/dL 0.56 0.55 0.66 0.65   CALCIUM mg/dL 7.9* 8.0* 8.2* 8.2*   MAGNESIUM mg/dL 2.31 2.17  --  1.91   BILIRUBIN TOTAL mg/dL 9.7* 12.1* 15.3* 16.0*   ALT U/L 135* 151* 159* 169*   AST U/L 154* 165* 158* 166*         Results from last 7 days   Lab Units 05/30/24  1315 05/30/24  1010 05/30/24  0427 05/29/24  2224 05/29/24 2016   TROPHS ng/L 356* 341* 534* 656* 351*              Kemal Layne, DO          "

## 2024-06-05 NOTE — PROGRESS NOTES
"Subjective  Patient sitting up on side of bed looking more comfortable today. Abdominal discomfort improved with decreased use of PCA. Hiccups have returned. They continue with inspiration, abdominal spasms with expiration. Tolerating clear liquids.  Liver enzymes and bilirubin continue to downtrend.  O2 down to 45% 30 L HFNC.  Chest congestion with moist cough. Plan to continue pain regimen and supportive care. Baclofen for hiccups/spasms.    Objective  Blood pressure 100/60, pulse 90, temperature 35.9 °C (96.6 °F), temperature source Temporal, resp. rate 18, height 1.702 m (5' 7\"), weight 72 kg (158 lb 11.7 oz), SpO2 96%.    Physical Exam  Constitutional: Alert and interactive, in NAD  Eyes: PERRL, scleral cterus, no conjunctival injection  Skin: Warm and dry, no rash or ecchymosis, jaundice   ENMT: Mucous membranes moist, no lesions noted  Resp: CTAB, even and unlabored  CV: RRR, normal S1, S2, no m,r,g  GI: +BS, soft, round, mid/upper abd TTP, no rebound tenderness or guarding, no palpable masses or organomegaly  Extremities: Extremities warm, no edema, contusions, wounds or cyanosis  Neuro: Alert and oriented x3  Psych: Appropriate mood and behavior    Medications  Scheduled medications  [Held by provider] amLODIPine, 10 mg, oral, Daily  aspirin, 81 mg, oral, Daily  atorvastatin, 40 mg, oral, Daily  clopidogrel, 75 mg, oral, Daily  FLUoxetine, 20 mg, oral, Daily  [Held by provider] hydroCHLOROthiazide, 12.5 mg, oral, q AM  ipratropium-albuteroL, 3 mL, nebulization, TID  metoprolol tartrate, 25 mg, oral, BID  nicotine, 1 patch, transdermal, Daily  pantoprazole, 40 mg, oral, Daily before breakfast  piperacillin-tazobactam, 3.375 g, intravenous, q8h      Continuous medications  HYDROmorphone,         PRN medications  PRN medications: acetaminophen **OR** acetaminophen **OR** acetaminophen, acetaminophen **OR** acetaminophen **OR** acetaminophen, ALPRAZolam, baclofen, [Held by provider] HYDROcodone-acetaminophen, " "[Held by provider] HYDROmorphone, ipratropium-albuteroL, melatonin, metoclopramide **OR** metoclopramide, [Held by provider] morphine, [Held by provider] morphine, naloxone, nitroglycerin, ondansetron ODT **OR** ondansetron, oxygen, polyethylene glycol, sodium chloride     Labs  Lab Results   Component Value Date    WBC 15.5 (H) 06/05/2024    HGB 8.3 (L) 06/05/2024    HCT 25.1 (L) 06/05/2024     (H) 06/05/2024     06/05/2024     Lab Results   Component Value Date    GLUCOSE 99 06/05/2024    CALCIUM 7.9 (L) 06/05/2024     (L) 06/05/2024    K 3.8 06/05/2024    CO2 26 06/05/2024    CL 97 (L) 06/05/2024    BUN 10 06/05/2024    CREATININE 0.56 06/05/2024     Lab Results   Component Value Date     (H) 06/05/2024     (H) 06/05/2024    ALKPHOS 137 (H) 06/05/2024    BILITOT 9.7 (H) 06/05/2024     No results found for: \"IRON\", \"TIBC\", \"FERRITIN\"  Lab Results   Component Value Date    INR 1.2 (H) 05/26/2024    INR 1.2 (H) 05/25/2024    INR 1.1 05/24/2024    PROTIME 13.2 (H) 05/26/2024    PROTIME 13.5 (H) 05/25/2024    PROTIME 12.6 05/24/2024   Endoscopic reports:  EUS with Dr. Gong 5/28/2024 noting:  Impression  The cricopharynx, upper third of the esophagus, middle third of the esophagus, lower third of the esophagus and GE junction appeared normal.  The body of the stomach and antrum appeared normal.  Mild extrinsic compression was observed in the 2nd part of the duodenum  The duodenal bulb appeared normal.  The pancreas appeared atrophic. The parenchyma was visualized in the body of the pancreas and tail of the pancreas. The parenchyma was heterogeneous and had hyperechoic strands.  Single round, homogeneous, hypoechoic and solid mass measuring 30 mm x 25 mm with well-defined margins was visualized in the head of the pancreas. Apparent abutment of the portal vein and superior mesenteric vein; 3 fine needle aspiration passes were taken. An adequate sample of aspirate was obtained. The " sample was sent for cytology analysis; 3 fine needle biopsy passes were taken. An adequate sample was obtained. A sample was sent for histology analysis  The proximal bile duct was dilated.  The parenchyma was visualized in the left lobe of the liver. The parenchyma was homogeneous and hyperechoic. The common hepatic duct, left main hepatic duct and right main hepatic duct appeared dilated.  The left kidney, spleen and left adrenal appeared normal.  ERCP with Dr. Gong 5/28/2024 noting:  Impression  The common bile duct was deeply cannulated after 10 attempts. Cannulation was difficult due to inadvertent cannulation, small ampulla and Distal CBD stricture.  20 mm localized, intrinsic, smooth and severe stricture was visualized in the distal common bile duct  Complete major papilla sphincterotomy was performed.  Performed brushings with cytology brush in the distal common bile duct  One 8 mm x 8 cm fully covered stent was placed in the common bile duct    Radiology  CXR 6/2/2024 noting:  Impression:     1.  Mild cardiomegaly. Interstitial thickening with diffuse bilateral  airspace opacities, may be secondary to pulmonary edema and/or  multifocal pneumonia.      Signed by: Lucia Gama 6/2/2024 11:36 PM  Dictation workstation:   [id number]     CT A/P with IV contrast 5/24/2024 noting:  Impression:     2.5 cm mass in the head of the pancreas.  There is dilatation of the  biliary ducts as well as the pancreatic duct and neoplasm cannot be  excluded.  This is best evaluated with ERCP or MRCP.  There are  findings consistent with sludge in the gallbladder.  Signed by Bhanu Taveras MD       Assessment  Emerson Chilel is a 64 y.o. male presenting with pancreatic mass and jaundice.  PMH of HTN, HLD, CAD s/p PCI, tobacco abuse, alcohol abuse who presented to the ER with abdominal pain, N/V, jaundice.  Liver enzymes elevated with bilirubin peak at 21.  CA 19-9- 6453.22->3210.89. Patient underwent EUS/ERCP with   Dinary 5/28 with metal stent placement into distal CBD with post ERCP pancreatitis.      Patient sitting up on side of bed looking more comfortable today. Abdominal discomfort improved with decreased use of PCA. Hiccups have returned. They continue with inspiration, abdominal spasms with expiration. Tolerating clear liquids.  Liver enzymes downtrending, tbili 9.  O2 down to 45% 30 L HFNC.       # pancreatic adenocarcinoma-s/p ERCP with metal stent placement  # transaminitis/ hyperbilirubinemia- downtrending  # post ERCP pancreatitis   # NSTEMI s/p PCI  # pulmonary edema and/or multifocal pneumonia  # leukocytosis  # jaundice  # previous EtOH misuse    Plan:  - continue supportive care  - continue clear liquids as tolerated  - discussed NJ tube placement for enteral nutrition which pt is currently declining   - wean O2 as tolerated  - continue PCA for pain control  - continue baclofen 5 mg tid prn hiccups/spasms  - continue antibiotics per primary team/ID recs  - pt ok for anticoagulation/ antiplatelet from GI standpoint  - pt will establish care with pancreaticobiliary surgery  - follow up oncology recs    Plan has been discussed with Dr. Matos. GI will continue to follow.     Mónica Gary, APRN/CNP

## 2024-06-06 LAB
ALBUMIN SERPL BCP-MCNC: 2.6 G/DL (ref 3.4–5)
ALP SERPL-CCNC: 145 U/L (ref 33–136)
ALT SERPL W P-5'-P-CCNC: 132 U/L (ref 10–52)
ANION GAP SERPL CALC-SCNC: 12 MMOL/L (ref 10–20)
AST SERPL W P-5'-P-CCNC: 161 U/L (ref 9–39)
BILIRUB SERPL-MCNC: 8.5 MG/DL (ref 0–1.2)
BUN SERPL-MCNC: 9 MG/DL (ref 6–23)
CALCIUM SERPL-MCNC: 7.7 MG/DL (ref 8.6–10.3)
CHLORIDE SERPL-SCNC: 97 MMOL/L (ref 98–107)
CO2 SERPL-SCNC: 26 MMOL/L (ref 21–32)
CREAT SERPL-MCNC: 0.49 MG/DL (ref 0.5–1.3)
EGFRCR SERPLBLD CKD-EPI 2021: >90 ML/MIN/1.73M*2
ERYTHROCYTE [DISTWIDTH] IN BLOOD BY AUTOMATED COUNT: 16.6 % (ref 11.5–14.5)
GLUCOSE SERPL-MCNC: 89 MG/DL (ref 74–99)
HCT VFR BLD AUTO: 23.7 % (ref 41–52)
HGB BLD-MCNC: 7.7 G/DL (ref 13.5–17.5)
MAGNESIUM SERPL-MCNC: 2.35 MG/DL (ref 1.6–2.4)
MCH RBC QN AUTO: 34.4 PG (ref 26–34)
MCHC RBC AUTO-ENTMCNC: 32.5 G/DL (ref 32–36)
MCV RBC AUTO: 106 FL (ref 80–100)
NRBC BLD-RTO: 0 /100 WBCS (ref 0–0)
PLATELET # BLD AUTO: 329 X10*3/UL (ref 150–450)
POTASSIUM SERPL-SCNC: 3.5 MMOL/L (ref 3.5–5.3)
PROT SERPL-MCNC: 5.1 G/DL (ref 6.4–8.2)
RBC # BLD AUTO: 2.24 X10*6/UL (ref 4.5–5.9)
SODIUM SERPL-SCNC: 131 MMOL/L (ref 136–145)
WBC # BLD AUTO: 14.3 X10*3/UL (ref 4.4–11.3)

## 2024-06-06 PROCEDURE — 51701 INSERT BLADDER CATHETER: CPT

## 2024-06-06 PROCEDURE — 2500000005 HC RX 250 GENERAL PHARMACY W/O HCPCS: Performed by: STUDENT IN AN ORGANIZED HEALTH CARE EDUCATION/TRAINING PROGRAM

## 2024-06-06 PROCEDURE — 36415 COLL VENOUS BLD VENIPUNCTURE: CPT | Performed by: STUDENT IN AN ORGANIZED HEALTH CARE EDUCATION/TRAINING PROGRAM

## 2024-06-06 PROCEDURE — 2500000001 HC RX 250 WO HCPCS SELF ADMINISTERED DRUGS (ALT 637 FOR MEDICARE OP): Performed by: STUDENT IN AN ORGANIZED HEALTH CARE EDUCATION/TRAINING PROGRAM

## 2024-06-06 PROCEDURE — 80053 COMPREHEN METABOLIC PANEL: CPT | Performed by: STUDENT IN AN ORGANIZED HEALTH CARE EDUCATION/TRAINING PROGRAM

## 2024-06-06 PROCEDURE — 2060000001 HC INTERMEDIATE ICU ROOM DAILY

## 2024-06-06 PROCEDURE — 85027 COMPLETE CBC AUTOMATED: CPT | Performed by: STUDENT IN AN ORGANIZED HEALTH CARE EDUCATION/TRAINING PROGRAM

## 2024-06-06 PROCEDURE — 2500000004 HC RX 250 GENERAL PHARMACY W/ HCPCS (ALT 636 FOR OP/ED): Performed by: STUDENT IN AN ORGANIZED HEALTH CARE EDUCATION/TRAINING PROGRAM

## 2024-06-06 PROCEDURE — 94640 AIRWAY INHALATION TREATMENT: CPT

## 2024-06-06 PROCEDURE — 2500000002 HC RX 250 W HCPCS SELF ADMINISTERED DRUGS (ALT 637 FOR MEDICARE OP, ALT 636 FOR OP/ED): Performed by: STUDENT IN AN ORGANIZED HEALTH CARE EDUCATION/TRAINING PROGRAM

## 2024-06-06 PROCEDURE — 99233 SBSQ HOSP IP/OBS HIGH 50: CPT | Performed by: STUDENT IN AN ORGANIZED HEALTH CARE EDUCATION/TRAINING PROGRAM

## 2024-06-06 PROCEDURE — 83735 ASSAY OF MAGNESIUM: CPT | Performed by: STUDENT IN AN ORGANIZED HEALTH CARE EDUCATION/TRAINING PROGRAM

## 2024-06-06 PROCEDURE — 2500000001 HC RX 250 WO HCPCS SELF ADMINISTERED DRUGS (ALT 637 FOR MEDICARE OP): Performed by: INTERNAL MEDICINE

## 2024-06-06 PROCEDURE — S4991 NICOTINE PATCH NONLEGEND: HCPCS | Performed by: STUDENT IN AN ORGANIZED HEALTH CARE EDUCATION/TRAINING PROGRAM

## 2024-06-06 PROCEDURE — 94660 CPAP INITIATION&MGMT: CPT

## 2024-06-06 RX ORDER — ALUMINUM HYDROXIDE, MAGNESIUM HYDROXIDE, AND SIMETHICONE 1200; 120; 1200 MG/30ML; MG/30ML; MG/30ML
30 SUSPENSION ORAL 4 TIMES DAILY PRN
Status: DISCONTINUED | OUTPATIENT
Start: 2024-06-06 | End: 2024-06-09 | Stop reason: HOSPADM

## 2024-06-06 RX ADMIN — ALPRAZOLAM 0.5 MG: 0.5 TABLET ORAL at 18:21

## 2024-06-06 RX ADMIN — ATORVASTATIN CALCIUM 40 MG: 40 TABLET, FILM COATED ORAL at 09:21

## 2024-06-06 RX ADMIN — PANTOPRAZOLE SODIUM 40 MG: 40 TABLET, DELAYED RELEASE ORAL at 05:32

## 2024-06-06 RX ADMIN — PIPERACILLIN SODIUM AND TAZOBACTAM SODIUM 3.38 G: 3; .375 INJECTION, SOLUTION INTRAVENOUS at 19:44

## 2024-06-06 RX ADMIN — METOPROLOL TARTRATE 25 MG: 25 TABLET, FILM COATED ORAL at 21:11

## 2024-06-06 RX ADMIN — CLOPIDOGREL BISULFATE 75 MG: 75 TABLET ORAL at 09:21

## 2024-06-06 RX ADMIN — HYDROMORPHONE HYDROCHLORIDE: 10 INJECTION, SOLUTION INTRAMUSCULAR; INTRAVENOUS; SUBCUTANEOUS at 06:12

## 2024-06-06 RX ADMIN — ALUMINUM HYDROXIDE, MAGNESIUM HYDROXIDE, AND DIMETHICONE 30 ML: 200; 20; 200 SUSPENSION ORAL at 21:11

## 2024-06-06 RX ADMIN — METOPROLOL TARTRATE 25 MG: 25 TABLET, FILM COATED ORAL at 09:21

## 2024-06-06 RX ADMIN — SODIUM CHLORIDE 30 ML/HR: 9 INJECTION, SOLUTION INTRAVENOUS at 19:43

## 2024-06-06 RX ADMIN — NICOTINE 1 PATCH: 21 PATCH, EXTENDED RELEASE TRANSDERMAL at 09:20

## 2024-06-06 RX ADMIN — FLUOXETINE HYDROCHLORIDE 20 MG: 20 CAPSULE ORAL at 09:21

## 2024-06-06 RX ADMIN — PIPERACILLIN SODIUM AND TAZOBACTAM SODIUM 3.38 G: 3; .375 INJECTION, SOLUTION INTRAVENOUS at 05:33

## 2024-06-06 RX ADMIN — Medication 3 MG: at 21:13

## 2024-06-06 RX ADMIN — METOCLOPRAMIDE 10 MG: 10 TABLET ORAL at 18:24

## 2024-06-06 RX ADMIN — Medication 8 L/MIN: at 12:59

## 2024-06-06 RX ADMIN — Medication 30 L/MIN: at 08:00

## 2024-06-06 RX ADMIN — Medication 6 L/MIN: at 20:00

## 2024-06-06 RX ADMIN — Medication 60 PERCENT: at 09:12

## 2024-06-06 RX ADMIN — IPRATROPIUM BROMIDE AND ALBUTEROL SULFATE 3 ML: 2.5; .5 SOLUTION RESPIRATORY (INHALATION) at 12:49

## 2024-06-06 RX ADMIN — Medication 30 L/MIN: at 12:00

## 2024-06-06 RX ADMIN — IPRATROPIUM BROMIDE AND ALBUTEROL SULFATE 3 ML: 2.5; .5 SOLUTION RESPIRATORY (INHALATION) at 20:05

## 2024-06-06 RX ADMIN — PIPERACILLIN SODIUM AND TAZOBACTAM SODIUM 3.38 G: 3; .375 INJECTION, SOLUTION INTRAVENOUS at 11:58

## 2024-06-06 RX ADMIN — IPRATROPIUM BROMIDE AND ALBUTEROL SULFATE 3 ML: 2.5; .5 SOLUTION RESPIRATORY (INHALATION) at 09:11

## 2024-06-06 RX ADMIN — METOCLOPRAMIDE 10 MG: 5 INJECTION, SOLUTION INTRAMUSCULAR; INTRAVENOUS at 11:58

## 2024-06-06 RX ADMIN — ASPIRIN 81 MG 81 MG: 81 TABLET ORAL at 09:21

## 2024-06-06 RX ADMIN — Medication 8 L/MIN: at 16:00

## 2024-06-06 RX ADMIN — Medication 60 PERCENT: at 05:38

## 2024-06-06 RX ADMIN — Medication 50 PERCENT: at 12:53

## 2024-06-06 ASSESSMENT — PAIN - FUNCTIONAL ASSESSMENT
PAIN_FUNCTIONAL_ASSESSMENT: 0-10

## 2024-06-06 ASSESSMENT — COGNITIVE AND FUNCTIONAL STATUS - GENERAL
DAILY ACTIVITIY SCORE: 24
MOBILITY SCORE: 24
DAILY ACTIVITIY SCORE: 24
MOBILITY SCORE: 24

## 2024-06-06 ASSESSMENT — RESPIRATORY DISTRESS OBSERVATION SCALE (RDOS)
ACCESSORY MUSCLE RISE IN CLAVICLE DURING INSPIRATION: 0 - NONE
INVOLUNTARY NASAL FLARING: 0 - NONE
RESPIRATORY RATE PER MINUTE: 1 - 19-30 BREATHS
PARADOXICAL BREATHING PATTERN: 0 - NONE
HEART RATE PER MINUTE: 1 - 90-109 BEATS
LOOK OF FEAR: 0 - NONE
RDOS TOTAL SCORE: 2
RESTLESS NONPURPOSEFUL MOVEMENTS: 0 - NONE
GRUNTING AT END OF EXPIRATION: 0 - NONE

## 2024-06-06 ASSESSMENT — PAIN SCALES - GENERAL
PAINLEVEL_OUTOF10: 0 - NO PAIN
PAINLEVEL_OUTOF10: 7
PAINLEVEL_OUTOF10: 4
PAINLEVEL_OUTOF10: 0 - NO PAIN

## 2024-06-06 ASSESSMENT — PAIN DESCRIPTION - DESCRIPTORS: DESCRIPTORS: ACHING;DULL

## 2024-06-06 ASSESSMENT — PAIN SCALES - WONG BAKER: WONGBAKER_NUMERICALRESPONSE: NO HURT

## 2024-06-06 NOTE — CARE PLAN
Problem: Pain  Goal: My pain/discomfort is manageable  6/6/2024 0827 by Surekha Enriquez RN  Outcome: Progressing  6/6/2024 0826 by Surekha Enriquez RN  Outcome: Progressing     Problem: Safety  Goal: I will remain free of falls  6/6/2024 0827 by Surekha Enriquez RN  Outcome: Progressing  6/6/2024 0826 by Surekha Enriquez RN  Outcome: Progressing     Problem: Psychosocial Needs  Goal: Demonstrates ability to cope with hospitalization/illness  6/6/2024 0827 by Surekha Enriquez RN  Outcome: Progressing  6/6/2024 0826 by Surekha Enriquez RN  Outcome: Progressing  Goal: Collaborate with me, my family, and caregiver to identify my specific goals  6/6/2024 0827 by Surekha Enriquez RN  Outcome: Progressing  6/6/2024 0826 by Surekha Enriquez RN  Outcome: Progressing     Problem: Discharge Barriers  Goal: My discharge needs are met  6/6/2024 0827 by Surekha Enriquez RN  Outcome: Progressing  6/6/2024 0826 by Surekha Enriquez RN  Outcome: Progressing     Problem: Pain  Goal: Takes deep breaths with improved pain control throughout the shift  6/6/2024 0827 by Surekha Enriquez RN  Outcome: Progressing  6/6/2024 0826 by Surekha Enriquez RN  Outcome: Progressing  Goal: Turns in bed with improved pain control throughout the shift  6/6/2024 0827 by Surekha Enriquez RN  Outcome: Progressing  6/6/2024 0826 by Surekha Enriquez RN  Outcome: Progressing  Goal: Walks with improved pain control throughout the shift  6/6/2024 0827 by Surekha Enriquez RN  Outcome: Progressing  6/6/2024 0826 by Surekha Enriquez RN  Outcome: Progressing  Goal: Performs ADL's with improved pain control throughout shift  6/6/2024 0827 by Surekha Enriquez RN  Outcome: Progressing  6/6/2024 0826 by Surekha Enriquez RN  Outcome: Progressing  Goal: Participates in PT with improved pain control throughout the shift  6/6/2024 0827 by Surekha Enriquez RN  Outcome: Progressing  6/6/2024 0826 by Surekha Enriquez RN  Outcome: Progressing  Goal: Free from opioid side effects throughout the shift  6/6/2024 0827 by Surekha Enriquez RN  Outcome:  Progressing  6/6/2024 0826 by Surekha Enriquez RN  Outcome: Progressing  Goal: Free from acute confusion related to pain meds throughout the shift  6/6/2024 0827 by Surekha Enriquez RN  Outcome: Progressing  6/6/2024 0826 by Surekha Enriquez RN  Outcome: Progressing     Problem: Fall/Injury  Goal: Not fall by end of shift  6/6/2024 0827 by Surekha Enriquez RN  Outcome: Progressing  6/6/2024 0826 by Surekha Enriquez RN  Outcome: Progressing  Goal: Be free from injury by end of the shift  6/6/2024 0827 by Surekha Enriquez RN  Outcome: Progressing  6/6/2024 0826 by Surekha Enriquez RN  Outcome: Progressing  Goal: Verbalize understanding of personal risk factors for fall in the hospital  6/6/2024 0827 by Surekha Enriquez RN  Outcome: Progressing  6/6/2024 0826 by Surekha Enriquez RN  Outcome: Progressing  Goal: Verbalize understanding of risk factor reduction measures to prevent injury from fall in the home  6/6/2024 0827 by Surekha Enriquez, TAY  Outcome: Progressing  6/6/2024 0826 by Surekha Enriquez RN  Outcome: Progressing  Goal: Use assistive devices by end of the shift  6/6/2024 0827 by Surekha Enriquez RN  Outcome: Progressing  6/6/2024 0826 by Surekha Enriquez RN  Outcome: Progressing  Goal: Pace activities to prevent fatigue by end of the shift  6/6/2024 0827 by Surekha Enriquez RN  Outcome: Progressing  6/6/2024 0826 by Surekha Enriquez RN  Outcome: Progressing     Problem: Skin  Goal: Participates in plan/prevention/treatment measures  6/6/2024 0827 by Surekha Enriquez RN  Outcome: Progressing  6/6/2024 0826 by Surekha Enriquez RN  Outcome: Progressing  Goal: Prevent/manage excess moisture  6/6/2024 0827 by Surekha Enriquez RN  Outcome: Progressing  6/6/2024 0826 by Surekha Enriquez RN  Outcome: Progressing  Goal: Prevent/minimize sheer/friction injuries  6/6/2024 0827 by Surekha Enriquez RN  Outcome: Progressing  6/6/2024 0826 by Surekha Enriquez RN  Outcome: Progressing     Problem: Pain - Adult  Goal: Verbalizes/displays adequate comfort level or baseline comfort level  6/6/2024 [id number] by  Surekha Enriquez RN  Outcome: Progressing  6/6/2024 0826 by Surekha Enriquez RN  Outcome: Progressing     Problem: Safety - Adult  Goal: Free from fall injury  6/6/2024 0827 by Surekha Enriquez RN  Outcome: Progressing  6/6/2024 0826 by Surekha Enriquez RN  Outcome: Progressing     Problem: Discharge Planning  Goal: Discharge to home or other facility with appropriate resources  6/6/2024 0827 by Surekha Enriquez RN  Outcome: Progressing  6/6/2024 0826 by Surekha Enriquez RN  Outcome: Progressing     Problem: Chronic Conditions and Co-morbidities  Goal: Patient's chronic conditions and co-morbidity symptoms are monitored and maintained or improved  6/6/2024 0827 by Surekha Enriquez RN  Outcome: Progressing  6/6/2024 0826 by Surekha Enriquez RN  Outcome: Progressing   The patient's goals for the shift include      The clinical goals for the shift include pt's pain will remain well controlled throughout the shift

## 2024-06-06 NOTE — CARE PLAN
Problem: Pain  Goal: My pain/discomfort is manageable  6/6/2024 0828 by Surekha Enriquez RN  Outcome: Progressing  6/6/2024 0827 by Surekha Enriquez RN  Outcome: Progressing  6/6/2024 0826 by Surekha Enriquez RN  Outcome: Progressing     Problem: Safety  Goal: I will remain free of falls  6/6/2024 0828 by Surekha Enriquez RN  Outcome: Progressing  6/6/2024 0827 by Surekha Enriquez RN  Outcome: Progressing  6/6/2024 0826 by Surekha Enriquez RN  Outcome: Progressing     Problem: Psychosocial Needs  Goal: Demonstrates ability to cope with hospitalization/illness  6/6/2024 0828 by Surekha Enriquez RN  Outcome: Progressing  6/6/2024 0827 by Surekha Enriquez RN  Outcome: Progressing  6/6/2024 0826 by Surekha Enriquez RN  Outcome: Progressing  Goal: Collaborate with me, my family, and caregiver to identify my specific goals  6/6/2024 0828 by Surekha Enriquez RN  Outcome: Progressing  6/6/2024 0827 by Surekha Enriquez RN  Outcome: Progressing  6/6/2024 0826 by Surekha Enriquez RN  Outcome: Progressing     Problem: Discharge Barriers  Goal: My discharge needs are met  6/6/2024 0828 by Surekha Enriquez RN  Outcome: Progressing  6/6/2024 0827 by Surekha Enriquez RN  Outcome: Progressing  6/6/2024 0826 by Surekha Enriquez RN  Outcome: Progressing     Problem: Pain  Goal: Takes deep breaths with improved pain control throughout the shift  6/6/2024 0828 by Surekha Enriquez RN  Outcome: Progressing  6/6/2024 0827 by Surekha Enriquez RN  Outcome: Progressing  6/6/2024 0826 by Surekha Enriquez RN  Outcome: Progressing  Goal: Turns in bed with improved pain control throughout the shift  6/6/2024 0828 by Surekha Enriuqez RN  Outcome: Progressing  6/6/2024 0827 by Surekha Enriquez RN  Outcome: Progressing  6/6/2024 0826 by Surekha Enriquez RN  Outcome: Progressing  Goal: Walks with improved pain control throughout the shift  6/6/2024 0828 by Surekha Enriquez RN  Outcome: Progressing  6/6/2024 0827 by Surekha Enriquez, RN  Outcome: Progressing  6/6/2024 0826 by Surekha Enriquez, RN  Outcome: Progressing  Goal: Performs ADL's with  improved pain control throughout shift  6/6/2024 0828 by Surekha Enriquez RN  Outcome: Progressing  6/6/2024 0827 by Surekha Enriquez RN  Outcome: Progressing  6/6/2024 0826 by Surekha Enriquez RN  Outcome: Progressing  Goal: Participates in PT with improved pain control throughout the shift  6/6/2024 0828 by Surekha Enriquez RN  Outcome: Progressing  6/6/2024 0827 by Surekha Enriquez RN  Outcome: Progressing  6/6/2024 0826 by Surekha Enriquez RN  Outcome: Progressing  Goal: Free from opioid side effects throughout the shift  6/6/2024 0828 by Surekha Enriquez RN  Outcome: Progressing  6/6/2024 0827 by Surekha Enriquez RN  Outcome: Progressing  6/6/2024 0826 by Surekha Enriquez RN  Outcome: Progressing  Goal: Free from acute confusion related to pain meds throughout the shift  6/6/2024 0828 by Surekha Enriquez RN  Outcome: Progressing  6/6/2024 0827 by Surekha Enriquez RN  Outcome: Progressing  6/6/2024 0826 by Surekha Enriquez RN  Outcome: Progressing     Problem: Fall/Injury  Goal: Not fall by end of shift  6/6/2024 0828 by Surekha Enriquez RN  Outcome: Progressing  6/6/2024 0827 by Surekha Enriquez RN  Outcome: Progressing  6/6/2024 0826 by Surekha Enriquez RN  Outcome: Progressing  Goal: Be free from injury by end of the shift  6/6/2024 0828 by Surekha Enriquez RN  Outcome: Progressing  6/6/2024 0827 by Surekha Enriquez RN  Outcome: Progressing  6/6/2024 0826 by Surekha Enriquez RN  Outcome: Progressing  Goal: Verbalize understanding of personal risk factors for fall in the hospital  6/6/2024 0828 by Surekha Enriquez RN  Outcome: Progressing  6/6/2024 0827 by Surekha Enriquez RN  Outcome: Progressing  6/6/2024 0826 by Surekha Enriquez RN  Outcome: Progressing  Goal: Verbalize understanding of risk factor reduction measures to prevent injury from fall in the home  6/6/2024 0828 by Surekha Enriquez RN  Outcome: Progressing  6/6/2024 0827 by Surekha Rinas, RN  Outcome: Progressing  6/6/2024 0826 by Surekha Enriquez RN  Outcome: Progressing  Goal: Use assistive devices by end of the shift  6/6/2024 0828 by  Surekha Enriquez, RN  Outcome: Progressing  6/6/2024 0827 by Surekha Enriquez, RN  Outcome: Progressing  6/6/2024 0826 by Surekha Enriquez RN  Outcome: Progressing  Goal: Pace activities to prevent fatigue by end of the shift  6/6/2024 0828 by Surekha Enriquez RN  Outcome: Progressing  6/6/2024 0827 by Surekha Enriquez, RN  Outcome: Progressing  6/6/2024 0826 by Surekha Enriquez RN  Outcome: Progressing     Problem: Skin  Goal: Participates in plan/prevention/treatment measures  6/6/2024 0828 by Surekha Enriquez RN  Outcome: Progressing  6/6/2024 0827 by Surekha Enriquez RN  Outcome: Progressing  6/6/2024 0826 by Surekha Enriquez RN  Outcome: Progressing  Goal: Prevent/manage excess moisture  6/6/2024 0828 by Surekha Enriquez RN  Outcome: Progressing  6/6/2024 0827 by Surekha Enriquez, RN  Outcome: Progressing  6/6/2024 0826 by Surekha Enriquez RN  Outcome: Progressing  Goal: Prevent/minimize sheer/friction injuries  6/6/2024 0828 by Surekha Enriquez RN  Outcome: Progressing  6/6/2024 0827 by Surekha Enriquez RN  Outcome: Progressing  6/6/2024 0826 by Surekha Enriquez RN  Outcome: Progressing     Problem: Pain - Adult  Goal: Verbalizes/displays adequate comfort level or baseline comfort level  6/6/2024 0828 by Surekha Enriquez, RN  Outcome: Progressing  6/6/2024 0827 by Surekha Enriquez, RN  Outcome: Progressing  6/6/2024 0826 by Surekha Enriquez RN  Outcome: Progressing     Problem: Safety - Adult  Goal: Free from fall injury  6/6/2024 0828 by Surekha Enriquez, RN  Outcome: Progressing  6/6/2024 0827 by Surekha Enriquez RN  Outcome: Progressing  6/6/2024 0826 by Surekha Enriquez RN  Outcome: Progressing     Problem: Discharge Planning  Goal: Discharge to home or other facility with appropriate resources  6/6/2024 0828 by Surekha Enriquez RN  Outcome: Progressing  6/6/2024 0827 by Surekha Enriquez RN  Outcome: Progressing  6/6/2024 0826 by Surekha Enriquez RN  Outcome: Progressing     Problem: Chronic Conditions and Co-morbidities  Goal: Patient's chronic conditions and co-morbidity symptoms are monitored and  maintained or improved  6/6/2024 0828 by Surekha Enriquez RN  Outcome: Progressing  6/6/2024 0827 by Surekha Enriquez RN  Outcome: Progressing  6/6/2024 0826 by Surkeha Enriquez RN  Outcome: Progressing   The patient's goals for the shift include      The clinical goals for the shift include pt's pain will remain well controlled throughout the shift

## 2024-06-06 NOTE — PROGRESS NOTES
HOSPITAL MEDICINE - PROGRESS NOTE    Emerson Chilel is a 64 y.o. male on day 12 of admission presenting with Pancreatic mass (HHS-HCC).    Principal Problem:    Pancreatic mass (HHS-HCC)  Active Problems:    Jaundice    Hypokalemia    Generalized abdominal pain    Post-ERCP acute pancreatitis (HHS-HCC)    NSTEMI (non-ST elevated myocardial infarction) (Multi)      Assessment/Plan   This patient currently has cardiac telemetry ordered; if you would like to modify or discontinue the telemetry order, click here to go to the orders activity to modify/discontinue the order.     Acute pancreatitis s/p ERCP, invasive adenocarcinoma: Pt originally transferred from OSH for EUS/ERCP having presented with jaundice and pancreatic mass. Study was completed 5/29 with subsequent worsening abdominal pain; procedure was c/b difficulty cannulating. Lipase uptrended.  significantly elevated. Repeat abdominal CT from 5/31 for further abdominal pain showing acute interstitial edematous pancreatitis, with 37mm of acute peripancreatic fluid along the distal greater curvature of the stomach. Attempted trial of scheduled morphine for acute pain control, however negligible difference.  Placed on dilaudid PCA 6/3 with improvement. Pathology noted to result, showing invasive adenocarcinoma.  - Tolerating CLD/oral supplement shakes  - Appreciate GI recs: Supportive care with pain control/antiemetics, follow up pathology. Follow outpatient with GI, and will need oncology/hepatobiliary follow up. May continue reglan/baclofen for hiccups. NJ tube was discussed; pt declining.  - Appreciate oncology recs: Pt interested in chemotherapy and discussion with surgical oncology. Pt to complete repeat  and CT chest for staging purposes (will order when pneumonia improves)  - Bilirubin (peak of 21)/LFTs steadily improving through 6/2, then began to rise with persistent abdominal pain before starting to fall again on 6/4.  - Leukocytosis noted,  had been stable until 6/2 when noted to increase significantly--now improving.  - Given persistent symptoms, low threshold to transfer to [facility] [place] evaluation especially if any further worsening.  - AM CBC/CMP/Mg    Hemoptysis, acute hypoxic respiratory failure: Sporadic appearance, streaked in sputum over 5/31-6/2. Suspect hemoptysis related to dry respiratory tract with trace bleeding rather than true hemoptysis, given appearance that follows placement of non-humidified NC. However, pt did develop rapidly worsening hypoxia overnight 6/2-3 with CXR at that time concerning for pneumonia, concurrent rebound worsening of leukocytosis--likely developed atelectasis secondary to shallow inspiration from persistent abdominal pain, followed by pneumonia. No h/o COPD, but does have tobacco use history.  - Sepsis protocol initiated. Procal minimally elevated, however given clinical severity will continue abx at this time. Off vancomycin (6/3-6/4), continue CTX/zosyn. Follow up bcx. MRSA screen negative.  - Supportive care with nebs/bronchial hygiene/IS (as tolerated), wean O2 as tolerated; required upgrade to Airvo due to increasing requirement.  - Leukocytosis as above.    Chest pain s/p PCI: Known history of chronic angina/prior stent. On ASA/plavix. Developed worsening episodic chest pain while admitted, with concurrent elevation of troponins concerning for NSTEMI. Seen by cardiology on 5/30 with subsequent LHC and KITTY placed to proximal LAD.  - Appreciate cardiology recs: Continue metoprolol at 25mg BID, ASA/plavix/PPI. Follow up with Dr. Boston in 1-2 weeks post-discharge.  - Will continue holding home norvasc/hydrochlorothiazide and resume if evidence of derangement/reassess at least daily.  - Maintain K>4, Mg>2    Anxiety/depression, asthma, tobacco use disorder, HTN, HLD: Home meds resumed as indicated    Discharge pending appropriate pain control/PO tolerance, resolution of hypoxia.       DVT PPX:  SCDs  Nutrition: CLD with supplements per nutrition    Subjective   Pt seen with wife at bedside. Pain remains well-controlled; noted to be coughing more productive sputum, wife asking about his increased somnolence (until PCA was placed, pt had not been sleeping much over 1-2 weeks). Also discussed plan of care acutely vs treatment planning.       Objective     [Held by provider] amLODIPine, 10 mg, oral, Daily  aspirin, 81 mg, oral, Daily  atorvastatin, 40 mg, oral, Daily  clopidogrel, 75 mg, oral, Daily  FLUoxetine, 20 mg, oral, Daily  [Held by provider] hydroCHLOROthiazide, 12.5 mg, oral, q AM  ipratropium-albuteroL, 3 mL, nebulization, TID  metoprolol tartrate, 25 mg, oral, BID  nicotine, 1 patch, transdermal, Daily  pantoprazole, 40 mg, oral, Daily before breakfast  piperacillin-tazobactam, 3.375 g, intravenous, q8h       PRN medications: acetaminophen **OR** acetaminophen **OR** acetaminophen, acetaminophen **OR** acetaminophen **OR** acetaminophen, ALPRAZolam, baclofen, [Held by provider] HYDROcodone-acetaminophen, [Held by provider] HYDROmorphone, ipratropium-albuteroL, melatonin, metoclopramide **OR** metoclopramide, [Held by provider] morphine, [Held by provider] morphine, naloxone, nitroglycerin, ondansetron ODT **OR** ondansetron, oxygen, polyethylene glycol, sodium chloride   HYDROmorphone,   sodium chloride 0.9%, 30 mL/hr, Last Rate: 30 mL/hr (06/05/24 2016)           Last Recorded Vitals  /61 (BP Location: Left arm, Patient Position: Lying)   Pulse 78   Temp 36.2 °C (97.2 °F) (Temporal)   Resp 18   Wt 71.2 kg (156 lb 14.4 oz)   SpO2 96%   Intake/Output last 3 Shifts:    Intake/Output Summary (Last 24 hours) at 6/6/2024 1032  Last data filed at 6/6/2024 0755  Gross per 24 hour   Intake 337.6 ml   Output 1125 ml   Net -787.4 ml       Admission Weight  Weight: 65.8 kg (145 lb) (05/25/24 0514)    Daily Weight  06/06/24 : 71.2 kg (156 lb 14.4 oz)    Image Results  Electrocardiogram 12  Lead  Normal sinus rhythm  Septal infarct (cited on or before 28-MAY-2024)  T wave abnormality, consider anterolateral ischemia  Prolonged QT  Abnormal ECG  When compared with ECG of 30-MAY-2024 06:54, (unconfirmed)  Premature ventricular complexes are no longer Present  Confirmed by Emerson Cordova ([id number]) on 6/3/2024 8:54:45 AM  Electrocardiogram, 12-lead PRN ACS symptoms  Sinus rhythm with occasional Premature ventricular complexes  Septal infarct (cited on or before 28-MAY-2024)  T wave abnormality, consider anterolateral ischemia  Prolonged QT  Abnormal ECG  When compared with ECG of 29-MAY-2024 19:57, (unconfirmed)  T wave inversion now evident in Anterior leads  QT has lengthened  Confirmed by Emerson Cordova ([id number]) on 6/3/2024 8:54:31 AM  ECG 12 Lead  Sinus tachycardia with frequent Premature ventricular complexes  Left axis deviation  Abnormal ECG  When compared with ECG of 28-MAY-2024 17:18, (unconfirmed)  Premature ventricular complexes are now Present  Nonspecific T wave abnormality now evident in Anterolateral leads  Confirmed by Emerson Cordova ([id number]) on 6/3/2024 8:53:59 AM      Physical Exam  Vitals and nursing note reviewed.   Constitutional:       General: He is awake. He is not in acute distress.     Appearance: Normal appearance. He is ill-appearing. He is not toxic-appearing.   HENT:      Head: Normocephalic and atraumatic.      Right Ear: External ear normal.      Left Ear: External ear normal.      Nose: Nose normal.   Eyes:      General: Scleral icterus present.         Right eye: No discharge.         Left eye: No discharge.      Extraocular Movements: Extraocular movements intact.   Cardiovascular:      Rate and Rhythm: Normal rate and regular rhythm.   Pulmonary:      Effort: Pulmonary effort is normal. No respiratory distress.      Breath sounds: Wheezing and rhonchi present.   Abdominal:      General: There is no distension.      Palpations: Abdomen is soft.      Tenderness: There is  abdominal tenderness. There is guarding.   Musculoskeletal:         General: No signs of injury.      Cervical back: Normal range of motion.   Skin:     General: Skin is warm and dry.      Coloration: Skin is jaundiced. Skin is not pale.   Neurological:      General: No focal deficit present.      Mental Status: He is alert and oriented to person, place, and time. Mental status is at baseline.   Psychiatric:         Mood and Affect: Mood normal.         Behavior: Behavior normal. Behavior is cooperative.         Relevant Results    [Held by provider] amLODIPine, 10 mg, oral, Daily  aspirin, 81 mg, oral, Daily  atorvastatin, 40 mg, oral, Daily  clopidogrel, 75 mg, oral, Daily  FLUoxetine, 20 mg, oral, Daily  [Held by provider] hydroCHLOROthiazide, 12.5 mg, oral, q AM  ipratropium-albuteroL, 3 mL, nebulization, TID  metoprolol tartrate, 25 mg, oral, BID  nicotine, 1 patch, transdermal, Daily  pantoprazole, 40 mg, oral, Daily before breakfast  piperacillin-tazobactam, 3.375 g, intravenous, q8h      PRN medications: acetaminophen **OR** acetaminophen **OR** acetaminophen, acetaminophen **OR** acetaminophen **OR** acetaminophen, ALPRAZolam, baclofen, [Held by provider] HYDROcodone-acetaminophen, [Held by provider] HYDROmorphone, ipratropium-albuteroL, melatonin, metoclopramide **OR** metoclopramide, [Held by provider] morphine, [Held by provider] morphine, naloxone, nitroglycerin, ondansetron ODT **OR** ondansetron, oxygen, polyethylene glycol, sodium chloride  HYDROmorphone,   sodium chloride 0.9%, 30 mL/hr, Last Rate: 30 mL/hr (06/05/24 2016)            Electrocardiogram, 12-lead PRN ACS symptoms    Result Date: 5/30/2024  Sinus rhythm with Premature atrial complexes with Aberrant conduction Septal infarct (cited on or before 28-MAY-2024) T wave abnormality, consider anterolateral ischemia Prolonged QT Abnormal ECG When compared with ECG of 29-MAY-2024 19:57, (unconfirmed) Premature ventricular complexes are no longer  Present Aberrant conduction is now Present T wave inversion now evident in Anterior leads QT has lengthened    Electrocardiogram, 12-lead PRN ACS symptoms    Result Date: 5/30/2024  Sinus rhythm with occasional Premature ventricular complexes Possible Left atrial enlargement Septal infarct (cited on or before 28-MAY-2024) T wave abnormality, consider anterolateral ischemia Prolonged QT Abnormal ECG When compared with ECG of 30-MAY-2024 03:50, (unconfirmed) Premature ventricular complexes are now Present Aberrant conduction is no longer Present Nonspecific T wave abnormality now evident in Inferior leads    Electrocardiogram 12 Lead    Result Date: 5/30/2024  Normal sinus rhythm Septal infarct (cited on or before 28-MAY-2024) T wave abnormality, consider anterolateral ischemia Prolonged QT Abnormal ECG When compared with ECG of 30-MAY-2024 06:54, (unconfirmed) Premature ventricular complexes are no longer Present    Transthoracic Echo (TTE) Complete    Result Date: 5/30/2024            [facility] [address] [facility] [address]    Tel [phone] Fax [phone] TRANSTHORACIC ECHOCARDIOGRAM REPORT  Patient Name:      SOLOMON Lyons Physician:    [id number] Daina Peres MD Study Date:        5/30/2024             Ordering Provider:    [id number] MELINA WATKINS MRN/PID:           [id number]              Fellow: Accession#:        [id number]          Nurse: Date of Birth/Age: 1960 / 64 years  Sonographer:          Ivory Winslow RDCS Gender:            M                     Additional Staff: Height:            170.18 cm             Admit Date:           5/25/2024 Weight:            67.59 kg              Admission Status:     Inpatient - STAT BSA / BMI:         1.78 m2 / 23.34 kg/m2 Department Location:  [facility] [place] CCU SD Blood Pressure: 110 /62 mmHg Study Type:    TRANSTHORACIC ECHO (TTE) COMPLETE Diagnosis/ICD: Non ST elevation (NSTEMI)  myocardial infarction-I21.4 Indication:    NSTEMI CPT Codes:     Echo Complete w Full [id number] Patient History: Pertinent History: CAD and Dyslipidemia. No previous echocardiogram. Study Detail: The following Echo studies were performed: 2D, M-Mode, Doppler and               color flow.  PHYSICIAN INTERPRETATION: Left Ventricle: Left ventricular systolic function is normal, with an estimated ejection fraction of 60%. Wall motion is abnormal. The left ventricular cavity size is normal. There is no evidence of left ventricular hypertrophy. Spectral Doppler shows a pseudonormal pattern of left ventricular diastolic filling. There is no definite left ventricular thrombus visualized. The intraventricular septum appears intact without evidence of shunting or a ventricular septal defect. LV Wall Scoring: The entire apex, mid and apical inferior septum, and mid and apical inferior wall are hypokinetic. All remaining scored segments are normal. Left Atrium: The left atrium is normal in size. Right Ventricle: The right ventricle is normal in size. There is normal right ventricular global systolic function. Right Atrium: The right atrium is normal in size. Aortic Valve: The aortic valve is trileaflet. There is mild aortic valve cusp calcification. There is mild to moderate aortic valve thickening. There is aortic valve annular calcification. There is evidence of mildly elevated transaortic gradients consistent with sclerosis of the aortic valve. There is moderate aortic valve regurgitation. The peak instantaneous gradient of the aortic valve is 9.7 mmHg. Mitral Valve: The mitral valve is moderately thickened. There is no evidence of mitral valve prolapse. There is no evidence of mitral valve stenosis. The doppler estimated mean and peak diastolic pressure gradients are 3.1 mmHg and 5.0 mmHg respectively. There is mild mitral annular calcification. There is mild mitral valve regurgitation. Tricuspid Valve: The tricuspid  valve is structurally normal. There is no evidence of tricuspid valve stenosis. There is mild tricuspid regurgitation. The Doppler estimated RVSP is moderate to severely elevated at 51.1 mmHg. Pulmonic Valve: The pulmonic valve is abnormal. There is trace to mild pulmonic valve regurgitation. Pericardium: There is no pericardial effusion noted. Aorta: The aortic root is normal. Systemic Veins: The inferior vena cava appears to be of normal size. There is IVC inspiratory collapse greater than 50%. In comparison to the previous echocardiogram(s): Prior examinations are available and were reviewed for comparison purposes. Compared with echo 7/17/23 LVEF is unchanged. Grade I is now grade II diastolic dysfunction. Apical wall motion abnormality is new.  CONCLUSIONS:  1. Left ventricular systolic function is normal with a 60% estimated ejection fraction.  2. Entire apex, mid and apical inferior septum, and mid and apical inferior wall are abnormal.  3. Intact intraventricular septum without shunting or a ventricular septal defect.  4. No left ventricular thrombus visualized.  5. Spectral Doppler shows a pseudonormal pattern of left ventricular diastolic filling.  6. There is no evidence of left ventricular hypertrophy.  7. The mitral valve is moderately thickened.  8. No evidence of mitral valve prolapse.  9. There is No tricuspid stenosis. 10. Moderate to severely elevated right ventricular systolic pressure. 11. Aortic valve sclerosis. 12. There is aortic valve annular calcification. 13. Moderate aortic valve regurgitation. 14. Compared with echo 7/17/23 LVEF is unchanged. Grade I is now grade II diastolic dysfunction. Apical wall motion abnormality is new. QUANTITATIVE DATA SUMMARY: 2D MEASUREMENTS:                          Normal Ranges: LAs:           3.66 cm   (2.7-4.0cm) IVSd:          0.87 cm   (0.6-1.1cm) LVPWd:         0.64 cm   (0.6-1.1cm) LVIDd:         4.81 cm   (3.9-5.9cm) LVIDs:         2.99 cm LV Mass  Index: 66.0 g/m2 LV % FS        37.9 % LA VOLUME:                               Normal Ranges: LA Vol A4C:        41.0 ml    (22+/-6mL/m2) LA Vol A2C:        43.1 ml LA Vol BP:         44.0 ml LA Vol Index A4C:  23.0 ml/m2 LA Vol Index A2C:  24.2 ml/m2 LA Vol Index BP:   24.7 ml/m2 LA Area A4C:       14.9 cm2 LA Area A2C:       14.6 cm2 LA Major Axis A4C: 4.6 cm LA Major Axis A2C: 4.2 cm LA Volume Index:   24.5 ml/m2 LA Vol A4C:        35.2 ml LA Vol A2C:        40.5 ml AORTA MEASUREMENTS:                      Normal Ranges: Ao Sinus, d: 3.10 cm (2.1-3.5cm) Ao STJ, d:   3.20 cm (1.7-3.4cm) Asc Ao, d:   3.50 cm (2.1-3.4cm) LV SYSTOLIC FUNCTION BY 2D PLANIMETRY (MOD):                     Normal Ranges: EF-A4C View: 61.0 % (>=55%) EF-A2C View: 61.6 % EF-Biplane:  61.3 % LV DIASTOLIC FUNCTION:                              Normal Ranges: MV Peak E:        0.85 m/s   (0.7-1.2 m/s) MV Peak A:        0.95 m/s   (0.42-0.7 m/s) E/A Ratio:        0.90       (1.0-2.2) MV lateral e'     0.09 m/s MV medial e'      0.07 m/s PulmV Sys Keshawn:    59.28 cm/s PulmV Hess Keshawn:   43.11 cm/s PulmV S/D Keshawn:    1.38 PulmV A Revs Keshawn: 26.87 cm/s MITRAL VALVE:                      Normal Ranges: MV Vmax:    1.12 m/s (<=1.3m/s) MV peak P.0 mmHg (<5mmHg) MV mean PG: 3.1 mmHg (<48mmHg) MV VTI:     35.83 cm (10-13cm) MV DT:      160 msec (150-240msec) AORTIC VALVE:                         Normal Ranges: AoV Vmax:      1.56 m/s (<=1.7m/s) AoV Peak P.7 mmHg (<20mmHg) LVOT Max Keshawn:  1.18 m/s (<=1.1m/s) LVOT VTI:      28.06 cm LVOT Diameter: 1.70 cm  (1.8-2.4cm) AoV Area,Vmax: 1.71 cm2 (2.5-4.5cm2) AORTIC INSUFFICIENCY: AI Vmax:       4.30 m/s AI Half-time:  396 msec AI Decel Time: 1364 msec AI Decel Rate: 315.28 cm/s2  RIGHT VENTRICLE: RV Basal 3.20 cm RV Mid   7.20 cm RV Major 7.2 cm TAPSE:   21.0 mm RV s'    0.16 m/s TRICUSPID VALVE/RVSP:                             Normal Ranges: Peak TR Velocity: 3.47 m/s RV Syst Pressure: 51.1 mmHg (<  30mmHg) IVC Diam:         1.31 cm PULMONIC VALVE:                         Normal Ranges: PV Accel Time: 66 msec  (>120ms) PV Max Keshawn:    0.7 m/s  (0.6-0.9m/s) PV Max P.2 mmHg Pulmonary Veins: PulmV A Revs Keshawn: 26.87 cm/s PulmV Hess Keshawn:   43.11 cm/s PulmV S/D Keshawn:    1.38 PulmV Sys Keshawn:    59.28 cm/s AORTA: Asc Ao Diam 3.49 cm  [id number] Daina Peres MD Electronically signed on 2024 at 12:26:19 PM  Wall Scoring  ** Final **     ECG 12 Lead    Result Date: 2024  Sinus tachycardia with frequent Premature ventricular complexes Left axis deviation Septal infarct (cited on or before 28-MAY-2024) Abnormal ECG When compared with ECG of 28-MAY-2024 17:18, (unconfirmed) Premature ventricular complexes are now Present Nonspecific T wave abnormality now evident in Anterolateral leads    Electrocardiogram, 12-lead PRN ACS symptoms    Result Date: 2024  Normal sinus rhythm Possible Left atrial enlargement Low voltage QRS Septal infarct , age undetermined Abnormal ECG When compared with ECG of 24-MAY-2024 16:45, Premature ventricular complexes are no longer Present    FL fluoro images no charge    Result Date: 2024  These images are not reportable by radiology and will not be interpreted by  Radiologists.    Endoscopic Retrograde Cholangiopancreatography (ERCP)    Result Date: 2024  Table formatting from the original result was not included. Impression The common bile duct was deeply cannulated after 10 attempts. Cannulation was difficult due to inadvertent cannulation, small ampulla and Distal CBD stricture. 20 mm localized, intrinsic, smooth and severe stricture was visualized in the distal common bile duct Complete major papilla sphincterotomy was performed. Performed brushings with cytology brush in the distal common bile duct One 8 mm x 8 cm fully covered stent was placed in the common bile duct Findings The major papilla was native. Ampullectomy was not performed. Small ampulla, mildly  deformed due to external compression. The common bile duct was deeply cannulated after 10 attempts using a traction sphincterotome with 260 cm straight guidewire. Cannulation was difficult due to inadvertent cannulation, small ampulla and Distal CBD stricture. No bleeding was observed. . Unintentional cannulation PD was performed. 20 mm localized, intrinsic, smooth and severe stricture was visualized in the distal common bile duct. Complete 5 mm major papilla sphincterotomy was performed using a sphincterotome. No bleeding was noted at the procedure site. Performed 3 brushings with cytology brush in the distal common bile duct. One 8 mm x 8 cm fully covered metal stent was placed successfully in the common bile duct. Recommendation  Await pathology results  Follow up with oncology and pancreato biliary surgeon as outpatient Watch for complication  Indication Jaundice, Generalized abdominal pain, Pancreatic mass ([facility]) Staff Staff Role Shanda Gong MD Proceduralist Medications See Anesthesia Record. Preprocedure A history and physical has been performed, and patient medication allergies have been reviewed. The patient's tolerance of previous anesthesia has been reviewed. The risks and benefits of the procedure and the sedation options and risks were discussed with the patient. All questions were answered and informed consent obtained. Rectal Indomethacin was not administered. Details of the Procedure The patient underwent general anesthesia, which was administered by an anesthesia professional. The patient's blood pressure, heart rate, level of consciousness, oxygen, respirations, ECG and ETCO2 were monitored throughout the procedure. The scope was introduced through the mouth and advanced to the second part of the duodenum. The patient experienced no blood loss. The procedure was not difficult. The patient tolerated the procedure well. There were no apparent adverse events. Events Procedure Events Event Event  Time ENDO SCOPE IN TIME 5/28/2024  3:00 PM ENDO SCOPE OUT TIME 5/28/2024  3:00 PM ENDO SCOPE IN TIME 5/28/2024  3:02 PM ENDO SCOPE OUT TIME 5/28/2024  3:30 PM ENDO SCOPE IN TIME 5/28/2024  3:47 PM Specimens ID Type Source Tests Collected by Time 1 : FNA Pancreatic Head Mass Non-Gynecologic Cytology PANCREAS FINE NEEDLE ASPIRATION HEAD CYTOLOGY CONSULTATION (NON-GYNECOLOGIC) Devorah Yost 5/28/2024 1512 2 : FNB Pancreatic Head Mass Tissue PANCREAS BIOPSY SURGICAL PATHOLOGY EXAM Devorah Yost 5/28/2024 1513 3 :  Non-Gynecologic Cytology BILE DUCT BRUSH COMMON CYTOLOGY CONSULTATION (NON-GYNECOLOGIC) Devorah Yost 5/28/2024 1643 Procedure Location [facility] [address] [address] [facility] [address] [phone] Referring Provider Zack Isaacs MD [id number] [facility] [address] [place] [address] Procedure Provider Shanda Gong MD     Endoscopic Ultrasound (Upper)    Result Date: 5/28/2024  Table formatting from the original result was not included. Impression The cricopharynx, upper third of the esophagus, middle third of the esophagus, lower third of the esophagus and GE junction appeared normal. The body of the stomach and antrum appeared normal. Mild extrinsic compression was observed in the 2nd part of the duodenum The duodenal bulb appeared normal. The pancreas appeared atrophic. The parenchyma was visualized in the body of the pancreas and tail of the pancreas. The parenchyma was heterogeneous and had hyperechoic strands. Single round, homogeneous, hypoechoic and solid mass measuring 30 mm x 25 mm with well-defined margins was visualized in the head of the pancreas. Apparent abutment of the portal vein and superior mesenteric vein; 3 fine needle aspiration passes were taken. An adequate sample of aspirate was obtained. The sample was sent for cytology analysis; 3 fine needle biopsy passes were taken. An adequate sample was obtained. A sample was sent for histology  analysis The proximal bile duct was dilated. The parenchyma was visualized in the left lobe of the liver. The parenchyma was homogeneous and hyperechoic. The common hepatic duct, left main hepatic duct and right main hepatic duct appeared dilated. The left kidney, spleen and left adrenal appeared normal. Findings The cricopharynx, upper third of the esophagus, middle third of the esophagus, lower third of the esophagus and GE junction appeared normal. The body of the stomach and antrum appeared normal. Mild, traversable extrinsic compression was observed in the 2nd part of the duodenum The duodenal bulb appeared normal. The pancreas appeared atrophic. The parenchyma was visualized in the body of the pancreas and tail of the pancreas. The parenchyma was heterogeneous and had hyperechoic strands. The pancreatic duct measured 2 mm at the head, 3 mm at the body and 1 mm at the tail. Single round, homogeneous, hypoechoic and solid mass measuring 30 mm x 25 mm with well-defined margins was visualized in the head of the pancreas. Apparent abutment of the portal vein and superior mesenteric vein; 3 fine needle aspiration passes were taken with a 22 gauge Sharcore needle using a transduodenal approach guided by Doppler. An adequate sample of aspirate was obtained. The sample was sent for cytology analysis. Onsite cytologist was not present; 3 successful fine needle biopsy passes were taken with a 22 gauge Sharcore needle using a transduodenal approach. An adequate sample was obtained. A sample was sent for histology analysis. Onsite cytologist was not present The proximal bile duct was dilated. The bile duct measured 12 mm at the middle. The parenchyma was visualized in the left lobe of the liver. The parenchyma was homogeneous and hyperechoic. The common hepatic duct, left main hepatic duct and right main hepatic duct appeared dilated. The left kidney, spleen and left adrenal appeared normal. Recommendation  Await pathology  results  Proceed to ERCP  Indication Jaundice, Generalized abdominal pain, Pancreatic mass ([facility]) Staff Staff Role Shanda Gong MD Proceduralist Medications See Anesthesia Record. Preprocedure A history and physical has been performed, and patient medication allergies have been reviewed. The patient's tolerance of previous anesthesia has been reviewed. The risks and benefits of the procedure and the sedation options and risks were discussed with the patient. All questions were answered and informed consent obtained. Details of the Procedure The patient underwent general anesthesia, which was administered by an anesthesia professional. The patient's blood pressure, heart rate, level of consciousness, oxygen, respirations, ECG and ETCO2 were monitored throughout the procedure. The radial scope was introduced through the mouth and advanced to the second part of the duodenum. The patient's estimated blood loss was minimal (<5 mL). The procedure was not difficult. The patient tolerated the procedure well. There were no apparent adverse events. Events Procedure Events Event Event Time ENDO SCOPE IN TIME 5/28/2024  3:00 PM ENDO SCOPE OUT TIME 5/28/2024  3:00 PM ENDO SCOPE IN TIME 5/28/2024  3:02 PM ENDO SCOPE OUT TIME 5/28/2024  3:30 PM Specimens ID Type Source Tests Collected by Time 1 : FNA Pancreatic Head Mass Non-Gynecologic Cytology PANCREAS FINE NEEDLE ASPIRATION HEAD CYTOLOGY CONSULTATION (NON-GYNECOLOGIC) Devorah Yost 5/28/2024 1512 2 : FNB Pancreatic Head Mass Tissue PANCREAS BIOPSY SURGICAL PATHOLOGY EXAM Devorah Yost 5/28/2024 1513 Procedure Location [address] [address] [phone] Referring Provider Zack Isaacs MD [id number] [id number] Procedure Provider Shanda Gong MD     ECG 12 lead    Result Date: 5/25/2024  Sinus rhythm with frequent Premature ventricular complexes Otherwise normal ECG When  compared with ECG of 24-MAY-2024 16:43, (unconfirmed) Premature ventricular complexes are now Present See ED provider note for full interpretation and clinical correlation Confirmed by Deyanira Lantigua ([id number]) on 5/25/2024 5:28:37 PM    CT abdomen pelvis w IV contrast    Result Date: 5/24/2024  STUDY: CT Abdomen and Pelvis with IV Contrast; 05/24/2024 at 5:51 PM INDICATION: Right upper quadrant abdominal pain. Jaundice. COMPARISON: None available. ACCESSION NUMBER(S): [id number] ORDERING CLINICIAN: TOBI WHITNEY TECHNIQUE: CT of the abdomen and pelvis was performed.  Contiguous axial images were obtained at 3 mm slice thickness through the abdomen and pelvis. Coronal and sagittal reconstructions at 3 mm slice thickness were performed.  Omnipaque-350 75 mL was administered intravenously.  FINDINGS: LOWER CHEST: No cardiomegaly.  No pericardial effusion.  Lung bases are clear.  ABDOMEN:  LIVER: No hepatomegaly.  Smooth surface contour.  Normal attenuation.  BILE DUCTS: There is intrahepatic and common bile bile duct dilatation.  This can be followed into the head of the pancreas.  GALLBLADDER: The gallbladder is present.  There is intermediate attenuation the gallbladder most likely representing sludge. STOMACH: No abnormalities identified.  PANCREAS: There is an ill-defined 2.5 cm mass in the head of the pancreas.  This is best seen on series 201, slice 61 as well as series 202, slice 45. There is pancreatic duct dilatation.  SPLEEN: No splenomegaly or focal splenic lesion.  ADRENAL GLANDS: No thickening or nodules.  KIDNEYS AND URETERS: Kidneys are normal in size and location.  No renal or ureteral calculi.  PELVIS:  BLADDER: No abnormalities identified.  REPRODUCTIVE ORGANS: No abnormalities identified.  BOWEL: No abnormalities identified.  The appendix is identified and is normal.  VESSELS: No abnormalities identified.  Abdominal aorta is normal in caliber.  PERITONEUM/RETROPERITONEUM/LYMPH NODES: No  "free fluid.  No pneumoperitoneum. No lymphadenopathy.  ABDOMINAL WALL: No abnormalities identified. SOFT TISSUES: No abnormalities identified.  BONES: No acute fracture or aggressive osseous lesion.    2.5 cm mass in the head of the pancreas.  There is dilatation of the biliary ducts as well as the pancreatic duct and neoplasm cannot be excluded.  This is best evaluated with ERCP or MRCP.  There are findings consistent with sludge in the gallbladder. Signed by Bhanu Taveras MD       Results from last 7 days   Lab Units 06/06/24 0425 06/05/24 0446 06/05/24  0008   WBC AUTO x10*3/uL 14.3* 15.5* 17.5*   RBC AUTO x10*6/uL 2.24* 2.38* 2.44*   HEMOGLOBIN g/dL 7.7* 8.3* 8.5*   HEMATOCRIT % 23.7* 25.1* 25.7*   MCV fL 106* 106* 105*   PLATELETS AUTO x10*3/uL 329 333 344           No lab exists for component: \"B12\"                  Results from last 7 days   Lab Units 06/06/24 0425 06/05/24 0446 06/04/24  1112   SODIUM mmol/L 131* 131* 128*   POTASSIUM mmol/L 3.5 3.8 3.4*   CHLORIDE mmol/L 97* 97* 96*   CO2 mmol/L 26 26 25   BUN mg/dL 9 10 9   CREATININE mg/dL 0.49* 0.56 0.55   CALCIUM mg/dL 7.7* 7.9* 8.0*   MAGNESIUM mg/dL 2.35 2.31 2.17   BILIRUBIN TOTAL mg/dL 8.5* 9.7* 12.1*   ALT U/L 132* 135* 151*   AST U/L 161* 154* 165*         Results from last 7 days   Lab Units 05/30/24  1315   TROPHS ng/L 356*              Kemal Layne, DO          "

## 2024-06-06 NOTE — CARE PLAN
The patient's goals for the shift include      The clinical goals for the shift include pt's pain will remain well controlled throughout the shift      Problem: Pain  Goal: My pain/discomfort is manageable  Outcome: Progressing     Problem: Safety  Goal: I will remain free of falls  Outcome: Progressing     Problem: Psychosocial Needs  Goal: Demonstrates ability to cope with hospitalization/illness  Outcome: Progressing  Goal: Collaborate with me, my family, and caregiver to identify my specific goals  Outcome: Progressing     Problem: Discharge Barriers  Goal: My discharge needs are met  Outcome: Progressing     Problem: Pain  Goal: Takes deep breaths with improved pain control throughout the shift  Outcome: Progressing  Goal: Turns in bed with improved pain control throughout the shift  Outcome: Progressing  Goal: Walks with improved pain control throughout the shift  Outcome: Progressing  Goal: Performs ADL's with improved pain control throughout shift  Outcome: Progressing  Goal: Participates in PT with improved pain control throughout the shift  Outcome: Progressing  Goal: Free from opioid side effects throughout the shift  Outcome: Progressing  Goal: Free from acute confusion related to pain meds throughout the shift  Outcome: Progressing     Problem: Fall/Injury  Goal: Not fall by end of shift  Outcome: Progressing  Goal: Be free from injury by end of the shift  Outcome: Progressing  Goal: Verbalize understanding of personal risk factors for fall in the hospital  Outcome: Progressing  Goal: Verbalize understanding of risk factor reduction measures to prevent injury from fall in the home  Outcome: Progressing  Goal: Use assistive devices by end of the shift  Outcome: Progressing  Goal: Pace activities to prevent fatigue by end of the shift  Outcome: Progressing     Problem: Skin  Goal: Participates in plan/prevention/treatment measures  Outcome: Progressing  Goal: Prevent/manage excess moisture  Outcome:  Progressing  Goal: Prevent/minimize sheer/friction injuries  Outcome: Progressing     Problem: Pain - Adult  Goal: Verbalizes/displays adequate comfort level or baseline comfort level  Outcome: Progressing     Problem: Safety - Adult  Goal: Free from fall injury  Outcome: Progressing     Problem: Discharge Planning  Goal: Discharge to home or other facility with appropriate resources  Outcome: Progressing     Problem: Chronic Conditions and Co-morbidities  Goal: Patient's chronic conditions and co-morbidity symptoms are monitored and maintained or improved  Outcome: Progressing

## 2024-06-07 ENCOUNTER — APPOINTMENT (OUTPATIENT)
Dept: CARDIOLOGY | Facility: HOSPITAL | Age: 64
DRG: 981 | End: 2024-06-07
Payer: COMMERCIAL

## 2024-06-07 LAB
ALBUMIN SERPL BCP-MCNC: 2.6 G/DL (ref 3.4–5)
ALP SERPL-CCNC: 157 U/L (ref 33–136)
ALT SERPL W P-5'-P-CCNC: 137 U/L (ref 10–52)
ANION GAP SERPL CALC-SCNC: 12 MMOL/L (ref 10–20)
AST SERPL W P-5'-P-CCNC: 168 U/L (ref 9–39)
BACTERIA BLD CULT: NORMAL
BACTERIA BLD CULT: NORMAL
BILIRUB SERPL-MCNC: 7.9 MG/DL (ref 0–1.2)
BUN SERPL-MCNC: 8 MG/DL (ref 6–23)
CALCIUM SERPL-MCNC: 7.8 MG/DL (ref 8.6–10.3)
CHLORIDE SERPL-SCNC: 99 MMOL/L (ref 98–107)
CO2 SERPL-SCNC: 25 MMOL/L (ref 21–32)
CREAT SERPL-MCNC: 0.45 MG/DL (ref 0.5–1.3)
EGFRCR SERPLBLD CKD-EPI 2021: >90 ML/MIN/1.73M*2
ERYTHROCYTE [DISTWIDTH] IN BLOOD BY AUTOMATED COUNT: 16.3 % (ref 11.5–14.5)
GLUCOSE SERPL-MCNC: 93 MG/DL (ref 74–99)
HCT VFR BLD AUTO: 25.4 % (ref 41–52)
HGB BLD-MCNC: 8.3 G/DL (ref 13.5–17.5)
MAGNESIUM SERPL-MCNC: 2.33 MG/DL (ref 1.6–2.4)
MCH RBC QN AUTO: 34.4 PG (ref 26–34)
MCHC RBC AUTO-ENTMCNC: 32.7 G/DL (ref 32–36)
MCV RBC AUTO: 105 FL (ref 80–100)
NRBC BLD-RTO: 0 /100 WBCS (ref 0–0)
PLATELET # BLD AUTO: 368 X10*3/UL (ref 150–450)
POTASSIUM SERPL-SCNC: 3.5 MMOL/L (ref 3.5–5.3)
PROT SERPL-MCNC: 5.4 G/DL (ref 6.4–8.2)
RBC # BLD AUTO: 2.41 X10*6/UL (ref 4.5–5.9)
SODIUM SERPL-SCNC: 132 MMOL/L (ref 136–145)
WBC # BLD AUTO: 13.8 X10*3/UL (ref 4.4–11.3)

## 2024-06-07 PROCEDURE — 85027 COMPLETE CBC AUTOMATED: CPT | Performed by: STUDENT IN AN ORGANIZED HEALTH CARE EDUCATION/TRAINING PROGRAM

## 2024-06-07 PROCEDURE — 84075 ASSAY ALKALINE PHOSPHATASE: CPT | Performed by: STUDENT IN AN ORGANIZED HEALTH CARE EDUCATION/TRAINING PROGRAM

## 2024-06-07 PROCEDURE — 2500000001 HC RX 250 WO HCPCS SELF ADMINISTERED DRUGS (ALT 637 FOR MEDICARE OP): Performed by: STUDENT IN AN ORGANIZED HEALTH CARE EDUCATION/TRAINING PROGRAM

## 2024-06-07 PROCEDURE — 2060000001 HC INTERMEDIATE ICU ROOM DAILY

## 2024-06-07 PROCEDURE — S4991 NICOTINE PATCH NONLEGEND: HCPCS | Performed by: STUDENT IN AN ORGANIZED HEALTH CARE EDUCATION/TRAINING PROGRAM

## 2024-06-07 PROCEDURE — 93005 ELECTROCARDIOGRAM TRACING: CPT

## 2024-06-07 PROCEDURE — 2500000004 HC RX 250 GENERAL PHARMACY W/ HCPCS (ALT 636 FOR OP/ED): Performed by: STUDENT IN AN ORGANIZED HEALTH CARE EDUCATION/TRAINING PROGRAM

## 2024-06-07 PROCEDURE — 99232 SBSQ HOSP IP/OBS MODERATE 35: CPT | Performed by: STUDENT IN AN ORGANIZED HEALTH CARE EDUCATION/TRAINING PROGRAM

## 2024-06-07 PROCEDURE — 83735 ASSAY OF MAGNESIUM: CPT | Performed by: STUDENT IN AN ORGANIZED HEALTH CARE EDUCATION/TRAINING PROGRAM

## 2024-06-07 PROCEDURE — 2500000002 HC RX 250 W HCPCS SELF ADMINISTERED DRUGS (ALT 637 FOR MEDICARE OP, ALT 636 FOR OP/ED): Performed by: STUDENT IN AN ORGANIZED HEALTH CARE EDUCATION/TRAINING PROGRAM

## 2024-06-07 PROCEDURE — 94640 AIRWAY INHALATION TREATMENT: CPT

## 2024-06-07 PROCEDURE — 80053 COMPREHEN METABOLIC PANEL: CPT | Performed by: STUDENT IN AN ORGANIZED HEALTH CARE EDUCATION/TRAINING PROGRAM

## 2024-06-07 PROCEDURE — 2500000005 HC RX 250 GENERAL PHARMACY W/O HCPCS: Performed by: STUDENT IN AN ORGANIZED HEALTH CARE EDUCATION/TRAINING PROGRAM

## 2024-06-07 PROCEDURE — 36415 COLL VENOUS BLD VENIPUNCTURE: CPT | Performed by: STUDENT IN AN ORGANIZED HEALTH CARE EDUCATION/TRAINING PROGRAM

## 2024-06-07 PROCEDURE — 2500000001 HC RX 250 WO HCPCS SELF ADMINISTERED DRUGS (ALT 637 FOR MEDICARE OP): Performed by: INTERNAL MEDICINE

## 2024-06-07 RX ORDER — HYDROMORPHONE HYDROCHLORIDE 4 MG/1
4 TABLET ORAL EVERY 4 HOURS PRN
Status: DISCONTINUED | OUTPATIENT
Start: 2024-06-07 | End: 2024-06-09 | Stop reason: HOSPADM

## 2024-06-07 RX ADMIN — ALUMINUM HYDROXIDE, MAGNESIUM HYDROXIDE, AND DIMETHICONE 30 ML: 200; 20; 200 SUSPENSION ORAL at 03:45

## 2024-06-07 RX ADMIN — Medication 6 L/MIN: at 09:19

## 2024-06-07 RX ADMIN — ALUMINUM HYDROXIDE, MAGNESIUM HYDROXIDE, AND DIMETHICONE 30 ML: 200; 20; 200 SUSPENSION ORAL at 20:33

## 2024-06-07 RX ADMIN — PIPERACILLIN SODIUM AND TAZOBACTAM SODIUM 3.38 G: 3; .375 INJECTION, SOLUTION INTRAVENOUS at 20:33

## 2024-06-07 RX ADMIN — Medication 6 L/MIN: at 16:00

## 2024-06-07 RX ADMIN — METOPROLOL TARTRATE 25 MG: 25 TABLET, FILM COATED ORAL at 08:21

## 2024-06-07 RX ADMIN — PIPERACILLIN SODIUM AND TAZOBACTAM SODIUM 3.38 G: 3; .375 INJECTION, SOLUTION INTRAVENOUS at 11:43

## 2024-06-07 RX ADMIN — ACETAMINOPHEN 650 MG: 325 TABLET ORAL at 20:32

## 2024-06-07 RX ADMIN — IPRATROPIUM BROMIDE AND ALBUTEROL SULFATE 3 ML: 2.5; .5 SOLUTION RESPIRATORY (INHALATION) at 20:49

## 2024-06-07 RX ADMIN — HYDROMORPHONE HYDROCHLORIDE 4 MG: 4 TABLET ORAL at 17:56

## 2024-06-07 RX ADMIN — ASPIRIN 81 MG 81 MG: 81 TABLET ORAL at 08:21

## 2024-06-07 RX ADMIN — PIPERACILLIN SODIUM AND TAZOBACTAM SODIUM 3.38 G: 3; .375 INJECTION, SOLUTION INTRAVENOUS at 03:45

## 2024-06-07 RX ADMIN — CLOPIDOGREL BISULFATE 75 MG: 75 TABLET ORAL at 08:21

## 2024-06-07 RX ADMIN — NICOTINE 1 PATCH: 21 PATCH, EXTENDED RELEASE TRANSDERMAL at 08:21

## 2024-06-07 RX ADMIN — ATORVASTATIN CALCIUM 40 MG: 40 TABLET, FILM COATED ORAL at 08:21

## 2024-06-07 RX ADMIN — METOPROLOL TARTRATE 25 MG: 25 TABLET, FILM COATED ORAL at 20:32

## 2024-06-07 RX ADMIN — IPRATROPIUM BROMIDE AND ALBUTEROL SULFATE 3 ML: 2.5; .5 SOLUTION RESPIRATORY (INHALATION) at 09:18

## 2024-06-07 RX ADMIN — FLUOXETINE HYDROCHLORIDE 20 MG: 20 CAPSULE ORAL at 08:22

## 2024-06-07 RX ADMIN — PANTOPRAZOLE SODIUM 40 MG: 40 TABLET, DELAYED RELEASE ORAL at 06:17

## 2024-06-07 RX ADMIN — IPRATROPIUM BROMIDE AND ALBUTEROL SULFATE 3 ML: 2.5; .5 SOLUTION RESPIRATORY (INHALATION) at 14:49

## 2024-06-07 RX ADMIN — HYDROMORPHONE HYDROCHLORIDE: 10 INJECTION, SOLUTION INTRAMUSCULAR; INTRAVENOUS; SUBCUTANEOUS at 14:00

## 2024-06-07 RX ADMIN — ALUMINUM HYDROXIDE, MAGNESIUM HYDROXIDE, AND DIMETHICONE 30 ML: 200; 20; 200 SUSPENSION ORAL at 08:26

## 2024-06-07 RX ADMIN — ALUMINUM HYDROXIDE, MAGNESIUM HYDROXIDE, AND DIMETHICONE 30 ML: 200; 20; 200 SUSPENSION ORAL at 12:54

## 2024-06-07 ASSESSMENT — PAIN SCALES - GENERAL
PAINLEVEL_OUTOF10: 3
PAINLEVEL_OUTOF10: 0 - NO PAIN
PAINLEVEL_OUTOF10: 3
PAINLEVEL_OUTOF10: 3

## 2024-06-07 ASSESSMENT — PAIN - FUNCTIONAL ASSESSMENT
PAIN_FUNCTIONAL_ASSESSMENT: 0-10

## 2024-06-07 ASSESSMENT — COGNITIVE AND FUNCTIONAL STATUS - GENERAL
WALKING IN HOSPITAL ROOM: A LITTLE
TOILETING: A LITTLE
MOBILITY SCORE: 22
DAILY ACTIVITIY SCORE: 22
CLIMB 3 TO 5 STEPS WITH RAILING: A LITTLE
HELP NEEDED FOR BATHING: A LITTLE

## 2024-06-07 ASSESSMENT — RESPIRATORY DISTRESS OBSERVATION SCALE (RDOS)
RESPIRATORY RATE PER MINUTE: 0 - <19 BREATHS
LOOK OF FEAR: 0 - NONE
RESTLESS NONPURPOSEFUL MOVEMENTS: 0 - NONE
INVOLUNTARY NASAL FLARING: 0 - NONE
RDOS TOTAL SCORE: 0
GRUNTING AT END OF EXPIRATION: 0 - NONE
ACCESSORY MUSCLE RISE IN CLAVICLE DURING INSPIRATION: 0 - NONE
PARADOXICAL BREATHING PATTERN: 0 - NONE
HEART RATE PER MINUTE: 0 - <90 BEATS

## 2024-06-07 ASSESSMENT — PAIN SCALES - WONG BAKER
WONGBAKER_NUMERICALRESPONSE: HURTS LITTLE BIT
WONGBAKER_NUMERICALRESPONSE: HURTS LITTLE BIT

## 2024-06-07 NOTE — PROGRESS NOTES
"Emerson Chilel is a 64 y.o. male on day 13 of admission presenting with Pancreatic mass ([facility]-HCC).    Subjective     Patient seen and examined, wife at the bedside, still has abdominal pain and hiccups, otherwise no complaints.       Objective   Constitutional: ill appearing, awake/alert/oriented x3, no distress, jaundiced.   Head/Neck: Neck supple  Respiratory/Thorax: diminished b/l with minimal wheezing  Cardiovascular: Regular, rate and rhythm, no murmurs  Gastrointestinal: mild distension, tenderness to the epigastric region  Musculoskeletal: ROM intact  Extremities: 1+ edema b/l lower extremity        Last Recorded Vitals  Blood pressure 112/68, pulse 80, temperature 36.2 °C (97.2 °F), temperature source Temporal, resp. rate 18, height 1.702 m (5' 7\"), weight 72.2 kg (159 lb 2.8 oz), SpO2 94%.  Intake/Output last 3 Shifts:  I/O last 3 completed shifts:  In: 1943.8 (26.9 mL/kg) [P.O.:520; I.V.:1173.8 (16.3 mL/kg); IV Piggyback:250]  Out: 2000 (27.7 mL/kg) [Urine:2000 (0.8 mL/kg/hr)]  Weight: 72.2 kg     Relevant Results  Scheduled medications  [Held by provider] amLODIPine, 10 mg, oral, Daily  aspirin, 81 mg, oral, Daily  atorvastatin, 40 mg, oral, Daily  clopidogrel, 75 mg, oral, Daily  FLUoxetine, 20 mg, oral, Daily  [Held by provider] hydroCHLOROthiazide, 12.5 mg, oral, q AM  ipratropium-albuteroL, 3 mL, nebulization, TID  metoprolol tartrate, 25 mg, oral, BID  nicotine, 1 patch, transdermal, Daily  pantoprazole, 40 mg, oral, Daily before breakfast  piperacillin-tazobactam, 3.375 g, intravenous, q8h      Continuous medications  HYDROmorphone,   sodium chloride 0.9%, 30 mL/hr, Last Rate: 30 mL/hr (06/07/24 1146)      PRN medications  PRN medications: acetaminophen **OR** acetaminophen **OR** acetaminophen, acetaminophen **OR** acetaminophen **OR** acetaminophen, ALPRAZolam, alum-mag hydroxide-simeth, baclofen, [Held by provider] HYDROcodone-acetaminophen, [Held by provider] HYDROmorphone, " ipratropium-albuteroL, melatonin, metoclopramide **OR** metoclopramide, [Held by provider] morphine, [Held by provider] morphine, naloxone, nitroglycerin, ondansetron ODT **OR** ondansetron, oxygen, oxygen, polyethylene glycol, sodium chloride  Results from last 7 days   Lab Units 06/07/24 0357 06/06/24 0425 06/05/24 0446   WBC AUTO x10*3/uL 13.8* 14.3* 15.5*   RBC AUTO x10*6/uL 2.41* 2.24* 2.38*   HEMOGLOBIN g/dL 8.3* 7.7* 8.3*     Results from last 7 days   Lab Units 06/07/24 0357 06/06/24 0425 06/05/24 0446   SODIUM mmol/L 132* 131* 131*   POTASSIUM mmol/L 3.5 3.5 3.8   CHLORIDE mmol/L 99 97* 97*   CO2 mmol/L 25 26 26   BUN mg/dL 8 9 10   CREATININE mg/dL 0.45* 0.49* 0.56   CALCIUM mg/dL 7.8* 7.7* 7.9*   MAGNESIUM mg/dL 2.33 2.35 2.31   BILIRUBIN TOTAL mg/dL 7.9* 8.5* 9.7*   ALT U/L 137* 132* 135*   AST U/L 168* 161* 154*         Assessment/Plan   Principal Problem:    Pancreatic mass ([facility])  Active Problems:    Jaundice    Hypokalemia    Generalized abdominal pain    Post-ERCP acute pancreatitis ([facility]-HCC)    NSTEMI (non-ST elevated myocardial infarction) (Multi)    A/p:  Acute pancreatitis s/p ERCP, invasive adenocarcinoma: Pt originally transferred from OSH for EUS/ERCP having presented with jaundice and pancreatic mass. Study was completed 5/29 with subsequent worsening abdominal pain; procedure was c/b difficulty cannulating. Lipase uptrended.  significantly elevated. Repeat abdominal CT from 5/31 for further abdominal pain showing acute interstitial edematous pancreatitis, with 37mm of acute peripancreatic fluid along the distal greater curvature of the stomach. Attempted trial of scheduled morphine for acute pain control, however negligible difference.  Placed on dilaudid PCA 6/3 with improvement. Pathology noted to result, showing invasive adenocarcinoma.  - Tolerating CLD/oral supplement shakes  - Appreciate GI recs: Supportive care with pain control/antiemetics, follow up pathology. Follow  outpatient with GI, and will need oncology/hepatobiliary follow up. May continue reglan/baclofen for hiccups. NJ tube was discussed; pt declining.  - Appreciate oncology recs: Pt interested in chemotherapy and discussion with surgical oncology. Pt to complete repeat  and CT chest for staging purposes (will order when pneumonia improves)  - Bilirubin (peak of 21)/LFTs steadily improving through 6/2, then began to rise with persistent abdominal pain before starting to fall again on 6/4.  - Leukocytosis noted, had been stable until 6/2 when noted to increase significantly--now improving.  - Given persistent symptoms, low threshold to transfer to [facility] for hepatobiliary evaluation especially if any further worsening.  - AM CBC/CMP/Mg     Hemoptysis, acute hypoxic respiratory failure: Sporadic appearance, streaked in sputum over 5/31-6/2. Suspect hemoptysis related to dry respiratory tract with trace bleeding rather than true hemoptysis, given appearance that follows placement of non-humidified NC. However, pt did develop rapidly worsening hypoxia overnight 6/2-3 with CXR at that time concerning for pneumonia, concurrent rebound worsening of leukocytosis--likely developed atelectasis secondary to shallow inspiration from persistent abdominal pain, followed by pneumonia. No h/o COPD, but does have tobacco use history.  - Sepsis protocol initiated. Procal minimally elevated, however given clinical severity will continue abx at this time. Off vancomycin (6/3-6/4), continue CTX/zosyn. Follow up bcx. MRSA screen negative.  - Supportive care with nebs/bronchial hygiene/IS (as tolerated), wean O2 as tolerated; required upgrade to Airvo due to increasing requirement.  - Leukocytosis as above.     Chest pain s/p PCI: Known history of chronic angina/prior stent. On ASA/plavix. Developed worsening episodic chest pain while admitted, with concurrent elevation of troponins concerning for NSTEMI. Seen by cardiology on 5/30 with  subsequent LHC and KITTY placed to proximal LAD.  - Appreciate cardiology recs: Continue metoprolol at 25mg BID, ASA/plavix/PPI. Follow up with Dr. Boston in 1-2 weeks post-discharge.  - Will continue holding home norvasc/hydrochlorothiazide and resume if evidence of derangement/reassess at least daily.  - Maintain K>4, Mg>2     Anxiety/depression, asthma, tobacco use disorder, HTN, HLD: Home meds resumed as indicated     Discharge pending appropriate pain control/PO tolerance, resolution of hypoxia.        DVT PPX: SCDs  Nutrition: CLD with supplements per nutrition       I spent 35 minutes in the professional and overall care of this patient.      Chintan Chowdhury MD

## 2024-06-07 NOTE — CARE PLAN
The patient's goals for the shift include  n/a    The clinical goals for the shift include pt will be hds by end of shift    Over the shift, the patient did not make progress toward the following goals. Barriers to progression include continued pain. Recommendations to address these barriers include disease process. Pt remains on pca pump with continued pain. Pt with continued hiccups, hospitalist notified, prn order for mylanta received. Pt medicated per emar with some relief in hiccups, no nausea/vomiting. Pt remains on oxymizer for this shift, rn able to wean oxygen demand down to 6lpm via oxymizer. Pt with brown sputum per coughing, lung rhonchorous. Pt skin and sclera remain icteric. Safety maintained, bed alarm remains active.     Problem: Pain  Goal: My pain/discomfort is manageable  Outcome: Not Progressing     Problem: Pain  Goal: Takes deep breaths with improved pain control throughout the shift  Outcome: Not Progressing  Goal: Turns in bed with improved pain control throughout the shift  Outcome: Not Progressing  Goal: Walks with improved pain control throughout the shift  Outcome: Not Progressing  Goal: Free from opioid side effects throughout the shift  Outcome: Not Progressing     Problem: Pain - Adult  Goal: Verbalizes/displays adequate comfort level or baseline comfort level  Outcome: Not Progressing

## 2024-06-07 NOTE — PROGRESS NOTES
06/07/24 1500   Discharge Planning   Who is requesting discharge planning? Provider   Home or Post Acute Services In home services   Type of Home Care Services Hospice   Patient expects to be discharged to: Home with hospice   Does the patient need discharge transport arranged? Yes   RoundTrip coordination needed? Yes   Has discharge transport been arranged? No     Met with patient at bedside. Discussed discharge plans and if they were different from admission. Patient states that he has a meeting with Hospice tomorrow with possible discharge to home with hospice care. TCC team to follow if needed.

## 2024-06-07 NOTE — CONSULTS
"Reason For Consult      Pancreatic cancer       History Of Present Illness  Emerson Chilel is a 64 y.o. male presenting with right upper quadrant abdominal pain, nausea, vomiting and light-colored stools over the last 2 weeks .     Past Medical History  He has a past medical history of CAD (coronary artery disease), Chronic sinusitis, unspecified (07/13/2021), Hyperlipidemia, and Hypertension.    Surgical History  He has a past surgical history that includes MR angio head wo IV contrast (12/21/2021); Cardiac catheterization; Hernia repair; Knee cartilage surgery (Right); Cardiac catheterization (N/A, 5/30/2024); Cardiac catheterization (5/30/2024); and Cardiac catheterization (N/A, 5/30/2024).     Social History  He reports that he has been smoking cigarettes. He has a 50 pack-year smoking history. He has never used smokeless tobacco. He reports current alcohol use of about 2.0 standard drinks of alcohol per week. He reports that he does not currently use drugs.    Family History  Family History   Problem Relation Name Age of Onset    Other (cardiac disorder) Mother      Stroke Mother      Pancreatic cancer Mother      Suicide Attempts Father      Other (cabg) Brother      Coronary artery disease Brother      No Known Problems Other          Allergies  Ramipril       Last Recorded Vitals  Blood pressure 116/71, pulse 74, temperature 36.2 °C (97.2 °F), temperature source Temporal, resp. rate 18, height 1.702 m (5' 7\"), weight 72.2 kg (159 lb 2.8 oz), SpO2 94%.         Assessment/Plan     Pt transferred from [facility] for GI service for evaluation of new Pancreatic mass and possible ERCP. After ERCP with stent placement, while inpatient, pt developed Pancreatitis and worsening episodic chest pain , with concurrent elevation of troponins ,pt dx with NONSTEMI. Pt then has stent placed. Pathology noted to result, showing invasive adenocarcinoma. Pt to complete repeat  and CT chest for staging. Pt with PCA for continued " pain. Pt remains on oxymizer for this shift, RN able to wean oxygen demand down to 6lpm via oxymizer today. Pt is from home with spouse Anna [phone] . Pt's code status is full code. Pt with pmhx of HTN, HLD, CAD s/p PCI, tobacco abuse, alcohol abuse . Plan to introduce Palliative Care to pt/family.     2:45p  Met with pt at length bedside to discuss goals of care. Pt has decided he no longer wants to continue aggressive care, and suffering. He wants to be comfortable, he does not want to linger/suffer like his family members (mom,uncle) did. Pt would like to meet with [facility] and get home to his dogs/family asap. Spoke with pts spouse by phone, she is also in agreement with this plan. Referral placed to [facility], family to meet over the weekend with Hospice RN. Pt will need weaned off PCA pain pump before discharge to home, he is aware and agreeable to this. Pt also agreeable to change his code status to DNRCC, notified Attending. Await call back from [facility] on Hospice meeting time, support provided, will follow.    [facility] RN to meet bedside with pt/spouse over the weekend, time/date currently being coordinated by Hospice staff,plan for discharge to home on Hospice care.  Await results of Hospice meeting, will follow.        Maria Alejandra Adames LCSW

## 2024-06-07 NOTE — PROGRESS NOTES
"Subjective  Patient sitting up on side of bed with continued abdominal discomfort though tolerating clear liquids.  Nutrition following.  Continues with PCA pump, high O2 requirements.  Plan to continue as needed baclofen.  Advance diet as tolerated.    Objective  Blood pressure 111/68, pulse 88, temperature 36.2 °C (97.2 °F), temperature source Temporal, resp. rate 18, height 1.702 m (5' 7\"), weight 72.2 kg (159 lb 2.8 oz), SpO2 94%.    Physical Exam  Constitutional: Alert and interactive, in NAD  Eyes: PERRL, scleral cterus, no conjunctival injection  Skin: Warm and dry, no rash or ecchymosis, jaundice   ENMT: Mucous membranes moist, no lesions noted  Resp: CTAB, even and unlabored  CV: RRR, normal S1, S2, no m,r,g  GI: +BS, soft, round, mid/upper abd TTP, no rebound tenderness or guarding, no palpable masses or organomegaly  Extremities: Extremities warm, no edema, contusions, wounds or cyanosis  Neuro: Alert and oriented x3  Psych: Appropriate mood and behavior    Medications  Scheduled medications  [Held by provider] amLODIPine, 10 mg, oral, Daily  aspirin, 81 mg, oral, Daily  atorvastatin, 40 mg, oral, Daily  clopidogrel, 75 mg, oral, Daily  FLUoxetine, 20 mg, oral, Daily  [Held by provider] hydroCHLOROthiazide, 12.5 mg, oral, q AM  ipratropium-albuteroL, 3 mL, nebulization, TID  metoprolol tartrate, 25 mg, oral, BID  nicotine, 1 patch, transdermal, Daily  pantoprazole, 40 mg, oral, Daily before breakfast  piperacillin-tazobactam, 3.375 g, intravenous, q8h      Continuous medications  HYDROmorphone,   sodium chloride 0.9%, 30 mL/hr, Last Rate: 30 mL/hr (06/07/24 1452)        PRN medications  PRN medications: acetaminophen **OR** acetaminophen **OR** acetaminophen, acetaminophen **OR** acetaminophen **OR** acetaminophen, ALPRAZolam, alum-mag hydroxide-simeth, baclofen, HYDROmorphone, ipratropium-albuteroL, melatonin, metoclopramide **OR** metoclopramide, [Held by provider] morphine, [Held by provider] morphine, " "naloxone, nitroglycerin, ondansetron ODT **OR** ondansetron, oxygen, oxygen, polyethylene glycol, sodium chloride     Labs  Lab Results   Component Value Date    WBC 13.8 (H) 06/07/2024    HGB 8.3 (L) 06/07/2024    HCT 25.4 (L) 06/07/2024     (H) 06/07/2024     06/07/2024     Lab Results   Component Value Date    GLUCOSE 93 06/07/2024    CALCIUM 7.8 (L) 06/07/2024     (L) 06/07/2024    K 3.5 06/07/2024    CO2 25 06/07/2024    CL 99 06/07/2024    BUN 8 06/07/2024    CREATININE 0.45 (L) 06/07/2024     Lab Results   Component Value Date     (H) 06/07/2024     (H) 06/07/2024    ALKPHOS 157 (H) 06/07/2024    BILITOT 7.9 (H) 06/07/2024     No results found for: \"IRON\", \"TIBC\", \"FERRITIN\"  Lab Results   Component Value Date    INR 1.2 (H) 05/26/2024    INR 1.2 (H) 05/25/2024    INR 1.1 05/24/2024    PROTIME 13.2 (H) 05/26/2024    PROTIME 13.5 (H) 05/25/2024    PROTIME 12.6 05/24/2024   Endoscopic reports:  EUS with Dr. Gong 5/28/2024 noting:  Impression  The cricopharynx, upper third of the esophagus, middle third of the esophagus, lower third of the esophagus and GE junction appeared normal.  The body of the stomach and antrum appeared normal.  Mild extrinsic compression was observed in the 2nd part of the duodenum  The duodenal bulb appeared normal.  The pancreas appeared atrophic. The parenchyma was visualized in the body of the pancreas and tail of the pancreas. The parenchyma was heterogeneous and had hyperechoic strands.  Single round, homogeneous, hypoechoic and solid mass measuring 30 mm x 25 mm with well-defined margins was visualized in the head of the pancreas. Apparent abutment of the portal vein and superior mesenteric vein; 3 fine needle aspiration passes were taken. An adequate sample of aspirate was obtained. The sample was sent for cytology analysis; 3 fine needle biopsy passes were taken. An adequate sample was obtained. A sample was sent for histology analysis  The " proximal bile duct was dilated.  The parenchyma was visualized in the left lobe of the liver. The parenchyma was homogeneous and hyperechoic. The common hepatic duct, left main hepatic duct and right main hepatic duct appeared dilated.  The left kidney, spleen and left adrenal appeared normal.  ERCP with Dr. Gong 5/28/2024 noting:  Impression  The common bile duct was deeply cannulated after 10 attempts. Cannulation was difficult due to inadvertent cannulation, small ampulla and Distal CBD stricture.  20 mm localized, intrinsic, smooth and severe stricture was visualized in the distal common bile duct  Complete major papilla sphincterotomy was performed.  Performed brushings with cytology brush in the distal common bile duct  One 8 mm x 8 cm fully covered stent was placed in the common bile duct    Radiology  CXR 6/2/2024 noting:  Impression:     1.  Mild cardiomegaly. Interstitial thickening with diffuse bilateral  airspace opacities, may be secondary to pulmonary edema and/or  multifocal pneumonia.      Signed by: Lucia Gama 6/2/2024 11:36 PM  Dictation workstation:   [id number]     CT A/P with IV contrast 5/24/2024 noting:  Impression:     2.5 cm mass in the head of the pancreas.  There is dilatation of the  biliary ducts as well as the pancreatic duct and neoplasm cannot be  excluded.  This is best evaluated with ERCP or MRCP.  There are  findings consistent with sludge in the gallbladder.  Signed by Bhanu Taveras MD       Assessment  Emerson Chilel is a 64 y.o. male presenting with pancreatic mass and jaundice.  PMH of HTN, HLD, CAD s/p PCI, tobacco abuse, alcohol abuse who presented to the ER with abdominal pain, N/V, jaundice.  Liver enzymes elevated with bilirubin peak at 21.  CA 19-9- 6453.22->3210.89. Patient underwent EUS/ERCP with Dr. Gong 5/28 with metal stent placement into distal CBD with post ERCP pancreatitis.    Patient sitting up on side of bed with ongoing abdominal discomfort though  tolerating clear liquids.  Continues to use PCA pump.  Remains on high flow nasal cannula.    # pancreatic adenocarcinoma-s/p ERCP with metal stent placement  # transaminitis/ hyperbilirubinemia- downtrending  # post ERCP pancreatitis   # NSTEMI s/p PCI  # pulmonary edema and/or multifocal pneumonia  # leukocytosis  # jaundice  # previous EtOH misuse    Plan:  - continue supportive care  -Advance to low-fat diet as tolerated  - discussed NJ tube placement for enteral nutrition which pt is currently declining   - wean O2 as tolerated  - continue PCA for pain control  - continue baclofen 5 mg tid prn hiccups/spasms  - continue antibiotics per primary team/ID recs  - pt ok for anticoagulation/ antiplatelet from GI standpoint  - pt will establish care with pancreaticobiliary surgery  - follow up oncology recs    Plan has been discussed with Dr. Zamora. GI will continue to follow.     Móinca Gary, APRN/CNP

## 2024-06-07 NOTE — PROGRESS NOTES
"Nutrition Follow Up Assessment:   Nutrition Assessment         Nutrition Note:  Emerson Chilel is a 64 y.o. male presenting 5/25 with RUQ pain with N/V and lite colored stools x 2weeks; work up revealing acute interstitial edematous pancreatitis and 2.5 cm mass at the head of the pancreas. Pt s/p ERCP with stent 5/28 and path pending. Length of stay complicated by chest pain and + NSTEMI 5/30; pt taken for heart cath 5/30 and KITTY to LAD placed. Stay further complicated by persistent abd pain and new hiccups today and pt only able to tolerate clear liquids; + PCA pump. Pt too dependent on HFNC with possible PNA. GI, Cardiology, and Oncology involved in pt care. Note per GI, 6/4, pt declining NJT for tube feeds.     6/7/2024 Follow up: Chart reviewed and events noted. Per notes, pt with invasive adenocarcinoma. Pt remains on clear liquids due to pain with solid foods; per notes, pt continues to decline enteral nutrition. Palliative Care consulted 6/7 with plan for meeting with hospice 6/8.    Past Medical History   has a past medical history of CAD (coronary artery disease), Chronic sinusitis, unspecified (07/13/2021), Hyperlipidemia, and Hypertension. Note pt reported to NP, 5/25, that he drinks 6 beers per day.   Surgical History   has a past surgical history that includes MR angio head wo IV contrast (12/21/2021); Cardiac catheterization; Hernia repair; Knee cartilage surgery (Right); Cardiac catheterization (N/A, 5/30/2024); Cardiac catheterization (5/30/2024); and Cardiac catheterization (N/A, 5/30/2024).       Nutrition History:     Food Allergies/Intolerances:  None  GI Symptoms: Abdominal pain and hiccups. On PCA pump since 6/3.  Oral Problems: None       Anthropometrics:  Height: 170.2 cm (5' 7\")   Weight: 72.2 kg (159 lb 2.8 oz)   BMI (Calculated): 24.92  Admit weight (measured) 68kg             Weight History:   6/7: 72.2kg--pt net + 15L as of 6/7 0700hrs  1/2024: 67.1kg  10/2023: 65.2kg  8/2023: " 67.7kg  2/2023: 66.4kg  Weight Change %:relatively stable       Nutrition Focused Physical Exam Findings:  Deferred per pt request, 6/4.  Subcutaneous Fat Loss:      Muscle Wasting:     Edema:  Edema: +2 mild  Edema Location: generalized  Physical Findings:  Skin: Positive (jaundice)    Nutrition Significant Labs:  BG POCT trend:    , Renal Lab Trend:   Results from last 7 days   Lab Units 06/07/24  0357 06/06/24  0425 06/05/24  0446 06/04/24  1112   POTASSIUM mmol/L 3.5 3.5 3.8 3.4*   SODIUM mmol/L 132* 131* 131* 128*   MAGNESIUM mg/dL 2.33 2.35 2.31 2.17   EGFR mL/min/1.73m*2 >90 >90 >90 >90   BUN mg/dL 8 9 10 9   CREATININE mg/dL 0.45* 0.49* 0.56 0.55        Nutrition Specific Medications:  Scheduled medications  [Held by provider] amLODIPine, 10 mg, oral, Daily  aspirin, 81 mg, oral, Daily  atorvastatin, 40 mg, oral, Daily  clopidogrel, 75 mg, oral, Daily  FLUoxetine, 20 mg, oral, Daily  [Held by provider] hydroCHLOROthiazide, 12.5 mg, oral, q AM  ipratropium-albuteroL, 3 mL, nebulization, TID  metoprolol tartrate, 25 mg, oral, BID  nicotine, 1 patch, transdermal, Daily  pantoprazole, 40 mg, oral, Daily before breakfast  piperacillin-tazobactam, 3.375 g, intravenous, q8h      Continuous medications  HYDROmorphone,   sodium chloride 0.9%, 30 mL/hr, Last Rate: 30 mL/hr (06/07/24 1452)      PRN medications  PRN medications: acetaminophen **OR** acetaminophen **OR** acetaminophen, acetaminophen **OR** acetaminophen **OR** acetaminophen, ALPRAZolam, alum-mag hydroxide-simeth, baclofen, HYDROmorphone, ipratropium-albuteroL, melatonin, metoclopramide **OR** metoclopramide, [Held by provider] morphine, [Held by provider] morphine, naloxone, nitroglycerin, ondansetron ODT **OR** ondansetron, oxygen, oxygen, polyethylene glycol, sodium chloride     I/O:   Last BM Date: 06/05/24; Stool Appearance: Formed, Soft (06/03/24 0200)    Dietary Orders (From admission, onward)       Start     Ordered    06/04/24 1038  Oral  nutritional supplements  Until discontinued        Comments: And PRN please   Question Answer Comment   Deliver with Dinner    Select supplement: Ensure Clear        06/04/24 1038    06/03/24 1207  Adult diet Clear Liquid  Diet effective now        Question:  Diet type  Answer:  Clear Liquid    06/03/24 1206                     Estimated Needs:   Total Energy Estimated Needs (kCal):  (2000-2400kcal (30-35kcal/kg of 68kg))     Total Protein Estimated Needs (g):  (82-102g (1.2-1.5g/kg of 68kg))     Total Fluid Estimated Needs (mL):  (1mL/kcal/d or as per physician)           Nutrition Diagnosis        Nutrition Diagnosis  Patient has Nutrition Diagnosis: Yes  Diagnosis Status (1): Declining  Nutrition Diagnosis 1: Increased nutrient needs  Related to (1): acute metabolic stress  As Evidenced by (1): acute pancreatitis with new pancreatic adenoCA complicated by oral intakes <50% since 5/25 admit and likely inadequate oral intakes of nutrient dense foods PTA with daily ETOH use.  Additional Assessment Information (1): 6/7: Case discussed with nurse and Palliative Care.       Nutrition Interventions/Recommendations         Nutrition Prescription:  Individualized Nutrition Prescription Provided for : liberalized regular.        Nutrition Interventions:   Interventions: Meals and snacks, Medical food supplement  Meals and Snacks: General healthful diet  Goal: liberalized/regular diet as tolerated; pleasure feeds.  Medical Food Supplement: Commercial beverage  Goal: Continue with Ensure Clear as per pt request      Collaboration and Referral of Nutrition Care: Collaboration by nutrition professional with other providers  Goal: TAY Dixon and Bailey Bess    Nutrition Education:   6/7: Met with pt and wife at bedside; pt states he likes the Ensure Clear and drinking a few a day. Offered to see if could advance diet to liberalized regular allowing pt to order own snacks/meals/etc however pt currently declines. Per wife, pt  tried pancakes a few days ago however had significant pain and does not want to experience that again. Pt agreeable to try Gelatein (80-160kcal and 20g pro/4 oz).  Pt currently prefers to order own meals, even clears, dietary aware to send a tray when pt requests.          Nutrition Monitoring and Evaluation   Food/Nutrient Related History Monitoring  Criteria: pleasure feeds              Nutrition Focused Physical Findings  Monitoring and Evaluation Plan: Digestive System  Digestive System: Decrease in appetite  Criteria: pt will tolerate oral diet without abd pain or hiccups       Time Spent/Follow-up Reminder:   Time Spent (min): 30 minutes  Last Date of Nutrition Visit: 06/07/24  Nutrition Follow-Up Needed?: 3-5 days  Follow up Comment: ks, meet with hospice

## 2024-06-08 LAB
ALBUMIN SERPL BCP-MCNC: 2.6 G/DL (ref 3.4–5)
ALP SERPL-CCNC: 173 U/L (ref 33–136)
ALT SERPL W P-5'-P-CCNC: 126 U/L (ref 10–52)
ANION GAP SERPL CALC-SCNC: 11 MMOL/L (ref 10–20)
AST SERPL W P-5'-P-CCNC: 132 U/L (ref 9–39)
BILIRUB SERPL-MCNC: 7 MG/DL (ref 0–1.2)
BUN SERPL-MCNC: 10 MG/DL (ref 6–23)
CALCIUM SERPL-MCNC: 7.8 MG/DL (ref 8.6–10.3)
CHLORIDE SERPL-SCNC: 101 MMOL/L (ref 98–107)
CO2 SERPL-SCNC: 27 MMOL/L (ref 21–32)
CREAT SERPL-MCNC: 0.43 MG/DL (ref 0.5–1.3)
EGFRCR SERPLBLD CKD-EPI 2021: >90 ML/MIN/1.73M*2
ERYTHROCYTE [DISTWIDTH] IN BLOOD BY AUTOMATED COUNT: 15.9 % (ref 11.5–14.5)
GLUCOSE SERPL-MCNC: 95 MG/DL (ref 74–99)
HCT VFR BLD AUTO: 25.6 % (ref 41–52)
HGB BLD-MCNC: 8.4 G/DL (ref 13.5–17.5)
MAGNESIUM SERPL-MCNC: 2.28 MG/DL (ref 1.6–2.4)
MCH RBC QN AUTO: 34.3 PG (ref 26–34)
MCHC RBC AUTO-ENTMCNC: 32.8 G/DL (ref 32–36)
MCV RBC AUTO: 105 FL (ref 80–100)
NRBC BLD-RTO: 0 /100 WBCS (ref 0–0)
PLATELET # BLD AUTO: 422 X10*3/UL (ref 150–450)
POTASSIUM SERPL-SCNC: 3.5 MMOL/L (ref 3.5–5.3)
PROT SERPL-MCNC: 5.6 G/DL (ref 6.4–8.2)
RBC # BLD AUTO: 2.45 X10*6/UL (ref 4.5–5.9)
SODIUM SERPL-SCNC: 135 MMOL/L (ref 136–145)
WBC # BLD AUTO: 12.8 X10*3/UL (ref 4.4–11.3)

## 2024-06-08 PROCEDURE — 2500000002 HC RX 250 W HCPCS SELF ADMINISTERED DRUGS (ALT 637 FOR MEDICARE OP, ALT 636 FOR OP/ED): Performed by: STUDENT IN AN ORGANIZED HEALTH CARE EDUCATION/TRAINING PROGRAM

## 2024-06-08 PROCEDURE — 99232 SBSQ HOSP IP/OBS MODERATE 35: CPT | Performed by: PHYSICIAN ASSISTANT

## 2024-06-08 PROCEDURE — S4991 NICOTINE PATCH NONLEGEND: HCPCS | Performed by: STUDENT IN AN ORGANIZED HEALTH CARE EDUCATION/TRAINING PROGRAM

## 2024-06-08 PROCEDURE — 2500000004 HC RX 250 GENERAL PHARMACY W/ HCPCS (ALT 636 FOR OP/ED): Performed by: STUDENT IN AN ORGANIZED HEALTH CARE EDUCATION/TRAINING PROGRAM

## 2024-06-08 PROCEDURE — 2500000001 HC RX 250 WO HCPCS SELF ADMINISTERED DRUGS (ALT 637 FOR MEDICARE OP): Performed by: STUDENT IN AN ORGANIZED HEALTH CARE EDUCATION/TRAINING PROGRAM

## 2024-06-08 PROCEDURE — 2500000001 HC RX 250 WO HCPCS SELF ADMINISTERED DRUGS (ALT 637 FOR MEDICARE OP): Performed by: NURSE PRACTITIONER

## 2024-06-08 PROCEDURE — 36415 COLL VENOUS BLD VENIPUNCTURE: CPT | Performed by: STUDENT IN AN ORGANIZED HEALTH CARE EDUCATION/TRAINING PROGRAM

## 2024-06-08 PROCEDURE — 2500000001 HC RX 250 WO HCPCS SELF ADMINISTERED DRUGS (ALT 637 FOR MEDICARE OP): Performed by: INTERNAL MEDICINE

## 2024-06-08 PROCEDURE — 94640 AIRWAY INHALATION TREATMENT: CPT

## 2024-06-08 PROCEDURE — 2500000005 HC RX 250 GENERAL PHARMACY W/O HCPCS: Performed by: STUDENT IN AN ORGANIZED HEALTH CARE EDUCATION/TRAINING PROGRAM

## 2024-06-08 PROCEDURE — 83735 ASSAY OF MAGNESIUM: CPT | Performed by: STUDENT IN AN ORGANIZED HEALTH CARE EDUCATION/TRAINING PROGRAM

## 2024-06-08 PROCEDURE — 99233 SBSQ HOSP IP/OBS HIGH 50: CPT | Performed by: STUDENT IN AN ORGANIZED HEALTH CARE EDUCATION/TRAINING PROGRAM

## 2024-06-08 PROCEDURE — 85027 COMPLETE CBC AUTOMATED: CPT | Performed by: STUDENT IN AN ORGANIZED HEALTH CARE EDUCATION/TRAINING PROGRAM

## 2024-06-08 PROCEDURE — 80053 COMPREHEN METABOLIC PANEL: CPT | Performed by: STUDENT IN AN ORGANIZED HEALTH CARE EDUCATION/TRAINING PROGRAM

## 2024-06-08 PROCEDURE — 2500000004 HC RX 250 GENERAL PHARMACY W/ HCPCS (ALT 636 FOR OP/ED): Performed by: INTERNAL MEDICINE

## 2024-06-08 PROCEDURE — 2060000001 HC INTERMEDIATE ICU ROOM DAILY

## 2024-06-08 RX ORDER — ALPRAZOLAM 0.5 MG/1
0.5 TABLET ORAL 4 TIMES DAILY PRN
Status: DISCONTINUED | OUTPATIENT
Start: 2024-06-08 | End: 2024-06-09 | Stop reason: HOSPADM

## 2024-06-08 RX ORDER — DIPHENHYDRAMINE HYDROCHLORIDE 50 MG/ML
25 INJECTION INTRAMUSCULAR; INTRAVENOUS ONCE
Status: COMPLETED | OUTPATIENT
Start: 2024-06-08 | End: 2024-06-08

## 2024-06-08 RX ADMIN — ASPIRIN 81 MG 81 MG: 81 TABLET ORAL at 07:58

## 2024-06-08 RX ADMIN — Medication 6 L/MIN: at 20:00

## 2024-06-08 RX ADMIN — HYDROMORPHONE HYDROCHLORIDE 4 MG: 4 TABLET ORAL at 00:11

## 2024-06-08 RX ADMIN — BACLOFEN 5 MG: 5 TABLET ORAL at 13:12

## 2024-06-08 RX ADMIN — HYDROMORPHONE HYDROCHLORIDE 4 MG: 4 TABLET ORAL at 04:44

## 2024-06-08 RX ADMIN — ATORVASTATIN CALCIUM 40 MG: 40 TABLET, FILM COATED ORAL at 07:58

## 2024-06-08 RX ADMIN — ALPRAZOLAM 0.5 MG: 0.5 TABLET ORAL at 22:01

## 2024-06-08 RX ADMIN — Medication 6 L/MIN: at 07:56

## 2024-06-08 RX ADMIN — HYDROMORPHONE HYDROCHLORIDE 4 MG: 4 TABLET ORAL at 13:24

## 2024-06-08 RX ADMIN — CLOPIDOGREL BISULFATE 75 MG: 75 TABLET ORAL at 07:58

## 2024-06-08 RX ADMIN — PIPERACILLIN SODIUM AND TAZOBACTAM SODIUM 3.38 G: 3; .375 INJECTION, SOLUTION INTRAVENOUS at 12:25

## 2024-06-08 RX ADMIN — METOCLOPRAMIDE 10 MG: 10 TABLET ORAL at 08:03

## 2024-06-08 RX ADMIN — NICOTINE 1 PATCH: 21 PATCH, EXTENDED RELEASE TRANSDERMAL at 07:59

## 2024-06-08 RX ADMIN — FLUOXETINE HYDROCHLORIDE 20 MG: 20 CAPSULE ORAL at 07:58

## 2024-06-08 RX ADMIN — IPRATROPIUM BROMIDE AND ALBUTEROL SULFATE 3 ML: 2.5; .5 SOLUTION RESPIRATORY (INHALATION) at 09:32

## 2024-06-08 RX ADMIN — Medication 6 L/MIN: at 21:16

## 2024-06-08 RX ADMIN — IPRATROPIUM BROMIDE AND ALBUTEROL SULFATE 3 ML: 2.5; .5 SOLUTION RESPIRATORY (INHALATION) at 21:16

## 2024-06-08 RX ADMIN — Medication 6 L/MIN: at 17:00

## 2024-06-08 RX ADMIN — PANTOPRAZOLE SODIUM 40 MG: 40 TABLET, DELAYED RELEASE ORAL at 04:52

## 2024-06-08 RX ADMIN — PIPERACILLIN SODIUM AND TAZOBACTAM SODIUM 3.38 G: 3; .375 INJECTION, SOLUTION INTRAVENOUS at 04:44

## 2024-06-08 RX ADMIN — PIPERACILLIN SODIUM AND TAZOBACTAM SODIUM 3.38 G: 3; .375 INJECTION, SOLUTION INTRAVENOUS at 20:02

## 2024-06-08 RX ADMIN — DIPHENHYDRAMINE HYDROCHLORIDE 25 MG: 50 INJECTION, SOLUTION INTRAMUSCULAR; INTRAVENOUS at 22:00

## 2024-06-08 RX ADMIN — METOPROLOL TARTRATE 25 MG: 25 TABLET, FILM COATED ORAL at 07:58

## 2024-06-08 RX ADMIN — ALUMINUM HYDROXIDE, MAGNESIUM HYDROXIDE, AND DIMETHICONE 30 ML: 200; 20; 200 SUSPENSION ORAL at 12:24

## 2024-06-08 RX ADMIN — ALUMINUM HYDROXIDE, MAGNESIUM HYDROXIDE, AND DIMETHICONE 30 ML: 200; 20; 200 SUSPENSION ORAL at 04:52

## 2024-06-08 RX ADMIN — HYDROMORPHONE HYDROCHLORIDE 4 MG: 4 TABLET ORAL at 20:35

## 2024-06-08 RX ADMIN — ALUMINUM HYDROXIDE, MAGNESIUM HYDROXIDE, AND DIMETHICONE 30 ML: 200; 20; 200 SUSPENSION ORAL at 17:37

## 2024-06-08 RX ADMIN — METOPROLOL TARTRATE 25 MG: 25 TABLET, FILM COATED ORAL at 20:02

## 2024-06-08 RX ADMIN — ALUMINUM HYDROXIDE, MAGNESIUM HYDROXIDE, AND DIMETHICONE 30 ML: 200; 20; 200 SUSPENSION ORAL at 20:02

## 2024-06-08 RX ADMIN — ALPRAZOLAM 0.5 MG: 0.5 TABLET ORAL at 13:14

## 2024-06-08 ASSESSMENT — COGNITIVE AND FUNCTIONAL STATUS - GENERAL
DAILY ACTIVITIY SCORE: 24
MOBILITY SCORE: 24

## 2024-06-08 ASSESSMENT — PAIN - FUNCTIONAL ASSESSMENT
PAIN_FUNCTIONAL_ASSESSMENT: 0-10

## 2024-06-08 ASSESSMENT — PAIN SCALES - GENERAL
PAINLEVEL_OUTOF10: 0 - NO PAIN
PAINLEVEL_OUTOF10: 7
PAINLEVEL_OUTOF10: 0 - NO PAIN
PAINLEVEL_OUTOF10: 7
PAINLEVEL_OUTOF10: 0 - NO PAIN
PAINLEVEL_OUTOF10: 5 - MODERATE PAIN
PAINLEVEL_OUTOF10: 2
PAINLEVEL_OUTOF10: 2
PAINLEVEL_OUTOF10: 7

## 2024-06-08 ASSESSMENT — PAIN SCALES - WONG BAKER
WONGBAKER_NUMERICALRESPONSE: NO HURT
WONGBAKER_NUMERICALRESPONSE: NO HURT

## 2024-06-08 NOTE — PROGRESS NOTES
"Emersno Chilel is a 64 y.o. male on day 14 of admission presenting with Pancreatic mass ([facility]).    Subjective   Patient seen and examined, weaned off PCA pump overnight, patient reports his pain seems to be overall controlled.  He still complains of headache up, however seems to be controlled improve after Mylanta.  Currently awaiting hospice meeting this afternoon.       Objective     Constitutional: ill appearing, awake/alert/oriented x3, no distress, jaundiced.   Head/Neck: Neck supple  Respiratory/Thorax: diminished b/l with minimal wheezing, on oxymizer  Cardiovascular: Regular, rate and rhythm, no murmurs  Gastrointestinal: mild distension, tenderness to the epigastric region  Musculoskeletal: ROM intact  Extremities: 1+ edema b/l lower extremity      Last Recorded Vitals  Blood pressure 115/66, pulse 84, temperature 35.8 °C (96.4 °F), temperature source Temporal, resp. rate 19, height 1.702 m (5' 7\"), weight 71.4 kg (157 lb 6.5 oz), SpO2 95%.  Intake/Output last 3 Shifts:  I/O last 3 completed shifts:  In: 1887.8 (26.4 mL/kg) [P.O.:400; I.V.:1387.8 (19.4 mL/kg); IV Piggyback:100]  Out: 2525 (35.4 mL/kg) [Urine:2525 (1 mL/kg/hr)]  Weight: 71.4 kg     Relevant Results  Scheduled medications  [Held by provider] amLODIPine, 10 mg, oral, Daily  aspirin, 81 mg, oral, Daily  atorvastatin, 40 mg, oral, Daily  clopidogrel, 75 mg, oral, Daily  FLUoxetine, 20 mg, oral, Daily  [Held by provider] hydroCHLOROthiazide, 12.5 mg, oral, q AM  ipratropium-albuteroL, 3 mL, nebulization, TID  metoprolol tartrate, 25 mg, oral, BID  nicotine, 1 patch, transdermal, Daily  pantoprazole, 40 mg, oral, Daily before breakfast  piperacillin-tazobactam, 3.375 g, intravenous, q8h      Continuous medications  HYDROmorphone, , Last Rate: Stopped (06/07/24 1816)  sodium chloride 0.9%, 30 mL/hr, Last Rate: 30 mL/hr (06/07/24 1817)      PRN medications  PRN medications: acetaminophen **OR** acetaminophen **OR** acetaminophen, acetaminophen " **OR** acetaminophen **OR** acetaminophen, ALPRAZolam, alum-mag hydroxide-simeth, baclofen, HYDROmorphone, ipratropium-albuteroL, melatonin, metoclopramide **OR** metoclopramide, [Held by provider] morphine, [Held by provider] morphine, naloxone, nitroglycerin, ondansetron ODT **OR** ondansetron, oxygen, oxygen, polyethylene glycol, sodium chloride  Results from last 7 days   Lab Units 06/08/24 0445 06/07/24  0357 06/06/24  0425   WBC AUTO x10*3/uL 12.8* 13.8* 14.3*   RBC AUTO x10*6/uL 2.45* 2.41* 2.24*   HEMOGLOBIN g/dL 8.4* 8.3* 7.7*     Results from last 7 days   Lab Units 06/08/24 0445 06/07/24 0357 06/06/24 0425   SODIUM mmol/L 135* 132* 131*   POTASSIUM mmol/L 3.5 3.5 3.5   CHLORIDE mmol/L 101 99 97*   CO2 mmol/L 27 25 26   BUN mg/dL 10 8 9   CREATININE mg/dL 0.43* 0.45* 0.49*   CALCIUM mg/dL 7.8* 7.8* 7.7*   MAGNESIUM mg/dL 2.28 2.33 2.35   BILIRUBIN TOTAL mg/dL 7.0* 7.9* 8.5*   ALT U/L 126* 137* 132*   AST U/L 132* 168* 161*         Assessment/Plan   Principal Problem:    Pancreatic mass ([facility]CC)  Active Problems:    Jaundice    Hypokalemia    Generalized abdominal pain    Post-ERCP acute pancreatitis ([facility]-HCC)    NSTEMI (non-ST elevated myocardial infarction) (Multi)      A/p:  Acute pancreatitis s/p ERCP, invasive adenocarcinoma: Pt originally transferred from OSH for EUS/ERCP having presented with jaundice and pancreatic mass. Study was completed 5/29 with subsequent worsening abdominal pain; procedure was c/b difficulty cannulating. Lipase uptrended.  significantly elevated. Repeat abdominal CT from 5/31 for further abdominal pain showing acute interstitial edematous pancreatitis, with 37mm of acute peripancreatic fluid along the distal greater curvature of the stomach. Attempted trial of scheduled morphine for acute pain control, however negligible difference.  Placed on dilaudid PCA 6/3 with improvement (weaned off on 6/8). Pathology noted to result, showing invasive adenocarcinoma.  -  Tolerating CLD/oral supplement shakes  - Appreciate GI recs: Supportive care with pain control/antiemetics, follow up pathology. Follow outpatient with GI, and will need oncology/hepatobiliary follow up. May continue reglan/baclofen for hiccups. NJ tube was discussed; pt declining.  - Appreciate oncology recs: Pt interested in chemotherapy and discussion with surgical oncology. Pt to complete repeat  and CT chest for staging purposes (will order when pneumonia improves)  - Bilirubin (peak of 21)/LFTs steadily improving through 6/2, then began to rise with persistent abdominal pain before starting to fall again on 6/4.  - Leukocytosis noted, had been stable until 6/2 when noted to increase significantly--now improving.  -Discussed with patient in great details, he reported family history of pancreatic cancer and seeing family members go through chemotherapy ultimately 10 and 7 the same place, he does not want to suffer the process.  Patient met with palliative care on 6/7, CODE STATUS changed to DNR comfort care only, requested hospice meeting which is planned for today.  -Patient has been started on oral Dilaudid for pain control, PCA pump has been weaned off       Hemoptysis, acute hypoxic respiratory failure: Sporadic appearance, streaked in sputum over 5/31-6/2. Suspect hemoptysis related to dry respiratory tract with trace bleeding rather than true hemoptysis, given appearance that follows placement of non-humidified NC. However, pt did develop rapidly worsening hypoxia overnight 6/2-3 with CXR at that time concerning for pneumonia, concurrent rebound worsening of leukocytosis--likely developed atelectasis secondary to shallow inspiration from persistent abdominal pain, followed by pneumonia. No h/o COPD, but does have tobacco use history.  - Sepsis protocol initiated. Procal minimally elevated, however given clinical severity will continue abx at this time. Off vancomycin (6/3-6/4), continue CTX/zosyn.  Follow up bcx. MRSA screen negative.  - Supportive care with nebs/bronchial hygiene/IS (as tolerated), wean O2 as tolerated; required upgrade to Airvo due to increasing requirement.  - Leukocytosis as above.     Chest pain s/p PCI: Known history of chronic angina/prior stent. On ASA/plavix. Developed worsening episodic chest pain while admitted, with concurrent elevation of troponins concerning for NSTEMI. Seen by cardiology on 5/30 with subsequent LHC and KITTY placed to proximal LAD.  - Appreciate cardiology recs: Continue metoprolol at 25mg BID, ASA/plavix/PPI. Follow up with Dr. Boston in 1-2 weeks post-discharge.  - Will continue holding home norvasc/hydrochlorothiazide and resume if evidence of derangement/reassess at least daily.  - Maintain K>4, Mg>2     Anxiety/depression, asthma, tobacco use disorder, HTN, HLD: Home meds resumed as indicated     Dispo: awaiting for hospice meeting        DVT PPX: SCDs  Nutrition: regular diet       I spent 35 minutes in the professional and overall care of this patient.      Chintan Chowdhury MD

## 2024-06-08 NOTE — CARE PLAN
The clinical goals for the shift include patient will remain HDS throughout shift. The patient met this goal    S: Patient admitted 5/25 for abdominal pain   B: History of: HTN, HLD, CAD, tobacco and ETOH use   A: Patient A&Ox4, denies complaints of chest pain and shortness of breath. Remains in NSR and on 6 L oximyzer. Medicated per orders throughout shift.    R: Patient to have meeting with [facility] today

## 2024-06-08 NOTE — PROGRESS NOTES
Department of Internal Medicine  Gastroenterology  Progress note      Subjective  GI is following for pancreatic mass requiring metal stent,   complicated by post ERCP pancreatitis     Today, patient continued abdominal pain.  Denies any nausea or vomiting.  Continues to have hiccups.  Patient states he is meeting with hospice this morning and plans to pursue hospice route.    Current Medication    Current Facility-Administered Medications:     acetaminophen (Tylenol) tablet 650 mg, 650 mg, oral, q4h PRN **OR** acetaminophen (Tylenol) oral liquid 650 mg, 650 mg, nasogastric tube, q4h PRN **OR** acetaminophen (Tylenol) suppository 650 mg, 650 mg, rectal, q4h PRN, Karin Zavaleta MD    acetaminophen (Tylenol) tablet 650 mg, 650 mg, oral, q4h PRN, 650 mg at 06/07/24 2032 **OR** acetaminophen (Tylenol) oral liquid 650 mg, 650 mg, oral, q4h PRN **OR** acetaminophen (Tylenol) suppository 650 mg, 650 mg, rectal, q4h PRN, Krain Zavaleta MD    ALPRAZolam (Xanax) tablet 0.5 mg, 0.5 mg, oral, Daily PRN, Karin Zavaleta MD, 0.5 mg at 06/06/24 1821    alum-mag hydroxide-simeth (Mylanta) 200-200-20 mg/5 mL oral suspension 30 mL, 30 mL, oral, 4x daily PRN, Rimma Fox MD, 30 mL at 06/08/24 0452    [Held by provider] amLODIPine (Norvasc) tablet 10 mg, 10 mg, oral, Daily, Karin Zavaleta MD, 10 mg at 05/30/24 0919    aspirin chewable tablet 81 mg, 81 mg, oral, Daily, Karin Zavaleta MD, 81 mg at 06/08/24 0758    atorvastatin (Lipitor) tablet 40 mg, 40 mg, oral, Daily, Karin Zavaleta MD, 40 mg at 06/08/24 0758    baclofen (Lioresal) tablet 5 mg, 5 mg, oral, TID PRN, SOLOMON David-CNP, 5 mg at 06/05/24 1019    clopidogrel (Plavix) tablet 75 mg, 75 mg, oral, Daily, Karin Zavaleta MD, 75 mg at 06/08/24 0758    FLUoxetine (PROzac) capsule 20 mg, 20 mg, oral, Daily, Karin Zavaleta MD, 20 mg at 06/08/24 3209    [Held by provider]  hydroCHLOROthiazide (Microzide) capsule 12.5 mg, 12.5 mg, oral, q AM, Karin Zavaleta MD    HYDROmorphone (Dilaudid) tablet 4 mg, 4 mg, oral, q4h PRN, Chintan Chowdhury MD, 4 mg at 06/08/24 0444    hydromorphone PCA 0.5 mg/mL in NS opioid naive, , intravenous, Continuous, Kemal Layne DO, Stopped at 06/07/24 1816    ipratropium-albuteroL (Duo-Neb) 0.5-2.5 mg/3 mL nebulizer solution 3 mL, 3 mL, nebulization, TID, Kemal Layne DO, 3 mL at 06/07/24 2049    ipratropium-albuteroL (Duo-Neb) 0.5-2.5 mg/3 mL nebulizer solution 3 mL, 3 mL, nebulization, q2h PRN, Kemal Layne DO, 3 mL at 06/03/24 0140    melatonin tablet 3 mg, 3 mg, oral, Nightly PRN, Karin Zavaleta MD, 3 mg at 06/06/24 2113    metoclopramide (Reglan) tablet 10 mg, 10 mg, oral, q6h PRN, 10 mg at 06/08/24 0803 **OR** metoclopramide (Reglan) injection 10 mg, 10 mg, intravenous, q6h PRN, Kemal Layne DO, 10 mg at 06/06/24 1158    metoprolol tartrate (Lopressor) tablet 25 mg, 25 mg, oral, BID, Daina Peres MD, 25 mg at 06/08/24 0758    [Held by provider] morphine injection 2 mg, 2 mg, intravenous, q4h PRN, Karin Zavaleta MD, 2 mg at 05/31/24 1409    [Held by provider] morphine injection 4 mg, 4 mg, intravenous, q4h PRN, Karin Zavaleta MD, 4 mg at 06/03/24 1430    naloxone (Narcan) injection 0.2 mg, 0.2 mg, intravenous, PRN, Kemal Roverto, DO    nicotine (Nicoderm CQ) 21 mg/24 hr patch 1 patch, 1 patch, transdermal, Daily, Karin Zavaleta MD, 1 patch at 06/08/24 0759    nitroglycerin (Nitrostat) SL tablet 0.4 mg, 0.4 mg, sublingual, q5 min PRN, Karin Zavaleta MD, 0.4 mg at 05/30/24 1250    ondansetron ODT (Zofran-ODT) disintegrating tablet 4 mg, 4 mg, oral, q8h PRN **OR** ondansetron (Zofran) injection 4 mg, 4 mg, intravenous, q8h PRN, Kemal Layne,     oxygen (O2) therapy, , inhalation, Continuous PRN - O2/gases, Kemal Layne, , 50 percent at 06/06/24 1253    oxygen (O2) therapy, ,  inhalation, Continuous PRN - O2/gases, Kemal Layne DO, 6 L/min at 06/08/24 0756    pantoprazole (ProtoNix) EC tablet 40 mg, 40 mg, oral, Daily before breakfast, Karin Zavaleta MD, 40 mg at 06/08/24 0452    piperacillin-tazobactam-dextrose (Zosyn) IV 3.375 g, 3.375 g, intravenous, q8h, Rimma Fox MD, Stopped at 06/08/24 0844    polyethylene glycol (Glycolax, Miralax) packet 17 g, 17 g, oral, Daily PRN, Karin Zavaleta MD, 17 g at 06/01/24 1213    sodium chloride (Ocean) 0.65 % nasal spray 1 spray, 1 spray, Each Nostril, 4x daily PRN, Kemal Layne DO    sodium chloride 0.9% infusion, 30 mL/hr, intravenous, Continuous, Kemal Layne DO, Last Rate: 30 mL/hr at 06/07/24 1817, 30 mL/hr at 06/07/24 1817    Past Medical History  Active Ambulatory Problems     Diagnosis Date Noted    Anxiety 03/23/2023    Acne rosacea 04/12/2023    Coronary stent patent 04/12/2023    Degeneration of cervical intervertebral disc with myelopathy 04/12/2023    Dyslipidemia 04/12/2023    Left lumbar radiculopathy 04/12/2023    Lumbar disc disease with radiculopathy 04/12/2023    Neck pain 04/12/2023    Osseous stenosis of neural canal of cervical region 04/12/2023    Osteoarthritis of right knee 04/12/2023    Sciatica of right side 04/12/2023    Tremor of right hand 04/12/2023    Medication management 05/29/2023    Asthma ([facility]) 01/31/2014    Ataxia 12/20/2021    Closed fracture of distal phalanx or phalanges of hand 05/10/2010    Coronary artery disease involving native coronary artery of native heart without angina pectoris 01/10/2013    Depression 06/14/2023    Essential hypertension 11/09/2016    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    Closed fracture of distal phalanx of digit of hand 05/10/2010    Mixed hyperlipidemia 08/28/2023    LVH (left ventricular hypertrophy) 10/11/2023    Aortic valve regurgitation 10/11/2023    BMI 23.0-23.9, adult 10/11/2023    Current every day smoker 10/11/2023    Simple  "chronic bronchitis (Multi) 10/17/2023    Need for influenza vaccination 10/17/2023    Encounter for monitoring opioid maintenance therapy 04/16/2024    Encounter for long term benzodiazepine therapy 04/16/2024    Atrophic gastritis without hemorrhage 05/16/2024     Resolved Ambulatory Problems     Diagnosis Date Noted    SOB (shortness of breath) 01/07/2020     Past Medical History:   Diagnosis Date    CAD (coronary artery disease)     Chronic sinusitis, unspecified 07/13/2021    Hyperlipidemia     Hypertension        PHYSICAL EXAM  VS: /74 (BP Location: Left arm, Patient Position: Sitting)   Pulse 81   Temp 36.3 °C (97.3 °F) (Temporal)   Resp 18   Ht 1.702 m (5' 7\")   Wt 71.4 kg (157 lb 6.5 oz)   SpO2 96%   BMI 24.65 kg/m²  Body mass index is 24.65 kg/m².  Physical Exam  Constitutional:       General: He is not in acute distress.     Appearance: Normal appearance.   HENT:      Nose:      Comments: High flow NC     Mouth/Throat:      Mouth: Mucous membranes are moist.      Pharynx: Oropharynx is clear.   Eyes:      General: Scleral icterus present.      Extraocular Movements: Extraocular movements intact.      Conjunctiva/sclera: Conjunctivae normal.      Pupils: Pupils are equal, round, and reactive to light.   Cardiovascular:      Rate and Rhythm: Normal rate and regular rhythm.      Heart sounds: No murmur heard.  Pulmonary:      Effort: Pulmonary effort is normal.      Breath sounds: No wheezing or rhonchi.   Abdominal:      General: Bowel sounds are normal. There is no distension.      Palpations: Abdomen is soft. There is no mass.      Tenderness: There is abdominal tenderness (mild).      Hernia: No hernia is present.   Musculoskeletal:         General: No swelling or deformity.   Skin:     General: Skin is warm.      Coloration: Skin is jaundiced.   Neurological:      General: No focal deficit present.      Mental Status: He is alert and oriented to person, place, and time.   Psychiatric:        "  Mood and Affect: Mood normal.          DATA  Recent blood work was reviewed and discussed with the patient   Results from last 7 days   Lab Units 06/08/24  0445   WBC AUTO x10*3/uL 12.8*   RBC AUTO x10*6/uL 2.45*   HEMOGLOBIN g/dL 8.4*   HEMATOCRIT % 25.6*   MCV fL 105*   MCHC g/dL 32.8   RDW % 15.9*   PLATELETS AUTO x10*3/uL 422       Results from last 72 hours   Lab Units 06/08/24  0445   SODIUM mmol/L 135*   POTASSIUM mmol/L 3.5   CHLORIDE mmol/L 101   CO2 mmol/L 27   BUN mg/dL 10   CREATININE mg/dL 0.43*   CALCIUM mg/dL 7.8*   PROTEIN TOTAL g/dL 5.6*   BILIRUBIN TOTAL mg/dL 7.0*   ALK PHOS U/L 173*   AST U/L 132*   ALT U/L 126*     RADIOLOGY REVIEW  NA    ENDOSCOPIC REVIEW  N/A    IMPRESSION/RECOMMENDATIONS  Emerson Chilel is a 64 y.o. male presenting with pancreatic mass and juandice. PMH of HTN, HLD, CAD s/p PCI, tobacco abuse, alcohol abuse who presented to the ER with abdominal pain, N/V, jaundice.     Patient underwent EUS/ERCP with Dr. Gong 5/28 with metal stent placement into distal CBD.  Developed post ERCP pancreatitis.    # pancreatic adenocarcinoma- s/p ERCP with metal stent placement  # transaminitis/ hyperbilirubinemia-downtrending  # post ERCP pancreatitis   # NSTEMI s/p PCI  # pulmonary edema and/or multifocal pneumonia   # leukocytosis- improving   # jaundice  # previous EtOH misuse  # Chronic antiplatelet Plavix and aspirin    PLAN  - Patient states he plans to pursue hospice  - discussed NJ tube placement for enteral nutrition which pt is currently declining   - wean O2 as tolerated  - continue PCA for pain control  - continue baclofen 5 mg tid prn hiccups/spasms  - continue antibiotics per primary team/ID recs  - pt ok for anticoagulation/ antiplatelet from GI standpoint  - pt will establish care with pancreaticobiliary surgery  - follow up oncology recs  -Advance to low-fat diet as tolerated   - continue supportive care     Discussed with Dr. Zamora, GI to sign off, please call with  questions or concerns    (Electronically signed byAntonella Baptiste PA-C on 6/8/2024 at 8:41 AM)

## 2024-06-08 NOTE — NURSING NOTE
RN Hospice Note    Met with patient and spouse Anna to discuss hospice services/benefit and goals of care. Discussed how hospice services would look at home and pt's needs prior to discharging home with hospice. Pt signed consents to begin services on arrival home. Hospital bed and oxygen ordered for delivery tomorrow before noon. Hospice nurse to visit tomorrow to coordinate discharge home.    Please call [facility] hospice team at [phone] if there are any questions/concerns or if pt expires.    Thank you,  Tiffanie Hendrix RN

## 2024-06-09 VITALS
DIASTOLIC BLOOD PRESSURE: 68 MMHG | BODY MASS INDEX: 24.29 KG/M2 | OXYGEN SATURATION: 97 % | WEIGHT: 154.76 LBS | SYSTOLIC BLOOD PRESSURE: 123 MMHG | HEIGHT: 67 IN | HEART RATE: 80 BPM | RESPIRATION RATE: 18 BRPM | TEMPERATURE: 97.5 F

## 2024-06-09 LAB
ALBUMIN SERPL BCP-MCNC: 2.7 G/DL (ref 3.4–5)
ALP SERPL-CCNC: 171 U/L (ref 33–136)
ALT SERPL W P-5'-P-CCNC: 110 U/L (ref 10–52)
ANION GAP SERPL CALC-SCNC: 11 MMOL/L (ref 10–20)
AST SERPL W P-5'-P-CCNC: 105 U/L (ref 9–39)
ATRIAL RATE: 99 BPM
BILIRUB SERPL-MCNC: 6.3 MG/DL (ref 0–1.2)
BUN SERPL-MCNC: 7 MG/DL (ref 6–23)
CALCIUM SERPL-MCNC: 7.8 MG/DL (ref 8.6–10.3)
CHLORIDE SERPL-SCNC: 103 MMOL/L (ref 98–107)
CO2 SERPL-SCNC: 26 MMOL/L (ref 21–32)
CREAT SERPL-MCNC: 0.45 MG/DL (ref 0.5–1.3)
EGFRCR SERPLBLD CKD-EPI 2021: >90 ML/MIN/1.73M*2
ERYTHROCYTE [DISTWIDTH] IN BLOOD BY AUTOMATED COUNT: 15.9 % (ref 11.5–14.5)
GLUCOSE SERPL-MCNC: 82 MG/DL (ref 74–99)
HCT VFR BLD AUTO: 27 % (ref 41–52)
HGB BLD-MCNC: 8.6 G/DL (ref 13.5–17.5)
MAGNESIUM SERPL-MCNC: 2.42 MG/DL (ref 1.6–2.4)
MCH RBC QN AUTO: 34.1 PG (ref 26–34)
MCHC RBC AUTO-ENTMCNC: 31.9 G/DL (ref 32–36)
MCV RBC AUTO: 107 FL (ref 80–100)
NRBC BLD-RTO: 0 /100 WBCS (ref 0–0)
P AXIS: 81 DEGREES
P OFFSET: 210 MS
P ONSET: 145 MS
PLATELET # BLD AUTO: 478 X10*3/UL (ref 150–450)
POTASSIUM SERPL-SCNC: 3.7 MMOL/L (ref 3.5–5.3)
PR INTERVAL: 158 MS
PROT SERPL-MCNC: 5.7 G/DL (ref 6.4–8.2)
Q ONSET: 224 MS
QRS COUNT: 16 BEATS
QRS DURATION: 78 MS
QT INTERVAL: 382 MS
QTC CALCULATION(BAZETT): 490 MS
QTC FREDERICIA: 451 MS
R AXIS: -9 DEGREES
RBC # BLD AUTO: 2.52 X10*6/UL (ref 4.5–5.9)
SODIUM SERPL-SCNC: 136 MMOL/L (ref 136–145)
T AXIS: 52 DEGREES
T OFFSET: 415 MS
VENTRICULAR RATE: 99 BPM
WBC # BLD AUTO: 13.9 X10*3/UL (ref 4.4–11.3)

## 2024-06-09 PROCEDURE — 2500000001 HC RX 250 WO HCPCS SELF ADMINISTERED DRUGS (ALT 637 FOR MEDICARE OP): Performed by: STUDENT IN AN ORGANIZED HEALTH CARE EDUCATION/TRAINING PROGRAM

## 2024-06-09 PROCEDURE — 80053 COMPREHEN METABOLIC PANEL: CPT | Performed by: STUDENT IN AN ORGANIZED HEALTH CARE EDUCATION/TRAINING PROGRAM

## 2024-06-09 PROCEDURE — 94640 AIRWAY INHALATION TREATMENT: CPT

## 2024-06-09 PROCEDURE — 2500000001 HC RX 250 WO HCPCS SELF ADMINISTERED DRUGS (ALT 637 FOR MEDICARE OP): Performed by: INTERNAL MEDICINE

## 2024-06-09 PROCEDURE — 36415 COLL VENOUS BLD VENIPUNCTURE: CPT | Performed by: STUDENT IN AN ORGANIZED HEALTH CARE EDUCATION/TRAINING PROGRAM

## 2024-06-09 PROCEDURE — 2500000001 HC RX 250 WO HCPCS SELF ADMINISTERED DRUGS (ALT 637 FOR MEDICARE OP): Performed by: NURSE PRACTITIONER

## 2024-06-09 PROCEDURE — 2500000004 HC RX 250 GENERAL PHARMACY W/ HCPCS (ALT 636 FOR OP/ED): Performed by: STUDENT IN AN ORGANIZED HEALTH CARE EDUCATION/TRAINING PROGRAM

## 2024-06-09 PROCEDURE — 2500000005 HC RX 250 GENERAL PHARMACY W/O HCPCS: Performed by: STUDENT IN AN ORGANIZED HEALTH CARE EDUCATION/TRAINING PROGRAM

## 2024-06-09 PROCEDURE — 2500000002 HC RX 250 W HCPCS SELF ADMINISTERED DRUGS (ALT 637 FOR MEDICARE OP, ALT 636 FOR OP/ED): Performed by: STUDENT IN AN ORGANIZED HEALTH CARE EDUCATION/TRAINING PROGRAM

## 2024-06-09 PROCEDURE — 99239 HOSP IP/OBS DSCHRG MGMT >30: CPT | Performed by: STUDENT IN AN ORGANIZED HEALTH CARE EDUCATION/TRAINING PROGRAM

## 2024-06-09 PROCEDURE — S4991 NICOTINE PATCH NONLEGEND: HCPCS | Performed by: STUDENT IN AN ORGANIZED HEALTH CARE EDUCATION/TRAINING PROGRAM

## 2024-06-09 PROCEDURE — 85027 COMPLETE CBC AUTOMATED: CPT | Performed by: STUDENT IN AN ORGANIZED HEALTH CARE EDUCATION/TRAINING PROGRAM

## 2024-06-09 PROCEDURE — 83735 ASSAY OF MAGNESIUM: CPT | Performed by: STUDENT IN AN ORGANIZED HEALTH CARE EDUCATION/TRAINING PROGRAM

## 2024-06-09 RX ORDER — ONDANSETRON 8 MG/1
8 TABLET, ORALLY DISINTEGRATING ORAL EVERY 8 HOURS PRN
Qty: 20 TABLET | Refills: 0 | Status: SHIPPED | OUTPATIENT
Start: 2024-06-09 | End: 2024-06-09 | Stop reason: HOSPADM

## 2024-06-09 RX ORDER — HYDROMORPHONE HYDROCHLORIDE 4 MG/1
4 TABLET ORAL EVERY 4 HOURS PRN
Qty: 10 TABLET | Refills: 0 | Status: CANCELLED | OUTPATIENT
Start: 2024-06-09 | End: 2024-06-19

## 2024-06-09 RX ORDER — METOPROLOL TARTRATE 25 MG/1
25 TABLET, FILM COATED ORAL 2 TIMES DAILY
Qty: 60 TABLET | Refills: 1 | Status: SHIPPED | OUTPATIENT
Start: 2024-06-09 | End: 2024-08-08

## 2024-06-09 RX ADMIN — ATORVASTATIN CALCIUM 40 MG: 40 TABLET, FILM COATED ORAL at 07:49

## 2024-06-09 RX ADMIN — BACLOFEN 5 MG: 5 TABLET ORAL at 00:40

## 2024-06-09 RX ADMIN — ALUMINUM HYDROXIDE, MAGNESIUM HYDROXIDE, AND DIMETHICONE 30 ML: 200; 20; 200 SUSPENSION ORAL at 12:05

## 2024-06-09 RX ADMIN — ALUMINUM HYDROXIDE, MAGNESIUM HYDROXIDE, AND DIMETHICONE 30 ML: 200; 20; 200 SUSPENSION ORAL at 07:48

## 2024-06-09 RX ADMIN — METOCLOPRAMIDE 10 MG: 10 TABLET ORAL at 07:48

## 2024-06-09 RX ADMIN — HYDROMORPHONE HYDROCHLORIDE 4 MG: 4 TABLET ORAL at 00:40

## 2024-06-09 RX ADMIN — ALPRAZOLAM 0.5 MG: 0.5 TABLET ORAL at 15:15

## 2024-06-09 RX ADMIN — IPRATROPIUM BROMIDE AND ALBUTEROL SULFATE 3 ML: 2.5; .5 SOLUTION RESPIRATORY (INHALATION) at 15:42

## 2024-06-09 RX ADMIN — HYDROMORPHONE HYDROCHLORIDE 4 MG: 4 TABLET ORAL at 04:49

## 2024-06-09 RX ADMIN — ASPIRIN 81 MG 81 MG: 81 TABLET ORAL at 07:50

## 2024-06-09 RX ADMIN — BACLOFEN 5 MG: 5 TABLET ORAL at 15:15

## 2024-06-09 RX ADMIN — FLUOXETINE HYDROCHLORIDE 20 MG: 20 CAPSULE ORAL at 07:49

## 2024-06-09 RX ADMIN — METOPROLOL TARTRATE 25 MG: 25 TABLET, FILM COATED ORAL at 07:49

## 2024-06-09 RX ADMIN — PIPERACILLIN SODIUM AND TAZOBACTAM SODIUM 3.38 G: 3; .375 INJECTION, SOLUTION INTRAVENOUS at 12:05

## 2024-06-09 RX ADMIN — NICOTINE 1 PATCH: 21 PATCH, EXTENDED RELEASE TRANSDERMAL at 07:50

## 2024-06-09 RX ADMIN — Medication 6 L/MIN: at 07:00

## 2024-06-09 RX ADMIN — METOCLOPRAMIDE 10 MG: 10 TABLET ORAL at 15:15

## 2024-06-09 RX ADMIN — ALUMINUM HYDROXIDE, MAGNESIUM HYDROXIDE, AND DIMETHICONE 30 ML: 200; 20; 200 SUSPENSION ORAL at 15:15

## 2024-06-09 RX ADMIN — PANTOPRAZOLE SODIUM 40 MG: 40 TABLET, DELAYED RELEASE ORAL at 04:49

## 2024-06-09 RX ADMIN — BACLOFEN 5 MG: 5 TABLET ORAL at 07:48

## 2024-06-09 RX ADMIN — CLOPIDOGREL BISULFATE 75 MG: 75 TABLET ORAL at 07:49

## 2024-06-09 RX ADMIN — HYDROMORPHONE HYDROCHLORIDE 4 MG: 4 TABLET ORAL at 12:28

## 2024-06-09 ASSESSMENT — COGNITIVE AND FUNCTIONAL STATUS - GENERAL
DAILY ACTIVITIY SCORE: 24
MOBILITY SCORE: 24

## 2024-06-09 ASSESSMENT — PAIN - FUNCTIONAL ASSESSMENT
PAIN_FUNCTIONAL_ASSESSMENT: 0-10

## 2024-06-09 ASSESSMENT — PAIN SCALES - GENERAL
PAINLEVEL_OUTOF10: 8
PAINLEVEL_OUTOF10: 0 - NO PAIN
PAINLEVEL_OUTOF10: 4
PAINLEVEL_OUTOF10: 3
PAINLEVEL_OUTOF10: 2
PAINLEVEL_OUTOF10: 7

## 2024-06-09 ASSESSMENT — PAIN SCALES - WONG BAKER
WONGBAKER_NUMERICALRESPONSE: NO HURT
WONGBAKER_NUMERICALRESPONSE: NO HURT

## 2024-06-09 NOTE — PROGRESS NOTES
06/09/24 0854   Discharge Planning   Patient expects to be discharged to: home with hospice     Per hospice note, patient plans to be discharged home with hospice. Hospice is arranging equipment for home and transportation.

## 2024-06-09 NOTE — NURSING NOTE
Patient transported home. Hospice Nurse handled transfer. Patient's vital signs stable and pain controlled. [facility]iroga transport ETA to patient home 30 minutes

## 2024-06-09 NOTE — DISCHARGE SUMMARY
Discharge Diagnosis  Pancreatic mass ([facility])    Issues Requiring Follow-Up  Follow up with hospice    Test Results Pending At Discharge  Pending Labs       Order Current Status    [facility] HOLD In process            Hospital Course   Pt originally transferred from OSH for EUS/ERCP having presented with jaundice and pancreatic mass. Study was completed 5/29 with subsequent worsening abdominal pain; procedure was c/b difficulty cannulating. Lipase uptrended.  significantly elevated. Repeat abdominal CT from 5/31 for further abdominal pain showing acute interstitial edematous pancreatitis, with 37mm of acute peripancreatic fluid along the distal greater curvature of the stomach. Attempted trial of scheduled morphine for acute pain control, however negligible difference.  Placed on dilaudid PCA 6/3 with improvement (weaned off on 6/8). Pathology noted to result, showing invasive adenocarcinoma.  Patient was seen by oncology team, recommend outpatient follow-up to discuss chemotherapy options.  Patient was gradually weaned off of PCA pump and transition to oral Dilaudid.  Patient's clinical course was also complicated with development of chest pain, ultimately requiring heart cath which showed LAD stenosis requiring a drug-eluting stent.  Due to diagnosis of adenocarcinoma of pancreas, as well as patient's family history of pancreatic cancer, patient ultimately decided that he does not want to go through the process his family members did and ultimately ends up at the same spot, patient requested palliative care and hospice meeting, ultimately decided to go with hospice at home.  Discharged on 6/9 to home with hospice.    Pertinent Physical Exam At Time of Discharge    Constitutional: ill appearing, awake/alert/oriented x3, no distress, jaundiced.   Head/Neck: Neck supple  Respiratory/Thorax: diminished b/l with minimal wheezing, on oxymizer  Cardiovascular: Regular, rate and rhythm, no murmurs  Gastrointestinal:  mild distension, tenderness to the epigastric region  Musculoskeletal: ROM intact  Extremities: 1+ edema b/l lower extremity    Home Medications     Medication List      START taking these medications     ondansetron ODT 8 mg disintegrating tablet; Commonly known as:   Zofran-ODT; Take 1 tablet (8 mg) by mouth every 8 hours if needed for   nausea or vomiting.     CHANGE how you take these medications     metoprolol tartrate 25 mg tablet; Commonly known as: Lopressor; Take 1   tablet (25 mg) by mouth 2 times a day.; What changed: See the new   instructions.     CONTINUE taking these medications     aspirin 81 mg chewable tablet   atorvastatin 40 mg tablet; Commonly known as: Lipitor; Take 1 tablet (40   mg) by mouth once daily.   clopidogrel 75 mg tablet; Commonly known as: Plavix   FLUoxetine 20 mg capsule; Commonly known as: PROzac; TAKE ONE CAPSULE BY   MOUTH DAILY   nitroglycerin 0.4 mg SL tablet; Commonly known as: Nitrostat; Place 1   tablet (0.4 mg) under the tongue every 5 minutes if needed for chest pain.   pantoprazole 40 mg EC tablet; Commonly known as: ProtoNix; Take 1 tablet   (40 mg) by mouth once daily. Do not crush, chew, or split.     STOP taking these medications     albuterol 90 mcg/actuation inhaler   ALPRAZolam 0.5 mg tablet; Commonly known as: Xanax   amLODIPine 10 mg tablet; Commonly known as: Norvasc   hydroCHLOROthiazide 12.5 mg capsule; Commonly known as: Microzide   HYDROcodone-acetaminophen 5-325 mg tablet; Commonly known as: Norco       Outpatient Follow-Up  Future Appointments   Date Time Provider Department [place]   6/20/2024 11:00 AM Evelyn Salter MD [id number] [place]   6/20/2024 12:00 PM MD EN [id number] [place]   7/12/2024 10:00 AM Anthony Moore DO [email or web address] [place]   12/11/2024 11:30 AM Zack Bsoton DO [id number] [place]       Chintan Chowdhury MD

## 2024-06-09 NOTE — NURSING NOTE
RN Hospice Note    Emerson Chilel is a Hospice Patient.   Hospice terminal diagnosis: Pancreatic Cancer  Physician: Dr. Trenton Woodson  Visit type: Routine Visit    Comments/recommendations: Pt discharging home with hospice care today.  Discharge medication orders for comfort obtained via hospice medical provider.  Dr. Anthony Moore, PCP wishes for hospice provider to follow at discharge-pt/wife in agreement.  DME being delivered to pt home this morning.  Hospice covered medications ordered for delivery to pt home.  Transport arranged via [facility] for 4:30pm .  Wife, Anna updated via phone and verbalized agreement.       Plan of care reviewed with patient/family members:   1) Pt  2) Anna Chilel, wife (via phone)     Plan of care reviewed with hospital staff members:   1) Zack Vincent RN  2) Sandra June RN/MALKA   3) Dr. Trenton Woodson     Please notify [facility] of any changes in condition. Thank you.  Office: [phone] (8 am-6:30 pm M-F and 8 am-4:30 pm weekends and holidays)   [phone] (6:30 pm-8 am M-F and 4:30 pm-8 am weekends and holidays)    Leila Whitehead RN

## 2024-06-09 NOTE — CARE PLAN
The clinical goals for the shift include patient will remain HDS throughout shift. The patient met this goal     S: Patient admitted 5/25 for abdominal pain   B: History of: HTN, HLD, CAD, tobacco and ETOH use   A: Patient A&Ox4, denies complaints of chest pain and shortness of breath. Remains in NSR and on 6 L NC. Medicated per orders throughout shift.    R: Patient to discharge home with [facility]

## 2024-06-14 LAB — ACT BLD: 359 SEC (ref 89–169)

## 2024-06-20 ENCOUNTER — APPOINTMENT (OUTPATIENT)
Dept: SURGICAL ONCOLOGY | Facility: CLINIC | Age: 64
End: 2024-06-20
Payer: COMMERCIAL

## 2024-06-20 ENCOUNTER — APPOINTMENT (OUTPATIENT)
Dept: HEMATOLOGY/ONCOLOGY | Facility: CLINIC | Age: 64
End: 2024-06-20
Payer: COMMERCIAL

## 2024-06-24 DIAGNOSIS — K29.40 ATROPHIC GASTRITIS WITHOUT HEMORRHAGE: ICD-10-CM

## 2024-06-24 RX ORDER — PANTOPRAZOLE SODIUM 40 MG/1
TABLET, DELAYED RELEASE ORAL
Qty: 30 TABLET | Refills: 5 | Status: SHIPPED | OUTPATIENT
Start: 2024-06-24

## 2024-07-12 ENCOUNTER — APPOINTMENT (OUTPATIENT)
Dept: PRIMARY CARE | Facility: CLINIC | Age: 64
End: 2024-07-12
Payer: COMMERCIAL

## 2024-12-11 ENCOUNTER — APPOINTMENT (OUTPATIENT)
Dept: CARDIOLOGY | Facility: CLINIC | Age: 64
End: 2024-12-11
Payer: COMMERCIAL

## (undated) DEVICE — CATHETER, BALLOON DILATION, EUPHORA SEMICOMPLIANT 2.50  X 12 MM X 142CM

## (undated) DEVICE — CATHETER, BALLOON, NC EUPHORA NONCOMPLIANT 3.5 X 20 X 142CM

## (undated) DEVICE — GUIDEWIRE, RUN THROUGH WIRE, 180CM

## (undated) DEVICE — INFLATION DEVICE, BASIXCOMPAX, 30 ATM/BAR, 20ML, MAP152

## (undated) DEVICE — CATHETER, EAGLE EYE, PLATNIUM (IVUS)

## (undated) DEVICE — DEPOT, BAG 1400ML, 48IN LG BORE, AIRLESS MALE, MACRO DRIP

## (undated) DEVICE — CATHETER, BALLOON, NC EUPHORA NONCOMPLIANT 3.75 X 12 X 142CM

## (undated) DEVICE — VALVE, CONTROL BLEEDBACK

## (undated) DEVICE — GUIDEWIRE, SION BLUE PTCA, 0.041 X 190CM, STRT

## (undated) DEVICE — CATHETER, DIAGNOSTIC, JUDKINS, RIGHT, 5 FR-JR 4.0

## (undated) DEVICE — TUBING, PRESSURE MONITOR, 24IN/61CM, FIXED FEMALE LUER

## (undated) DEVICE — TR BAND, RADIAL COMPRESSION, STANDARD, 24CM

## (undated) DEVICE — CATHETER, OPTITORQUE, 5FR, JACKY, 3.5/ 2H/110CM, CURVED

## (undated) DEVICE — MANIFOLD KIT, CUSTOM (SJM)

## (undated) DEVICE — SHEATH, GLIDESHEATH, SLENDER, 6FR 10CM

## (undated) DEVICE — CATHETER, GUIDING, LAUNCHER, 6FR EBU 3.5

## (undated) DEVICE — Device

## (undated) DEVICE — MBRACE, WRIST SUPPORT WITH HOOK